# Patient Record
Sex: MALE | Race: BLACK OR AFRICAN AMERICAN | NOT HISPANIC OR LATINO | Employment: OTHER | URBAN - METROPOLITAN AREA
[De-identification: names, ages, dates, MRNs, and addresses within clinical notes are randomized per-mention and may not be internally consistent; named-entity substitution may affect disease eponyms.]

---

## 2017-04-04 ENCOUNTER — OFFICE VISIT (OUTPATIENT)
Dept: PULMONOLOGY | Facility: CLINIC | Age: 67
End: 2017-04-04
Payer: MEDICARE

## 2017-04-04 ENCOUNTER — HOSPITAL ENCOUNTER (OUTPATIENT)
Dept: PULMONOLOGY | Facility: CLINIC | Age: 67
Discharge: HOME | End: 2017-04-04
Payer: MEDICARE

## 2017-04-04 VITALS
BODY MASS INDEX: 28.25 KG/M2 | OXYGEN SATURATION: 96 % | DIASTOLIC BLOOD PRESSURE: 74 MMHG | HEIGHT: 67 IN | HEART RATE: 85 BPM | SYSTOLIC BLOOD PRESSURE: 142 MMHG | WEIGHT: 180 LBS

## 2017-04-04 DIAGNOSIS — J45.40 MODERATE PERSISTENT ASTHMA WITHOUT COMPLICATION: ICD-10-CM

## 2017-04-04 DIAGNOSIS — J45.30 MILD PERSISTENT ASTHMA IN ADULT WITHOUT COMPLICATION: Primary | ICD-10-CM

## 2017-04-04 LAB
PRE FEV1 FVC: 57
PRE FEV1: 1.35
PRE FVC: 2.38
PREDICTED FEV1 FVC: 80
PREDICTED FEV1: 2.52
PREDICTED FVC: 3.14

## 2017-04-04 PROCEDURE — 1157F ADVNC CARE PLAN IN RCRD: CPT | Mod: S$GLB,,, | Performed by: INTERNAL MEDICINE

## 2017-04-04 PROCEDURE — 3078F DIAST BP <80 MM HG: CPT | Mod: S$GLB,,, | Performed by: INTERNAL MEDICINE

## 2017-04-04 PROCEDURE — 1159F MED LIST DOCD IN RCRD: CPT | Mod: S$GLB,,, | Performed by: INTERNAL MEDICINE

## 2017-04-04 PROCEDURE — 1160F RVW MEDS BY RX/DR IN RCRD: CPT | Mod: S$GLB,,, | Performed by: INTERNAL MEDICINE

## 2017-04-04 PROCEDURE — 94010 BREATHING CAPACITY TEST: CPT | Mod: S$GLB,,, | Performed by: INTERNAL MEDICINE

## 2017-04-04 PROCEDURE — 99214 OFFICE O/P EST MOD 30 MIN: CPT | Mod: S$GLB,,, | Performed by: INTERNAL MEDICINE

## 2017-04-04 PROCEDURE — 3077F SYST BP >= 140 MM HG: CPT | Mod: S$GLB,,, | Performed by: INTERNAL MEDICINE

## 2017-04-04 PROCEDURE — 1126F AMNT PAIN NOTED NONE PRSNT: CPT | Mod: S$GLB,,, | Performed by: INTERNAL MEDICINE

## 2017-04-04 PROCEDURE — 99499 UNLISTED E&M SERVICE: CPT | Mod: S$GLB,,, | Performed by: INTERNAL MEDICINE

## 2017-04-04 PROCEDURE — 99999 PR PBB SHADOW E&M-EST. PATIENT-LVL III: CPT | Mod: PBBFAC,,, | Performed by: INTERNAL MEDICINE

## 2017-04-04 NOTE — PROGRESS NOTES
Subjective:       Patient ID: Scooter Morrissey is a 66 y.o. male.    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 He has not seen me since December, and  The use of the Symbicort in doing well. Seldom has  To use his ventolin inhaler.       No flowsheet data found.]  Review of Systems   Constitutional: Negative.    HENT: Negative.    Eyes: Negative.    Respiratory: Positive for cough and dyspnea on extertion.         Cough seems to be triggered by changes in temperature of pollens.     Hx of empyema treated at [facility]   Cardiovascular: Negative.    Genitourinary: Negative.    Musculoskeletal: Negative.    Skin: Negative.    Gastrointestinal: Negative.    Neurological: Negative.    Psychiatric/Behavioral: Negative.        Objective:      Physical Exam   Constitutional: He is oriented to person, place, and time. He appears well-developed and well-nourished.   HENT:   Head: Normocephalic and atraumatic.   Right Ear: External ear normal.   Left Ear: External ear normal.   Eyes: Conjunctivae and EOM are normal. Pupils are equal, round, and reactive to light.   Neck: Normal range of motion. Neck supple.   Cardiovascular: Normal rate, regular rhythm and normal heart sounds.    Pulmonary/Chest: Effort normal and breath sounds normal.   Clear with decreased air entry NO wheezes or rales.    Abdominal: Soft. Bowel sounds are normal.   Musculoskeletal: Normal range of motion.   Neurological: He is alert and oriented to person, place, and time. He has normal reflexes.   Skin: Skin is warm and dry.   Psychiatric: He has a normal mood and affect. His behavior is normal. Judgment and thought content normal.           Assessment:       No diagnosis found.    Outpatient Encounter Prescriptions as of 4/4/2017   Medication Sig Dispense Refill    albuterol 90 mcg/actuation inhaler Inhale 2 puffs into the lungs every 6 (six) hours as needed. 1 Inhaler 12     amlodipine (NORVASC) 10 MG tablet Take 1 tablet (10 mg total) by mouth once daily. 90 tablet 3    atorvastatin (LIPITOR) 20 MG tablet Take 1 tablet (20 mg total) by mouth once daily. 90 tablet 3    budesonide-formoterol 160-4.5 mcg (SYMBICORT) 160-4.5 mcg/actuation HFAA Inhale 2 puffs into the lungs every 12 (twelve) hours. 1 Inhaler 6    losartan (COZAAR) 100 MG tablet Take 1 tablet (100 mg total) by mouth once daily. 90 tablet 3    metoprolol succinate (TOPROL-XL) 100 MG 24 hr tablet Take 1 tablet (100 mg total) by mouth once daily. 90 tablet 3    sildenafil (VIAGRA) 50 MG tablet Take 1 tablet (50 mg total) by mouth daily as needed for Erectile Dysfunction. 9 tablet 3     No facility-administered encounter medications on file as of 4/4/2017.      No orders of the defined types were placed in this encounter.    Plan:        Patient is stable Fev-1:1.35 liters 57% IMP Stable COPD

## 2017-04-07 DIAGNOSIS — J44.9 CHRONIC OBSTRUCTIVE PULMONARY DISEASE, UNSPECIFIED COPD TYPE: Primary | ICD-10-CM

## 2017-04-17 ENCOUNTER — TELEPHONE (OUTPATIENT)
Dept: FAMILY MEDICINE | Facility: CLINIC | Age: 67
End: 2017-04-17

## 2017-04-17 NOTE — TELEPHONE ENCOUNTER
Called patient to reschedule is appointment Wednesday because it was schedules incorrectly. Left a voicemail requesting a callback.

## 2017-04-18 ENCOUNTER — TELEPHONE (OUTPATIENT)
Dept: FAMILY MEDICINE | Facility: CLINIC | Age: 67
End: 2017-04-18

## 2017-04-18 NOTE — TELEPHONE ENCOUNTER
Called patient to reschedule tomorrow's appointment due to incorrect scheduling. Left a voicemail requesting a call back.

## 2017-04-20 ENCOUNTER — TELEPHONE (OUTPATIENT)
Dept: INTERNAL MEDICINE | Facility: CLINIC | Age: 67
End: 2017-04-20

## 2017-04-20 DIAGNOSIS — E78.2 MIXED HYPERLIPIDEMIA: Primary | ICD-10-CM

## 2017-04-20 RX ORDER — ATORVASTATIN CALCIUM 20 MG/1
20 TABLET, FILM COATED ORAL DAILY
Qty: 90 TABLET | Refills: 4 | Status: SHIPPED | OUTPATIENT
Start: 2017-04-20 | End: 2017-05-03 | Stop reason: SDUPTHER

## 2017-04-20 NOTE — TELEPHONE ENCOUNTER
----- Message from Sia Dos Santos sent at 4/20/2017  1:28 PM CDT -----  Contact: Laurence vizcaino [facility]/[phone]  Pharmacy said they have faxed for a refill on patient's atorvastatin (LIPITOR) 20 MG tablet and there has been no reply. Please send to pharmacy on file. Please advise

## 2017-05-03 ENCOUNTER — OFFICE VISIT (OUTPATIENT)
Dept: FAMILY MEDICINE | Facility: CLINIC | Age: 67
End: 2017-05-03
Payer: MEDICARE

## 2017-05-03 VITALS
HEART RATE: 82 BPM | DIASTOLIC BLOOD PRESSURE: 80 MMHG | WEIGHT: 181.88 LBS | BODY MASS INDEX: 30.3 KG/M2 | HEIGHT: 65 IN | OXYGEN SATURATION: 96 % | SYSTOLIC BLOOD PRESSURE: 149 MMHG

## 2017-05-03 DIAGNOSIS — E66.9 OBESITY (BMI 30.0-34.9): ICD-10-CM

## 2017-05-03 DIAGNOSIS — I10 BENIGN ESSENTIAL HYPERTENSION: ICD-10-CM

## 2017-05-03 DIAGNOSIS — Z12.5 ENCOUNTER FOR SCREENING FOR MALIGNANT NEOPLASM OF PROSTATE: ICD-10-CM

## 2017-05-03 DIAGNOSIS — E78.2 MIXED HYPERLIPIDEMIA: ICD-10-CM

## 2017-05-03 DIAGNOSIS — M62.08 RECTUS DIASTASIS: ICD-10-CM

## 2017-05-03 DIAGNOSIS — Z79.899 MEDICATION MANAGEMENT: ICD-10-CM

## 2017-05-03 DIAGNOSIS — J01.00 ACUTE NON-RECURRENT MAXILLARY SINUSITIS: ICD-10-CM

## 2017-05-03 DIAGNOSIS — J44.9 CHRONIC OBSTRUCTIVE PULMONARY DISEASE, UNSPECIFIED COPD TYPE: ICD-10-CM

## 2017-05-03 DIAGNOSIS — Z00.00 ENCOUNTER FOR MEDICARE ANNUAL WELLNESS EXAM: Primary | ICD-10-CM

## 2017-05-03 DIAGNOSIS — H66.001 ACUTE SUPPURATIVE OTITIS MEDIA OF RIGHT EAR WITHOUT SPONTANEOUS RUPTURE OF TYMPANIC MEMBRANE, RECURRENCE NOT SPECIFIED: ICD-10-CM

## 2017-05-03 DIAGNOSIS — Z23 NEED FOR SHINGLES VACCINE: ICD-10-CM

## 2017-05-03 PROCEDURE — 99999 PR PBB SHADOW E&M-EST. PATIENT-LVL III: CPT | Mod: PBBFAC,,, | Performed by: FAMILY MEDICINE

## 2017-05-03 PROCEDURE — 99499 UNLISTED E&M SERVICE: CPT | Mod: S$GLB,,, | Performed by: FAMILY MEDICINE

## 2017-05-03 PROCEDURE — 3079F DIAST BP 80-89 MM HG: CPT | Mod: S$GLB,,, | Performed by: FAMILY MEDICINE

## 2017-05-03 PROCEDURE — 3077F SYST BP >= 140 MM HG: CPT | Mod: S$GLB,,, | Performed by: FAMILY MEDICINE

## 2017-05-03 PROCEDURE — 99397 PER PM REEVAL EST PAT 65+ YR: CPT | Mod: S$GLB,,, | Performed by: FAMILY MEDICINE

## 2017-05-03 RX ORDER — AMLODIPINE BESYLATE 10 MG/1
10 TABLET ORAL DAILY
Qty: 90 TABLET | Refills: 3 | Status: SHIPPED | OUTPATIENT
Start: 2017-05-03 | End: 2018-05-04 | Stop reason: SDUPTHER

## 2017-05-03 RX ORDER — METOPROLOL SUCCINATE 100 MG/1
100 TABLET, EXTENDED RELEASE ORAL DAILY
Qty: 90 TABLET | Refills: 3 | Status: SHIPPED | OUTPATIENT
Start: 2017-05-03 | End: 2018-07-30 | Stop reason: SDUPTHER

## 2017-05-03 RX ORDER — ALBUTEROL SULFATE 90 UG/1
2 AEROSOL, METERED RESPIRATORY (INHALATION) EVERY 6 HOURS PRN
Qty: 1 INHALER | Refills: 12 | Status: SHIPPED | OUTPATIENT
Start: 2017-05-03 | End: 2018-02-02

## 2017-05-03 RX ORDER — AMOXICILLIN AND CLAVULANATE POTASSIUM 875; 125 MG/1; MG/1
1 TABLET, FILM COATED ORAL 2 TIMES DAILY
Qty: 20 TABLET | Refills: 0 | Status: SHIPPED | OUTPATIENT
Start: 2017-05-03 | End: 2017-11-29

## 2017-05-03 RX ORDER — ATORVASTATIN CALCIUM 20 MG/1
20 TABLET, FILM COATED ORAL DAILY
Qty: 90 TABLET | Refills: 4 | Status: SHIPPED | OUTPATIENT
Start: 2017-05-03 | End: 2018-07-08 | Stop reason: SDUPTHER

## 2017-05-03 RX ORDER — LOSARTAN POTASSIUM 100 MG/1
100 TABLET ORAL DAILY
Qty: 90 TABLET | Refills: 3 | Status: SHIPPED | OUTPATIENT
Start: 2017-05-03 | End: 2018-05-04 | Stop reason: SDUPTHER

## 2017-05-03 NOTE — PROGRESS NOTES
" Office Visit    Patient Name: Scooter Morrissey    : 1950  MRN: [id number]    Subjective:  Scooter is a 66 y.o. male who presents today for:    Annual Exam    Scooter presents today for annual wellness exam and for monitoring of chronic HTN, HLD, and chronic lung disease/ COPD for which he also sees pulmonary Dr Guerrier. Home blood pressures are 120-140s/60s.       They have been feeling overall well other than some sinus drainage with thick and "smelly" mucus.      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 Weight is up 10 lbs over the last year.      Immunizations: TDaP 16, Prevnar 13 4/10/2015, states he also had Pneumovax 23 around age 64. Shingles due     Screening Tests: colonoscopy- 2016 and repeat 10 years, PSA today and declines MARISA     Eye/Dental: eye exam up to date-- examined last week by [facility]--> cataracts, dental up to date           Past Medical History  Past Medical History:   Diagnosis Date    Anxiety 2016    Benign essential hypertension 2016    Hyperlipidemia 2016    Interstitial pneumonitis 2016       Past Surgical History  Past Surgical History:   Procedure Laterality Date    COLONOSCOPY N/A 2016    Procedure: COLONOSCOPY;  Surgeon: Meme Mills MD;  Location: [facility];  Service: Endoscopy;  Laterality: N/A;    FINGER SURGERY      15 years ago    TONSILLECTOMY         Family History  Family History   Problem Relation Age of Onset    No Known Problems Mother     No Known Problems Father        Social History  Social History     Social History    Marital status:      Spouse name: N/A    Number of children: N/A    Years of education: N/A     Occupational History    lawn " "service      Social History Main Topics    Smoking status: Former Smoker     Types: Cigars    Smokeless tobacco: Former User     Quit date: 5/12/1996    Alcohol use No    Drug use: No    Sexual activity: Yes     Partners: Female     Other Topics Concern    Not on file     Social History Narrative       Current Medications  Medications reviewed and updated.     Allergies   Review of patient's allergies indicates:  No Known Allergies    Review of Systems (Pertinent positives)  Review of Systems   Constitutional: Positive for unexpected weight change (weight gain).   HENT: Positive for congestion and postnasal drip.    Eyes: Negative for visual disturbance.   Respiratory: Positive for shortness of breath (stable).    Cardiovascular: Negative for chest pain.   Gastrointestinal: Negative for blood in stool, constipation and diarrhea.   Genitourinary: Negative for difficulty urinating.   Musculoskeletal: Positive for arthralgias (right knee pain).   Allergic/Immunologic: Positive for environmental allergies.   Neurological: Negative for dizziness and light-headedness.   Psychiatric/Behavioral: Negative for sleep disturbance.       BP (!) 149/80  Pulse 82  Ht 5' 5" (1.651 m)  Wt 82.5 kg (181 lb 14.1 oz)  SpO2 96%  BMI 30.27 kg/m2    Physical Exam   Constitutional: He is oriented to person, place, and time. He appears well-developed and well-nourished. No distress.   HENT:   Head: Normocephalic and atraumatic.   Right Ear: External ear normal. Tympanic membrane is erythematous and bulging. Tympanic membrane is not perforated.   Left Ear: External ear normal.   Nose: Mucosal edema and rhinorrhea present. Right sinus exhibits maxillary sinus tenderness.   Mouth/Throat: Oropharynx is clear and moist. No oropharyngeal exudate.   Eyes: Conjunctivae are normal. No scleral icterus.   Neck: No tracheal deviation present. No thyromegaly present.   Cardiovascular: Normal rate, regular rhythm, normal heart sounds and intact " distal pulses.    Pulmonary/Chest: Effort normal and breath sounds normal.   Abdominal: Soft. Bowel sounds are normal. He exhibits no distension and no mass. There is no tenderness. No hernia (but has notable midline rectus diastasis).   Musculoskeletal: Normal range of motion.   Lymphadenopathy:     He has no cervical adenopathy.   Neurological: He is alert and oriented to person, place, and time. He has normal reflexes.   Skin: Skin is warm and dry. No rash noted.   Psychiatric: He has a normal mood and affect.   Vitals reviewed.        Assessment/Plan:  Scooter Morrissey is a 66 y.o. male who presents today for :    Scooter was seen today for annual exam.    Diagnoses and all orders for this visit:    Encounter for Medicare annual wellness exam  -     Hemoglobin A1c; Future  -     Comprehensive metabolic panel; Future  -     Lipid panel; Future  -     CBC auto differential; Future  -     TSH; Future  -     PSA, Screening; Future  -     Vitamin D; Future    Obesity (BMI 30.0-34.9)  Comments:  discussed starting aerobic exercise program-- walking 20-30 minutes sustained a few times weekly, cut back on sugar and salt  Orders:  -     Hemoglobin A1c; Future    Benign essential hypertension  Comments:  stable on metoprolol, cozaar and amlodipine and continue home monitoring  Orders:  -     Comprehensive metabolic panel; Future  -     CBC auto differential; Future  -     TSH; Future  -     metoprolol succinate (TOPROL-XL) 100 MG 24 hr tablet; Take 1 tablet (100 mg total) by mouth once daily.  -     losartan (COZAAR) 100 MG tablet; Take 1 tablet (100 mg total) by mouth once daily.  -     amlodipine (NORVASC) 10 MG tablet; Take 1 tablet (10 mg total) by mouth once daily.    Mixed hyperlipidemia  -     Lipid panel; Future  -     atorvastatin (LIPITOR) 20 MG tablet; Take 1 tablet (20 mg total) by mouth once daily.    Chronic obstructive pulmonary disease, unspecified COPD type  Comments:  stable on Symbicort, following with  Pulmonary, Dr Guerrier  Orders:  -     albuterol 90 mcg/actuation inhaler; Inhale 2 puffs into the lungs every 6 (six) hours as needed.    Medication management  -     Hemoglobin A1c; Future  -     Comprehensive metabolic panel; Future  -     Lipid panel; Future  -     CBC auto differential; Future  -     TSH; Future  -     PSA, Screening; Future  -     Vitamin D; Future    Encounter for screening for malignant neoplasm of prostate  -     PSA, Screening; Future    Acute non-recurrent maxillary sinusitis  Comments:  daily Allegra or Zyrtec to help dry up drainage, Saline rinses, and Augmentin Antibiotic  Orders:  -     amoxicillin-clavulanate 875-125mg (AUGMENTIN) 875-125 mg per tablet; Take 1 tablet by mouth 2 (two) times daily.    Need for shingles vaccine  Comments:  advised to obtain shingels vaccine through the [facility]    Benign essential hypertension  Comments:  INCREASE COZAAR  MG NIGHTLY (NEW PRESCRIPTION REFLECTS ADJUSTED DOSE), CHECK AND RECORD PRESSURES AND FOLLOW UP IN 2 WEEKS  Orders:  -     Comprehensive metabolic panel; Future  -     CBC auto differential; Future  -     TSH; Future  -     metoprolol succinate (TOPROL-XL) 100 MG 24 hr tablet; Take 1 tablet (100 mg total) by mouth once daily.  -     losartan (COZAAR) 100 MG tablet; Take 1 tablet (100 mg total) by mouth once daily.  -     amlodipine (NORVASC) 10 MG tablet; Take 1 tablet (10 mg total) by mouth once daily.    Benign essential hypertension  Comments:  continue current metoprolol succinate 100 mg daily, losartan 100 mg daily, and increase amlodipine to 10 mg daily. monitor readings & follow up 3 months  Orders:  -     Comprehensive metabolic panel; Future  -     CBC auto differential; Future  -     TSH; Future  -     metoprolol succinate (TOPROL-XL) 100 MG 24 hr tablet; Take 1 tablet (100 mg total) by mouth once daily.  -     losartan (COZAAR) 100 MG tablet; Take 1 tablet (100 mg total) by mouth once daily.  -     amlodipine (NORVASC) 10  MG tablet; Take 1 tablet (10 mg total) by mouth once daily.    Rectus diastasis    Acute suppurative otitis media of right ear without spontaneous rupture of tympanic membrane, recurrence not specified  Comments:  prescribing augmentin antibiotic            ICD-10-CM ICD-9-CM    1. Encounter for Medicare annual wellness exam Z00.00 V70.0 Hemoglobin A1c      Comprehensive metabolic panel      Lipid panel      CBC auto differential      TSH      PSA, Screening      Vitamin D   2. Obesity (BMI 30.0-34.9) E66.9 278.00 Hemoglobin A1c    discussed starting aerobic exercise program-- walking 20-30 minutes sustained a few times weekly, cut back on sugar and salt   3. Benign essential hypertension I10 401.1 Comprehensive metabolic panel      CBC auto differential      TSH      metoprolol succinate (TOPROL-XL) 100 MG 24 hr tablet      losartan (COZAAR) 100 MG tablet      amlodipine (NORVASC) 10 MG tablet    stable on metoprolol, cozaar and amlodipine and continue home monitoring   4. Mixed hyperlipidemia E78.2 272.2 Lipid panel      atorvastatin (LIPITOR) 20 MG tablet   5. Chronic obstructive pulmonary disease, unspecified COPD type J44.9 496 albuterol 90 mcg/actuation inhaler    stable on Symbicort, following with Pulmonary, Dr Guerrier   6. Medication management Z79.899 V58.69 Hemoglobin A1c      Comprehensive metabolic panel      Lipid panel      CBC auto differential      TSH      PSA, Screening      Vitamin D   7. Encounter for screening for malignant neoplasm of prostate Z12.5 V76.44 PSA, Screening   8. Acute non-recurrent maxillary sinusitis J01.00 461.0 amoxicillin-clavulanate 875-125mg (AUGMENTIN) 875-125 mg per tablet    daily Allegra or Zyrtec to help dry up drainage, Saline rinses, and Augmentin Antibiotic   9. Need for shingles vaccine Z23 V04.89     advised to obtain shingels vaccine through the pharamcy   10. Benign essential hypertension I10 401.1 Comprehensive metabolic panel      CBC auto differential      TSH       metoprolol succinate (TOPROL-XL) 100 MG 24 hr tablet      losartan (COZAAR) 100 MG tablet      amlodipine (NORVASC) 10 MG tablet    INCREASE COZAAR  MG NIGHTLY (NEW PRESCRIPTION REFLECTS ADJUSTED DOSE), CHECK AND RECORD PRESSURES AND FOLLOW UP IN 2 WEEKS   11. Benign essential hypertension I10 401.1 Comprehensive metabolic panel      CBC auto differential      TSH      metoprolol succinate (TOPROL-XL) 100 MG 24 hr tablet      losartan (COZAAR) 100 MG tablet      amlodipine (NORVASC) 10 MG tablet    continue current metoprolol succinate 100 mg daily, losartan 100 mg daily, and increase amlodipine to 10 mg daily. monitor readings & follow up 3 months   12. Rectus diastasis M62.08 728.84    13. Acute suppurative otitis media of right ear without spontaneous rupture of tympanic membrane, recurrence not specified H66.001 382.00     prescribing augmentin antibiotic       Return in about 6 months (around 11/3/2017) for return as needed for new concerns.

## 2017-05-03 NOTE — MR AVS SNAPSHOT
[address]  [place] [address]  Phone: [phone]  Fax: [phone]                  Scooter Morrissey   5/3/2017 10:40 AM   Office Visit    Description:  Male : 1950   Provider:  Katelynn Rojas MD   Department:  [facility]           Reason for Visit     Annual Exam           Diagnoses this Visit        Comments    Encounter for Medicare annual wellness exam    -  Primary     Obesity (BMI 30.0-34.9)     discussed starting aerobic exercise program-- walking 20-30 minutes sustained a few times weekly, cut back on sugar and salt    Benign essential hypertension     stable on metoprolol, cozaar and amlodipine and continue home monitoring    Mixed hyperlipidemia         Chronic obstructive pulmonary disease, unspecified COPD type     stable on Symbicort, following with Pulmonary, Dr Guerrier    Medication management         Encounter for screening for malignant neoplasm of prostate         Acute non-recurrent maxillary sinusitis     daily Allegra or Zyrtec to help dry up drainage, Saline rinses, and Augmentin Antibiotic    Need for shingles vaccine     advised to obtain shingels vaccine throught he pharamcy    Benign essential hypertension     INCREASE COZAAR  MG NIGHTLY (NEW PRESCRIPTION REFLECTS ADJUSTED DOSE), CHECK AND RECORD PRESSURES AND FOLLOW UP IN 2 WEEKS    Benign essential hypertension     continue current metoprolol succinate 100 mg daily, losartan 100 mg daily, and increase amlodipine to 10 mg daily. monitor readings & follow up 3 months    Rectus diastasis         Acute suppurative otitis media of right ear without spontaneous rupture of tympanic membrane, recurrence not specified     prescribing augmentin antibiotic           To Do List           Future Appointments        Provider Department Dept Phone    2017 7:00 AM APPOINTMENT [facility] [phone]      Goals (5 Years of Data)      None      Follow-Up and Disposition     Return in about 6 months (around 11/3/2017) for return as needed for new concerns.       These Medications        Disp Refills Start End    amoxicillin-clavulanate 875-125mg (AUGMENTIN) 875-125 mg per tablet 20 tablet 0 5/3/2017     Take 1 tablet by mouth 2 (two) times daily. - Oral    Pharmacy: [address] Ph #: [phone]       albuterol 90 mcg/actuation inhaler 1 Inhaler 12 5/3/2017     Inhale 2 puffs into the lungs every 6 (six) hours as needed. - Inhalation    Pharmacy: [address] Ph #: [phone]       metoprolol succinate (TOPROL-XL) 100 MG 24 hr tablet 90 tablet 3 5/3/2017 5/3/2018    Take 1 tablet (100 mg total) by mouth once daily. - Oral    Pharmacy: [address] Ph #: [phone]       losartan (COZAAR) 100 MG tablet 90 tablet 3 5/3/2017 5/3/2018    Take 1 tablet (100 mg total) by mouth once daily. - Oral    Pharmacy: [address] Ph #: [phone]       atorvastatin (LIPITOR) 20 MG tablet 90 tablet 4 5/3/2017 5/3/2018    Take 1 tablet (20 mg total) by mouth once daily. - Oral    Pharmacy: [address] Ph #: [phone]       amlodipine (NORVASC) 10 MG tablet 90 tablet 3 5/3/2017 5/3/2018    Take 1 tablet (10 mg total) by mouth once daily. - Oral    Pharmacy: [address] Ph #: [phone]         [facility]     [facility] Nurse Care Line - 24/7 Assistance  Unless otherwise directed by your provider, please  contact [facility], our nurse care line that is available for 24/7 assistance.     Registered nurses in the [facility] provide: appointment scheduling, clinical advisement, health education, and other advisory services.  Call: [phone] (toll free)               Medications           Message regarding Medications     Verify the changes and/or additions to your medication regime listed below are the same as discussed with your clinician today.  If any of these changes or additions are incorrect, please notify your healthcare provider.        START taking these NEW medications        Refills    amoxicillin-clavulanate 875-125mg (AUGMENTIN) 875-125 mg per tablet 0    Sig: Take 1 tablet by mouth 2 (two) times daily.    Class: Normal    Route: Oral      STOP taking these medications     sildenafil (VIAGRA) 50 MG tablet Take 1 tablet (50 mg total) by mouth daily as needed for Erectile Dysfunction.           Verify that the below list of medications is an accurate representation of the medications you are currently taking.  If none reported, the list may be blank. If incorrect, please contact your healthcare provider. Carry this list with you in case of emergency.           Current Medications     albuterol 90 mcg/actuation inhaler Inhale 2 puffs into the lungs every 6 (six) hours as needed.    amlodipine (NORVASC) 10 MG tablet Take 1 tablet (10 mg total) by mouth once daily.    amoxicillin-clavulanate 875-125mg (AUGMENTIN) 875-125 mg per tablet Take 1 tablet by mouth 2 (two) times daily.    atorvastatin (LIPITOR) 20 MG tablet Take 1 tablet (20 mg total) by mouth once daily.    budesonide-formoterol 160-4.5 mcg (SYMBICORT) 160-4.5 mcg/actuation HFAA Inhale 2 puffs into the lungs every 12 (twelve) hours.    losartan (COZAAR) 100 MG tablet Take 1 tablet (100 mg total) by mouth once daily.    metoprolol succinate (TOPROL-XL) 100 MG 24 hr tablet Take 1 tablet (100 mg total) by mouth once daily.          "  Clinical Reference Information           Your Vitals Were     BP Pulse Height Weight SpO2 BMI    149/80 82 5' 5" (1.651 m) 82.5 kg (181 lb 14.1 oz) 96% 30.27 kg/m2      Blood Pressure          Most Recent Value    BP  (!)  149/80      Allergies as of 5/3/2017     No Known Allergies      Immunizations Administered on Date of Encounter - 5/3/2017     None      Orders Placed During Today's Visit     Future Labs/Procedures Expected by Expires    CBC auto differential  5/3/2017 8/1/2017    Comprehensive metabolic panel  5/3/2017 8/1/2017    Hemoglobin A1c  5/3/2017 7/2/2018    Lipid panel  5/3/2017 8/1/2017    PSA, Screening  5/3/2017 8/1/2017    TSH  5/3/2017 8/1/2017    Vitamin D  5/3/2017 7/2/2018      Language Assistance Services     ATTENTION: Language assistance services are available, free of charge. Please call [phone].      ATENCIÓN: Si habla español, tiene a castro disposición servicios gratuitos de asistencia lingüística. Llame al [phone].     CHÚ Ý: N?u b?n nói Ti?ng Vi?t, có các d?ch v? h? tr? ngôn ng? mi?n phí dành cho b?n. G?i s? [phone].         [facility] complies with applicable Federal civil rights laws and does not discriminate on the basis of race, color, national origin, age, disability, or sex.        "

## 2017-05-09 ENCOUNTER — LAB VISIT (OUTPATIENT)
Dept: LAB | Facility: HOSPITAL | Age: 67
End: 2017-05-09
Attending: FAMILY MEDICINE
Payer: MEDICARE

## 2017-05-09 DIAGNOSIS — E78.2 MIXED HYPERLIPIDEMIA: ICD-10-CM

## 2017-05-09 DIAGNOSIS — E66.9 OBESITY (BMI 30.0-34.9): ICD-10-CM

## 2017-05-09 DIAGNOSIS — Z00.00 ENCOUNTER FOR MEDICARE ANNUAL WELLNESS EXAM: ICD-10-CM

## 2017-05-09 DIAGNOSIS — Z79.899 MEDICATION MANAGEMENT: ICD-10-CM

## 2017-05-09 DIAGNOSIS — I10 BENIGN ESSENTIAL HYPERTENSION: ICD-10-CM

## 2017-05-09 DIAGNOSIS — Z12.5 ENCOUNTER FOR SCREENING FOR MALIGNANT NEOPLASM OF PROSTATE: ICD-10-CM

## 2017-05-09 LAB
25(OH)D3+25(OH)D2 SERPL-MCNC: 25 NG/ML
ALBUMIN SERPL BCP-MCNC: 3.8 G/DL
ALP SERPL-CCNC: 69 U/L
ALT SERPL W/O P-5'-P-CCNC: 24 U/L
ANION GAP SERPL CALC-SCNC: 9 MMOL/L
AST SERPL-CCNC: 19 U/L
BASOPHILS # BLD AUTO: 0.02 K/UL
BASOPHILS NFR BLD: 0.3 %
BILIRUB SERPL-MCNC: 0.5 MG/DL
BUN SERPL-MCNC: 15 MG/DL
CALCIUM SERPL-MCNC: 9.5 MG/DL
CHLORIDE SERPL-SCNC: 106 MMOL/L
CHOLEST/HDLC SERPL: 4.7 {RATIO}
CO2 SERPL-SCNC: 25 MMOL/L
COMPLEXED PSA SERPL-MCNC: 0.47 NG/ML
CREAT SERPL-MCNC: 1 MG/DL
DIFFERENTIAL METHOD: ABNORMAL
EOSINOPHIL # BLD AUTO: 0.3 K/UL
EOSINOPHIL NFR BLD: 3.6 %
ERYTHROCYTE [DISTWIDTH] IN BLOOD BY AUTOMATED COUNT: 12.9 %
EST. GFR  (AFRICAN AMERICAN): >60 ML/MIN/1.73 M^2
EST. GFR  (NON AFRICAN AMERICAN): >60 ML/MIN/1.73 M^2
GLUCOSE SERPL-MCNC: 102 MG/DL
HCT VFR BLD AUTO: 41.3 %
HDL/CHOLESTEROL RATIO: 21.4 %
HDLC SERPL-MCNC: 140 MG/DL
HDLC SERPL-MCNC: 30 MG/DL
HGB BLD-MCNC: 14.2 G/DL
LDLC SERPL CALC-MCNC: 92.6 MG/DL
LYMPHOCYTES # BLD AUTO: 2.4 K/UL
LYMPHOCYTES NFR BLD: 31.6 %
MCH RBC QN AUTO: 33.3 PG
MCHC RBC AUTO-ENTMCNC: 34.4 %
MCV RBC AUTO: 97 FL
MONOCYTES # BLD AUTO: 0.6 K/UL
MONOCYTES NFR BLD: 8 %
NEUTROPHILS # BLD AUTO: 4.3 K/UL
NEUTROPHILS NFR BLD: 56.2 %
NONHDLC SERPL-MCNC: 110 MG/DL
PLATELET # BLD AUTO: 223 K/UL
PMV BLD AUTO: 10.6 FL
POTASSIUM SERPL-SCNC: 3.9 MMOL/L
PROT SERPL-MCNC: 9 G/DL
RBC # BLD AUTO: 4.27 M/UL
SODIUM SERPL-SCNC: 140 MMOL/L
TRIGL SERPL-MCNC: 87 MG/DL
TSH SERPL DL<=0.005 MIU/L-ACNC: 1.6 UIU/ML
WBC # BLD AUTO: 7.71 K/UL

## 2017-05-09 PROCEDURE — 84153 ASSAY OF PSA TOTAL: CPT

## 2017-05-09 PROCEDURE — 82306 VITAMIN D 25 HYDROXY: CPT

## 2017-05-09 PROCEDURE — 80053 COMPREHEN METABOLIC PANEL: CPT

## 2017-05-09 PROCEDURE — 84443 ASSAY THYROID STIM HORMONE: CPT

## 2017-05-09 PROCEDURE — 80061 LIPID PANEL: CPT

## 2017-05-09 PROCEDURE — 36415 COLL VENOUS BLD VENIPUNCTURE: CPT

## 2017-05-09 PROCEDURE — 85025 COMPLETE CBC W/AUTO DIFF WBC: CPT

## 2017-05-09 PROCEDURE — 83036 HEMOGLOBIN GLYCOSYLATED A1C: CPT

## 2017-05-10 LAB
ESTIMATED AVG GLUCOSE: 123 MG/DL
HBA1C MFR BLD HPLC: 5.9 %

## 2017-05-12 ENCOUNTER — TELEPHONE (OUTPATIENT)
Dept: FAMILY MEDICINE | Facility: CLINIC | Age: 67
End: 2017-05-12

## 2017-05-12 DIAGNOSIS — E55.9 VITAMIN D DEFICIENCY: ICD-10-CM

## 2017-05-12 DIAGNOSIS — Z79.899 MEDICATION MANAGEMENT: ICD-10-CM

## 2017-05-12 DIAGNOSIS — E78.2 MIXED HYPERLIPIDEMIA: ICD-10-CM

## 2017-05-12 DIAGNOSIS — I10 BENIGN ESSENTIAL HYPERTENSION: Primary | ICD-10-CM

## 2017-05-12 NOTE — TELEPHONE ENCOUNTER
Please notify that labs overall look good but he is on the border for pre-diabetes, so he should definitely cut back on sweet tea and sugars in general.  Also vitamin D level is a little low, so I would like him to take 5,000 IU of vitamin D daily-- it's over the counter to help improve his level. I am ordering repeat labs to be done in 6 months, prior to a 6 month follow up, thanks

## 2017-05-15 ENCOUNTER — TELEPHONE (OUTPATIENT)
Dept: FAMILY MEDICINE | Facility: CLINIC | Age: 67
End: 2017-05-15

## 2017-11-29 ENCOUNTER — OFFICE VISIT (OUTPATIENT)
Dept: FAMILY MEDICINE | Facility: CLINIC | Age: 67
End: 2017-11-29
Payer: MEDICARE

## 2017-11-29 VITALS
OXYGEN SATURATION: 94 % | HEIGHT: 65 IN | BODY MASS INDEX: 30.01 KG/M2 | HEART RATE: 93 BPM | WEIGHT: 180.13 LBS | DIASTOLIC BLOOD PRESSURE: 84 MMHG | SYSTOLIC BLOOD PRESSURE: 152 MMHG

## 2017-11-29 DIAGNOSIS — E66.3 OVERWEIGHT (BMI 25.0-29.9): ICD-10-CM

## 2017-11-29 DIAGNOSIS — I10 BENIGN ESSENTIAL HYPERTENSION: ICD-10-CM

## 2017-11-29 DIAGNOSIS — J44.9 CHRONIC OBSTRUCTIVE PULMONARY DISEASE, UNSPECIFIED COPD TYPE: ICD-10-CM

## 2017-11-29 DIAGNOSIS — Z23 NEED FOR VACCINATION AGAINST STREPTOCOCCUS PNEUMONIAE: ICD-10-CM

## 2017-11-29 DIAGNOSIS — E55.9 VITAMIN D DEFICIENCY: ICD-10-CM

## 2017-11-29 DIAGNOSIS — J84.10 LUNG GRANULOMA: ICD-10-CM

## 2017-11-29 DIAGNOSIS — I70.0 AORTIC ATHEROSCLEROSIS: ICD-10-CM

## 2017-11-29 DIAGNOSIS — Z00.00 ENCOUNTER FOR PREVENTIVE HEALTH EXAMINATION: Primary | ICD-10-CM

## 2017-11-29 DIAGNOSIS — E78.2 MIXED HYPERLIPIDEMIA: ICD-10-CM

## 2017-11-29 PROCEDURE — 90732 PPSV23 VACC 2 YRS+ SUBQ/IM: CPT | Mod: S$GLB,,, | Performed by: FAMILY MEDICINE

## 2017-11-29 PROCEDURE — G0009 ADMIN PNEUMOCOCCAL VACCINE: HCPCS | Mod: S$GLB,,, | Performed by: FAMILY MEDICINE

## 2017-11-29 PROCEDURE — 99999 PR PBB SHADOW E&M-EST. PATIENT-LVL IV: CPT | Mod: PBBFAC,,, | Performed by: NURSE PRACTITIONER

## 2017-11-29 PROCEDURE — 99499 UNLISTED E&M SERVICE: CPT | Mod: S$GLB,,, | Performed by: NURSE PRACTITIONER

## 2017-11-29 PROCEDURE — G0439 PPPS, SUBSEQ VISIT: HCPCS | Mod: S$GLB,,, | Performed by: NURSE PRACTITIONER

## 2017-11-29 NOTE — Clinical Note
Primary Care Providers: Katelynn Rojas MD, MD (General)  Your patient was seen today for a HRA visit. Gap(s) in care (HEDIS gaps) have been identified during this visit that require additional testing and possible follow up.  Orders Placed This Encounter     Pneumococcal polysaccharide vaccine 23-valent greater than or equal to 3yo subcutaneous/IM   These orders were placed using [facility] approved protocol and any results will be forwarded to your office for appropriate follow up. I have included a copy of my visit note; please review the note and feel free to contact me with any questions.   Thank you for allowing me to participate in the care of your patients. Estelita Gomez NP

## 2017-11-29 NOTE — PATIENT INSTRUCTIONS
Counseling and Referral of Other Preventative  (Italic type indicates deductible and co-insurance are waived)    Patient Name: Scooter Morrissey  Today's Date: 11/29/2017      SERVICE LIMITATIONS RECOMMENDATION    Vaccines    · Pneumococcal (once after 65)    · Influenza (annually)    · Hepatitis B (if medium/high risk)    · Prevnar 13      Hepatitis B medium/high risk factors:       - End-stage renal disease       - Hemophiliacs who received Factor VII or         IX concentrates       - Clients of institutions for the mentally             retarded       - Persons who live in the same house as          a HepB carrier       - Homosexual men       - Illicit injectable drug abusers     Pneumococcal: Scheduled - see appointments     Influenza: Recommended to patient, declined     Hepatitis B: N/A     Prevnar 13: Done, no repeat necessary    Prostate cancer screening (annually to age 75)     Prostate specific antigen (PSA) Shared decision making with Provider. Sometimes a co-pay may be required if the patient decides to have this test. The USPSTF no longer recommends prostate cancer screening routinely in medicine: every 1 year    Colorectal cancer screening (to age 75)    · Fecal occult blood test (annual)  · Flexible sigmoidoscopy (5y)  · Screening colonoscopy (10y)  · Barium enema   Last done 06/01/2016, recommend to repeat every 10  years    Diabetes self-management training (no USPSTF recommendations)  Requires referral by treating physician for patient with diabetes or renal disease. 10 hours of initial DSMT sessions of no less than 30 minutes each in a continuous 12-month period. 2 hours of follow-up DSMT in subsequent years.  N/A    Glaucoma screening (no USPSTF recommendation)  Diabetes mellitus, family history   , age 50 or over    American, age 65 or over  Recommend follow up with eye care professional regularly    Medical nutrition therapy for diabetes or renal disease (no recommended  schedule)  Requires referral by treating physician for patient with diabetes or renal disease or kidney transplant within the past 3 years.  Can be provided in same year as diabetes self-management training (DSMT), and CMS recommends medical nutrition therapy take place after DSMT. Up to 3 hours for initial year and 2 hours in subsequent years.  N/A    Cardiovascular screening blood tests (every 5 years)  · Fasting lipid panel  Order as a panel if possible  Done this year, repeat every year    Diabetes screening tests (at least every 3 years, Medicare covers annually or at 6-month intervals for prediabetic patients)  · Fasting blood sugar (FBS) or glucose tolerance test (GTT)  Patient must be diagnosed with one of the following:       - Hypertension       - Dyslipidemia       - Obesity (BMI 30kg/m2)       - Previous elevated impaired FBS or GTT       ... or any two of the following:       - Overweight (BMI 25 but <30)       - Family history of diabetes       - Age 65 or older       - History of gestational diabetes or birth of baby weighing more than 9 pounds  Done this year, repeat every year    Abdominal aortic aneurysm screening (once)  · Sonogram   Limited to patients who meet one of the following criteria:       - Men who are 65-75 years old and have smoked more than 100 cigarette in their lifetime       - Anyone with a family history of abdominal aortic aneurysm       - Anyone recommended for screening by the USPSTF  Done, no repeat necessary    HIV screening (annually for increased risk patients)  · HIV-1 and HIV-2 by EIA, or MARTHA, rapid antibody test or oral mucosa transudate  Patients must be at increased risk for HIV infection per USPSTF guidelines or pregnant. Tests covered annually for patient at increased risk or as requested by the patient. Pregnant patients may receive up to 3 tests during pregnancy.  Risks discussed, screening is not recommended    Smoking cessation counseling (up to 8 sessions per  year)  Patients must be asymptomatic of tobacco-related conditions to receive as a preventative service.  Non-smoker    Subsequent annual wellness visit  At least 12 months since last AWV  Return in one year     The following information is provided to all patients.  This information is to help you find resources for any of the problems found today that may be affecting your health:                Living healthy guide: www.[facility].[place].[facility]      Understanding Diabetes: www.diabetes.org      Eating healthy: www.cdc.gov/healthyweight      CDC home safety checklist: www.cdc.gov/steadi/patient.html      Agency on Aging: www.goea.[place].[facility]      Alcoholics anonymous (AA): www.aa.org      Physical Activity: www.emilie.nih.gov/yq7ctsh      Tobacco use: www.quitwithusla.org

## 2017-11-30 ENCOUNTER — TELEPHONE (OUTPATIENT)
Dept: FAMILY MEDICINE | Facility: CLINIC | Age: 67
End: 2017-11-30

## 2017-11-30 PROBLEM — H66.001 ACUTE SUPPURATIVE OTITIS MEDIA OF RIGHT EAR WITHOUT SPONTANEOUS RUPTURE OF TYMPANIC MEMBRANE: Status: RESOLVED | Noted: 2017-05-03 | Resolved: 2017-11-30

## 2017-11-30 NOTE — PROGRESS NOTES
"Scooter Morrissey presented for a  Medicare AWV and comprehensive Health Risk Assessment today. The following components were reviewed and updated:    · Medical history  · Family History  · Social history  · Allergies and Current Medications  · Health Risk Assessment  · Health Maintenance  · Care Team     ** See Completed Assessments for Annual Wellness Visit within the encounter summary.**       The following assessments were completed:  · Living Situation  · CAGE  · Depression Screening  · Timed Get Up and Go  · Whisper Test  · Cognitive Function Screening  · Nutrition Screening  · ADL Screening  · PAQ Screening    Vitals:    11/29/17 1453   BP: (!) 152/84   Pulse: 93   SpO2: (!) 94%   Weight: 81.7 kg (180 lb 1.9 oz)   Height: 5' 5" (1.651 m)     Body mass index is 29.97 kg/m².     Physical Exam   Constitutional: He is oriented to person, place, and time. He appears well-developed and well-nourished. No distress.   HENT:   Head: Normocephalic and atraumatic.   Eyes: EOM are normal. Pupils are equal, round, and reactive to light.   Neck: Neck supple. No JVD present. No tracheal deviation present.   Cardiovascular: Normal rate, regular rhythm, normal heart sounds and intact distal pulses.    No murmur heard.  Pulmonary/Chest: Effort normal and breath sounds normal. No respiratory distress. He has no wheezes. He has no rales.   Abdominal: Soft. Bowel sounds are normal. He exhibits no distension and no mass. There is no tenderness.   Musculoskeletal: Normal range of motion. He exhibits no edema or tenderness.   Neurological: He is alert and oriented to person, place, and time. Coordination normal.   Skin: Skin is warm and dry. No erythema. No pallor.   Psychiatric: He has a normal mood and affect. His behavior is normal. Judgment and thought content normal. Cognition and memory are normal. He expresses no homicidal and no suicidal ideation.   Nursing note and vitals reviewed.        Diagnoses and health risks identified " today and associated recommendations/orders:    1. Encounter for preventive health examination    2. Benign essential hypertension  Chronic; stable on medication.  Followed by PCP.    3. Mixed hyperlipidemia  Chronic; stable on medication.  Followed by PCP.    4. Aortic atherosclerosis  Chronic; stable.  As seen on imaging dated 04/13/16.  Followed by PCP.    5. Chronic obstructive pulmonary disease, unspecified COPD type  Chronic; stable on medication.  Followed by Pulmonology.    6. Lung granuloma  Chronic; stable.  As seen on imaging dated 04/13/16.  Followed by Pulmonology.    7. Vitamin D deficiency  Chronic; stable.  Followed by PCP.    8. Overweight (BMI 25.0-29.9)    9. Need for vaccination against Streptococcus pneumoniae  Pneumovax vaccination administered today in clinic.  - Pneumococcal polysaccharide vaccine 23-valent greater than or equal to 3yo subcutaneous/IM      Provided Scooter with a 5-10 year written screening schedule and personal prevention plan. Recommendations were developed using the USPSTF age appropriate recommendations. Education, counseling, and referrals were provided as needed. After Visit Summary printed and given to patient which includes a list of additional screenings\tests needed.    Return in about 3 months (around 2/28/2018) for follow-up with PCP, HRA visit in 1 year.    Estelita Gomez NP

## 2017-11-30 NOTE — TELEPHONE ENCOUNTER
Called patient to schedule 40 minute appointment and labs. Left a voicemail requesting a call back.

## 2017-11-30 NOTE — TELEPHONE ENCOUNTER
Patient is overdue for labs (they are already ordered) and a 6 month follow up.  Please schedule him for a 40 minute visit the week between Christmas and new year's if possible thanks

## 2017-12-01 ENCOUNTER — TELEPHONE (OUTPATIENT)
Dept: FAMILY MEDICINE | Facility: CLINIC | Age: 67
End: 2017-12-01

## 2017-12-01 NOTE — TELEPHONE ENCOUNTER
----- Message from Everette Ibarra sent at 12/1/2017 10:56 AM CST -----  Contact: [phone] self  Patient called in returning your call. Please advise.

## 2017-12-12 ENCOUNTER — HOSPITAL ENCOUNTER (OUTPATIENT)
Dept: PULMONOLOGY | Facility: CLINIC | Age: 67
Discharge: HOME | End: 2017-12-12
Payer: MEDICARE

## 2017-12-12 ENCOUNTER — OFFICE VISIT (OUTPATIENT)
Dept: PULMONOLOGY | Facility: CLINIC | Age: 67
End: 2017-12-12
Payer: MEDICARE

## 2017-12-12 VITALS
SYSTOLIC BLOOD PRESSURE: 134 MMHG | BODY MASS INDEX: 30.32 KG/M2 | HEART RATE: 82 BPM | OXYGEN SATURATION: 96 % | DIASTOLIC BLOOD PRESSURE: 64 MMHG | HEIGHT: 65 IN | WEIGHT: 182 LBS

## 2017-12-12 DIAGNOSIS — J44.9 CHRONIC OBSTRUCTIVE PULMONARY DISEASE, UNSPECIFIED COPD TYPE: ICD-10-CM

## 2017-12-12 DIAGNOSIS — J45.30 WELL CONTROLLED MILD PERSISTENT ASTHMA: Primary | ICD-10-CM

## 2017-12-12 LAB
PRE FEV1 FVC: 55
PRE FEV1: 1.15
PRE FVC: 2.11
PREDICTED FEV1 FVC: 80
PREDICTED FEV1: 2.49
PREDICTED FVC: 3.11

## 2017-12-12 PROCEDURE — 99214 OFFICE O/P EST MOD 30 MIN: CPT | Mod: 25,S$GLB,, | Performed by: INTERNAL MEDICINE

## 2017-12-12 PROCEDURE — 99499 UNLISTED E&M SERVICE: CPT | Mod: S$GLB,,, | Performed by: INTERNAL MEDICINE

## 2017-12-12 PROCEDURE — 99999 PR PBB SHADOW E&M-EST. PATIENT-LVL III: CPT | Mod: PBBFAC,,, | Performed by: INTERNAL MEDICINE

## 2017-12-12 PROCEDURE — 94010 BREATHING CAPACITY TEST: CPT | Mod: S$GLB,,, | Performed by: INTERNAL MEDICINE

## 2017-12-12 RX ORDER — PREDNISONE 10 MG/1
TABLET ORAL
Qty: 36 TABLET | Refills: 1 | Status: SHIPPED | OUTPATIENT
Start: 2017-12-12 | End: 2019-03-06

## 2017-12-14 NOTE — PROGRESS NOTES
Subjective:       Patient ID: Scooter Morrissey is a 67 y.o. male.    Chief Complaint: Asthma and COPD    HPI  Patient with moderte COPD with a bronchospastic component comes for a periodic follow up. He has done OK but is not compliant with taking his symbicort.   It is too expensive to use except when he is symptomatic.Fev-1: 1.15 liters 55% and FVC 68%. He is no distress today. Have asked him to use his symbicort bid and gave him a BREO inhaler sample to use when this symbicort run out. Then in he new year will see if he can get financial assistance for his inhaler.       No flowsheet data found.]  Review of Systems   Constitutional: Negative.    HENT: Negative.    Eyes: Negative.    Respiratory: Positive for cough and wheezing.         COPD with a bronchospastic component    Hx of empyema   Cardiovascular: Negative.    Genitourinary: Negative.    Musculoskeletal: Negative.    Skin: Negative.    Gastrointestinal: Negative.    Neurological: Negative.    Psychiatric/Behavioral: Negative.        Objective:      Physical Exam   Constitutional: He is oriented to person, place, and time. He appears well-developed and well-nourished.   HENT:   Head: Normocephalic and atraumatic.   Right Ear: External ear normal.   Left Ear: External ear normal.   Eyes: Conjunctivae and EOM are normal. Pupils are equal, round, and reactive to light.   Neck: Normal range of motion. Neck supple.   Cardiovascular: Normal rate, regular rhythm and normal heart sounds.    Pulmonary/Chest: Effort normal and breath sounds normal.   Decreased breath sounds with a faint end expiratory wheeze   Abdominal: Soft. Bowel sounds are normal.   Musculoskeletal: Normal range of motion.   Neurological: He is alert and oriented to person, place, and time. He has normal reflexes.   Skin: Skin is warm and dry.   Psychiatric: He has a normal mood and affect. His behavior is normal. Judgment and thought content normal.           Assessment:       1. Well controlled  mild persistent asthma        Outpatient Encounter Prescriptions as of 12/12/2017   Medication Sig Dispense Refill    albuterol 90 mcg/actuation inhaler Inhale 2 puffs into the lungs every 6 (six) hours as needed. 1 Inhaler 12    amlodipine (NORVASC) 10 MG tablet Take 1 tablet (10 mg total) by mouth once daily. 90 tablet 3    atorvastatin (LIPITOR) 20 MG tablet Take 1 tablet (20 mg total) by mouth once daily. 90 tablet 4    budesonide-formoterol 160-4.5 mcg (SYMBICORT) 160-4.5 mcg/actuation HFAA Inhale 2 puffs into the lungs every 12 (twelve) hours. 1 Inhaler 6    losartan (COZAAR) 100 MG tablet Take 1 tablet (100 mg total) by mouth once daily. 90 tablet 3    metoprolol succinate (TOPROL-XL) 100 MG 24 hr tablet Take 1 tablet (100 mg total) by mouth once daily. 90 tablet 3    predniSONE (DELTASONE) 10 MG tablet Three tablets x 7 days then 2 tablets x 7days 36 tablet 1     No facility-administered encounter medications on file as of 12/12/2017.      No orders of the defined types were placed in this encounter.    Plan:       IMP Moderate COPD Has asked him to use his symbicort bid and gave a pulse of prednisone 30 mg x 7 days then 20 mg x 7 days. Use a BREO inhaler when symbicort run out.

## 2017-12-29 DIAGNOSIS — J44.9 CHRONIC OBSTRUCTIVE PULMONARY DISEASE, UNSPECIFIED COPD TYPE: Primary | ICD-10-CM

## 2018-01-23 ENCOUNTER — TELEPHONE (OUTPATIENT)
Dept: FAMILY MEDICINE | Facility: CLINIC | Age: 68
End: 2018-01-23

## 2018-01-23 NOTE — TELEPHONE ENCOUNTER
----- Message from Vandana Doan sent at 1/23/2018  2:57 PM CST -----  Contact: Self [phone]  Patient Returning Your Phone Call

## 2018-02-02 ENCOUNTER — OFFICE VISIT (OUTPATIENT)
Dept: FAMILY MEDICINE | Facility: CLINIC | Age: 68
End: 2018-02-02
Payer: MEDICARE

## 2018-02-02 VITALS
HEART RATE: 95 BPM | WEIGHT: 178.13 LBS | TEMPERATURE: 99 F | HEIGHT: 65 IN | OXYGEN SATURATION: 95 % | SYSTOLIC BLOOD PRESSURE: 146 MMHG | DIASTOLIC BLOOD PRESSURE: 78 MMHG | BODY MASS INDEX: 29.68 KG/M2

## 2018-02-02 DIAGNOSIS — J44.9 CHRONIC OBSTRUCTIVE PULMONARY DISEASE, UNSPECIFIED COPD TYPE: ICD-10-CM

## 2018-02-02 DIAGNOSIS — E55.9 VITAMIN D DEFICIENCY: ICD-10-CM

## 2018-02-02 DIAGNOSIS — J30.89 CHRONIC NONSEASONAL ALLERGIC RHINITIS DUE TO OTHER ALLERGEN: ICD-10-CM

## 2018-02-02 DIAGNOSIS — E66.3 OVERWEIGHT (BMI 25.0-29.9): ICD-10-CM

## 2018-02-02 DIAGNOSIS — I70.0 AORTIC ATHEROSCLEROSIS: ICD-10-CM

## 2018-02-02 DIAGNOSIS — Z79.899 MEDICATION MANAGEMENT: ICD-10-CM

## 2018-02-02 DIAGNOSIS — I10 BENIGN ESSENTIAL HYPERTENSION: Primary | ICD-10-CM

## 2018-02-02 DIAGNOSIS — J84.89 INTERSTITIAL PNEUMONITIS: ICD-10-CM

## 2018-02-02 DIAGNOSIS — E78.2 MIXED HYPERLIPIDEMIA: ICD-10-CM

## 2018-02-02 DIAGNOSIS — J84.10 LUNG GRANULOMA: ICD-10-CM

## 2018-02-02 PROCEDURE — 99214 OFFICE O/P EST MOD 30 MIN: CPT | Mod: S$GLB,,, | Performed by: FAMILY MEDICINE

## 2018-02-02 PROCEDURE — 1159F MED LIST DOCD IN RCRD: CPT | Mod: S$GLB,,, | Performed by: FAMILY MEDICINE

## 2018-02-02 PROCEDURE — 99499 UNLISTED E&M SERVICE: CPT | Mod: S$GLB,,, | Performed by: FAMILY MEDICINE

## 2018-02-02 PROCEDURE — 1126F AMNT PAIN NOTED NONE PRSNT: CPT | Mod: S$GLB,,, | Performed by: FAMILY MEDICINE

## 2018-02-02 PROCEDURE — 99999 PR PBB SHADOW E&M-EST. PATIENT-LVL IV: CPT | Mod: PBBFAC,,, | Performed by: FAMILY MEDICINE

## 2018-02-02 PROCEDURE — 3008F BODY MASS INDEX DOCD: CPT | Mod: S$GLB,,, | Performed by: FAMILY MEDICINE

## 2018-02-02 RX ORDER — MINERAL OIL
180 ENEMA (ML) RECTAL DAILY
Qty: 30 TABLET | Refills: 11 | Status: SHIPPED | OUTPATIENT
Start: 2018-02-02 | End: 2019-03-06

## 2018-02-02 RX ORDER — FLUTICASONE PROPIONATE 50 MCG
2 SPRAY, SUSPENSION (ML) NASAL DAILY
Qty: 1 BOTTLE | Refills: 11 | Status: SHIPPED | OUTPATIENT
Start: 2018-02-02 | End: 2019-03-06

## 2018-02-02 NOTE — PATIENT INSTRUCTIONS
Continue current medications as prescribed except    #1.  STOP Over-the-counter [employer] ALLERGY pills that contain decongestants.  Instead start daily Allegra antihistamine and daily steroid nasal spray (Flonase)    #2.  Increase Symbicort to 2 puffs twice daily    #3 have labs done as ordered and will call to advise further based on results.  If stable will advise follow up for annual physical in 6 months.  Will advise follow up sooner if concerns.    #4 Start over the counter 2,000 IU VITAMIN D Daily supplement with breakfast

## 2018-02-02 NOTE — PROGRESS NOTES
Office Visit    Patient Name: Scooter Morrissey    : 1950  MRN: [id number]    Subjective:  Scooter is a 67 y.o. male who presents today for:    Follow-up (6 month)    66 yo male for 6 month follow up of HTN, HLD, obesity here for 6 month follow up.  Had labs prior to this appointment.  His blood pressure is looking pretty good on a combination of metoprolol extended release 100 mg daily, Cozaar 100 mg daily, amlodipine 10 mg daily.  He has been compliant with his Lipitor for high cholesterol.  He cuts lawns for a living, but lately with the winter weather his work schedule has been not as busy.  He has still managed to lose some weight despite not being as active, and he has done this primarily by watching his portions.  She does report an increase in ALLERGY symptoms with his lawn care/allergen exposure, and he started taking some over-the-counter sinus pills from [employer] that contain Benadryl and phenylephrine.  He has been taking 2 of these every night for the past several weeks.  His breathing is overall recently been stable.  Started on Symbicort per pulmonology but he is not taking it twice daily as prescribed.  He has not recently had fevers, chills, cough or chest pain.  He was not aware of the fact that decongestants can raise his blood pressure and should not be taken on a long-term basis due to this and possibility of rebound congestion.        Past Medical History  Past Medical History:   Diagnosis Date    Anxiety 2016    Benign essential hypertension 2016    Hyperlipidemia 2016    Interstitial pneumonitis 2016       Past Surgical History  Past Surgical History:   Procedure Laterality Date    ADENOIDECTOMY      COLONOSCOPY N/A 2016    Procedure: COLONOSCOPY;  Surgeon: Meme Mills MD;  Location: [facility];  Service: Endoscopy;  Laterality: N/A;    FINGER SURGERY      15 years ago    TONSILLECTOMY         Family History  Family History   Problem Relation Age of  "Onset    No Known Problems Mother     No Known Problems Father     No Known Problems Daughter        Social History  Social History     Social History    Marital status:      Spouse name: N/A    Number of children: N/A    Years of education: N/A     Occupational History    lawn service      Social History Main Topics    Smoking status: Former Smoker     Types: Cigars    Smokeless tobacco: Former User     Quit date: 5/12/1996    Alcohol use No    Drug use: No    Sexual activity: Yes     Partners: Female     Other Topics Concern    Not on file     Social History Narrative    No narrative on file       Current Medications  Medications reviewed and updated.     Allergies   Review of patient's allergies indicates:  No Known Allergies    Review of Systems (Pertinent positives)  Review of Systems   Constitutional: Negative for chills and fever.   HENT: Positive for congestion. Negative for ear pain.    Respiratory: Positive for shortness of breath (at baseline). Negative for cough.    Cardiovascular: Negative for chest pain and leg swelling.   Neurological: Negative for dizziness.       BP (!) 146/78   Pulse 95   Temp 98.5 °F (36.9 °C) (Oral)   Ht 5' 5" (1.651 m)   Wt 80.8 kg (178 lb 2.1 oz)   SpO2 95%   BMI 29.64 kg/m²     Physical Exam   Constitutional: He is oriented to person, place, and time. He appears well-developed and well-nourished. No distress.   HENT:   Right Ear: Tympanic membrane is injected and retracted. Tympanic membrane is not erythematous and not bulging.   Left Ear: Tympanic membrane is injected and retracted. Tympanic membrane is not erythematous and not bulging.   Nose: Mucosal edema (mild) and rhinorrhea present.   Mouth/Throat: No oropharyngeal exudate, posterior oropharyngeal edema or posterior oropharyngeal erythema.   Cardiovascular: Normal rate, regular rhythm and normal heart sounds.    Pulmonary/Chest: Effort normal. He has decreased breath sounds. He has rhonchi " (scattered). He has rales (of bilateral bases).   Musculoskeletal: He exhibits no edema.   Neurological: He is alert and oriented to person, place, and time.   Psychiatric: He has a normal mood and affect.   Vitals reviewed.        Assessment/Plan:  Scooter Morrissey is a 67 y.o. male who presents today for :    Scooter was seen today for follow-up.    Diagnoses and all orders for this visit:    Benign essential hypertension    Mixed hyperlipidemia    Overweight (BMI 25.0-29.9)    Interstitial pneumonitis    Lung granuloma    Chronic obstructive pulmonary disease, unspecified COPD type    Chronic nonseasonal allergic rhinitis due to other allergen  -     fexofenadine (ALLEGRA) 180 MG tablet; Take 1 tablet (180 mg total) by mouth once daily.  -     fluticasone (FLONASE) 50 mcg/actuation nasal spray; 2 sprays (100 mcg total) by Each Nare route once daily.    Aortic atherosclerosis    Medication management    Vitamin D deficiency            ICD-10-CM ICD-9-CM    1. Benign essential hypertension I10 401.1    2. Mixed hyperlipidemia E78.2 272.2    3. Overweight (BMI 25.0-29.9) E66.3 278.02    4. Interstitial pneumonitis J84.89 515    5. Lung granuloma J84.10 515    6. Chronic obstructive pulmonary disease, unspecified COPD type J44.9 496    7. Chronic nonseasonal allergic rhinitis due to other allergen J30.89 477.8 fexofenadine (ALLEGRA) 180 MG tablet      fluticasone (FLONASE) 50 mcg/actuation nasal spray   8. Aortic atherosclerosis I70.0 440.0    9. Medication management Z79.899 V58.69    10. Vitamin D deficiency E55.9 268.9        Patient Instructions   Continue current medications as prescribed except    #1.  STOP Over-the-counter [employer] ALLERGY pills that contain decongestants.  Instead start daily Allegra antihistamine and daily steroid nasal spray (Flonase)    #2.  Increase Symbicort to 2 puffs twice daily    #3 have labs done as ordered and will call to advise further based on results.  If stable will advise follow up  for annual physical in 6 months.  Will advise follow up sooner if concerns.    #4 Start over the counter 2,000 IU VITAMIN D Daily supplement with breakfast        Follow-up for will advise follow up based on results.

## 2018-02-20 ENCOUNTER — LAB VISIT (OUTPATIENT)
Dept: LAB | Facility: HOSPITAL | Age: 68
End: 2018-02-20
Attending: FAMILY MEDICINE
Payer: MEDICARE

## 2018-02-20 DIAGNOSIS — E55.9 VITAMIN D DEFICIENCY: ICD-10-CM

## 2018-02-20 DIAGNOSIS — Z79.899 MEDICATION MANAGEMENT: ICD-10-CM

## 2018-02-20 DIAGNOSIS — E78.2 MIXED HYPERLIPIDEMIA: ICD-10-CM

## 2018-02-20 DIAGNOSIS — I10 BENIGN ESSENTIAL HYPERTENSION: ICD-10-CM

## 2018-02-20 LAB
25(OH)D3+25(OH)D2 SERPL-MCNC: 21 NG/ML
ALBUMIN SERPL BCP-MCNC: 3.7 G/DL
ALP SERPL-CCNC: 75 U/L
ALT SERPL W/O P-5'-P-CCNC: 20 U/L
ANION GAP SERPL CALC-SCNC: 7 MMOL/L
AST SERPL-CCNC: 17 U/L
BILIRUB SERPL-MCNC: 0.4 MG/DL
BUN SERPL-MCNC: 11 MG/DL
CALCIUM SERPL-MCNC: 9.4 MG/DL
CHLORIDE SERPL-SCNC: 106 MMOL/L
CHOLEST SERPL-MCNC: 135 MG/DL
CHOLEST/HDLC SERPL: 4.2 {RATIO}
CO2 SERPL-SCNC: 26 MMOL/L
CREAT SERPL-MCNC: 0.9 MG/DL
EST. GFR  (AFRICAN AMERICAN): >60 ML/MIN/1.73 M^2
EST. GFR  (NON AFRICAN AMERICAN): >60 ML/MIN/1.73 M^2
ESTIMATED AVG GLUCOSE: 108 MG/DL
GLUCOSE SERPL-MCNC: 105 MG/DL
HBA1C MFR BLD HPLC: 5.4 %
HDLC SERPL-MCNC: 32 MG/DL
HDLC SERPL: 23.7 %
LDLC SERPL CALC-MCNC: 86.4 MG/DL
NONHDLC SERPL-MCNC: 103 MG/DL
POTASSIUM SERPL-SCNC: 4.2 MMOL/L
PROT SERPL-MCNC: 8.4 G/DL
SODIUM SERPL-SCNC: 139 MMOL/L
TRIGL SERPL-MCNC: 83 MG/DL

## 2018-02-20 PROCEDURE — 80053 COMPREHEN METABOLIC PANEL: CPT

## 2018-02-20 PROCEDURE — 36415 COLL VENOUS BLD VENIPUNCTURE: CPT

## 2018-02-20 PROCEDURE — 82306 VITAMIN D 25 HYDROXY: CPT

## 2018-02-20 PROCEDURE — 83036 HEMOGLOBIN GLYCOSYLATED A1C: CPT

## 2018-02-20 PROCEDURE — 80061 LIPID PANEL: CPT

## 2018-02-22 ENCOUNTER — TELEPHONE (OUTPATIENT)
Dept: FAMILY MEDICINE | Facility: CLINIC | Age: 68
End: 2018-02-22

## 2018-02-22 DIAGNOSIS — E78.2 MIXED HYPERLIPIDEMIA: ICD-10-CM

## 2018-02-22 DIAGNOSIS — Z12.5 SCREENING FOR MALIGNANT NEOPLASM OF PROSTATE: ICD-10-CM

## 2018-02-22 DIAGNOSIS — I10 BENIGN ESSENTIAL HYPERTENSION: ICD-10-CM

## 2018-02-22 DIAGNOSIS — E55.9 VITAMIN D DEFICIENCY: Primary | ICD-10-CM

## 2018-02-22 DIAGNOSIS — Z79.899 MEDICATION MANAGEMENT: ICD-10-CM

## 2018-02-23 ENCOUNTER — TELEPHONE (OUTPATIENT)
Dept: FAMILY MEDICINE | Facility: CLINIC | Age: 68
End: 2018-02-23

## 2018-02-23 NOTE — TELEPHONE ENCOUNTER
Called patient to review lab results: continue to look good except for ongoing mild-moderate vitamin D deficiency. Please advise 5,000 IU of otc vitamin D every day with breakfast. Please schedule him for annual physical in 6 months, and I am putting in orders for repeat labs to be done in advance of this visit .-- Left a message requesting a call back.

## 2018-05-04 DIAGNOSIS — I10 BENIGN ESSENTIAL HYPERTENSION: ICD-10-CM

## 2018-05-06 RX ORDER — LOSARTAN POTASSIUM 100 MG/1
TABLET ORAL
Qty: 90 TABLET | Refills: 4 | Status: SHIPPED | OUTPATIENT
Start: 2018-05-06 | End: 2019-07-16 | Stop reason: SDUPTHER

## 2018-05-06 RX ORDER — AMLODIPINE BESYLATE 10 MG/1
TABLET ORAL
Qty: 90 TABLET | Refills: 4 | Status: SHIPPED | OUTPATIENT
Start: 2018-05-06 | End: 2019-07-16 | Stop reason: SDUPTHER

## 2018-06-28 ENCOUNTER — PES CALL (OUTPATIENT)
Dept: ADMINISTRATIVE | Facility: CLINIC | Age: 68
End: 2018-06-28

## 2018-07-08 DIAGNOSIS — E78.2 MIXED HYPERLIPIDEMIA: ICD-10-CM

## 2018-07-09 RX ORDER — ATORVASTATIN CALCIUM 20 MG/1
TABLET, FILM COATED ORAL
Qty: 90 TABLET | Refills: 0 | Status: SHIPPED | OUTPATIENT
Start: 2018-07-09 | End: 2018-10-05 | Stop reason: SDUPTHER

## 2018-07-30 DIAGNOSIS — I10 BENIGN ESSENTIAL HYPERTENSION: ICD-10-CM

## 2018-07-30 RX ORDER — METOPROLOL SUCCINATE 100 MG/1
TABLET, EXTENDED RELEASE ORAL
Qty: 90 TABLET | Refills: 4 | Status: SHIPPED | OUTPATIENT
Start: 2018-07-30 | End: 2019-10-05 | Stop reason: SDUPTHER

## 2018-08-09 ENCOUNTER — PES CALL (OUTPATIENT)
Dept: ADMINISTRATIVE | Facility: CLINIC | Age: 68
End: 2018-08-09

## 2018-10-05 DIAGNOSIS — E78.2 MIXED HYPERLIPIDEMIA: ICD-10-CM

## 2018-10-07 RX ORDER — ATORVASTATIN CALCIUM 20 MG/1
TABLET, FILM COATED ORAL
Qty: 90 TABLET | Refills: 3 | Status: SHIPPED | OUTPATIENT
Start: 2018-10-07 | End: 2019-10-21 | Stop reason: SDUPTHER

## 2018-10-07 RX ORDER — ATORVASTATIN CALCIUM 20 MG/1
TABLET, FILM COATED ORAL
Qty: 90 TABLET | Refills: 3 | Status: SHIPPED | OUTPATIENT
Start: 2018-10-07 | End: 2019-03-06

## 2019-01-29 ENCOUNTER — PES CALL (OUTPATIENT)
Dept: ADMINISTRATIVE | Facility: CLINIC | Age: 69
End: 2019-01-29

## 2019-03-04 ENCOUNTER — TELEPHONE (OUTPATIENT)
Dept: FAMILY MEDICINE | Facility: CLINIC | Age: 69
End: 2019-03-04

## 2019-03-04 NOTE — TELEPHONE ENCOUNTER
----- Message from Tabatha Darnell sent at 3/4/2019  9:43 AM CST -----  Contact: Ly (wife)/ [phone]  Wife asked if patient can be seen today. Patient fainted twice on Thursday Feb 28.    Please call.

## 2019-03-04 NOTE — TELEPHONE ENCOUNTER
Returned patient's call. Patient stated he had a bad cough since alt week. He had a coughing fit Thursday and his wife said he fainted. His wife said he fainted 2 hours later. He still has a severe cough. I scheduled him for Wednesday. I offered him an urgent visit with another provider, he declined. He also declined urgent care.

## 2019-03-04 NOTE — TELEPHONE ENCOUNTER
----- Message from Vida Valverde sent at 3/4/2019 12:28 PM CST -----  Contact: [phone]/ self  Patient called in returning your call. Please advise.

## 2019-03-06 ENCOUNTER — OFFICE VISIT (OUTPATIENT)
Dept: FAMILY MEDICINE | Facility: CLINIC | Age: 69
End: 2019-03-06
Payer: MEDICARE

## 2019-03-06 VITALS
SYSTOLIC BLOOD PRESSURE: 149 MMHG | HEART RATE: 90 BPM | DIASTOLIC BLOOD PRESSURE: 79 MMHG | HEIGHT: 66 IN | BODY MASS INDEX: 27.74 KG/M2 | WEIGHT: 172.63 LBS | OXYGEN SATURATION: 95 % | TEMPERATURE: 98 F

## 2019-03-06 DIAGNOSIS — R00.2 PALPITATIONS: ICD-10-CM

## 2019-03-06 DIAGNOSIS — I10 BENIGN ESSENTIAL HYPERTENSION: ICD-10-CM

## 2019-03-06 DIAGNOSIS — J30.89 NON-SEASONAL ALLERGIC RHINITIS, UNSPECIFIED TRIGGER: ICD-10-CM

## 2019-03-06 DIAGNOSIS — J45.31 MILD PERSISTENT ASTHMA WITH ACUTE EXACERBATION: Primary | ICD-10-CM

## 2019-03-06 DIAGNOSIS — I70.0 AORTIC ATHEROSCLEROSIS: ICD-10-CM

## 2019-03-06 DIAGNOSIS — J44.9 CHRONIC OBSTRUCTIVE PULMONARY DISEASE, UNSPECIFIED COPD TYPE: ICD-10-CM

## 2019-03-06 DIAGNOSIS — R55 SYNCOPE, UNSPECIFIED SYNCOPE TYPE: ICD-10-CM

## 2019-03-06 DIAGNOSIS — Z87.09 H/O PLEURAL EMPYEMA: ICD-10-CM

## 2019-03-06 DIAGNOSIS — J84.89 INTERSTITIAL PNEUMONITIS: ICD-10-CM

## 2019-03-06 DIAGNOSIS — Z79.899 MEDICATION MANAGEMENT: ICD-10-CM

## 2019-03-06 DIAGNOSIS — E66.3 OVERWEIGHT (BMI 25.0-29.9): ICD-10-CM

## 2019-03-06 PROCEDURE — 99214 OFFICE O/P EST MOD 30 MIN: CPT | Mod: HCNC,S$GLB,, | Performed by: FAMILY MEDICINE

## 2019-03-06 PROCEDURE — 3077F PR MOST RECENT SYSTOLIC BLOOD PRESSURE >= 140 MM HG: ICD-10-PCS | Mod: HCNC,CPTII,S$GLB, | Performed by: FAMILY MEDICINE

## 2019-03-06 PROCEDURE — 1101F PR PT FALLS ASSESS DOC 0-1 FALLS W/OUT INJ PAST YR: ICD-10-PCS | Mod: HCNC,CPTII,S$GLB, | Performed by: FAMILY MEDICINE

## 2019-03-06 PROCEDURE — 99214 PR OFFICE/OUTPT VISIT, EST, LEVL IV, 30-39 MIN: ICD-10-PCS | Mod: HCNC,S$GLB,, | Performed by: FAMILY MEDICINE

## 2019-03-06 PROCEDURE — 99499 RISK ADDL DX/OHS AUDIT: ICD-10-PCS | Mod: HCNC,S$GLB,, | Performed by: FAMILY MEDICINE

## 2019-03-06 PROCEDURE — 3077F SYST BP >= 140 MM HG: CPT | Mod: HCNC,CPTII,S$GLB, | Performed by: FAMILY MEDICINE

## 2019-03-06 PROCEDURE — 99999 PR PBB SHADOW E&M-EST. PATIENT-LVL III: ICD-10-PCS | Mod: PBBFAC,HCNC,, | Performed by: FAMILY MEDICINE

## 2019-03-06 PROCEDURE — 3078F PR MOST RECENT DIASTOLIC BLOOD PRESSURE < 80 MM HG: ICD-10-PCS | Mod: HCNC,CPTII,S$GLB, | Performed by: FAMILY MEDICINE

## 2019-03-06 PROCEDURE — 99999 PR PBB SHADOW E&M-EST. PATIENT-LVL III: CPT | Mod: PBBFAC,HCNC,, | Performed by: FAMILY MEDICINE

## 2019-03-06 PROCEDURE — 99499 UNLISTED E&M SERVICE: CPT | Mod: HCNC,S$GLB,, | Performed by: FAMILY MEDICINE

## 2019-03-06 PROCEDURE — 3078F DIAST BP <80 MM HG: CPT | Mod: HCNC,CPTII,S$GLB, | Performed by: FAMILY MEDICINE

## 2019-03-06 PROCEDURE — 1101F PT FALLS ASSESS-DOCD LE1/YR: CPT | Mod: HCNC,CPTII,S$GLB, | Performed by: FAMILY MEDICINE

## 2019-03-06 RX ORDER — PREDNISONE 20 MG/1
40 TABLET ORAL DAILY
Qty: 10 TABLET | Refills: 0 | Status: SHIPPED | OUTPATIENT
Start: 2019-03-06 | End: 2019-03-11

## 2019-03-06 RX ORDER — MINERAL OIL
180 ENEMA (ML) RECTAL DAILY
Qty: 30 TABLET | Refills: 11 | Status: SHIPPED | OUTPATIENT
Start: 2019-03-06 | End: 2019-04-08 | Stop reason: SDUPTHER

## 2019-03-06 RX ORDER — BUDESONIDE AND FORMOTEROL FUMARATE DIHYDRATE 160; 4.5 UG/1; UG/1
2 AEROSOL RESPIRATORY (INHALATION) EVERY 12 HOURS
Qty: 10.2 G | Refills: 11 | Status: SHIPPED | OUTPATIENT
Start: 2019-03-06 | End: 2019-07-11 | Stop reason: SDUPTHER

## 2019-03-06 NOTE — PROGRESS NOTES
Office Visit    Patient Name: Scooter Morrissey    : 1950  MRN: [id number]    Subjective:  Scooter is a 68 y.o. male who presents today for:    Cough (ongoing for over a week, pt stated he passed out twice Thursday during coughing fits) and Nasal Congestion    69 yo patient of mine with HTN, HLD, obesity, mild persistent asthma/history of pulmonary empyema/interstitial pneumonitis (s/p pulmonary eval with Dr Guerrier) who is very overdue for routine followup presents today at the urging of his family members due to 2 episodes of passing out during coughing fits.    He does have a diagnosis of asthma as per above and unfortunately has gotten off of all of his asthma and allergy medications due to affordability concerns.  He has been off of his Symbicort for several months and the last couple of months he has definitely noticed his breathing is more labored in his exercise tolerance is decreased.  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  He has had increased nasal congestion and postnasal drip.  He has not had fevers or chills.  He has not had severe sinus pain or pressure.    Per the report of family members he had 2 episodes of severe coughing where he actually passed out and fell to the floor seemingly losing consciousness for a brief period.  He did not fall forcefully to the for and there is no concern for underlying head or other bodily injury associated with the episode of passing out.  The patient denies that he fully lost consciousness and also declined going to the emergency room on those occasions.  He does report that when he regained a full sense of awareness he felt that his heart was racing and he felt anxious.  He did not have pre syncopal symptoms such as lightheadedness or nausea leading up to  the episodes.  He has underlying hypertension that has overall been decently controlled, though he is way overdue for follow-up and has not been checking his levels regularly at home.  He does not, however, have any history of heart conditions.    Past Medical History  Past Medical History:   Diagnosis Date    Anxiety 6/29/2016    Benign essential hypertension 4/5/2016    Hyperlipidemia 4/13/2016    Interstitial pneumonitis 4/13/2016       Past Surgical History  Past Surgical History:   Procedure Laterality Date    ADENOIDECTOMY      COLONOSCOPY N/A 6/1/2016    Performed by [facility]    FINGER SURGERY      15 years ago    TONSILLECTOMY         Family History  Family History   Problem Relation Age of Onset    No Known Problems Mother     No Known Problems Father     No Known Problems Daughter        Social History  Social History     Socioeconomic History    Marital status:      Spouse name: Not on file    Number of children: Not on file    Years of education: Not on file    Highest education level: Not on file   Social Needs    Financial resource strain: Not on file    Food insecurity - worry: Not on file    Food insecurity - inability: Not on file    Transportation needs - medical: Not on file    Transportation needs - non-medical: Not on file   Occupational History    Occupation: lawn service   Tobacco Use    Smoking status: Former Smoker     Types: Cigars    Smokeless tobacco: Former User     Quit date: 5/12/1996   Substance and Sexual Activity    Alcohol use: No    Drug use: No    Sexual activity: Yes     Partners: Female   Other Topics Concern    Not on file   Social History Narrative    Not on file       Current Medications  Medications reviewed and updated.     Allergies   Review of patient's allergies indicates:  No Known Allergies    Review of Systems (Pertinent positives)  Review of Systems   Constitutional: Negative for fever.   HENT:  "Positive for congestion and postnasal drip. Negative for sinus pressure and sinus pain.    Respiratory: Positive for cough, chest tightness and shortness of breath.    Cardiovascular: Positive for palpitations. Negative for leg swelling.   Neurological: Positive for syncope. Negative for dizziness and light-headedness.       BP (!) 149/79   Pulse 90   Temp 97.9 °F (36.6 °C)   Ht 5' 6" (1.676 m)   Wt 78.3 kg (172 lb 9.9 oz)   SpO2 95%   BMI 27.86 kg/m²     Physical Exam   Constitutional: He is oriented to person, place, and time. He appears well-developed and well-nourished. No distress.   HENT:   Right Ear: Tympanic membrane is not erythematous and not bulging.   Left Ear: Tympanic membrane is not erythematous and not bulging.   Nose: Rhinorrhea present. No mucosal edema. Right sinus exhibits no maxillary sinus tenderness and no frontal sinus tenderness. Left sinus exhibits no maxillary sinus tenderness and no frontal sinus tenderness.   Mouth/Throat: Posterior oropharyngeal erythema (mild with PND) present. No oropharyngeal exudate or posterior oropharyngeal edema.   Cardiovascular: Normal rate, regular rhythm and normal heart sounds.   Pulmonary/Chest: Effort normal. He has decreased breath sounds (diffusely decreased). He has wheezes in the right middle field, the right lower field, the left middle field and the left lower field. He has rales in the left lower field.   Musculoskeletal: He exhibits no edema.   Neurological: He is alert and oriented to person, place, and time.   Psychiatric: He has a normal mood and affect.   Vitals reviewed.        Assessment/Plan:  Scooter Morrissey is a 68 y.o. male who presents today for :    Scooter was seen today for cough and nasal congestion.    Diagnoses and all orders for this visit:    Mild persistent asthma with acute exacerbation  -     budesonide-formoterol 160-4.5 mcg (SYMBICORT) 160-4.5 mcg/actuation HFAA; Inhale 2 puffs into the lungs every 12 (twelve) hours.  -     " predniSONE (DELTASONE) 20 MG tablet; Take 2 tablets (40 mg total) by mouth once daily. for 5 days  -     Magnesium; Future    Chronic obstructive pulmonary disease, unspecified COPD type  -     budesonide-formoterol 160-4.5 mcg (SYMBICORT) 160-4.5 mcg/actuation HFAA; Inhale 2 puffs into the lungs every 12 (twelve) hours.  -     predniSONE (DELTASONE) 20 MG tablet; Take 2 tablets (40 mg total) by mouth once daily. for 5 days    Interstitial pneumonitis  -     predniSONE (DELTASONE) 20 MG tablet; Take 2 tablets (40 mg total) by mouth once daily. for 5 days    H/O pleural empyema    Aortic atherosclerosis  -     Comprehensive metabolic panel; Future  -     Lipid panel; Future    Benign essential hypertension  -     Exercise stress echo; Future  -     Hemoglobin A1c; Future  -     Comprehensive metabolic panel; Future  -     Lipid panel; Future  -     TSH; Future    Overweight (BMI 25.0-29.9)    Non-seasonal allergic rhinitis, unspecified trigger  -     fexofenadine (ALLEGRA) 180 MG tablet; Take 1 tablet (180 mg total) by mouth once daily.    Syncope, unspecified syncope type  -     Exercise stress echo; Future  -     Holter monitor - 48 hour; Future    Medication management  -     Hemoglobin A1c; Future  -     Comprehensive metabolic panel; Future  -     Lipid panel; Future  -     CBC auto differential; Future  -     TSH; Future  -     Vitamin D; Future  -     Magnesium; Future    Palpitations  -     Comprehensive metabolic panel; Future  -     CBC auto differential; Future  -     TSH; Future  -     Magnesium; Future  -     Holter monitor - 48 hour; Future            ICD-10-CM ICD-9-CM    1. Mild persistent asthma with acute exacerbation J45.31 493.92 budesonide-formoterol 160-4.5 mcg (SYMBICORT) 160-4.5 mcg/actuation HFAA      predniSONE (DELTASONE) 20 MG tablet      Magnesium   2. Chronic obstructive pulmonary disease, unspecified COPD type J44.9 496 budesonide-formoterol 160-4.5 mcg (SYMBICORT) 160-4.5 mcg/actuation  HFAA      predniSONE (DELTASONE) 20 MG tablet   3. Interstitial pneumonitis J84.89 515 predniSONE (DELTASONE) 20 MG tablet   4. H/O pleural empyema Z87.09 V12.69    5. Aortic atherosclerosis I70.0 440.0 Comprehensive metabolic panel      Lipid panel   6. Benign essential hypertension I10 401.1 Exercise stress echo      Hemoglobin A1c      Comprehensive metabolic panel      Lipid panel      TSH   7. Overweight (BMI 25.0-29.9) E66.3 278.02    8. Non-seasonal allergic rhinitis, unspecified trigger J30.89 477.8 fexofenadine (ALLEGRA) 180 MG tablet   9. Syncope, unspecified syncope type R55 780.2 Exercise stress echo      Holter monitor - 48 hour   10. Medication management Z79.899 V58.69 Hemoglobin A1c      Comprehensive metabolic panel      Lipid panel      CBC auto differential      TSH      Vitamin D      Magnesium   11. Palpitations R00.2 785.1 Comprehensive metabolic panel      CBC auto differential      TSH      Magnesium      Holter monitor - 48 hour       Needs to resume asthma and allergy medications.  Steroid burst due to acute exacerbation of symptoms.  Unclear whether patient with episode of complete syncope, but will order stress echo and Holter monitor given his decreased exercise tolerance and report of intermittent palpitations.  Will also order blood work secondary to this and his underlying chronic conditions such as high cholesterol and high blood pressure managed with chronic prescription medications.    There are no Patient Instructions on file for this visit.      Follow-up in about 1 month (around 4/6/2019) for to follow up on lab results, to review results of diagnostic procedure, return as needed for new con.

## 2019-03-08 ENCOUNTER — TELEPHONE (OUTPATIENT)
Dept: FAMILY MEDICINE | Facility: CLINIC | Age: 69
End: 2019-03-08

## 2019-03-08 NOTE — TELEPHONE ENCOUNTER
----- Message from Keiko Medrano sent at 3/8/2019 12:06 PM CST -----  Contact: Self [phone]  Patient would like to speak with you about a personal matter. Please advise

## 2019-03-12 ENCOUNTER — HOSPITAL ENCOUNTER (OUTPATIENT)
Dept: CARDIOLOGY | Facility: HOSPITAL | Age: 69
Discharge: HOME | End: 2019-03-12
Attending: FAMILY MEDICINE
Payer: MEDICARE

## 2019-03-12 DIAGNOSIS — R00.2 PALPITATIONS: ICD-10-CM

## 2019-03-12 DIAGNOSIS — R55 SYNCOPE, UNSPECIFIED SYNCOPE TYPE: ICD-10-CM

## 2019-03-12 DIAGNOSIS — I10 BENIGN ESSENTIAL HYPERTENSION: ICD-10-CM

## 2019-03-12 PROCEDURE — 93227 HOLTER MONITOR - 48 HOUR (CUPID ONLY): ICD-10-PCS | Mod: HCNC,,, | Performed by: STUDENT IN AN ORGANIZED HEALTH CARE EDUCATION/TRAINING PROGRAM

## 2019-03-12 PROCEDURE — 93227 XTRNL ECG REC<48 HR R&I: CPT | Mod: HCNC,,, | Performed by: STUDENT IN AN ORGANIZED HEALTH CARE EDUCATION/TRAINING PROGRAM

## 2019-03-12 PROCEDURE — 93226 XTRNL ECG REC<48 HR SCAN A/R: CPT | Mod: HCNC

## 2019-03-13 ENCOUNTER — PATIENT MESSAGE (OUTPATIENT)
Dept: FAMILY MEDICINE | Facility: CLINIC | Age: 69
End: 2019-03-13

## 2019-03-13 LAB
CV ECHO LV RWT: 0.47 CM
CV STRESS BASE HR: 85 BPM
DIASTOLIC BLOOD PRESSURE: 76 MMHG
ECHO LV POSTERIOR WALL: 1.1 CM (ref 0.6–1.1)
FRACTIONAL SHORTENING: 30 % (ref 28–44)
INTERVENTRICULAR SEPTUM: 1 CM (ref 0.6–1.1)
LA MAJOR: 3.9 CM
LA MINOR: 3.5 CM
LA WIDTH: 3.5 CM
LEFT ATRIUM SIZE: 3.5 CM
LEFT ATRIUM VOLUME: 38.41 CM3
LEFT INTERNAL DIMENSION IN SYSTOLE: 3.3 CM (ref 2.1–4)
LEFT VENTRICULAR INTERNAL DIMENSION IN DIASTOLE: 4.7 CM (ref 3.5–6)
LEFT VENTRICULAR MASS: 175.83 G
OHS CV CPX 85 PERCENT MAX PREDICTED HEART RATE MALE: 128
OHS CV CPX ESTIMATED METS: 4
OHS CV CPX MAX PREDICTED HEART RATE: 151
OHS CV CPX PATIENT IS FEMALE: 0
OHS CV CPX PATIENT IS MALE: 1
OHS CV CPX PEAK DIASTOLIC BLOOD PRESSURE: 79 MMHG
OHS CV CPX PEAK HEAR RATE: 131 BPM
OHS CV CPX PEAK RATE PRESSURE PRODUCT: NORMAL
OHS CV CPX PEAK SYSTOLIC BLOOD PRESSURE: 169 MMHG
OHS CV CPX PERCENT MAX PREDICTED HEART RATE ACHIEVED: 87
OHS CV CPX RATE PRESSURE PRODUCT PRESENTING: NORMAL
RIGHT VENTRICULAR END-DIASTOLIC DIMENSION: 2.8 CM
STRESS ECHO POST EXERCISE DUR MIN: 3 MIN
STRESS ECHO POST EXERCISE DUR SEC: 5
SYSTOLIC BLOOD PRESSURE: 146 MMHG

## 2019-03-20 LAB
OHS CV EVENT MONITOR DAY: 0
OHS CV HOLTER LENGTH DECIMAL HOURS: 47.98
OHS CV HOLTER LENGTH HOURS: 47
OHS CV HOLTER LENGTH MINUTES: 59

## 2019-04-08 ENCOUNTER — OFFICE VISIT (OUTPATIENT)
Dept: FAMILY MEDICINE | Facility: CLINIC | Age: 69
End: 2019-04-08
Payer: MEDICARE

## 2019-04-08 VITALS
HEIGHT: 66 IN | OXYGEN SATURATION: 95 % | BODY MASS INDEX: 28.09 KG/M2 | WEIGHT: 174.81 LBS | DIASTOLIC BLOOD PRESSURE: 76 MMHG | SYSTOLIC BLOOD PRESSURE: 138 MMHG | HEART RATE: 80 BPM

## 2019-04-08 DIAGNOSIS — Z12.5 SCREENING FOR MALIGNANT NEOPLASM OF PROSTATE: ICD-10-CM

## 2019-04-08 DIAGNOSIS — Z23 NEED FOR SHINGLES VACCINE: ICD-10-CM

## 2019-04-08 DIAGNOSIS — R55 SYNCOPE, UNSPECIFIED SYNCOPE TYPE: ICD-10-CM

## 2019-04-08 DIAGNOSIS — J34.89 PURULENT NASAL DISCHARGE: ICD-10-CM

## 2019-04-08 DIAGNOSIS — J30.89 NON-SEASONAL ALLERGIC RHINITIS, UNSPECIFIED TRIGGER: ICD-10-CM

## 2019-04-08 DIAGNOSIS — R73.09 ELEVATED HEMOGLOBIN A1C: ICD-10-CM

## 2019-04-08 DIAGNOSIS — E55.9 VITAMIN D DEFICIENCY: ICD-10-CM

## 2019-04-08 DIAGNOSIS — J84.10 LUNG GRANULOMA: ICD-10-CM

## 2019-04-08 DIAGNOSIS — J32.4 CHRONIC PANSINUSITIS: ICD-10-CM

## 2019-04-08 DIAGNOSIS — E78.2 MIXED HYPERLIPIDEMIA: ICD-10-CM

## 2019-04-08 DIAGNOSIS — J84.89 INTERSTITIAL PNEUMONITIS: ICD-10-CM

## 2019-04-08 DIAGNOSIS — Z00.00 ROUTINE GENERAL MEDICAL EXAMINATION AT A HEALTH CARE FACILITY: Primary | ICD-10-CM

## 2019-04-08 DIAGNOSIS — I70.0 AORTIC ATHEROSCLEROSIS: ICD-10-CM

## 2019-04-08 DIAGNOSIS — I10 BENIGN ESSENTIAL HYPERTENSION: ICD-10-CM

## 2019-04-08 DIAGNOSIS — J44.9 CHRONIC OBSTRUCTIVE PULMONARY DISEASE, UNSPECIFIED COPD TYPE: ICD-10-CM

## 2019-04-08 DIAGNOSIS — E66.3 OVERWEIGHT (BMI 25.0-29.9): ICD-10-CM

## 2019-04-08 DIAGNOSIS — H91.93 DECREASED HEARING OF BOTH EARS: ICD-10-CM

## 2019-04-08 PROCEDURE — 3075F PR MOST RECENT SYSTOLIC BLOOD PRESS GE 130-139MM HG: ICD-10-PCS | Mod: HCNC,CPTII,S$GLB, | Performed by: FAMILY MEDICINE

## 2019-04-08 PROCEDURE — 3075F SYST BP GE 130 - 139MM HG: CPT | Mod: HCNC,CPTII,S$GLB, | Performed by: FAMILY MEDICINE

## 2019-04-08 PROCEDURE — 99397 PER PM REEVAL EST PAT 65+ YR: CPT | Mod: HCNC,S$GLB,, | Performed by: FAMILY MEDICINE

## 2019-04-08 PROCEDURE — 3078F DIAST BP <80 MM HG: CPT | Mod: HCNC,CPTII,S$GLB, | Performed by: FAMILY MEDICINE

## 2019-04-08 PROCEDURE — 99999 PR PBB SHADOW E&M-EST. PATIENT-LVL IV: CPT | Mod: PBBFAC,HCNC,, | Performed by: FAMILY MEDICINE

## 2019-04-08 PROCEDURE — 99397 PR PREVENTIVE VISIT,EST,65 & OVER: ICD-10-PCS | Mod: HCNC,S$GLB,, | Performed by: FAMILY MEDICINE

## 2019-04-08 PROCEDURE — 3078F PR MOST RECENT DIASTOLIC BLOOD PRESSURE < 80 MM HG: ICD-10-PCS | Mod: HCNC,CPTII,S$GLB, | Performed by: FAMILY MEDICINE

## 2019-04-08 PROCEDURE — 99999 PR PBB SHADOW E&M-EST. PATIENT-LVL IV: ICD-10-PCS | Mod: PBBFAC,HCNC,, | Performed by: FAMILY MEDICINE

## 2019-04-08 RX ORDER — MINERAL OIL
180 ENEMA (ML) RECTAL DAILY
Qty: 30 TABLET | Refills: 11 | Status: SHIPPED | OUTPATIENT
Start: 2019-04-08 | End: 2019-06-24

## 2019-04-08 RX ORDER — VIT C/E/ZN/COPPR/LUTEIN/ZEAXAN 250MG-90MG
2000 CAPSULE ORAL DAILY
Qty: 180 CAPSULE | Refills: 3 | COMMUNITY
Start: 2019-04-08 | End: 2019-10-21

## 2019-04-08 NOTE — PROGRESS NOTES
Office Visit    Patient Name: Scooter Morrissey    : 1950  MRN: [id number]    Subjective:  Scooter is a 68 y.o. male who presents today for:    Annual Exam    Scooter presents today for annual wellness exam and for monitoring of chronic HTN, HLD, and chronic lung disease/ COPD for which he also sees pulmonary Dr Guerrier. Home blood pressures are 120-140s/60s.      At our most recent visit he reported  2 syncopal episodes that occurred during coughing fits.  He was coughing excessively likely in part due to being off of his chronic Symbicort that he takes for control of asthma/COPD.  I ordered further cardiac evaluation including a Holter monitor and a stress echo that were fortunately unremarkable/reassuring.  I also emphasized the need to be on regular Symbicort which she has resumed.  Since our last appointment he has been feeling overall well.  His breathing is much improved as is his exercise tolerance.  He has not had any further excessive coughing fits, however, he is continuing to have some thick sinus drainage that smells bad and is always thick and green.  He has had multiple rounds of antibiotics and steroids for this in the past but he feels that it never totally goes away.  He has been using Flonase nasal spray which helped some, but he has not started daily Allegra.  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      They have been feeling overall well other than the ongoing sinus drainage and also decreased hearing of both ears.     General lifestyle habits are as follows: Diet is described as fair- eats lots of fruits and vegetables but diet is very high in salt and sugars and his portion sizes are admittedly too large, exercise is described as fair-- cuts grass for work but otherwise no exercise, sleep is described as good-- no issues. Weight is overall stable at about 175.      Immunizations: TDaP 16, Prevnar 13 4/10/2015, states he also had Pneumovax 23 2017.  Shingles due but declined at this time. FLU shot declined     Screening Tests: colonoscopy- 6/1/2016 and repeat 10 years, PSA has been stable and repeat ordered     Eye/Dental: eye exam up to date-- examined last week by [facility]--> cataracts, dental up to date          Past Medical History  Past Medical History:   Diagnosis Date    Anxiety 6/29/2016    Benign essential hypertension 4/5/2016    Hyperlipidemia 4/13/2016    Interstitial pneumonitis 4/13/2016       Past Surgical History  Past Surgical History:   Procedure Laterality Date    ADENOIDECTOMY      COLONOSCOPY N/A 6/1/2016    [facility]    FINGER SURGERY      15 years ago    TONSILLECTOMY         Family History  Family History   Problem Relation Age of Onset    No Known Problems Mother     No Known Problems Father     No Known Problems Daughter        Social History  Social History     Socioeconomic History    Marital status:      Spouse name: Not on file    Number of children: Not on file    Years of education: Not on file    Highest education level: Not on file   Occupational History    Occupation: lawn service   Social Needs    Financial resource strain: Not on file    Food insecurity:     Worry: Not on file     Inability: Not on file    Transportation needs:     Medical: Not on file     Non-medical: Not on file   Tobacco Use    Smoking status: Former Smoker     Types: Cigars    Smokeless tobacco: Former User     Quit date: 5/12/1996   Substance and Sexual Activity    Alcohol use: No    Drug use: No    Sexual activity: Yes     Partners: Female   Lifestyle    Physical activity:     Days per week: Not on file     Minutes per session: Not on file    Stress: Not on file   Relationships    Social connections:     Talks on phone: Not on file     Gets together: Not on file     Attends Hoah[employer] service: Not on file     Active member of club or organization: Not on file     Attends  "meetings of clubs or organizations: Not on file     Relationship status: Not on file   Other Topics Concern    Not on file   Social History Narrative    Not on file       Current Medications  Medications reviewed and updated.     Allergies   Review of patient's allergies indicates:  No Known Allergies    Review of Systems (Pertinent positives)  Review of Systems   Constitutional: Negative for chills, fever and unexpected weight change.   HENT: Positive for congestion, hearing loss, postnasal drip and sinus pressure.    Respiratory: Positive for shortness of breath (overall stable and improved).    Cardiovascular: Negative for chest pain and leg swelling.   Gastrointestinal: Negative for constipation and diarrhea.   Genitourinary: Negative for dysuria and hematuria.   Allergic/Immunologic: Positive for environmental allergies.   Neurological: Negative for dizziness, syncope (no further episodes) and light-headedness.   Psychiatric/Behavioral: Negative for sleep disturbance.       /76   Pulse 80   Ht 5' 6" (1.676 m)   Wt 79.3 kg (174 lb 13.2 oz)   SpO2 95%   BMI 28.22 kg/m²     Physical Exam   Constitutional: He is oriented to person, place, and time. He appears well-developed and well-nourished. No distress.   HENT:   Head: Normocephalic and atraumatic.   Right Ear: External ear normal.   Left Ear: External ear normal.   Nose: Rhinorrhea present. No mucosal edema. Right sinus exhibits maxillary sinus tenderness and frontal sinus tenderness. Left sinus exhibits maxillary sinus tenderness and frontal sinus tenderness.   Mouth/Throat: Oropharynx is clear and moist. No oropharyngeal exudate.   Eyes: Conjunctivae are normal. No scleral icterus.   Neck: No tracheal deviation present. No thyromegaly present.   Cardiovascular: Normal rate, regular rhythm, normal heart sounds and intact distal pulses.   Pulmonary/Chest: Effort normal. He has decreased breath sounds. He has wheezes (some scattered). He has rhonchi " in the left lower field.   Abdominal: Soft. Bowel sounds are normal. He exhibits no distension and no mass. There is no tenderness. No hernia.   Musculoskeletal: Normal range of motion.   Lymphadenopathy:     He has no cervical adenopathy.   Neurological: He is alert and oriented to person, place, and time. He has normal reflexes.   Skin: Skin is warm and dry. No rash noted.   Psychiatric: He has a normal mood and affect.   Vitals reviewed.        Assessment/Plan:  Scooter Morrissey is a 68 y.o. male who presents today for :    Scooter was seen today for annual exam.    Diagnoses and all orders for this visit:    Routine general medical examination at a health care facility  Comments:  Health maintenance reviewed:  Declines shingles and flu vaccines, otherwise up-to-date.  Advised on diet/exercise, eye/dental exams  Orders:  -     PSA, Screening; Future    Overweight (BMI 25.0-29.9)    Benign essential hypertension  Comments:  Blood pressures with overall good control on Norvasc 10, Cozaar 100 mg daily along with metoprolol succinate 100 mg daily  Orders:  -     Comprehensive metabolic panel; Future  -     Lipid panel; Future  -     CBC auto differential; Future  -     TSH; Future    Chronic obstructive pulmonary disease, unspecified COPD type  Comments:  Has been followed by pulmonology Dr. Guerrier for diagnosis of chronic lung disease, COPD versus asthma.  Now much improved with Symbicort compliance    Interstitial pneumonitis    Lung granuloma    Aortic atherosclerosis  Comments:  Continue daily statin    Vitamin D deficiency  Comments:  advised on gummy vitamin D 2,000 IU daily since has had trouble tolerating other formulations  Orders:  -     Vitamin D; Future  -     cholecalciferol, vitamin D3, (VITAMIN D3) 1,000 unit capsule; Take 2 capsules (2,000 Units total) by mouth once daily.    Mixed hyperlipidemia  -     Hemoglobin A1c; Future  -     Comprehensive metabolic panel; Future  -     Lipid panel; Future    Elevated  hemoglobin A1c  -     Hemoglobin A1c; Future  -     Comprehensive metabolic panel; Future  -     Lipid panel; Future    Syncope, unspecified syncope type  Comments:  Labs, stress echo, Holter all reassuring.  Syncopal event was associated with severe coughing fit.  Cough is now much improved with daily use of Symbicort    Chronic pansinusitis  -     CT Maxillofacial Without Contrast; Future  -     Ambulatory referral to ENT    Purulent nasal discharge  -     CT Maxillofacial Without Contrast; Future  -     Ambulatory referral to ENT    Decreased hearing of both ears  -     Ambulatory referral to ENT    Non-seasonal allergic rhinitis, unspecified trigger  -     fexofenadine (ALLEGRA) 180 MG tablet; Take 1 tablet (180 mg total) by mouth once daily.    Screening for malignant neoplasm of prostate  -     PSA, Screening; Future    Need for shingles vaccine            ICD-10-CM ICD-9-CM    1. Routine general medical examination at a health care facility Z00.00 V70.0 PSA, Screening    Health maintenance reviewed:  Declines shingles and flu vaccines, otherwise up-to-date.  Advised on diet/exercise, eye/dental exams   2. Overweight (BMI 25.0-29.9) E66.3 278.02    3. Benign essential hypertension I10 401.1 Comprehensive metabolic panel      Lipid panel      CBC auto differential      TSH    Blood pressures with overall good control on Norvasc 10, Cozaar 100 mg daily along with metoprolol succinate 100 mg daily   4. Chronic obstructive pulmonary disease, unspecified COPD type J44.9 496     Has been followed by pulmonology Dr. Guerrier for diagnosis of chronic lung disease, COPD versus asthma.  Now much improved with Symbicort compliance   5. Interstitial pneumonitis J84.89 515    6. Lung granuloma J84.10 515    7. Aortic atherosclerosis I70.0 440.0     Continue daily statin   8. Vitamin D deficiency E55.9 268.9 Vitamin D      cholecalciferol, vitamin D3, (VITAMIN D3) 1,000 unit capsule    advised on gummy vitamin D 2,000 IU daily  since has had trouble tolerating other formulations   9. Mixed hyperlipidemia E78.2 272.2 Hemoglobin A1c      Comprehensive metabolic panel      Lipid panel   10. Elevated hemoglobin A1c R73.09 790.29 Hemoglobin A1c      Comprehensive metabolic panel      Lipid panel   11. Syncope, unspecified syncope type R55 780.2     Labs, stress echo, Holter all reassuring.  Syncopal event was associated with severe coughing fit.  Cough is now much improved with daily use of Symbicort   12. Chronic pansinusitis J32.4 473.8 CT Maxillofacial Without Contrast      Ambulatory referral to ENT   13. Purulent nasal discharge J34.89 478.19 CT Maxillofacial Without Contrast      Ambulatory referral to ENT   14. Decreased hearing of both ears H91.93 389.9 Ambulatory referral to ENT   15. Non-seasonal allergic rhinitis, unspecified trigger J30.89 477.8 fexofenadine (ALLEGRA) 180 MG tablet   16. Screening for malignant neoplasm of prostate Z12.5 V76.44 PSA, Screening   17. Need for shingles vaccine Z23 V04.89        There are no Patient Instructions on file for this visit.      Follow up in about 6 months (around 10/8/2019) for return as needed for new concerns, to follow up on lab results.

## 2019-04-10 ENCOUNTER — HOSPITAL ENCOUNTER (OUTPATIENT)
Dept: RADIOLOGY | Facility: HOSPITAL | Age: 69
Discharge: HOME | End: 2019-04-10
Attending: FAMILY MEDICINE
Payer: MEDICARE

## 2019-04-10 DIAGNOSIS — J34.89 PURULENT NASAL DISCHARGE: ICD-10-CM

## 2019-04-10 DIAGNOSIS — J32.4 CHRONIC PANSINUSITIS: ICD-10-CM

## 2019-04-10 PROCEDURE — 70486 CT MAXILLOFACIAL W/O DYE: CPT | Mod: 26,HCNC,, | Performed by: RADIOLOGY

## 2019-04-10 PROCEDURE — 70486 CT MAXILLOFACIAL W/O DYE: CPT | Mod: TC,HCNC

## 2019-04-10 PROCEDURE — 70486 CT MAXILLOFACIAL WITHOUT CONTRAST: ICD-10-PCS | Mod: 26,HCNC,, | Performed by: RADIOLOGY

## 2019-04-12 ENCOUNTER — TELEPHONE (OUTPATIENT)
Dept: FAMILY MEDICINE | Facility: CLINIC | Age: 69
End: 2019-04-12

## 2019-04-12 PROBLEM — J32.4 CHRONIC PANSINUSITIS: Status: ACTIVE | Noted: 2019-04-12

## 2019-04-12 NOTE — TELEPHONE ENCOUNTER
----- Message from Lorena Toribio sent at 4/12/2019  2:12 PM CDT -----  Contact: [phone]/self  Patient is returning your call.  Please call and advise

## 2019-04-12 NOTE — TELEPHONE ENCOUNTER
Pt stated that they done the test and he wanted to see the ENT at [facility], he is requesting an external referral. Please Advise.

## 2019-04-12 NOTE — TELEPHONE ENCOUNTER
That is fine, I will place an external referral, but please tell me the name of the ENT physician so that I can include that, thank you

## 2019-04-12 NOTE — TELEPHONE ENCOUNTER
Called pt to find out the name The name of the ENT at [facility]. Left message requesting a call back.

## 2019-04-15 ENCOUNTER — PES CALL (OUTPATIENT)
Dept: ADMINISTRATIVE | Facility: CLINIC | Age: 69
End: 2019-04-15

## 2019-05-16 ENCOUNTER — OFFICE VISIT (OUTPATIENT)
Dept: FAMILY MEDICINE | Facility: CLINIC | Age: 69
End: 2019-05-16
Payer: MEDICARE

## 2019-05-16 VITALS
HEART RATE: 93 BPM | DIASTOLIC BLOOD PRESSURE: 76 MMHG | WEIGHT: 174.38 LBS | BODY MASS INDEX: 28.03 KG/M2 | SYSTOLIC BLOOD PRESSURE: 150 MMHG | OXYGEN SATURATION: 95 % | HEIGHT: 66 IN

## 2019-05-16 DIAGNOSIS — J84.10 LUNG GRANULOMA: ICD-10-CM

## 2019-05-16 DIAGNOSIS — E78.2 MIXED HYPERLIPIDEMIA: ICD-10-CM

## 2019-05-16 DIAGNOSIS — E55.9 VITAMIN D DEFICIENCY: ICD-10-CM

## 2019-05-16 DIAGNOSIS — I70.0 AORTIC ATHEROSCLEROSIS: ICD-10-CM

## 2019-05-16 DIAGNOSIS — J44.9 CHRONIC OBSTRUCTIVE PULMONARY DISEASE, UNSPECIFIED COPD TYPE: ICD-10-CM

## 2019-05-16 DIAGNOSIS — I10 BENIGN ESSENTIAL HYPERTENSION: ICD-10-CM

## 2019-05-16 DIAGNOSIS — E66.3 OVERWEIGHT (BMI 25.0-29.9): ICD-10-CM

## 2019-05-16 DIAGNOSIS — Z00.00 ENCOUNTER FOR PREVENTIVE HEALTH EXAMINATION: Primary | ICD-10-CM

## 2019-05-16 PROCEDURE — 3077F SYST BP >= 140 MM HG: CPT | Mod: HCNC,CPTII,S$GLB, | Performed by: NURSE PRACTITIONER

## 2019-05-16 PROCEDURE — G0439 PR MEDICARE ANNUAL WELLNESS SUBSEQUENT VISIT: ICD-10-PCS | Mod: HCNC,S$GLB,, | Performed by: NURSE PRACTITIONER

## 2019-05-16 PROCEDURE — 99999 PR PBB SHADOW E&M-EST. PATIENT-LVL IV: ICD-10-PCS | Mod: PBBFAC,HCNC,, | Performed by: NURSE PRACTITIONER

## 2019-05-16 PROCEDURE — G0439 PPPS, SUBSEQ VISIT: HCPCS | Mod: HCNC,S$GLB,, | Performed by: NURSE PRACTITIONER

## 2019-05-16 PROCEDURE — 99999 PR PBB SHADOW E&M-EST. PATIENT-LVL IV: CPT | Mod: PBBFAC,HCNC,, | Performed by: NURSE PRACTITIONER

## 2019-05-16 PROCEDURE — 3077F PR MOST RECENT SYSTOLIC BLOOD PRESSURE >= 140 MM HG: ICD-10-PCS | Mod: HCNC,CPTII,S$GLB, | Performed by: NURSE PRACTITIONER

## 2019-05-16 PROCEDURE — 3078F PR MOST RECENT DIASTOLIC BLOOD PRESSURE < 80 MM HG: ICD-10-PCS | Mod: HCNC,CPTII,S$GLB, | Performed by: NURSE PRACTITIONER

## 2019-05-16 PROCEDURE — 3078F DIAST BP <80 MM HG: CPT | Mod: HCNC,CPTII,S$GLB, | Performed by: NURSE PRACTITIONER

## 2019-05-16 RX ORDER — FLUTICASONE PROPIONATE 50 MCG
2 SPRAY, SUSPENSION (ML) NASAL DAILY
Refills: 11 | COMMUNITY
Start: 2019-04-15 | End: 2020-01-28 | Stop reason: SDUPTHER

## 2019-05-16 NOTE — PATIENT INSTRUCTIONS
How to Quit Smoking  Smoking is one of the hardest habits to break. About half of all people who have ever smoked have been able to quit. Most people who still smoke want to quit. Here are some of the best ways to stop smoking.    Keep trying  Most smokers make many attempts at quitting before they are successful. Its important not to give up.  Go cold turkey  Most former smokers quit cold turkey (all at once). Trying to cut back gradually doesn't seem to work as well, perhaps because it continues the smoking habit. Also, it is possible to inhale more while smoking fewer cigarettes. This results in the same amount of nicotine in your body.  Get support  Support programs can be a big help, especially for heavy smokers. These groups offer lectures, ways to change behavior, and peer support. Here are some ways to find a support program:  · Free national quitline: 800-QUIT-NOW (889-748-6966).  · Hospital quit-smoking programs.  · American Lung Association: (986.440.6711).  · American Cancer Society (288-132-8956).  Support at home is important too. Nonsmokers can offer praise and encouragement. If the smoker in your life finds it hard to quit, encourage them to keep trying.  Over-the-counter medicines  Nicotine replacement therapy may make quitting easier. Certain aids, such as the nicotine patch, gum, and lozenges, are available without a prescription. It is best to use these under a doctors care, though. The skin patch provides a steady supply of nicotine. Nicotine gum and lozenges give temporary bursts of low levels of nicotine. Both methods reduce the craving for cigarettes. Warning: If you have nausea, vomiting, dizziness, weakness, or a fast heartbeat, stop using these products and see your doctor.  Prescription medicines  After reviewing your smoking patterns and past attempts to quit, your doctor may offer a prescription medicine such as bupropion, varenicline, a nicotine inhaler, or nasal spray. Each has  "advantages and side effects. Your doctor can review these with you.  Health benefits of quitting  The benefits of quitting start right away and keep improving the longer you go without smoking. These benefits occur at any age.  So whether you are 17 or 70, quitting is a good decision. Some of the benefits include:  · 20 minutes: Blood pressure and pulse return to normal.  · 8 hours: Oxygen levels return to normal.  · 2 days: Ability to smell and taste begin to improve as damaged nerves regrow.  · 2 to 3 weeks: Circulation and lung function improve.  · 1 to 9 months: Coughing, congestion, and shortness of breath decrease; tiredness decreases.  · 1 year: Risk of heart attack decreases by half.  · 5 years: Risk of lung cancer decreases by half; risk of stroke becomes the same as a nonsmokers.  For more on how to quit smoking, try these online resources:   · [employer]  · "Clearing the Air" booklet from the National Cancer [place]: [employer].gov/sites/default/files/pdf/clearing-the-air-accessible.pdf  Date Last Reviewed: 3/1/2017  © 5877-9770 [employer]. [address] [place] [address]. All rights reserved. This information is not intended as a substitute for professional medical care. Always follow your healthcare professional's instructions.          Controlling High Blood Pressure  High blood pressure (hypertension) is often called the silent killer. This is because many people who have it dont know it. High blood pressure is defined as 140/90 mm Hg or higher. Know your blood pressure and remember to check it regularly. Doing so can save your life. Here are some things you can do to help control your blood pressure.    Choose heart-healthy foods  · Select low-salt, low-fat foods. Limit sodium intake to 2,400 mg per day or the amount suggested by your healthcare provider.  · Limit canned, dried, cured, packaged, and fast foods. These can contain a lot of salt.  · Eat 8 to 10 servings of " fruits and vegetables every day.  · Choose lean meats, fish, or chicken.  · Eat whole-grain pasta, brown rice, and beans.  · Eat 2 to 3 servings of low-fat or fat-free dairy products.  · Ask your doctor about the DASH eating plan. This plan helps reduce blood pressure.  · When you go to a restaurant, ask that your meal be prepared with no added salt.  Maintain a healthy weight  · Ask your healthcare provider how many calories to eat a day. Then stick to that number.  · Ask your healthcare provider what weight range is healthiest for you. If you are overweight, a weight loss of only 3% to 5% of your body weight can help lower blood pressure. Generally, a good weight loss goal is to lose 10% of your body weight in a year.  · Limit snacks and sweets.  · Get regular exercise.  Get up and get active  · Choose activities you enjoy. Find ones you can do with friends or family. This includes bicycling, dancing, walking, and jogging.  · Park farther away from building entrances.  · Use stairs instead of the elevator.  · When you can, walk or bike instead of driving.  · Ovid leaves, garden, or do household repairs.  · Be active at a moderate to vigorous level of physical activity for at least 40 minutes for a minimum of 3 to 4 days a week.   Manage stress  · Make time to relax and enjoy life. Find time to laugh.  · Communicate your concerns with your loved ones and your healthcare provider.  · Visit with family and friends, and keep up with hobbies.  Limit alcohol and quit smoking  · Men should have no more than 2 drinks per day.  · Women should have no more than 1 drink per day.  · Talk with your healthcare provider about quitting smoking. Smoking significantly increases your risk for heart disease and stroke. Ask your healthcare provider about community smoking cessation programs and other options.  Medicines  If lifestyle changes arent enough, your healthcare provider may prescribe high blood pressure medicine. Take all  medicines as prescribed. If you have any questions about your medicines, ask your healthcare provider before stopping or changing them.   Date Last Reviewed: 4/27/2016  © 1952-7004 [employer]. [address] [place] [address], [place] [address]. All rights reserved. This information is not intended as a substitute for professional medical care. Always follow your healthcare professional's instructions.        Counseling and Referral of Other Preventative  (Italic type indicates deductible and co-insurance are waived)    Patient Name: Scooter Morrissey  Today's Date: 5/16/2019    Health Maintenance       Date Due Completion Date    Shingles Vaccine (1 of 2) 10/02/1999 ---    High Dose Statin 04/08/2020 4/8/2019    Lipid Panel 03/12/2024 3/12/2019    TETANUS VACCINE 04/05/2026 4/5/2016    Colonoscopy 06/01/2026 6/1/2016        No orders of the defined types were placed in this encounter.    The following information is provided to all patients.  This information is to help you find resources for any of the problems found today that may be affecting your health:                Living healthy guide: www.[facility].[place].gov      Understanding Diabetes: www.diabetes.org      Eating healthy: www.cdc.gov/healthyweight      CDC home safety checklist: www.cdc.gov/steadi/patient.html      Agency on Aging: www.goea.[place].gov      Alcoholics anonymous (AA): www.aa.org      Physical Activity: www.emilie.nih.gov/az3tscw      Tobacco use: www.quitwithusla.org

## 2019-05-17 PROBLEM — I10 HYPERTENSION: Status: ACTIVE | Noted: 2019-05-17

## 2019-05-18 NOTE — PROGRESS NOTES
"Scooter Morrissey presented for a  Medicare AWV and comprehensive Health Risk Assessment today. The following components were reviewed and updated:    · Medical history  · Family History  · Social history  · Allergies and Current Medications  · Health Risk Assessment  · Health Maintenance  · Care Team     ** See Completed Assessments for Annual Wellness Visit within the encounter summary.**       The following assessments were completed:  · Living Situation  · CAGE  · Depression Screening  · Timed Get Up and Go  · Whisper Test  · Cognitive Function Screening  · Nutrition Screening  · ADL Screening  · PAQ Screening    Vitals:    05/16/19 1617   BP: (!) 150/76   Pulse: 93   SpO2: 95%   Weight: 79.1 kg (174 lb 6.1 oz)   Height: 5' 6" (1.676 m)     Body mass index is 28.15 kg/m².  Physical Exam   Constitutional: He is oriented to person, place, and time. He appears well-developed and well-nourished. No distress.   HENT:   Head: Normocephalic.   Right Ear: Tympanic membrane and ear canal normal.   Left Ear: Tympanic membrane and ear canal normal.   Nose: Nose normal.   Mouth/Throat: Uvula is midline and oropharynx is clear and moist.   Eyes: Pupils are equal, round, and reactive to light.   Neck: Normal range of motion. Neck supple. No JVD present. No thyromegaly present.   Cardiovascular: Normal rate, regular rhythm and intact distal pulses.   No murmur heard.  No peripheral edema noted.   Pulmonary/Chest: Effort normal. He has no wheezes.   Diminished breath sounds throughout both lungs   Abdominal: Soft. Bowel sounds are normal.   Musculoskeletal: Normal range of motion.   Neurological: He is alert and oriented to person, place, and time. No cranial nerve deficit. Coordination normal.   Skin: Skin is warm and dry.   Psychiatric: He has a normal mood and affect. His behavior is normal. Thought content normal.             Diagnoses and health risks identified today and associated recommendations/orders:    1. Encounter for " preventive health examination      2. Benign essential hypertension  - Uncontrolled hypertension  - Keep log of blood pressure measurements twice daily, bring log to next appt; follow up for nurse visit in 2 weeks for blood pressure check  - Continue current medication    3. Aortic atherosclerosis  - Chronic, stable, continue current medication therapy  - Followed by PCP    4. Chronic obstructive pulmonary disease, unspecified COPD type  - Chronic, stable, continue current medication therapy  - Followed by PCP    5. Lung granuloma  - Chronic, stable. Seen on imaging 4/2016  - Followed by PCP    6. Mixed hyperlipidemia  - Chronic, stable, continue current medication therapy  - Followed by PCP    7. Overweight (BMI 25.0-29.9)  - Weight loss advised. Dietary and exercise counseling done.  - Followed by PCP    8. Vitamin D deficiency  - Vitamin D 26 3/2019 labs, repeat vitamin D level 10/2019  - Continue vitamin D supplementation  - Followed by PCP      Provided Scooter with a 5-10 year written screening schedule and personal prevention plan. Recommendations were developed using the USPSTF age appropriate recommendations. Education, counseling, and referrals were provided as needed. After Visit Summary printed and given to patient which includes a list of additional screenings\tests needed.    Follow up for PCP as scheduled and Annual Medicare Wellness in 1 year.    CARLOTA Dick

## 2019-05-30 ENCOUNTER — PATIENT MESSAGE (OUTPATIENT)
Dept: FAMILY MEDICINE | Facility: CLINIC | Age: 69
End: 2019-05-30

## 2019-05-31 ENCOUNTER — PATIENT MESSAGE (OUTPATIENT)
Dept: FAMILY MEDICINE | Facility: CLINIC | Age: 69
End: 2019-05-31

## 2019-06-03 NOTE — TELEPHONE ENCOUNTER
Please schedule Scooter non-urgently for a 40 min test to do some tests regarding his memory. I had discussed this with him previously but family members are noting some concerns and I think we should encourage him to come in within the next 6 weeks or so for some testing, thanks.    PS I'm not sure if he is aware that his family wrote in on my chart on his behalf with the concerns, so please be advised of this when calling him to schedule, thanks

## 2019-06-05 ENCOUNTER — TELEPHONE (OUTPATIENT)
Dept: FAMILY MEDICINE | Facility: CLINIC | Age: 69
End: 2019-06-05

## 2019-06-05 NOTE — TELEPHONE ENCOUNTER
Returned pt's wife phone call, she stated that he had an appt on may 16th, she said  That the pt called after the appt and that he was in the parking lot lost. She stated that he called her and said that he was in a parking lot and didnt know where he was. So she stayed on the phone with him until he got into his appt and when he went back home he managed to be able to tell ms cano what they said in the visit. She also stated that Yesterday they are both in the truck came to a stop sign and stopped and he stayed there for like 5 mins. She said that she told him that he can go and he turned and said what are you doing here. And then a little ways down at a red light he looked at her and said it again, he then asked where it was. So she explained. He then a few mins later asked again where they were going. She did state that he did not know anything about her calling and expressing her concern and that she would like for me to call him myslef to get the appt scheduled and state that it is a follow up from appt with Ms Kebede.     Called pt himself and left a message.

## 2019-06-05 NOTE — TELEPHONE ENCOUNTER
----- Message from Everette Ibarra sent at 6/5/2019  8:55 AM CDT -----  Contact: [phone] rachael  Patient wife requested to speak with the nurse because the patient is having episodes and has the first signs of dementia. Please call.

## 2019-06-05 NOTE — TELEPHONE ENCOUNTER
----- Message from Natali Mercado sent at 6/5/2019  1:59 PM CDT -----  Contact: [phone]/self  Pt states he is returning your call

## 2019-06-05 NOTE — TELEPHONE ENCOUNTER
Pt returned phone call, got pt scheled for his appt for memory loss for 6/24 at 11AM. Pt verbalized understanding.

## 2019-06-14 ENCOUNTER — TELEPHONE (OUTPATIENT)
Dept: FAMILY MEDICINE | Facility: CLINIC | Age: 69
End: 2019-06-14

## 2019-06-17 ENCOUNTER — TELEPHONE (OUTPATIENT)
Dept: FAMILY MEDICINE | Facility: CLINIC | Age: 69
End: 2019-06-17

## 2019-06-17 NOTE — TELEPHONE ENCOUNTER
----- Message from Kaycee Guerin sent at 6/17/2019  2:52 PM CDT -----  Contact: self, [phone] (M)  Patient called in returning your call. States he needs a late afternoon appt on 6/24.Please advise.

## 2019-06-17 NOTE — TELEPHONE ENCOUNTER
Pt returned my phone call, got his October appt rescheduled for October 21st and his June 24th appt rescheduled for 320. Pt verbalized understanding.

## 2019-06-24 ENCOUNTER — OFFICE VISIT (OUTPATIENT)
Dept: FAMILY MEDICINE | Facility: CLINIC | Age: 69
End: 2019-06-24
Payer: MEDICARE

## 2019-06-24 VITALS
HEIGHT: 66 IN | BODY MASS INDEX: 28.6 KG/M2 | TEMPERATURE: 98 F | SYSTOLIC BLOOD PRESSURE: 130 MMHG | HEART RATE: 86 BPM | WEIGHT: 177.94 LBS | DIASTOLIC BLOOD PRESSURE: 70 MMHG | OXYGEN SATURATION: 95 %

## 2019-06-24 DIAGNOSIS — J84.10 LUNG GRANULOMA: ICD-10-CM

## 2019-06-24 DIAGNOSIS — Z87.09 H/O PLEURAL EMPYEMA: ICD-10-CM

## 2019-06-24 DIAGNOSIS — R45.86 MOOD SWINGS: ICD-10-CM

## 2019-06-24 DIAGNOSIS — J32.4 CHRONIC PANSINUSITIS: ICD-10-CM

## 2019-06-24 DIAGNOSIS — I70.0 AORTIC ATHEROSCLEROSIS: ICD-10-CM

## 2019-06-24 DIAGNOSIS — E66.3 OVERWEIGHT (BMI 25.0-29.9): ICD-10-CM

## 2019-06-24 DIAGNOSIS — H91.93 DECREASED HEARING OF BOTH EARS: ICD-10-CM

## 2019-06-24 DIAGNOSIS — I10 BENIGN ESSENTIAL HYPERTENSION: Primary | ICD-10-CM

## 2019-06-24 DIAGNOSIS — J44.89 COPD WITH ASTHMA: ICD-10-CM

## 2019-06-24 DIAGNOSIS — R41.3 MEMORY IMPAIRMENT: ICD-10-CM

## 2019-06-24 DIAGNOSIS — E78.2 MIXED HYPERLIPIDEMIA: ICD-10-CM

## 2019-06-24 PROCEDURE — 1101F PR PT FALLS ASSESS DOC 0-1 FALLS W/OUT INJ PAST YR: ICD-10-PCS | Mod: HCNC,CPTII,S$GLB, | Performed by: FAMILY MEDICINE

## 2019-06-24 PROCEDURE — 99499 UNLISTED E&M SERVICE: CPT | Mod: HCNC,S$GLB,, | Performed by: FAMILY MEDICINE

## 2019-06-24 PROCEDURE — 3075F SYST BP GE 130 - 139MM HG: CPT | Mod: HCNC,CPTII,S$GLB, | Performed by: FAMILY MEDICINE

## 2019-06-24 PROCEDURE — 99999 PR PBB SHADOW E&M-EST. PATIENT-LVL IV: CPT | Mod: PBBFAC,HCNC,, | Performed by: FAMILY MEDICINE

## 2019-06-24 PROCEDURE — 1101F PT FALLS ASSESS-DOCD LE1/YR: CPT | Mod: HCNC,CPTII,S$GLB, | Performed by: FAMILY MEDICINE

## 2019-06-24 PROCEDURE — 3075F PR MOST RECENT SYSTOLIC BLOOD PRESS GE 130-139MM HG: ICD-10-PCS | Mod: HCNC,CPTII,S$GLB, | Performed by: FAMILY MEDICINE

## 2019-06-24 PROCEDURE — 99999 PR PBB SHADOW E&M-EST. PATIENT-LVL IV: ICD-10-PCS | Mod: PBBFAC,HCNC,, | Performed by: FAMILY MEDICINE

## 2019-06-24 PROCEDURE — 99499 RISK ADDL DX/OHS AUDIT: ICD-10-PCS | Mod: HCNC,S$GLB,, | Performed by: FAMILY MEDICINE

## 2019-06-24 PROCEDURE — 3078F PR MOST RECENT DIASTOLIC BLOOD PRESSURE < 80 MM HG: ICD-10-PCS | Mod: HCNC,CPTII,S$GLB, | Performed by: FAMILY MEDICINE

## 2019-06-24 PROCEDURE — 99214 OFFICE O/P EST MOD 30 MIN: CPT | Mod: HCNC,S$GLB,, | Performed by: FAMILY MEDICINE

## 2019-06-24 PROCEDURE — 99214 PR OFFICE/OUTPT VISIT, EST, LEVL IV, 30-39 MIN: ICD-10-PCS | Mod: HCNC,S$GLB,, | Performed by: FAMILY MEDICINE

## 2019-06-24 PROCEDURE — 3078F DIAST BP <80 MM HG: CPT | Mod: HCNC,CPTII,S$GLB, | Performed by: FAMILY MEDICINE

## 2019-06-24 NOTE — PROGRESS NOTES
" Office Visit    Patient Name: Scooter Morrissey    : 1950  MRN: [id number]    Subjective:  Scooter is a 68 y.o. male who presents today for:    Memory Loss (shingles)    69 yo patient of mine with history of HTN, HLD, and chronic lung disease/ COPD/asthma, chronic pansinusitis who presents today at that request for memory evaluation. They are concerned about possible dementia given several outburst episodes he has had and also some concerns about him getting lost and not remembering conversations.    Patient himself has no concerns about his memory. He feels that the issues at hand are   1) decreased hearing-- admits to not hearing what people say and just inferring/ assuming instead of asking for clarification  2) some anger management issues- he is really struggling with getting older and having to cut back on his work due to the physical nature and he is having a hard time letting go of some jobs he would have done "back in the day" and this can lead to outbursts/ takking out frustration against his family even though they are not aware of what is really bothering him.   3) He perseverates on small things at time like if someone else makes a "bad move" driving he gets really annoyed to the point of getting distracted himself and just needs to let things go more    He works doing pressure washing, lawn care and, painting. Work is going well. Doesn't forget appointments and shows up on time. Uses a calendar to stay organized and reports no job related deficiencies.     Needs to walk a little more for cardio though he is very physically active in general with his work.    Denies any recent at risk situations due to confusion/ memory lapse.     He is overall feeling well-- good exercise tolerance and breathing is improved with daily use of symbicort. Still struggling with ongoing sinus pressure and foul smelling sinus drainage.  Went to an outside ENT but has had no lasting relief from recent antibiotic courses. "       Past Medical History  Past Medical History:   Diagnosis Date    Anxiety 6/29/2016    Benign essential hypertension 4/5/2016    Hyperlipidemia 4/13/2016    Interstitial pneumonitis 4/13/2016       Past Surgical History  Past Surgical History:   Procedure Laterality Date    ADENOIDECTOMY      COLONOSCOPY N/A 6/1/2016    Performed by Meme Mills MD at [facility]    FINGER SURGERY      15 years ago    TONSILLECTOMY         Family History  Family History   Problem Relation Age of Onset    No Known Problems Mother     No Known Problems Father     No Known Problems Daughter        Social History  Social History     Socioeconomic History    Marital status:      Spouse name: Not on file    Number of children: Not on file    Years of education: Not on file    Highest education level: Not on file   Occupational History    Occupation: lawn service   Social Needs    Financial resource strain: Not on file    Food insecurity:     Worry: Not on file     Inability: Not on file    Transportation needs:     Medical: Not on file     Non-medical: Not on file   Tobacco Use    Smoking status: Former Smoker     Types: Cigars    Smokeless tobacco: Former User     Quit date: 5/12/1996   Substance and Sexual Activity    Alcohol use: No    Drug use: No    Sexual activity: Yes     Partners: Female   Lifestyle    Physical activity:     Days per week: Not on file     Minutes per session: Not on file    Stress: Not on file   Relationships    Social connections:     Talks on phone: Not on file     Gets together: Not on file     Attends Evangelical service: Not on file     Active member of club or organization: Not on file     Attends meetings of clubs or organizations: Not on file     Relationship status: Not on file   Other Topics Concern    Not on file   Social History Narrative    Not on file       Current Medications  Medications reviewed and updated.     Allergies   Review of patient's  "allergies indicates:  No Known Allergies    Review of Systems (Pertinent positives)  Review of Systems   HENT: Positive for hearing loss, postnasal drip, rhinorrhea and sinus pressure.    Respiratory: Negative for shortness of breath and wheezing.    Cardiovascular: Negative for chest pain and leg swelling.   Allergic/Immunologic: Positive for environmental allergies.   Neurological: Negative for dizziness.   Psychiatric/Behavioral: Positive for agitation (reports some mood swings, anger mgmt issues). Negative for decreased concentration.       /70   Pulse 86   Temp 98.1 °F (36.7 °C)   Ht 5' 6" (1.676 m)   Wt 80.7 kg (177 lb 14.6 oz)   SpO2 95%   BMI 28.72 kg/m²     Physical Exam   Constitutional: He is oriented to person, place, and time. He appears well-developed and well-nourished. No distress.   HENT:   Right Ear: External ear normal. No drainage. Decreased hearing is noted.   Left Ear: External ear normal. No drainage. Decreased hearing is noted.   Nose: Mucosal edema and rhinorrhea present. Right sinus exhibits maxillary sinus tenderness and frontal sinus tenderness. Left sinus exhibits maxillary sinus tenderness and frontal sinus tenderness.   Cardiovascular: Normal rate, regular rhythm and normal heart sounds.   Pulmonary/Chest: Effort normal. He has decreased breath sounds. He has no wheezes. He has no rhonchi.   Musculoskeletal: He exhibits no edema.   Neurological: He is alert and oriented to person, place, and time.   See MMSE scanned to chart- score of 29/30-- got 2/3 object recall.    Poor hearing noted when asked to repeat phrase "no ifs ands or buts" when phrase stated very loudly patient able to repeat correctly   Psychiatric: He has a normal mood and affect.         Assessment/Plan:  Scooter Morrissey is a 68 y.o. male who presents today for :    Scooter was seen today for memory loss.    Diagnoses and all orders for this visit:    Benign essential hypertension  Comments:  Controlled on Toprol "  mg daily, Cozaar 100 mg daily, amlodipine 10 mg daily    Aortic atherosclerosis  Comments:  continue lipitor 20    Mixed hyperlipidemia    Memory impairment  Comments:  MMSE score of 29/30 (quite good given he did not graduate senior yr of high school)- issues likely related to poor hearing, mood swings. ongoing monitoring    Mood swings  Comments:  Patient acknowledges anger mgmt issues at times. Never violent & he feels he lets his issues go rather quickly. Family concerned this could represent dementia    Overweight (BMI 25.0-29.9)    COPD with asthma  Comments:  Stable with use of daily Symbicort, compliance much improved and this is making a difference.  Using Flonase and following up with ENT    H/O pleural empyema    Lung granuloma    Chronic pansinusitis  Comments:  CT of sinuses did demonstrate chronic severe sinus disease, have placed a referral to Dr. Simmons- ENT  Orders:  -     Cancel: Ambulatory referral to ENT  -     Ambulatory referral to ENT    Decreased hearing of both ears  -     Ambulatory referral to ENT            ICD-10-CM ICD-9-CM    1. Benign essential hypertension I10 401.1     Controlled on Toprol  mg daily, Cozaar 100 mg daily, amlodipine 10 mg daily   2. Aortic atherosclerosis I70.0 440.0     continue lipitor 20   3. Mixed hyperlipidemia E78.2 272.2    4. Memory impairment R41.3 780.93     MMSE score of 29/30 (quite good given he did not graduate senior yr of high school)- issues likely related to poor hearing, mood swings. ongoing monitoring   5. Mood swings R45.86 296.99     Patient acknowledges anger mgmt issues at times. Never violent & he feels he lets his issues go rather quickly. Family concerned this could represent dementia   6. Overweight (BMI 25.0-29.9) E66.3 278.02    7. COPD with asthma J44.9 493.20     Stable with use of daily Symbicort, compliance much improved and this is making a difference.  Using Flonase and following up with ENT   8. H/O pleural empyema  Z87.09 V12.69    9. Lung granuloma J84.10 515    10. Chronic pansinusitis J32.4 473.8 Ambulatory referral to ENT      CANCELED: Ambulatory referral to ENT    CT of sinuses did demonstrate chronic severe sinus disease, have placed a referral to Dr. Simmons- ENT   11. Decreased hearing of both ears H91.93 389.9 Ambulatory referral to ENT       There are no Patient Instructions on file for this visit.      Follow up in about 3 months (around 9/24/2019) for to follow up on lab results, return as needed for new concerns.

## 2019-06-25 ENCOUNTER — TELEPHONE (OUTPATIENT)
Dept: FAMILY MEDICINE | Facility: CLINIC | Age: 69
End: 2019-06-25

## 2019-07-03 ENCOUNTER — TELEPHONE (OUTPATIENT)
Dept: FAMILY MEDICINE | Facility: CLINIC | Age: 69
End: 2019-07-03

## 2019-07-03 NOTE — TELEPHONE ENCOUNTER
Pt called wanting to get a sooner appt with ENT. I let him know that he would have to call them and see if they have anything sooner. Pt verbalized understanding.

## 2019-07-11 DIAGNOSIS — J44.9 CHRONIC OBSTRUCTIVE PULMONARY DISEASE, UNSPECIFIED COPD TYPE: ICD-10-CM

## 2019-07-11 DIAGNOSIS — J45.31 MILD PERSISTENT ASTHMA WITH ACUTE EXACERBATION: ICD-10-CM

## 2019-07-11 RX ORDER — BUDESONIDE AND FORMOTEROL FUMARATE DIHYDRATE 160; 4.5 UG/1; UG/1
AEROSOL RESPIRATORY (INHALATION)
Qty: 10.2 G | Refills: 11 | Status: SHIPPED | OUTPATIENT
Start: 2019-07-11 | End: 2021-04-30 | Stop reason: SDUPTHER

## 2019-07-16 DIAGNOSIS — I10 BENIGN ESSENTIAL HYPERTENSION: ICD-10-CM

## 2019-07-16 RX ORDER — LOSARTAN POTASSIUM 100 MG/1
TABLET ORAL
Qty: 90 TABLET | Refills: 4 | Status: SHIPPED | OUTPATIENT
Start: 2019-07-16 | End: 2020-08-17

## 2019-07-16 RX ORDER — AMLODIPINE BESYLATE 10 MG/1
TABLET ORAL
Qty: 90 TABLET | Refills: 4 | Status: SHIPPED | OUTPATIENT
Start: 2019-07-16 | End: 2020-08-17

## 2019-08-01 ENCOUNTER — CLINICAL SUPPORT (OUTPATIENT)
Dept: OTOLARYNGOLOGY | Facility: CLINIC | Age: 69
End: 2019-08-01
Payer: MEDICARE

## 2019-08-01 ENCOUNTER — OFFICE VISIT (OUTPATIENT)
Dept: OTOLARYNGOLOGY | Facility: CLINIC | Age: 69
End: 2019-08-01
Payer: MEDICARE

## 2019-08-01 VITALS
HEART RATE: 70 BPM | DIASTOLIC BLOOD PRESSURE: 80 MMHG | BODY MASS INDEX: 28.44 KG/M2 | TEMPERATURE: 98 F | SYSTOLIC BLOOD PRESSURE: 138 MMHG | WEIGHT: 176.94 LBS | HEIGHT: 66 IN

## 2019-08-01 DIAGNOSIS — J34.89 NASAL OBSTRUCTION: ICD-10-CM

## 2019-08-01 DIAGNOSIS — H90.6 MIXED HEARING LOSS, BILATERAL: ICD-10-CM

## 2019-08-01 DIAGNOSIS — H69.93 ETD (EUSTACHIAN TUBE DYSFUNCTION), BILATERAL: Primary | ICD-10-CM

## 2019-08-01 DIAGNOSIS — J34.2 NASAL SEPTAL DEVIATION: ICD-10-CM

## 2019-08-01 DIAGNOSIS — J32.8 OTHER CHRONIC SINUSITIS: Primary | ICD-10-CM

## 2019-08-01 DIAGNOSIS — J34.3 HYPERTROPHY OF INFERIOR NASAL TURBINATE: ICD-10-CM

## 2019-08-01 PROCEDURE — 99999 PR PBB SHADOW E&M-EST. PATIENT-LVL I: ICD-10-PCS | Mod: PBBFAC,HCNC,, | Performed by: AUDIOLOGIST-HEARING AID FITTER

## 2019-08-01 PROCEDURE — 3075F SYST BP GE 130 - 139MM HG: CPT | Mod: HCNC,CPTII,S$GLB, | Performed by: OTOLARYNGOLOGY

## 2019-08-01 PROCEDURE — 92557 COMPREHENSIVE HEARING TEST: CPT | Mod: HCNC,S$GLB,, | Performed by: AUDIOLOGIST-HEARING AID FITTER

## 2019-08-01 PROCEDURE — 31231 NASAL ENDOSCOPY DX: CPT | Mod: HCNC,S$GLB,, | Performed by: OTOLARYNGOLOGY

## 2019-08-01 PROCEDURE — 99999 PR PBB SHADOW E&M-EST. PATIENT-LVL I: CPT | Mod: PBBFAC,HCNC,, | Performed by: AUDIOLOGIST-HEARING AID FITTER

## 2019-08-01 PROCEDURE — 99999 PR PBB SHADOW E&M-EST. PATIENT-LVL III: ICD-10-PCS | Mod: PBBFAC,HCNC,, | Performed by: OTOLARYNGOLOGY

## 2019-08-01 PROCEDURE — 92567 PR TYMPA2METRY: ICD-10-PCS | Mod: HCNC,S$GLB,, | Performed by: AUDIOLOGIST-HEARING AID FITTER

## 2019-08-01 PROCEDURE — 3075F PR MOST RECENT SYSTOLIC BLOOD PRESS GE 130-139MM HG: ICD-10-PCS | Mod: HCNC,CPTII,S$GLB, | Performed by: OTOLARYNGOLOGY

## 2019-08-01 PROCEDURE — 31231 PR NASAL ENDOSCOPY, DX: ICD-10-PCS | Mod: HCNC,S$GLB,, | Performed by: OTOLARYNGOLOGY

## 2019-08-01 PROCEDURE — 3079F DIAST BP 80-89 MM HG: CPT | Mod: HCNC,CPTII,S$GLB, | Performed by: OTOLARYNGOLOGY

## 2019-08-01 PROCEDURE — 99204 OFFICE O/P NEW MOD 45 MIN: CPT | Mod: 25,HCNC,S$GLB, | Performed by: OTOLARYNGOLOGY

## 2019-08-01 PROCEDURE — 1101F PR PT FALLS ASSESS DOC 0-1 FALLS W/OUT INJ PAST YR: ICD-10-PCS | Mod: HCNC,CPTII,S$GLB, | Performed by: OTOLARYNGOLOGY

## 2019-08-01 PROCEDURE — 1101F PT FALLS ASSESS-DOCD LE1/YR: CPT | Mod: HCNC,CPTII,S$GLB, | Performed by: OTOLARYNGOLOGY

## 2019-08-01 PROCEDURE — 3079F PR MOST RECENT DIASTOLIC BLOOD PRESSURE 80-89 MM HG: ICD-10-PCS | Mod: HCNC,CPTII,S$GLB, | Performed by: OTOLARYNGOLOGY

## 2019-08-01 PROCEDURE — 92557 PR COMPREHENSIVE HEARING TEST: ICD-10-PCS | Mod: HCNC,S$GLB,, | Performed by: AUDIOLOGIST-HEARING AID FITTER

## 2019-08-01 PROCEDURE — 99999 PR PBB SHADOW E&M-EST. PATIENT-LVL III: CPT | Mod: PBBFAC,HCNC,, | Performed by: OTOLARYNGOLOGY

## 2019-08-01 PROCEDURE — 99204 PR OFFICE/OUTPT VISIT, NEW, LEVL IV, 45-59 MIN: ICD-10-PCS | Mod: 25,HCNC,S$GLB, | Performed by: OTOLARYNGOLOGY

## 2019-08-01 PROCEDURE — 92567 TYMPANOMETRY: CPT | Mod: HCNC,S$GLB,, | Performed by: AUDIOLOGIST-HEARING AID FITTER

## 2019-08-01 RX ORDER — AMOXICILLIN AND CLAVULANATE POTASSIUM 875; 125 MG/1; MG/1
1 TABLET, FILM COATED ORAL 2 TIMES DAILY
Qty: 42 TABLET | Refills: 0 | Status: SHIPPED | OUTPATIENT
Start: 2019-08-01 | End: 2019-08-22

## 2019-08-01 RX ORDER — PREDNISONE 20 MG/1
TABLET ORAL
Qty: 21 TABLET | Refills: 0 | Status: SHIPPED | OUTPATIENT
Start: 2019-08-01 | End: 2019-09-03

## 2019-08-01 RX ORDER — LEVOCETIRIZINE DIHYDROCHLORIDE 5 MG/1
5 TABLET, FILM COATED ORAL NIGHTLY
Qty: 30 TABLET | Refills: 11 | Status: SHIPPED | OUTPATIENT
Start: 2019-08-01 | End: 2020-01-28 | Stop reason: SDUPTHER

## 2019-08-01 NOTE — PROCEDURES
Procedures     PROCEDURE NOTE:  Nasal endoscopy   Preprocedure diagnosis:  Chronic sinusitis  Postprocedure diangosis:  Same  Complications:  None  Blood Loss:  None    Procedure in detail:  After verbal consent was obtained, the patient's nasal cavity was anesthesized using topical 1%lidocaine and Neosynepherine.  A rigid 0 degree endoscope was placed in first the right, then the left nasal cavity.  The inferior and middle turbinates were examined, and found to be edematous and erythematous  bilaterally.  The middle meatus and maxillary antrum was also examined, and found to be narrowed bilaterally.  Purulent drainage present. No masses seen.  The patient tolerated the procedure well and there were no complications.

## 2019-08-01 NOTE — LETTER
August 1, 2019      Katelynn Rojas MD  [address]  [address]  [place] [address]           [facility]  [address]  [place] [address]  Phone: [phone]  Fax: [phone]          Patient: Scooter Morrissey   MR Number: [id number]   YOB: 1950   Date of Visit: 8/1/2019       Dear Dr. Katelynn Rojas:    Thank you for referring Scooter Morrissey to me for evaluation. Attached you will find relevant portions of my assessment and plan of care.    If you have questions, please do not hesitate to call me. I look forward to following Scooter Morrissey along with you.    Sincerely,    Clara Jennings MD    Enclosure  CC:  No Recipients    If you would like to receive this communication electronically, please contact [email or web address] or [phone] to request more information on [facility] access.    For providers and/or their staff who would like to refer a patient to [facility], please contact us through our one-stop-shop provider referral line, [facility], at [phone].    If you feel you have received this communication in error or would no longer like to receive these types of communications, please e-mail [email or web address]

## 2019-08-09 ENCOUNTER — TELEPHONE (OUTPATIENT)
Dept: OTOLARYNGOLOGY | Facility: CLINIC | Age: 69
End: 2019-08-09

## 2019-08-12 ENCOUNTER — HOSPITAL ENCOUNTER (OUTPATIENT)
Dept: RADIOLOGY | Facility: HOSPITAL | Age: 69
Discharge: HOME | End: 2019-08-12
Attending: OTOLARYNGOLOGY
Payer: MEDICARE

## 2019-08-12 DIAGNOSIS — J32.8 OTHER CHRONIC SINUSITIS: ICD-10-CM

## 2019-08-12 PROCEDURE — 70486 CT MAXILLOFACIAL W/O DYE: CPT | Mod: TC,HCNC

## 2019-08-12 PROCEDURE — 70486 CT MAXILLOFACIAL W/O DYE: CPT | Mod: 26,HCNC,, | Performed by: RADIOLOGY

## 2019-08-12 PROCEDURE — 70486 CT MEDTRONIC SINUSES WITHOUT: ICD-10-PCS | Mod: 26,HCNC,, | Performed by: RADIOLOGY

## 2019-09-03 ENCOUNTER — OFFICE VISIT (OUTPATIENT)
Dept: OTOLARYNGOLOGY | Facility: CLINIC | Age: 69
End: 2019-09-03
Payer: MEDICARE

## 2019-09-03 VITALS
HEART RATE: 88 BPM | BODY MASS INDEX: 29.09 KG/M2 | DIASTOLIC BLOOD PRESSURE: 74 MMHG | WEIGHT: 181 LBS | SYSTOLIC BLOOD PRESSURE: 144 MMHG | HEIGHT: 66 IN

## 2019-09-03 DIAGNOSIS — H69.93 ETD (EUSTACHIAN TUBE DYSFUNCTION), BILATERAL: ICD-10-CM

## 2019-09-03 DIAGNOSIS — J34.3 HYPERTROPHY OF INFERIOR NASAL TURBINATE: ICD-10-CM

## 2019-09-03 DIAGNOSIS — J34.89 NASAL OBSTRUCTION: ICD-10-CM

## 2019-09-03 DIAGNOSIS — J32.8 OTHER CHRONIC SINUSITIS: Primary | ICD-10-CM

## 2019-09-03 DIAGNOSIS — J34.2 NASAL SEPTAL DEVIATION: ICD-10-CM

## 2019-09-03 PROCEDURE — 3077F SYST BP >= 140 MM HG: CPT | Mod: HCNC,CPTII,S$GLB, | Performed by: OTOLARYNGOLOGY

## 2019-09-03 PROCEDURE — 31231 NASAL ENDOSCOPY DX: CPT | Mod: HCNC,S$GLB,, | Performed by: OTOLARYNGOLOGY

## 2019-09-03 PROCEDURE — 31231 PR NASAL ENDOSCOPY, DX: ICD-10-PCS | Mod: HCNC,S$GLB,, | Performed by: OTOLARYNGOLOGY

## 2019-09-03 PROCEDURE — 99214 PR OFFICE/OUTPT VISIT, EST, LEVL IV, 30-39 MIN: ICD-10-PCS | Mod: 25,HCNC,S$GLB, | Performed by: OTOLARYNGOLOGY

## 2019-09-03 PROCEDURE — 99999 PR PBB SHADOW E&M-EST. PATIENT-LVL III: CPT | Mod: PBBFAC,HCNC,, | Performed by: OTOLARYNGOLOGY

## 2019-09-03 PROCEDURE — 3078F DIAST BP <80 MM HG: CPT | Mod: HCNC,CPTII,S$GLB, | Performed by: OTOLARYNGOLOGY

## 2019-09-03 PROCEDURE — 3078F PR MOST RECENT DIASTOLIC BLOOD PRESSURE < 80 MM HG: ICD-10-PCS | Mod: HCNC,CPTII,S$GLB, | Performed by: OTOLARYNGOLOGY

## 2019-09-03 PROCEDURE — 1101F PR PT FALLS ASSESS DOC 0-1 FALLS W/OUT INJ PAST YR: ICD-10-PCS | Mod: HCNC,CPTII,S$GLB, | Performed by: OTOLARYNGOLOGY

## 2019-09-03 PROCEDURE — 99214 OFFICE O/P EST MOD 30 MIN: CPT | Mod: 25,HCNC,S$GLB, | Performed by: OTOLARYNGOLOGY

## 2019-09-03 PROCEDURE — 3077F PR MOST RECENT SYSTOLIC BLOOD PRESSURE >= 140 MM HG: ICD-10-PCS | Mod: HCNC,CPTII,S$GLB, | Performed by: OTOLARYNGOLOGY

## 2019-09-03 PROCEDURE — 99999 PR PBB SHADOW E&M-EST. PATIENT-LVL III: ICD-10-PCS | Mod: PBBFAC,HCNC,, | Performed by: OTOLARYNGOLOGY

## 2019-09-03 PROCEDURE — 1101F PT FALLS ASSESS-DOCD LE1/YR: CPT | Mod: HCNC,CPTII,S$GLB, | Performed by: OTOLARYNGOLOGY

## 2019-09-03 RX ORDER — LEVOFLOXACIN 500 MG/1
500 TABLET, FILM COATED ORAL DAILY
Qty: 14 TABLET | Refills: 0 | Status: SHIPPED | OUTPATIENT
Start: 2019-09-03 | End: 2019-09-17

## 2019-09-03 NOTE — PROGRESS NOTES
Chief Complaint   Patient presents with    Follow-up     patient reports the bad odor smell is gone, breathing better   .    HPI:     Scooter Morrissey is a 68 y.o. male who is referred by Dr. Katelynn Rojas for evaluation of a several month history of foul smelling discharge in his nose.  He reports that he has nasal congestion and postnasal drip.  He describes difficulty breathing at night. There is bilateral nasal obstruction. He does use sinus rinses or nasal sprays. He has been using nasal saline rinses. He is on Flonase. He has seen an outside ENT who prescribed a Zpak and gave him an IM steroid injection without relief. He has had an abnormal CT maxillofacial ordered by Dr. Rojas in April 2019.   He denies midface pain and pressure.  He admits to rhinorrhea and postnasal drip. There is not maxillary tooth pain. He  admits to headaches.  He has not had sinus or nasal surgery. There is no history of sinonasal trauma.      Interval HPI 9/3/2019:  Mr. Morrissey follows up today for CRS.  He states since last visit approximately 1 month ago he has completed his Augmentin a 3 week course as well as his prednisone taper.  He has been utilizing Xyzal and Flonase as recommended.  He has been using nasal saline rinses which he does feel are useful.  He does feel that he has had improvement in the foul smelling discharge that was present prior to his last appointment.  He does note continued nasal congestion, rhinorrhea, postnasal drip and facial pain or pressure.  He does work as a  and is frequently outside.  He does feel that grass does tend to worsen his symptoms.      Past Medical History:   Diagnosis Date    Anxiety 6/29/2016    Benign essential hypertension 4/5/2016    Hyperlipidemia 4/13/2016    Interstitial pneumonitis 4/13/2016     Social History     Socioeconomic History    Marital status:      Spouse name: Not on file    Number of children: Not on file    Years of education: Not on  file    Highest education level: Not on file   Occupational History    Occupation: lawn service   Social Needs    Financial resource strain: Not on file    Food insecurity:     Worry: Not on file     Inability: Not on file    Transportation needs:     Medical: Not on file     Non-medical: Not on file   Tobacco Use    Smoking status: Former Smoker     Types: Cigars    Smokeless tobacco: Former User     Quit date: 5/12/1996   Substance and Sexual Activity    Alcohol use: No    Drug use: No    Sexual activity: Yes     Partners: Female   Lifestyle    Physical activity:     Days per week: Not on file     Minutes per session: Not on file    Stress: Not on file   Relationships    Social connections:     Talks on phone: Not on file     Gets together: Not on file     Attends Restorationist service: Not on file     Active member of club or organization: Not on file     Attends meetings of clubs or organizations: Not on file     Relationship status: Not on file   Other Topics Concern    Not on file   Social History Narrative    Not on file     Past Surgical History:   Procedure Laterality Date    ADENOIDECTOMY      COLONOSCOPY N/A 6/1/2016    Performed by [facility]    FINGER SURGERY      15 years ago    TONSILLECTOMY       Family History   Problem Relation Age of Onset    No Known Problems Mother     No Known Problems Father     No Known Problems Daughter            Review of Systems  General: negative for chills, fever or weight loss  Psychological: negative for mood changes or depression  Ophthalmic: negative for blurry vision, photophobia or eye pain  ENT: see HPI  Respiratory: no cough, shortness of breath, or wheezing  Cardiovascular: no chest pain or dyspnea on exertion  Gastrointestinal: no abdominal pain, change in bowel habits, or black/ bloody stools  Musculoskeletal: negative for gait disturbance or muscular weakness  Neurological: no syncope or seizures; no  ataxia  Dermatological: negative for puritis,  rash and jaundice  Hematologic/lymphatic: no easy bruising, no new lumps or bumps      Physical Exam:    Vitals:    09/03/19 1522   BP: (!) 144/74   Pulse: 88       Constitutional: Well appearing / communicating without difficutly.  NAD.  Eyes: EOM I Bilaterally  Head/Face: Normocephalic.  Negative paranasal sinus pressure/tenderness.  Salivary glands WNL.  House Brackmann I Bilaterally.    Right Ear: Auricle normal appearance. External Auditory Canal within normal limits,TM w/o masses/lesions/perforations. TM mobility noted.   Left Ear: Auricle normal appearance. External Auditory Canal WNL,TM w/o masses/lesions/perforations. TM mobility noted.  Nose: Nasal septal deviation to the right. Inferior Turbinates 3+ bilaterally. No septal perforation. No masses/lesions. External nasal skin appears normal without masses/lesions.  Oral Cavity: Gingiva/lips within normal limits.  Dentition/gingiva healthy appearing. Mucus membranes moist. Floor of mouth soft, no masses palpated. Oral Tongue mobile. Hard Palate appears normal.    Oropharynx: Base of tongue appears normal. No masses/lesions noted. Tonsillar fossa/pharyngeal wall without lesions. Posterior oropharynx WNL.  Soft palate without masses. Midline uvula.   Neck/Lymphatic: No LAD I-VI bilaterally.  No thyromegaly.  No masses noted on exam.    Mirror laryngoscopy/nasopharyngoscopy: Active gag reflex.  Unable to perform.    Neuro/Psychiatric: AOx3.  Normal mood and affect.   Cardiovascular: Normal carotid pulses bilaterally, no increasing jugular venous distention noted at cervical region bilaterally.    Respiratory: Normal respiratory effort, no stridor, no retractions noted.      See separate procedure note for nasal endoscopy.    Diagnostic studies reviewed:    Ct sinus 8/13/2019: Mild improvement in the aeration of the paranasal sinuses compared to the prior exam.  The frontal sinuses and ethmoid air cells are better  aerated.  Remains inflammatory changes within the maxillary sinuses and sphenoid sinus.    There is fluid opacification of all right mastoid air cells and right middle air cavity, and few inferior left mastoid air cells, unchanged.    CT maxillofacial 4/10/2019:   Paranasal sinuses: There are postsurgical findings of bilateral maxillary uncinectomy/antrostomy. Moderate mucosal thickening is present throughout the right maxillary sinus with near complete opacification of the left maxillary sinus. There near complete opacification of the ethmoid air cells and left frontal sinus with complete opacification of the right frontal sinus. Mild mucosal thickening is present in the sphenoid sinuses. Chronic osteitis changes are present involving all of the paranasal sinuses with rarefaction of the ethmoid air cells in keeping with chronic sinus disease.    Nasal cavity: Clear.    Aerodigestive tract: Rightward septal deviation.      Assessment:    ICD-10-CM ICD-9-CM    1. Other chronic sinusitis J32.8 473.8    2. Nasal septal deviation J34.2 470    3. Hypertrophy of inferior nasal turbinate J34.3 478.0    4. Nasal obstruction J34.89 478.19    5. ETD (Eustachian tube dysfunction), bilateral H69.83 381.81      The primary encounter diagnosis was Other chronic sinusitis. Diagnoses of Nasal septal deviation, Hypertrophy of inferior nasal turbinate, Nasal obstruction, and ETD (Eustachian tube dysfunction), bilateral were also pertinent to this visit.      Plan:  No orders of the defined types were placed in this encounter.    Levaquin 500mg PO daily for 14 days.   Continue Flonase  Continue  Xyzal  Nasal saline rinses BID.   Post treatment CT scan of the sinuses shows continued inflammatory changes within the bilateral maxillary sinuses and sphenoid sinuses.  We discussed that he would benefit from endoscopic sinus surgery, septoplasty, and turbinate reduction for the treatment of this condition.  He is reluctant to undergo  surgery at this time as he feels that he cannot afford to take off work for the procedure. He wishes to try to continue to maximize medical therapy at this time.    This visit was 25 minutes in duration, with over 50% of the time spent in direct face-to-face counseling and coordination of care regarding the issues outlined above.    Thank you kindly for allowing me to participate in the patient's care.       Clara Jennings MD

## 2019-09-03 NOTE — PROCEDURES
Procedures       PROCEDURE NOTE:  Nasal endoscopy   Preprocedure diagnosis:  Chronic sinusitis  Postprocedure diangosis:  Same  Complications:  None  Blood Loss:  None    Procedure in detail:  After verbal consent was obtained, the patient's nasal cavity was anesthesized using topical 1%lidocaine and Neosynepherine.  A rigid 0 degree endoscope was placed in first the right, then the left nasal cavity.  The inferior and middle turbinates were examined, and found to be edematous and erythematous  bilaterally.  The middle meatus and maxillary antrum was also examined, and found to be narrowed bilaterally.  Purulent drainage present in bilateral middle meatus. No masses seen.  The patient tolerated the procedure well and there were no complications.

## 2019-10-04 ENCOUNTER — LAB VISIT (OUTPATIENT)
Dept: LAB | Facility: HOSPITAL | Age: 69
End: 2019-10-04
Attending: FAMILY MEDICINE
Payer: MEDICARE

## 2019-10-04 DIAGNOSIS — Z12.5 SCREENING FOR MALIGNANT NEOPLASM OF PROSTATE: ICD-10-CM

## 2019-10-04 DIAGNOSIS — E55.9 VITAMIN D DEFICIENCY: ICD-10-CM

## 2019-10-04 DIAGNOSIS — I10 BENIGN ESSENTIAL HYPERTENSION: ICD-10-CM

## 2019-10-04 DIAGNOSIS — Z00.00 ROUTINE GENERAL MEDICAL EXAMINATION AT A HEALTH CARE FACILITY: ICD-10-CM

## 2019-10-04 DIAGNOSIS — E78.2 MIXED HYPERLIPIDEMIA: ICD-10-CM

## 2019-10-04 DIAGNOSIS — R73.09 ELEVATED HEMOGLOBIN A1C: ICD-10-CM

## 2019-10-04 LAB
25(OH)D3+25(OH)D2 SERPL-MCNC: 33 NG/ML (ref 30–96)
ALBUMIN SERPL BCP-MCNC: 3.9 G/DL (ref 3.5–5.2)
ALP SERPL-CCNC: 83 U/L (ref 55–135)
ALT SERPL W/O P-5'-P-CCNC: 21 U/L (ref 10–44)
ANION GAP SERPL CALC-SCNC: 8 MMOL/L (ref 8–16)
AST SERPL-CCNC: 22 U/L (ref 10–40)
BASOPHILS # BLD AUTO: 0.01 K/UL (ref 0–0.2)
BASOPHILS NFR BLD: 0.1 % (ref 0–1.9)
BILIRUB SERPL-MCNC: 0.4 MG/DL (ref 0.1–1)
BUN SERPL-MCNC: 15 MG/DL (ref 8–23)
CALCIUM SERPL-MCNC: 9.7 MG/DL (ref 8.7–10.5)
CHLORIDE SERPL-SCNC: 106 MMOL/L (ref 95–110)
CHOLEST SERPL-MCNC: 133 MG/DL (ref 120–199)
CHOLEST/HDLC SERPL: 4.6 {RATIO} (ref 2–5)
CO2 SERPL-SCNC: 25 MMOL/L (ref 23–29)
COMPLEXED PSA SERPL-MCNC: 0.43 NG/ML (ref 0–4)
CREAT SERPL-MCNC: 1.1 MG/DL (ref 0.5–1.4)
DIFFERENTIAL METHOD: ABNORMAL
EOSINOPHIL # BLD AUTO: 0.2 K/UL (ref 0–0.5)
EOSINOPHIL NFR BLD: 2.8 % (ref 0–8)
ERYTHROCYTE [DISTWIDTH] IN BLOOD BY AUTOMATED COUNT: 13.2 % (ref 11.5–14.5)
EST. GFR  (AFRICAN AMERICAN): >60 ML/MIN/1.73 M^2
EST. GFR  (NON AFRICAN AMERICAN): >60 ML/MIN/1.73 M^2
ESTIMATED AVG GLUCOSE: 117 MG/DL (ref 68–131)
GLUCOSE SERPL-MCNC: 107 MG/DL (ref 70–110)
HBA1C MFR BLD HPLC: 5.7 % (ref 4–5.6)
HCT VFR BLD AUTO: 39.5 % (ref 40–54)
HDLC SERPL-MCNC: 29 MG/DL (ref 40–75)
HDLC SERPL: 21.8 % (ref 20–50)
HGB BLD-MCNC: 13 G/DL (ref 14–18)
LDLC SERPL CALC-MCNC: 82.6 MG/DL (ref 63–159)
LYMPHOCYTES # BLD AUTO: 2.9 K/UL (ref 1–4.8)
LYMPHOCYTES NFR BLD: 33.7 % (ref 18–48)
MCH RBC QN AUTO: 32.9 PG (ref 27–31)
MCHC RBC AUTO-ENTMCNC: 32.9 G/DL (ref 32–36)
MCV RBC AUTO: 100 FL (ref 82–98)
MONOCYTES # BLD AUTO: 0.6 K/UL (ref 0.3–1)
MONOCYTES NFR BLD: 6.6 % (ref 4–15)
NEUTROPHILS # BLD AUTO: 4.8 K/UL (ref 1.8–7.7)
NEUTROPHILS NFR BLD: 56.8 % (ref 38–73)
NONHDLC SERPL-MCNC: 104 MG/DL
PLATELET # BLD AUTO: 234 K/UL (ref 150–350)
PMV BLD AUTO: 10.1 FL (ref 9.2–12.9)
POTASSIUM SERPL-SCNC: 4.5 MMOL/L (ref 3.5–5.1)
PROT SERPL-MCNC: 8.9 G/DL (ref 6–8.4)
RBC # BLD AUTO: 3.95 M/UL (ref 4.6–6.2)
SODIUM SERPL-SCNC: 139 MMOL/L (ref 136–145)
TRIGL SERPL-MCNC: 107 MG/DL (ref 30–150)
TSH SERPL DL<=0.005 MIU/L-ACNC: 1.18 UIU/ML (ref 0.4–4)
WBC # BLD AUTO: 8.52 K/UL (ref 3.9–12.7)

## 2019-10-04 PROCEDURE — 82306 VITAMIN D 25 HYDROXY: CPT | Mod: HCNC

## 2019-10-04 PROCEDURE — 83036 HEMOGLOBIN GLYCOSYLATED A1C: CPT | Mod: HCNC

## 2019-10-04 PROCEDURE — 36415 COLL VENOUS BLD VENIPUNCTURE: CPT | Mod: HCNC

## 2019-10-04 PROCEDURE — 80061 LIPID PANEL: CPT | Mod: HCNC

## 2019-10-04 PROCEDURE — 84153 ASSAY OF PSA TOTAL: CPT | Mod: HCNC

## 2019-10-04 PROCEDURE — 85025 COMPLETE CBC W/AUTO DIFF WBC: CPT | Mod: HCNC

## 2019-10-04 PROCEDURE — 80053 COMPREHEN METABOLIC PANEL: CPT | Mod: HCNC

## 2019-10-04 PROCEDURE — 84443 ASSAY THYROID STIM HORMONE: CPT | Mod: HCNC

## 2019-10-05 DIAGNOSIS — I10 BENIGN ESSENTIAL HYPERTENSION: ICD-10-CM

## 2019-10-05 RX ORDER — METOPROLOL SUCCINATE 100 MG/1
TABLET, EXTENDED RELEASE ORAL
Qty: 90 TABLET | Refills: 4 | Status: SHIPPED | OUTPATIENT
Start: 2019-10-05 | End: 2020-10-27

## 2019-10-06 DIAGNOSIS — E78.2 MIXED HYPERLIPIDEMIA: ICD-10-CM

## 2019-10-06 RX ORDER — ATORVASTATIN CALCIUM 20 MG/1
TABLET, FILM COATED ORAL
Qty: 90 TABLET | Refills: 4 | Status: SHIPPED | OUTPATIENT
Start: 2019-10-06 | End: 2021-01-11

## 2019-10-07 ENCOUNTER — PATIENT OUTREACH (OUTPATIENT)
Dept: ADMINISTRATIVE | Facility: HOSPITAL | Age: 69
End: 2019-10-07

## 2019-10-11 ENCOUNTER — PATIENT OUTREACH (OUTPATIENT)
Dept: ADMINISTRATIVE | Facility: OTHER | Age: 69
End: 2019-10-11

## 2019-10-14 ENCOUNTER — OFFICE VISIT (OUTPATIENT)
Dept: OTOLARYNGOLOGY | Facility: CLINIC | Age: 69
End: 2019-10-14
Payer: MEDICARE

## 2019-10-14 VITALS
BODY MASS INDEX: 29.37 KG/M2 | TEMPERATURE: 98 F | DIASTOLIC BLOOD PRESSURE: 73 MMHG | HEART RATE: 78 BPM | HEIGHT: 66 IN | WEIGHT: 182.75 LBS | SYSTOLIC BLOOD PRESSURE: 133 MMHG

## 2019-10-14 DIAGNOSIS — J34.2 NASAL SEPTAL DEVIATION: ICD-10-CM

## 2019-10-14 DIAGNOSIS — J34.89 NASAL OBSTRUCTION: ICD-10-CM

## 2019-10-14 DIAGNOSIS — J32.8 OTHER CHRONIC SINUSITIS: Primary | ICD-10-CM

## 2019-10-14 DIAGNOSIS — J34.3 HYPERTROPHY OF INFERIOR NASAL TURBINATE: ICD-10-CM

## 2019-10-14 PROCEDURE — 1101F PT FALLS ASSESS-DOCD LE1/YR: CPT | Mod: HCNC,CPTII,S$GLB, | Performed by: OTOLARYNGOLOGY

## 2019-10-14 PROCEDURE — 99999 PR PBB SHADOW E&M-EST. PATIENT-LVL III: ICD-10-PCS | Mod: PBBFAC,HCNC,, | Performed by: OTOLARYNGOLOGY

## 2019-10-14 PROCEDURE — 3075F PR MOST RECENT SYSTOLIC BLOOD PRESS GE 130-139MM HG: ICD-10-PCS | Mod: HCNC,CPTII,S$GLB, | Performed by: OTOLARYNGOLOGY

## 2019-10-14 PROCEDURE — 99999 PR PBB SHADOW E&M-EST. PATIENT-LVL III: CPT | Mod: PBBFAC,HCNC,, | Performed by: OTOLARYNGOLOGY

## 2019-10-14 PROCEDURE — 3075F SYST BP GE 130 - 139MM HG: CPT | Mod: HCNC,CPTII,S$GLB, | Performed by: OTOLARYNGOLOGY

## 2019-10-14 PROCEDURE — 3078F DIAST BP <80 MM HG: CPT | Mod: HCNC,CPTII,S$GLB, | Performed by: OTOLARYNGOLOGY

## 2019-10-14 PROCEDURE — 1101F PR PT FALLS ASSESS DOC 0-1 FALLS W/OUT INJ PAST YR: ICD-10-PCS | Mod: HCNC,CPTII,S$GLB, | Performed by: OTOLARYNGOLOGY

## 2019-10-14 PROCEDURE — 99213 OFFICE O/P EST LOW 20 MIN: CPT | Mod: 25,HCNC,S$GLB, | Performed by: OTOLARYNGOLOGY

## 2019-10-14 PROCEDURE — 31231 NASAL ENDOSCOPY DX: CPT | Mod: HCNC,S$GLB,, | Performed by: OTOLARYNGOLOGY

## 2019-10-14 PROCEDURE — 3078F PR MOST RECENT DIASTOLIC BLOOD PRESSURE < 80 MM HG: ICD-10-PCS | Mod: HCNC,CPTII,S$GLB, | Performed by: OTOLARYNGOLOGY

## 2019-10-14 PROCEDURE — 31231 PR NASAL ENDOSCOPY, DX: ICD-10-PCS | Mod: HCNC,S$GLB,, | Performed by: OTOLARYNGOLOGY

## 2019-10-14 PROCEDURE — 99213 PR OFFICE/OUTPT VISIT, EST, LEVL III, 20-29 MIN: ICD-10-PCS | Mod: 25,HCNC,S$GLB, | Performed by: OTOLARYNGOLOGY

## 2019-10-14 NOTE — PROCEDURES
Procedures       PROCEDURE NOTE:  Nasal endoscopy   Preprocedure diagnosis:  Chronic sinusitis  Postprocedure diangosis:  Same  Complications:  None  Blood Loss:  None    Procedure in detail:  After verbal consent was obtained, the patient's nasal cavity was anesthesized using topical 1%lidocaine and Neosynepherine.  A rigid 0 degree endoscope was placed in first the right, then the left nasal cavity.  The inferior and middle turbinates were examined, and found to be edematous and erythematous  bilaterally.  The middle meatus and maxillary antrum was also examined, and found to be normal bilaterally.  Purulent drainage present in bilateral middle meatus previously is resolved. No masses seen.  The patient tolerated the procedure well and there were no complications.

## 2019-10-14 NOTE — PROGRESS NOTES
Chief Complaint   Patient presents with    Follow-up     Patient states improvment since last visit.   .    HPI:     Scooter Morrissey is a 69 y.o. male who is referred by Dr. Katelynn Rojas for evaluation of a several month history of foul smelling discharge in his nose.  He reports that he has nasal congestion and postnasal drip.  He describes difficulty breathing at night. There is bilateral nasal obstruction. He does use sinus rinses or nasal sprays. He has been using nasal saline rinses. He is on Flonase. He has seen an outside ENT who prescribed a Zpak and gave him an IM steroid injection without relief. He has had an abnormal CT maxillofacial ordered by Dr. Rojas in April 2019.   He denies midface pain and pressure.  He admits to rhinorrhea and postnasal drip. There is not maxillary tooth pain. He  admits to headaches.  He has not had sinus or nasal surgery. There is no history of sinonasal trauma.      Interval HPI 10/14/2019:  Mr. Morrissey follows up today for CRS.  He states since last visit approximately 1 month ago he feels he is doing much better. He has completed levaquin.  He has been utilizing Xyzal as recommended but stopped Flonase.   He has been using nasal saline rinses which he does feel are tremendously helpful.  He does note continued mild nasal congestion but denies  rhinorrhea, postnasal drip and facial pain or pressure.  Overall he is improved.     Past Medical History:   Diagnosis Date    Anxiety 6/29/2016    Benign essential hypertension 4/5/2016    Hyperlipidemia 4/13/2016    Interstitial pneumonitis 4/13/2016     Social History     Socioeconomic History    Marital status:      Spouse name: Not on file    Number of children: Not on file    Years of education: Not on file    Highest education level: Not on file   Occupational History    Occupation: lawn service   Social Needs    Financial resource strain: Not on file    Food insecurity:     Worry: Not on file      Inability: Not on file    Transportation needs:     Medical: Not on file     Non-medical: Not on file   Tobacco Use    Smoking status: Former Smoker     Types: Cigars    Smokeless tobacco: Former User     Quit date: 5/12/1996   Substance and Sexual Activity    Alcohol use: No    Drug use: No    Sexual activity: Yes     Partners: Female   Lifestyle    Physical activity:     Days per week: Not on file     Minutes per session: Not on file    Stress: Not on file   Relationships    Social connections:     Talks on phone: Not on file     Gets together: Not on file     Attends Yazdanism service: Not on file     Active member of club or organization: Not on file     Attends meetings of clubs or organizations: Not on file     Relationship status: Not on file   Other Topics Concern    Not on file   Social History Narrative    Not on file     Past Surgical History:   Procedure Laterality Date    ADENOIDECTOMY      COLONOSCOPY N/A 6/1/2016    Procedure: COLONOSCOPY;  Surgeon: Meme Mills MD;  Location: [facility];  Service: Endoscopy;  Laterality: N/A;    FINGER SURGERY      15 years ago    TONSILLECTOMY       Family History   Problem Relation Age of Onset    No Known Problems Mother     No Known Problems Father     No Known Problems Daughter            Review of Systems  General: negative for chills, fever or weight loss  Psychological: negative for mood changes or depression  Ophthalmic: negative for blurry vision, photophobia or eye pain  ENT: see HPI  Respiratory: no cough, shortness of breath, or wheezing  Cardiovascular: no chest pain or dyspnea on exertion  Gastrointestinal: no abdominal pain, change in bowel habits, or black/ bloody stools  Musculoskeletal: negative for gait disturbance or muscular weakness  Neurological: no syncope or seizures; no ataxia  Dermatological: negative for puritis,  rash and jaundice  Hematologic/lymphatic: no easy bruising, no new lumps or bumps      Physical  Exam:    Vitals:    10/14/19 1525   BP: 133/73   Pulse: 78   Temp: 98.2 °F (36.8 °C)       Constitutional: Well appearing / communicating without difficutly.  NAD.  Eyes: EOM I Bilaterally  Head/Face: Normocephalic.  Negative paranasal sinus pressure/tenderness.  Salivary glands WNL.  House Brackmann I Bilaterally.    Right Ear: Auricle normal appearance. External Auditory Canal within normal limits,TM w/o masses/lesions/perforations. TM mobility noted.   Left Ear: Auricle normal appearance. External Auditory Canal WNL,TM w/o masses/lesions/perforations. TM mobility noted.  Nose: Nasal septal deviation to the right. Inferior Turbinates 3+ bilaterally. No septal perforation. No masses/lesions. External nasal skin appears normal without masses/lesions.  Oral Cavity: Gingiva/lips within normal limits.  Dentition/gingiva healthy appearing. Mucus membranes moist. Floor of mouth soft, no masses palpated. Oral Tongue mobile. Hard Palate appears normal.    Oropharynx: Base of tongue appears normal. No masses/lesions noted. Tonsillar fossa/pharyngeal wall without lesions. Posterior oropharynx WNL.  Soft palate without masses. Midline uvula.   Neck/Lymphatic: No LAD I-VI bilaterally.  No thyromegaly.  No masses noted on exam.    Mirror laryngoscopy/nasopharyngoscopy: Active gag reflex.  Unable to perform.    Neuro/Psychiatric: AOx3.  Normal mood and affect.   Cardiovascular: Normal carotid pulses bilaterally, no increasing jugular venous distention noted at cervical region bilaterally.    Respiratory: Normal respiratory effort, no stridor, no retractions noted.      See separate procedure note for nasal endoscopy.        Assessment:    ICD-10-CM ICD-9-CM    1. Other chronic sinusitis J32.8 473.8    2. Nasal septal deviation J34.2 470    3. Hypertrophy of inferior nasal turbinate J34.3 478.0    4. Nasal obstruction J34.89 478.19      The primary encounter diagnosis was Other chronic sinusitis. Diagnoses of Nasal septal  deviation, Hypertrophy of inferior nasal turbinate, and Nasal obstruction were also pertinent to this visit.      Plan:  No orders of the defined types were placed in this encounter.    Continue Flonase  Continue  Xyzal  Nasal saline rinses BID.   Symptoms are overall improved with medical management. Will defer further imaging or procedures unless symptoms return or persist.     Thank you kindly for allowing me to participate in the patient's care.       Clara Jennings MD

## 2019-10-21 ENCOUNTER — OFFICE VISIT (OUTPATIENT)
Dept: FAMILY MEDICINE | Facility: CLINIC | Age: 69
End: 2019-10-21
Payer: MEDICARE

## 2019-10-21 VITALS
WEIGHT: 180 LBS | TEMPERATURE: 98 F | HEART RATE: 87 BPM | BODY MASS INDEX: 28.93 KG/M2 | SYSTOLIC BLOOD PRESSURE: 134 MMHG | HEIGHT: 66 IN | DIASTOLIC BLOOD PRESSURE: 66 MMHG | OXYGEN SATURATION: 97 %

## 2019-10-21 DIAGNOSIS — Z87.09 H/O PLEURAL EMPYEMA: ICD-10-CM

## 2019-10-21 DIAGNOSIS — Z79.899 MEDICATION MANAGEMENT: ICD-10-CM

## 2019-10-21 DIAGNOSIS — I70.0 AORTIC ATHEROSCLEROSIS: ICD-10-CM

## 2019-10-21 DIAGNOSIS — Z23 NEED FOR SHINGLES VACCINE: ICD-10-CM

## 2019-10-21 DIAGNOSIS — E55.9 VITAMIN D DEFICIENCY: ICD-10-CM

## 2019-10-21 DIAGNOSIS — E78.2 MIXED HYPERLIPIDEMIA: ICD-10-CM

## 2019-10-21 DIAGNOSIS — E66.3 OVERWEIGHT (BMI 25.0-29.9): ICD-10-CM

## 2019-10-21 DIAGNOSIS — J44.9 CHRONIC OBSTRUCTIVE PULMONARY DISEASE, UNSPECIFIED COPD TYPE: ICD-10-CM

## 2019-10-21 DIAGNOSIS — I10 BENIGN ESSENTIAL HYPERTENSION: Primary | ICD-10-CM

## 2019-10-21 DIAGNOSIS — J84.10 LUNG GRANULOMA: ICD-10-CM

## 2019-10-21 DIAGNOSIS — D53.9 MACROCYTIC ANEMIA: ICD-10-CM

## 2019-10-21 DIAGNOSIS — J32.4 CHRONIC PANSINUSITIS: ICD-10-CM

## 2019-10-21 DIAGNOSIS — Z28.21 INFLUENZA VACCINATION DECLINED BY PATIENT: ICD-10-CM

## 2019-10-21 PROCEDURE — 99214 PR OFFICE/OUTPT VISIT, EST, LEVL IV, 30-39 MIN: ICD-10-PCS | Mod: HCNC,S$GLB,, | Performed by: FAMILY MEDICINE

## 2019-10-21 PROCEDURE — 3078F DIAST BP <80 MM HG: CPT | Mod: HCNC,CPTII,S$GLB, | Performed by: FAMILY MEDICINE

## 2019-10-21 PROCEDURE — 99214 OFFICE O/P EST MOD 30 MIN: CPT | Mod: HCNC,S$GLB,, | Performed by: FAMILY MEDICINE

## 2019-10-21 PROCEDURE — 3078F PR MOST RECENT DIASTOLIC BLOOD PRESSURE < 80 MM HG: ICD-10-PCS | Mod: HCNC,CPTII,S$GLB, | Performed by: FAMILY MEDICINE

## 2019-10-21 PROCEDURE — 1101F PR PT FALLS ASSESS DOC 0-1 FALLS W/OUT INJ PAST YR: ICD-10-PCS | Mod: HCNC,CPTII,S$GLB, | Performed by: FAMILY MEDICINE

## 2019-10-21 PROCEDURE — 99999 PR PBB SHADOW E&M-EST. PATIENT-LVL III: CPT | Mod: PBBFAC,HCNC,, | Performed by: FAMILY MEDICINE

## 2019-10-21 PROCEDURE — 99499 RISK ADDL DX/OHS AUDIT: ICD-10-PCS | Mod: HCNC,S$GLB,, | Performed by: FAMILY MEDICINE

## 2019-10-21 PROCEDURE — 3075F PR MOST RECENT SYSTOLIC BLOOD PRESS GE 130-139MM HG: ICD-10-PCS | Mod: HCNC,CPTII,S$GLB, | Performed by: FAMILY MEDICINE

## 2019-10-21 PROCEDURE — 99999 PR PBB SHADOW E&M-EST. PATIENT-LVL III: ICD-10-PCS | Mod: PBBFAC,HCNC,, | Performed by: FAMILY MEDICINE

## 2019-10-21 PROCEDURE — 3075F SYST BP GE 130 - 139MM HG: CPT | Mod: HCNC,CPTII,S$GLB, | Performed by: FAMILY MEDICINE

## 2019-10-21 PROCEDURE — 99499 UNLISTED E&M SERVICE: CPT | Mod: HCNC,S$GLB,, | Performed by: FAMILY MEDICINE

## 2019-10-21 PROCEDURE — 1101F PT FALLS ASSESS-DOCD LE1/YR: CPT | Mod: HCNC,CPTII,S$GLB, | Performed by: FAMILY MEDICINE

## 2019-10-21 NOTE — PROGRESS NOTES
" Office Visit    Patient Name: Scooter Morrissey    : 1950  MRN: [id number]    Subjective:  Scooter is a 69 y.o. male who presents today for:    Follow-up    Scooter presents today for 6 month follow-up of chronic conditions (seen for annual 2019 and for memory concerns 2019) that include chronic HTN, HLD, and chronic lung disease/ COPD/asthma for which he also sees pulmonary Dr Guerrier- last seen .  He has recently been struggling with chronic sinusitis that is being managed by ENT Dr. Simmons, last seen 10/14/19.     He was most recently treated with a course of Levaquin following Augmentin with notable improvement.  Advised:  "Continue Flonase, Continue Xyzal. Nasal saline rinses BID.   Symptoms are overall improved with medical management. Will defer further imaging or procedures unless symptoms return or persist. "    Home blood pressures have been at goal and he has been overall feeling well especially with the improvement in his sinus symptoms.  He has been very good about taking his Symbicort morning and night and this is keeping his breathing much improved-no recent exacerbations of COPD/asthma.    Labs drawn prior to office visit 10/4/2019:  Show low stable PSA of 0.43, normal CMP/TSH/lipids at goal with LDL 83/ A1c 5.7.  CBC with overall stable mild macrocytic anemia.  Hematocrit is 39.5.  Colonoscopy is up-to-date as 2016 and 10 year recall advised.    Past Medical History  Past Medical History:   Diagnosis Date    Anxiety 2016    Benign essential hypertension 2016    Hyperlipidemia 2016    Interstitial pneumonitis 2016       Past Surgical History  Past Surgical History:   Procedure Laterality Date    ADENOIDECTOMY      COLONOSCOPY N/A 2016    Procedure: COLONOSCOPY;  Surgeon: Meme Mills MD;  Location: [facility];  Service: Endoscopy;  Laterality: N/A;    FINGER SURGERY      15 years ago    TONSILLECTOMY         Family History  Family History "   Problem Relation Age of Onset    No Known Problems Mother     No Known Problems Father     No Known Problems Daughter        Social History  Social History     Socioeconomic History    Marital status:      Spouse name: Not on file    Number of children: Not on file    Years of education: Not on file    Highest education level: Not on file   Occupational History    Occupation: lawn service   Social Needs    Financial resource strain: Not on file    Food insecurity:     Worry: Not on file     Inability: Not on file    Transportation needs:     Medical: Not on file     Non-medical: Not on file   Tobacco Use    Smoking status: Former Smoker     Types: Cigars    Smokeless tobacco: Former User     Quit date: 5/12/1996   Substance and Sexual Activity    Alcohol use: No    Drug use: No    Sexual activity: Yes     Partners: Female   Lifestyle    Physical activity:     Days per week: Not on file     Minutes per session: Not on file    Stress: Not on file   Relationships    Social connections:     Talks on phone: Not on file     Gets together: Not on file     Attends Quaker service: Not on file     Active member of club or organization: Not on file     Attends meetings of clubs or organizations: Not on file     Relationship status: Not on file   Other Topics Concern    Not on file   Social History Narrative    Not on file       Current Medications  Medications reviewed and updated.     Allergies   Review of patient's allergies indicates:  No Known Allergies    Review of Systems (Pertinent positives)  Review of Systems   Constitutional: Negative for chills, fever and unexpected weight change.   HENT: Positive for rhinorrhea (Not purulent as it was previously prior to antibiotic therapy). Negative for congestion, sinus pressure and sinus pain.    Respiratory: Negative for shortness of breath.    Cardiovascular: Positive for leg swelling (mild, stable). Negative for chest pain.   Neurological:  "Negative for dizziness and light-headedness.       /66   Pulse 87   Temp 98.2 °F (36.8 °C)   Ht 5' 6" (1.676 m)   Wt 81.6 kg (180 lb)   SpO2 97%   BMI 29.05 kg/m²     Physical Exam   Constitutional: He is oriented to person, place, and time. He appears well-developed and well-nourished. No distress.   Cardiovascular: Normal rate, regular rhythm and normal heart sounds.   Pulmonary/Chest: Effort normal. No stridor. No respiratory distress. He has no wheezes. He has rales (bibasilar L>R).   Musculoskeletal: He exhibits no edema.   Neurological: He is alert and oriented to person, place, and time.   Psychiatric: He has a normal mood and affect.   Vitals reviewed.        Assessment/Plan:  Scooter Morrissey is a 69 y.o. male who presents today for :    Scooter was seen today for follow-up.    Diagnoses and all orders for this visit:    Benign essential hypertension  Comments:  Controlled on amlodipine 10 mg daily with Cozaar 100 mg daily and Toprol  mg daily.  Orders:  -     Comprehensive metabolic panel; Future  -     Lipid panel; Future    Overweight (BMI 25.0-29.9)    Mixed hyperlipidemia  Comments:  LDL at goal on Lipitor 20, advised increased physical activity to improve HDL    Aortic atherosclerosis  Comments:  Continue daily Lipitor.    Vitamin D deficiency  Comments:  level is low normal on multivitamin with vitamin D daily    Chronic pansinusitis  Comments:  Significant improvement following course of both Augmentin and Levaquin.  Re-evaluated by ENT.  Continue maintenance regimen with nasal washes    Chronic obstructive pulmonary disease, unspecified COPD type  Comments:  Stable on Symbicort, as long as he takes this regularly he has not had difficulties with breathing.  Taking Xyzal for allergy management.    H/O pleural empyema    Lung granuloma  Comments:  Noted on prior imaging, respiratory status stable.    Medication management  -     Vitamin B12; Future  -     Comprehensive metabolic panel; " Future  -     Lipid panel; Future  -     CBC auto differential; Future  -     Folate; Future    Influenza vaccination declined by patient    Need for shingles vaccine    Macrocytic anemia  Comments:  Overall mild, stable, chronic.  Check B12/folate with next lab draw  Orders:  -     Vitamin B12; Future  -     CBC auto differential; Future  -     Folate; Future            ICD-10-CM ICD-9-CM    1. Benign essential hypertension I10 401.1 Comprehensive metabolic panel      Lipid panel    Controlled on amlodipine 10 mg daily with Cozaar 100 mg daily and Toprol  mg daily.   2. Overweight (BMI 25.0-29.9) E66.3 278.02    3. Mixed hyperlipidemia E78.2 272.2     LDL at goal on Lipitor 20, advised increased physical activity to improve HDL   4. Aortic atherosclerosis I70.0 440.0     Continue daily Lipitor.   5. Vitamin D deficiency E55.9 268.9     level is low normal on multivitamin with vitamin D daily   6. Chronic pansinusitis J32.4 473.8     Significant improvement following course of both Augmentin and Levaquin.  Re-evaluated by ENT.  Continue maintenance regimen with nasal washes   7. Chronic obstructive pulmonary disease, unspecified COPD type J44.9 496     Stable on Symbicort, as long as he takes this regularly he has not had difficulties with breathing.  Taking Xyzal for allergy management.   8. H/O pleural empyema Z87.09 V12.69    9. Lung granuloma J84.10 515     Noted on prior imaging, respiratory status stable.   10. Medication management Z79.899 V58.69 Vitamin B12      Comprehensive metabolic panel      Lipid panel      CBC auto differential      Folate   11. Influenza vaccination declined by patient Z28.21 V64.06    12. Need for shingles vaccine Z23 V04.89    13. Macrocytic anemia D53.9 281.9 Vitamin B12      CBC auto differential      Folate    Overall mild, stable, chronic.  Check B12/folate with next lab draw       There are no Patient Instructions on file for this visit.      Follow up in about 6 months  (around 4/21/2020) for to follow up on lab results, return as needed for new concerns.

## 2020-01-15 ENCOUNTER — PATIENT OUTREACH (OUTPATIENT)
Dept: ADMINISTRATIVE | Facility: OTHER | Age: 70
End: 2020-01-15

## 2020-01-23 ENCOUNTER — PATIENT OUTREACH (OUTPATIENT)
Dept: ADMINISTRATIVE | Facility: OTHER | Age: 70
End: 2020-01-23

## 2020-01-28 ENCOUNTER — OFFICE VISIT (OUTPATIENT)
Dept: OTOLARYNGOLOGY | Facility: CLINIC | Age: 70
End: 2020-01-28
Payer: MEDICARE

## 2020-01-28 VITALS
SYSTOLIC BLOOD PRESSURE: 148 MMHG | WEIGHT: 181.88 LBS | HEART RATE: 84 BPM | TEMPERATURE: 98 F | HEIGHT: 66 IN | DIASTOLIC BLOOD PRESSURE: 80 MMHG | BODY MASS INDEX: 29.23 KG/M2

## 2020-01-28 DIAGNOSIS — J34.3 HYPERTROPHY OF INFERIOR NASAL TURBINATE: ICD-10-CM

## 2020-01-28 DIAGNOSIS — J34.89 NASAL OBSTRUCTION: ICD-10-CM

## 2020-01-28 DIAGNOSIS — J32.8 OTHER CHRONIC SINUSITIS: Primary | ICD-10-CM

## 2020-01-28 DIAGNOSIS — J34.2 NASAL SEPTAL DEVIATION: ICD-10-CM

## 2020-01-28 PROCEDURE — 1126F PR PAIN SEVERITY QUANTIFIED, NO PAIN PRESENT: ICD-10-PCS | Mod: HCNC,S$GLB,, | Performed by: OTOLARYNGOLOGY

## 2020-01-28 PROCEDURE — 99999 PR PBB SHADOW E&M-EST. PATIENT-LVL III: ICD-10-PCS | Mod: PBBFAC,HCNC,, | Performed by: OTOLARYNGOLOGY

## 2020-01-28 PROCEDURE — 99999 PR PBB SHADOW E&M-EST. PATIENT-LVL III: CPT | Mod: PBBFAC,HCNC,, | Performed by: OTOLARYNGOLOGY

## 2020-01-28 PROCEDURE — 99214 OFFICE O/P EST MOD 30 MIN: CPT | Mod: HCNC,S$GLB,, | Performed by: OTOLARYNGOLOGY

## 2020-01-28 PROCEDURE — 3079F DIAST BP 80-89 MM HG: CPT | Mod: HCNC,CPTII,S$GLB, | Performed by: OTOLARYNGOLOGY

## 2020-01-28 PROCEDURE — 1101F PR PT FALLS ASSESS DOC 0-1 FALLS W/OUT INJ PAST YR: ICD-10-PCS | Mod: HCNC,CPTII,S$GLB, | Performed by: OTOLARYNGOLOGY

## 2020-01-28 PROCEDURE — 1159F MED LIST DOCD IN RCRD: CPT | Mod: HCNC,S$GLB,, | Performed by: OTOLARYNGOLOGY

## 2020-01-28 PROCEDURE — 3077F PR MOST RECENT SYSTOLIC BLOOD PRESSURE >= 140 MM HG: ICD-10-PCS | Mod: HCNC,CPTII,S$GLB, | Performed by: OTOLARYNGOLOGY

## 2020-01-28 PROCEDURE — 1159F PR MEDICATION LIST DOCUMENTED IN MEDICAL RECORD: ICD-10-PCS | Mod: HCNC,S$GLB,, | Performed by: OTOLARYNGOLOGY

## 2020-01-28 PROCEDURE — 99214 PR OFFICE/OUTPT VISIT, EST, LEVL IV, 30-39 MIN: ICD-10-PCS | Mod: HCNC,S$GLB,, | Performed by: OTOLARYNGOLOGY

## 2020-01-28 PROCEDURE — 3077F SYST BP >= 140 MM HG: CPT | Mod: HCNC,CPTII,S$GLB, | Performed by: OTOLARYNGOLOGY

## 2020-01-28 PROCEDURE — 1101F PT FALLS ASSESS-DOCD LE1/YR: CPT | Mod: HCNC,CPTII,S$GLB, | Performed by: OTOLARYNGOLOGY

## 2020-01-28 PROCEDURE — 1126F AMNT PAIN NOTED NONE PRSNT: CPT | Mod: HCNC,S$GLB,, | Performed by: OTOLARYNGOLOGY

## 2020-01-28 PROCEDURE — 3079F PR MOST RECENT DIASTOLIC BLOOD PRESSURE 80-89 MM HG: ICD-10-PCS | Mod: HCNC,CPTII,S$GLB, | Performed by: OTOLARYNGOLOGY

## 2020-01-28 RX ORDER — FLUTICASONE PROPIONATE 50 MCG
2 SPRAY, SUSPENSION (ML) NASAL DAILY
Qty: 9.9 ML | Refills: 11 | Status: SHIPPED | OUTPATIENT
Start: 2020-01-28 | End: 2021-04-29 | Stop reason: SDUPTHER

## 2020-01-28 RX ORDER — LEVOCETIRIZINE DIHYDROCHLORIDE 5 MG/1
5 TABLET, FILM COATED ORAL NIGHTLY
Qty: 30 TABLET | Refills: 11 | Status: SHIPPED | OUTPATIENT
Start: 2020-01-28 | End: 2021-01-29 | Stop reason: SDUPTHER

## 2020-01-28 NOTE — PROGRESS NOTES
Chief Complaint   Patient presents with    Follow-up     Patient states improvement since last visit.   .    HPI:     Scooter Morrissey is a 69 y.o. male who is referred by Dr. Katelynn Rojas for evaluation of a several month history of foul smelling discharge in his nose.  He reports that he has nasal congestion and postnasal drip.  He describes difficulty breathing at night. There is bilateral nasal obstruction. He does use sinus rinses or nasal sprays. He has been using nasal saline rinses. He is on Flonase. He has seen an outside ENT who prescribed a Zpak and gave him an IM steroid injection without relief. He has had an abnormal CT maxillofacial ordered by Dr. Rojas in April 2019.   He denies midface pain and pressure.  He admits to rhinorrhea and postnasal drip. There is not maxillary tooth pain. He  admits to headaches.  He has not had sinus or nasal surgery. There is no history of sinonasal trauma.      Interval HPI 1/28/2020:  Mr. Morrissey follows up today for CRS.  He states since last visit approximately 4 months ago he feels he is doing well. . He relays that he had a URI last week but feels he is getting better with decreased nasal congestion and cough.  He has been utilizing Xyzal as recommended but forgets to use his Flonase regularly.   He has been using nasal saline rinses which he does feel are tremendously helpful.  He does note continued mild nasal congestion and rhinorrhea but denies postnasal drip and facial pain or pressure.    Past Medical History:   Diagnosis Date    Anxiety 6/29/2016    Benign essential hypertension 4/5/2016    Hyperlipidemia 4/13/2016    Interstitial pneumonitis 4/13/2016     Social History     Socioeconomic History    Marital status:      Spouse name: Not on file    Number of children: Not on file    Years of education: Not on file    Highest education level: Not on file   Occupational History    Occupation: lawn service   Social Needs    Financial  resource strain: Not on file    Food insecurity:     Worry: Not on file     Inability: Not on file    Transportation needs:     Medical: Not on file     Non-medical: Not on file   Tobacco Use    Smoking status: Former Smoker     Types: Cigars    Smokeless tobacco: Former User     Quit date: 5/12/1996   Substance and Sexual Activity    Alcohol use: No    Drug use: No    Sexual activity: Yes     Partners: Female   Lifestyle    Physical activity:     Days per week: Not on file     Minutes per session: Not on file    Stress: Not on file   Relationships    Social connections:     Talks on phone: Not on file     Gets together: Not on file     Attends Gnosticist service: Not on file     Active member of club or organization: Not on file     Attends meetings of clubs or organizations: Not on file     Relationship status: Not on file   Other Topics Concern    Not on file   Social History Narrative    Not on file     Past Surgical History:   Procedure Laterality Date    ADENOIDECTOMY      COLONOSCOPY N/A 6/1/2016    Procedure: COLONOSCOPY;  Surgeon: Meme Mills MD;  Location: [facility];  Service: Endoscopy;  Laterality: N/A;    FINGER SURGERY      15 years ago    TONSILLECTOMY       Family History   Problem Relation Age of Onset    No Known Problems Mother     No Known Problems Father     No Known Problems Daughter            Review of Systems  General: negative for chills, fever or weight loss  Psychological: negative for mood changes or depression  Ophthalmic: negative for blurry vision, photophobia or eye pain  ENT: see HPI  Respiratory: no cough, shortness of breath, or wheezing  Cardiovascular: no chest pain or dyspnea on exertion  Gastrointestinal: no abdominal pain, change in bowel habits, or black/ bloody stools  Musculoskeletal: negative for gait disturbance or muscular weakness  Neurological: no syncope or seizures; no ataxia  Dermatological: negative for puritis,  rash and  jaundice  Hematologic/lymphatic: no easy bruising, no new lumps or bumps      Physical Exam:    Vitals:    01/28/20 1400   BP: (!) 148/80   Pulse: 84   Temp: 97.7 °F (36.5 °C)       Constitutional: Well appearing / communicating without difficutly.  NAD.  Eyes: EOM I Bilaterally  Head/Face: Normocephalic.  Negative paranasal sinus pressure/tenderness.  Salivary glands WNL.  House Brackmann I Bilaterally.    Right Ear: Auricle normal appearance. External Auditory Canal within normal limits,TM w/o masses/lesions/perforations. TM mobility noted.   Left Ear: Auricle normal appearance. External Auditory Canal WNL,TM w/o masses/lesions/perforations. TM mobility noted.  Nose: Nasal septal deviation to the right. Inferior Turbinates 3+ bilaterally. No septal perforation. No masses/lesions. External nasal skin appears normal without masses/lesions.  Oral Cavity: Gingiva/lips within normal limits.  Dentition/gingiva healthy appearing. Mucus membranes moist. Floor of mouth soft, no masses palpated. Oral Tongue mobile. Hard Palate appears normal.    Oropharynx: Base of tongue appears normal. No masses/lesions noted. Tonsillar fossa/pharyngeal wall without lesions. Posterior oropharynx WNL.  Soft palate without masses. Midline uvula.   Neck/Lymphatic: No LAD I-VI bilaterally.  No thyromegaly.  No masses noted on exam.    Mirror laryngoscopy/nasopharyngoscopy: Active gag reflex.  Unable to perform.    Neuro/Psychiatric: AOx3.  Normal mood and affect.   Cardiovascular: Normal carotid pulses bilaterally, no increasing jugular venous distention noted at cervical region bilaterally.    Respiratory: Normal respiratory effort, no stridor, no retractions noted.      Assessment:    ICD-10-CM ICD-9-CM    1. Other chronic sinusitis J32.8 473.8    2. Nasal septal deviation J34.2 470    3. Hypertrophy of inferior nasal turbinate J34.3 478.0    4. Nasal obstruction J34.89 478.19      The primary encounter diagnosis was Other chronic  sinusitis. Diagnoses of Nasal septal deviation, Hypertrophy of inferior nasal turbinate, and Nasal obstruction were also pertinent to this visit.      Plan:  No orders of the defined types were placed in this encounter.    Continue Flonase  Continue  Xyzal  Nasal saline rinses BID.   Symptoms are overall improved with medical management. Continue current treatment regimen.     Thank you kindly for allowing me to participate in the patient's care.       Clara Jennings MD

## 2020-03-11 ENCOUNTER — TELEPHONE (OUTPATIENT)
Dept: FAMILY MEDICINE | Facility: CLINIC | Age: 70
End: 2020-03-11

## 2020-03-11 ENCOUNTER — NURSE TRIAGE (OUTPATIENT)
Dept: ADMINISTRATIVE | Facility: CLINIC | Age: 70
End: 2020-03-11

## 2020-03-11 NOTE — TELEPHONE ENCOUNTER
Pt calling, states he's had worsening SOB at rest and dyspnea on exertion off and on since last Monday. Reports SOB has gotten worse over the last few days. Per triage protocol, advised that he go to the nearest ED now. He verbalized understanding but stated he does not want to go today. I informed him I would send a message to PCP.    Reason for Disposition   MODERATE difficulty breathing (e.g., speaks in phrases, SOB even at rest, pulse 100-120) of new onset or worse than normal    Additional Information   Negative: Breathing stopped and hasn't returned   Negative: Choking on something   Negative: SEVERE difficulty breathing (e.g., struggling for each breath, speaks in single words, pulse > 120)   Negative: Bluish (or gray) lips or face   Negative: Difficult to awaken or acting confused (e.g., disoriented, slurred speech)   Negative: Passed out (i.e., fainted, collapsed and was not responding)   Negative: Wheezing started suddenly after medicine, an allergic food, or bee sting   Negative: Stridor   Negative: Slow, shallow and weak breathing   Negative: Sounds like a life-threatening emergency to the triager   Negative: Chest pain   Negative: Wheezing (high pitched whistling sound) and previous asthma attacks or use of asthma medicines    Protocols used: BREATHING DIFFICULTY-A-OH

## 2020-03-11 NOTE — TELEPHONE ENCOUNTER
----- Message from Arlette Robledo sent at 3/11/2020 12:04 PM CDT -----  Contact: [phone]-self  Patient is requesting a call back concerning a Same Day  appt for a cough and Shortness of Breathe that has been going on for over a week. Please call          Transfer the pt to On Call

## 2020-03-13 ENCOUNTER — HOSPITAL ENCOUNTER (OUTPATIENT)
Facility: HOSPITAL | Age: 70
Discharge: HOME | End: 2020-03-16
Attending: EMERGENCY MEDICINE | Admitting: INTERNAL MEDICINE
Payer: MEDICARE

## 2020-03-13 DIAGNOSIS — R06.02 SHORTNESS OF BREATH: ICD-10-CM

## 2020-03-13 DIAGNOSIS — R00.0 TACHYCARDIA: ICD-10-CM

## 2020-03-13 DIAGNOSIS — J44.1 COPD EXACERBATION: Primary | ICD-10-CM

## 2020-03-13 PROBLEM — R09.02 HYPOXIA: Status: ACTIVE | Noted: 2020-03-13

## 2020-03-13 PROBLEM — J20.9 ACUTE BRONCHITIS: Status: ACTIVE | Noted: 2020-03-13

## 2020-03-13 PROBLEM — R73.9 HYPERGLYCEMIA: Status: ACTIVE | Noted: 2020-03-13

## 2020-03-13 PROBLEM — D64.89 OTHER SPECIFIED ANEMIAS: Status: ACTIVE | Noted: 2020-03-13

## 2020-03-13 PROBLEM — D53.9 MACROCYTIC ANEMIA: Status: ACTIVE | Noted: 2020-03-13

## 2020-03-13 LAB
ALBUMIN SERPL BCP-MCNC: 2.7 G/DL (ref 3.5–5.2)
ALLENS TEST: ABNORMAL
ALP SERPL-CCNC: 73 U/L (ref 55–135)
ALT SERPL W/O P-5'-P-CCNC: 20 U/L (ref 10–44)
AMORPH CRY URNS QL MICRO: NORMAL
ANION GAP SERPL CALC-SCNC: 9 MMOL/L (ref 8–16)
AST SERPL-CCNC: 18 U/L (ref 10–40)
BACTERIA #/AREA URNS HPF: NORMAL /HPF
BASOPHILS # BLD AUTO: 0.02 K/UL (ref 0–0.2)
BASOPHILS NFR BLD: 0.2 % (ref 0–1.9)
BILIRUB SERPL-MCNC: 0.7 MG/DL (ref 0.1–1)
BILIRUB UR QL STRIP: NEGATIVE
BNP SERPL-MCNC: 26 PG/ML (ref 0–99)
BUN SERPL-MCNC: 15 MG/DL (ref 8–23)
CALCIUM SERPL-MCNC: 9 MG/DL (ref 8.7–10.5)
CHLORIDE SERPL-SCNC: 107 MMOL/L (ref 95–110)
CLARITY UR: CLEAR
CO2 SERPL-SCNC: 21 MMOL/L (ref 23–29)
COLOR UR: YELLOW
CREAT SERPL-MCNC: 1.1 MG/DL (ref 0.5–1.4)
DELSYS: ABNORMAL
DIFFERENTIAL METHOD: ABNORMAL
EOSINOPHIL # BLD AUTO: 0 K/UL (ref 0–0.5)
EOSINOPHIL NFR BLD: 0.1 % (ref 0–8)
ERYTHROCYTE [DISTWIDTH] IN BLOOD BY AUTOMATED COUNT: 13.5 % (ref 11.5–14.5)
EST. GFR  (AFRICAN AMERICAN): >60 ML/MIN/1.73 M^2
EST. GFR  (NON AFRICAN AMERICAN): >60 ML/MIN/1.73 M^2
FLOW: 2
GLUCOSE SERPL-MCNC: 165 MG/DL (ref 70–110)
GLUCOSE UR QL STRIP: ABNORMAL
HCO3 UR-SCNC: 20 MMOL/L (ref 24–28)
HCT VFR BLD AUTO: 34.8 % (ref 40–54)
HGB BLD-MCNC: 11.2 G/DL (ref 14–18)
HGB UR QL STRIP: NEGATIVE
HYALINE CASTS #/AREA URNS LPF: 1 /LPF
IMM GRANULOCYTES # BLD AUTO: 0.04 K/UL (ref 0–0.04)
IMM GRANULOCYTES NFR BLD AUTO: 0.3 % (ref 0–0.5)
INFLUENZA A, MOLECULAR: NEGATIVE
INFLUENZA B, MOLECULAR: NEGATIVE
KETONES UR QL STRIP: NEGATIVE
LACTATE SERPL-SCNC: 1.1 MMOL/L (ref 0.5–2.2)
LACTATE SERPL-SCNC: 1.5 MMOL/L (ref 0.5–2.2)
LEUKOCYTE ESTERASE UR QL STRIP: NEGATIVE
LYMPHOCYTES # BLD AUTO: 1.4 K/UL (ref 1–4.8)
LYMPHOCYTES NFR BLD: 11.8 % (ref 18–48)
MCH RBC QN AUTO: 32.5 PG (ref 27–31)
MCHC RBC AUTO-ENTMCNC: 32.2 G/DL (ref 32–36)
MCV RBC AUTO: 101 FL (ref 82–98)
MICROSCOPIC COMMENT: NORMAL
MODE: ABNORMAL
MONOCYTES # BLD AUTO: 0.9 K/UL (ref 0.3–1)
MONOCYTES NFR BLD: 7.2 % (ref 4–15)
NEUTROPHILS # BLD AUTO: 9.5 K/UL (ref 1.8–7.7)
NEUTROPHILS NFR BLD: 80.4 % (ref 38–73)
NITRITE UR QL STRIP: NEGATIVE
NRBC BLD-RTO: 0 /100 WBC
PCO2 BLDA: 32.7 MMHG (ref 35–45)
PH SMN: 7.39 [PH] (ref 7.35–7.45)
PH UR STRIP: 6 [PH] (ref 5–8)
PLATELET # BLD AUTO: 385 K/UL (ref 150–350)
PMV BLD AUTO: 9.6 FL (ref 9.2–12.9)
PO2 BLDA: 72 MMHG (ref 80–100)
POC BE: -5 MMOL/L
POC SATURATED O2: 94 % (ref 95–100)
POC TCO2: 21 MMOL/L (ref 23–27)
POTASSIUM SERPL-SCNC: 3.9 MMOL/L (ref 3.5–5.1)
PROCALCITONIN SERPL IA-MCNC: 0.26 NG/ML
PROT SERPL-MCNC: 8.6 G/DL (ref 6–8.4)
PROT UR QL STRIP: ABNORMAL
RBC # BLD AUTO: 3.45 M/UL (ref 4.6–6.2)
RBC #/AREA URNS HPF: 1 /HPF (ref 0–4)
SAMPLE: ABNORMAL
SITE: ABNORMAL
SODIUM SERPL-SCNC: 137 MMOL/L (ref 136–145)
SP GR UR STRIP: 1.02 (ref 1–1.03)
SPECIMEN SOURCE: NORMAL
SQUAMOUS #/AREA URNS HPF: 0 /HPF
TROPONIN I SERPL DL<=0.01 NG/ML-MCNC: <0.006 NG/ML (ref 0–0.03)
URN SPEC COLLECT METH UR: ABNORMAL
UROBILINOGEN UR STRIP-ACNC: ABNORMAL EU/DL
WBC # BLD AUTO: 11.8 K/UL (ref 3.9–12.7)
WBC #/AREA URNS HPF: 1 /HPF (ref 0–5)
WBC CLUMPS URNS QL MICRO: NORMAL
YEAST URNS QL MICRO: NORMAL

## 2020-03-13 PROCEDURE — U0002 COVID-19 LAB TEST NON-CDC: HCPCS | Mod: HCNC

## 2020-03-13 PROCEDURE — 25000003 PHARM REV CODE 250: Mod: HCNC | Performed by: INTERNAL MEDICINE

## 2020-03-13 PROCEDURE — 84484 ASSAY OF TROPONIN QUANT: CPT | Mod: HCNC

## 2020-03-13 PROCEDURE — 25000242 PHARM REV CODE 250 ALT 637 W/ HCPCS: Mod: HCNC | Performed by: EMERGENCY MEDICINE

## 2020-03-13 PROCEDURE — G0378 HOSPITAL OBSERVATION PER HR: HCPCS | Mod: HCNC

## 2020-03-13 PROCEDURE — 94644 CONT INHLJ TX 1ST HOUR: CPT | Mod: HCNC

## 2020-03-13 PROCEDURE — 96365 THER/PROPH/DIAG IV INF INIT: CPT | Performed by: EMERGENCY MEDICINE

## 2020-03-13 PROCEDURE — 96361 HYDRATE IV INFUSION ADD-ON: CPT | Performed by: EMERGENCY MEDICINE

## 2020-03-13 PROCEDURE — 25000003 PHARM REV CODE 250: Mod: HCNC | Performed by: NURSE PRACTITIONER

## 2020-03-13 PROCEDURE — 93010 ELECTROCARDIOGRAM REPORT: CPT | Mod: HCNC,,, | Performed by: INTERNAL MEDICINE

## 2020-03-13 PROCEDURE — 94761 N-INVAS EAR/PLS OXIMETRY MLT: CPT | Mod: HCNC

## 2020-03-13 PROCEDURE — 87040 BLOOD CULTURE FOR BACTERIA: CPT | Mod: HCNC

## 2020-03-13 PROCEDURE — 96361 HYDRATE IV INFUSION ADD-ON: CPT | Mod: HCNC

## 2020-03-13 PROCEDURE — 63600175 PHARM REV CODE 636 W HCPCS: Mod: HCNC | Performed by: INTERNAL MEDICINE

## 2020-03-13 PROCEDURE — 81000 URINALYSIS NONAUTO W/SCOPE: CPT | Mod: HCNC

## 2020-03-13 PROCEDURE — 94640 AIRWAY INHALATION TREATMENT: CPT | Mod: HCNC

## 2020-03-13 PROCEDURE — 63600175 PHARM REV CODE 636 W HCPCS: Mod: HCNC | Performed by: EMERGENCY MEDICINE

## 2020-03-13 PROCEDURE — 93010 EKG 12-LEAD: ICD-10-PCS | Mod: HCNC,,, | Performed by: INTERNAL MEDICINE

## 2020-03-13 PROCEDURE — 85025 COMPLETE CBC W/AUTO DIFF WBC: CPT | Mod: HCNC

## 2020-03-13 PROCEDURE — 96367 TX/PROPH/DG ADDL SEQ IV INF: CPT | Performed by: EMERGENCY MEDICINE

## 2020-03-13 PROCEDURE — 82803 BLOOD GASES ANY COMBINATION: CPT | Mod: HCNC

## 2020-03-13 PROCEDURE — 87502 INFLUENZA DNA AMP PROBE: CPT | Mod: HCNC

## 2020-03-13 PROCEDURE — 93005 ELECTROCARDIOGRAM TRACING: CPT | Mod: HCNC

## 2020-03-13 PROCEDURE — 83605 ASSAY OF LACTIC ACID: CPT | Mod: 91,HCNC

## 2020-03-13 PROCEDURE — 83880 ASSAY OF NATRIURETIC PEPTIDE: CPT | Mod: HCNC

## 2020-03-13 PROCEDURE — 84145 PROCALCITONIN (PCT): CPT | Mod: HCNC

## 2020-03-13 PROCEDURE — 25000242 PHARM REV CODE 250 ALT 637 W/ HCPCS: Mod: HCNC | Performed by: INTERNAL MEDICINE

## 2020-03-13 PROCEDURE — 80053 COMPREHEN METABOLIC PANEL: CPT | Mod: HCNC

## 2020-03-13 PROCEDURE — 36600 WITHDRAWAL OF ARTERIAL BLOOD: CPT | Mod: HCNC

## 2020-03-13 PROCEDURE — 99291 CRITICAL CARE FIRST HOUR: CPT | Mod: HCNC

## 2020-03-13 PROCEDURE — 27000221 HC OXYGEN, UP TO 24 HOURS: Mod: HCNC

## 2020-03-13 RX ORDER — LOSARTAN POTASSIUM 50 MG/1
100 TABLET ORAL DAILY
Status: DISCONTINUED | OUTPATIENT
Start: 2020-03-14 | End: 2020-03-16 | Stop reason: HOSPADM

## 2020-03-13 RX ORDER — PREDNISONE 20 MG/1
60 TABLET ORAL
Status: COMPLETED | OUTPATIENT
Start: 2020-03-13 | End: 2020-03-13

## 2020-03-13 RX ORDER — METOPROLOL SUCCINATE 50 MG/1
100 TABLET, EXTENDED RELEASE ORAL NIGHTLY
Status: DISCONTINUED | OUTPATIENT
Start: 2020-03-13 | End: 2020-03-16 | Stop reason: HOSPADM

## 2020-03-13 RX ORDER — LEVOFLOXACIN 5 MG/ML
750 INJECTION, SOLUTION INTRAVENOUS
Status: DISCONTINUED | OUTPATIENT
Start: 2020-03-13 | End: 2020-03-13

## 2020-03-13 RX ORDER — METOPROLOL SUCCINATE 50 MG/1
100 TABLET, EXTENDED RELEASE ORAL DAILY
Status: DISCONTINUED | OUTPATIENT
Start: 2020-03-14 | End: 2020-03-13

## 2020-03-13 RX ORDER — IPRATROPIUM BROMIDE AND ALBUTEROL SULFATE 2.5; .5 MG/3ML; MG/3ML
3 SOLUTION RESPIRATORY (INHALATION)
Status: DISCONTINUED | OUTPATIENT
Start: 2020-03-13 | End: 2020-03-16 | Stop reason: HOSPADM

## 2020-03-13 RX ORDER — ZOLPIDEM TARTRATE 5 MG/1
5 TABLET ORAL NIGHTLY PRN
Status: DISCONTINUED | OUTPATIENT
Start: 2020-03-13 | End: 2020-03-16 | Stop reason: HOSPADM

## 2020-03-13 RX ORDER — GUAIFENESIN 600 MG/1
600 TABLET, EXTENDED RELEASE ORAL 2 TIMES DAILY
Status: DISCONTINUED | OUTPATIENT
Start: 2020-03-13 | End: 2020-03-16 | Stop reason: HOSPADM

## 2020-03-13 RX ORDER — CETIRIZINE HYDROCHLORIDE 10 MG/1
10 TABLET ORAL DAILY
Status: DISCONTINUED | OUTPATIENT
Start: 2020-03-14 | End: 2020-03-16 | Stop reason: HOSPADM

## 2020-03-13 RX ORDER — FLUTICASONE PROPIONATE 50 MCG
2 SPRAY, SUSPENSION (ML) NASAL DAILY
Status: DISCONTINUED | OUTPATIENT
Start: 2020-03-14 | End: 2020-03-16 | Stop reason: HOSPADM

## 2020-03-13 RX ORDER — AMLODIPINE BESYLATE 5 MG/1
10 TABLET ORAL DAILY
Status: DISCONTINUED | OUTPATIENT
Start: 2020-03-14 | End: 2020-03-13

## 2020-03-13 RX ORDER — ALBUTEROL SULFATE 2.5 MG/.5ML
15 SOLUTION RESPIRATORY (INHALATION)
Status: COMPLETED | OUTPATIENT
Start: 2020-03-13 | End: 2020-03-13

## 2020-03-13 RX ORDER — IPRATROPIUM BROMIDE 0.5 MG/2.5ML
0.5 SOLUTION RESPIRATORY (INHALATION)
Status: COMPLETED | OUTPATIENT
Start: 2020-03-13 | End: 2020-03-13

## 2020-03-13 RX ORDER — FAMOTIDINE 20 MG/1
20 TABLET, FILM COATED ORAL 2 TIMES DAILY
Status: DISCONTINUED | OUTPATIENT
Start: 2020-03-13 | End: 2020-03-16 | Stop reason: HOSPADM

## 2020-03-13 RX ORDER — FLUTICASONE FUROATE AND VILANTEROL 100; 25 UG/1; UG/1
1 POWDER RESPIRATORY (INHALATION) DAILY
Status: DISCONTINUED | OUTPATIENT
Start: 2020-03-14 | End: 2020-03-16 | Stop reason: HOSPADM

## 2020-03-13 RX ORDER — AMLODIPINE BESYLATE 5 MG/1
10 TABLET ORAL NIGHTLY
Status: DISCONTINUED | OUTPATIENT
Start: 2020-03-13 | End: 2020-03-16 | Stop reason: HOSPADM

## 2020-03-13 RX ORDER — ACETAMINOPHEN 325 MG/1
650 TABLET ORAL EVERY 4 HOURS PRN
Status: DISCONTINUED | OUTPATIENT
Start: 2020-03-13 | End: 2020-03-16 | Stop reason: HOSPADM

## 2020-03-13 RX ORDER — SODIUM CHLORIDE 0.9 % (FLUSH) 0.9 %
10 SYRINGE (ML) INJECTION
Status: DISCONTINUED | OUTPATIENT
Start: 2020-03-13 | End: 2020-03-16 | Stop reason: HOSPADM

## 2020-03-13 RX ORDER — ENOXAPARIN SODIUM 100 MG/ML
40 INJECTION SUBCUTANEOUS EVERY 24 HOURS
Status: DISCONTINUED | OUTPATIENT
Start: 2020-03-13 | End: 2020-03-16 | Stop reason: HOSPADM

## 2020-03-13 RX ORDER — ATORVASTATIN CALCIUM 20 MG/1
20 TABLET, FILM COATED ORAL DAILY
Status: DISCONTINUED | OUTPATIENT
Start: 2020-03-14 | End: 2020-03-16 | Stop reason: HOSPADM

## 2020-03-13 RX ADMIN — AZITHROMYCIN MONOHYDRATE 500 MG: 500 INJECTION, POWDER, LYOPHILIZED, FOR SOLUTION INTRAVENOUS at 03:03

## 2020-03-13 RX ADMIN — PREDNISONE 60 MG: 20 TABLET ORAL at 11:03

## 2020-03-13 RX ADMIN — CEFTRIAXONE 1 G: 1 INJECTION, SOLUTION INTRAVENOUS at 02:03

## 2020-03-13 RX ADMIN — SODIUM CHLORIDE 2478 ML: 0.9 INJECTION, SOLUTION INTRAVENOUS at 11:03

## 2020-03-13 RX ADMIN — METHYLPREDNISOLONE SODIUM SUCCINATE 40 MG: 40 INJECTION, POWDER, FOR SOLUTION INTRAMUSCULAR; INTRAVENOUS at 11:03

## 2020-03-13 RX ADMIN — IPRATROPIUM BROMIDE AND ALBUTEROL SULFATE 3 ML: .5; 3 SOLUTION RESPIRATORY (INHALATION) at 07:03

## 2020-03-13 RX ADMIN — IPRATROPIUM BROMIDE 0.5 MG: 0.5 SOLUTION RESPIRATORY (INHALATION) at 11:03

## 2020-03-13 RX ADMIN — GUAIFENESIN 600 MG: 600 TABLET, EXTENDED RELEASE ORAL at 08:03

## 2020-03-13 RX ADMIN — METOPROLOL SUCCINATE 100 MG: 50 TABLET, EXTENDED RELEASE ORAL at 11:03

## 2020-03-13 RX ADMIN — AMLODIPINE BESYLATE 10 MG: 5 TABLET ORAL at 11:03

## 2020-03-13 RX ADMIN — ALBUTEROL SULFATE 15 MG: 2.5 SOLUTION RESPIRATORY (INHALATION) at 11:03

## 2020-03-13 RX ADMIN — FAMOTIDINE 20 MG: 20 TABLET ORAL at 08:03

## 2020-03-13 NOTE — HPI
69 year old male with history of COPD/Asthma/Chronic bronchitis, who usually follows with Dr Guerrier presents with 2 week history of feeling poorly with cough productive of copious amouts of green sputum along with progressive shortness of breath, FARLEY and chest pressure. He states that his symptoms began about two weeks ago. He stayed home from work a few days and started to feel better.  Then a week ago, symptoms again worsened. He can hardly walk without getting dyspneic but it is relieved by rest.  He has a chronic cough and usually the sputum is thicker and darker.  Now he is coughing up a lot more, thinner and less dark.  He states that he has some chest pressure.  Feels like a baby on his chest. No overt chest pain or radiation.  He denies fever or chills.   He states that he takes Symbicort. He denies really using his rescue inhaler at all.   He denies any sick exposures. He denies contact with COVID or travel to high risk areas.

## 2020-03-13 NOTE — ED PROVIDER NOTES
Encounter Date: 3/13/2020    SCRIBE #1 NOTE: I, Sherita Hoff, am scribing for, and in the presence of,  Dr. Castillo. I have scribed the entire note.       History     Chief Complaint   Patient presents with    Shortness of Breath     c/o cough and SOB for the past week and a half. Coughing up thick white sputum     Time seen by provider: 10:30 AM    This is a 69 y.o. Male with PMHx of COPD who presents with complaint of shortness of breath. The patient reports onset began 03/01/20 and included a productive cough with thick white phlegm. He notes progressive alleviation of symptoms, but on 03/10/20 they suddenly worsened. He denies fever, vomiting, abdominal pain, or any positive sick contact. The patient works as a . He is not currently on oxygen.     The history is provided by the patient.     Review of patient's allergies indicates:  No Known Allergies  Past Medical History:   Diagnosis Date    Anxiety 6/29/2016    Benign essential hypertension 4/5/2016    Hyperlipidemia 4/13/2016    Interstitial pneumonitis 4/13/2016     Past Surgical History:   Procedure Laterality Date    ADENOIDECTOMY      COLONOSCOPY N/A 6/1/2016    Procedure: COLONOSCOPY;  Surgeon: Meme Mills MD;  Location: [facility];  Service: Endoscopy;  Laterality: N/A;    FINGER SURGERY      15 years ago    TONSILLECTOMY       Family History   Problem Relation Age of Onset    No Known Problems Mother     No Known Problems Father     No Known Problems Daughter      Social History     Tobacco Use    Smoking status: Former Smoker     Types: Cigars    Smokeless tobacco: Former User     Quit date: 5/12/1996   Substance Use Topics    Alcohol use: No    Drug use: No     Review of Systems   Constitutional: Positive for fatigue. Negative for chills and fever.   HENT: Negative for rhinorrhea, sore throat and trouble swallowing.    Eyes: Negative for visual disturbance.   Respiratory: Positive for cough and shortness of breath.     Cardiovascular: Negative for chest pain.   Gastrointestinal: Negative for abdominal pain, diarrhea and vomiting.   Genitourinary: Negative for dysuria and hematuria.   Musculoskeletal: Negative for back pain.   Skin: Negative for rash.   Neurological: Negative for numbness and headaches.   Hematological: Negative for adenopathy.       Physical Exam     Initial Vitals [03/13/20 1030]   BP Pulse Resp Temp SpO2   (!) 157/74 100 (!) 25 99.5 °F (37.5 °C) (!) 90 %      MAP       --         Physical Exam    Constitutional: He appears well-nourished. He is not diaphoretic.  Non-toxic appearance. No distress.   Speaking in full sentences   HENT:   Head: Normocephalic and atraumatic.   Eyes: Conjunctivae are normal. Pupils are equal, round, and reactive to light. Right eye exhibits no nystagmus. Left eye exhibits no nystagmus.   Neck: Neck supple.   Cardiovascular: Normal rate, regular rhythm, S1 normal and S2 normal. Exam reveals no gallop.    No murmur heard.  Pulmonary/Chest: He has wheezes. He has no rales.   Tachypnic  Diminished breath sounds bilaterally   Abdominal: Soft. He exhibits no distension. There is no tenderness.   Neurological: He is alert and oriented to person, place, and time. He has normal strength. No sensory deficit. GCS score is 15. GCS eye subscore is 4. GCS verbal subscore is 5. GCS motor subscore is 6.   Skin: Skin is warm and dry. No rash noted.   Psychiatric: He has a normal mood and affect. His behavior is normal.         ED Course   Procedures  Labs Reviewed   CBC W/ AUTO DIFFERENTIAL - Abnormal; Notable for the following components:       Result Value    RBC 3.45 (*)     Hemoglobin 11.2 (*)     Hematocrit 34.8 (*)     Mean Corpuscular Volume 101 (*)     Mean Corpuscular Hemoglobin 32.5 (*)     Platelets 385 (*)     Gran # (ANC) 9.5 (*)     Gran% 80.4 (*)     Lymph% 11.8 (*)     All other components within normal limits   COMPREHENSIVE METABOLIC PANEL - Abnormal; Notable for the following  components:    CO2 21 (*)     Glucose 165 (*)     Total Protein 8.6 (*)     Albumin 2.7 (*)     All other components within normal limits   URINALYSIS, REFLEX TO URINE CULTURE - Abnormal; Notable for the following components:    Protein, UA 1+ (*)     Glucose, UA Trace (*)     Urobilinogen, UA 2.0-3.0 (*)     All other components within normal limits    Narrative:     Preferred Collection Type->Urine, Clean Catch   PROCALCITONIN - Abnormal; Notable for the following components:    Procalcitonin 0.26 (*)     All other components within normal limits   ISTAT PROCEDURE - Abnormal; Notable for the following components:    POC PCO2 32.7 (*)     POC PO2 72 (*)     POC HCO3 20.0 (*)     POC SATURATED O2 94 (*)     POC TCO2 21 (*)     All other components within normal limits   INFLUENZA A & B BY MOLECULAR   LACTIC ACID, PLASMA   LACTIC ACID, PLASMA   TROPONIN I   B-TYPE NATRIURETIC PEPTIDE   URINALYSIS MICROSCOPIC    Narrative:     Preferred Collection Type->Urine, Clean Catch   SARS-COV-2 (COVID-19) QUALITATIVE PCR          Imaging Results          X-Ray Chest AP Portable (Final result)  Result time 03/13/20 13:18:27    Final result by Trace Manuel MD (03/13/20 13:18:27)                 Impression:      Nonspecific chronic interstitial coarsening throughout both lungs, mildly increased when compared to 04/05/2016.      Electronically signed by: Trace Manuel MD  Date:    03/13/2020  Time:    13:18             Narrative:    EXAMINATION:  XR CHEST AP PORTABLE    CLINICAL HISTORY:  Sepsis;    TECHNIQUE:  Single frontal view of the chest was performed.    COMPARISON:  04/05/2016.    FINDINGS:  The heart and mediastinal structures are unremarkable.  Pulmonary vasculature is within normal limits.  There is diffuse coarsening of the interstitial markings throughout both lungs, mildly increased when compared to the prior study.  No focal pulmonary consolidation is evident.  There is no evidence for pneumothorax or large  pleural effusions.  Bony structures appear intact.                                 Medical Decision Making:   Initial Assessment:   Patient presents with wheezing shortness of breath and cough with green-yellow sputum his temperature is 99.5° orally he is tachycardic his oxygen saturations go below 90% while speaking and he appears tachypneic.  Will obtain labs duo nebs chest x-ray blood cultures and will reassess.  Differential Diagnosis:   Differential Diagnosis includes, but is not limited to:  PE, MI/ACS, pneumothorax, pericardial effusion/tamonade, pneumonia, lung abscess, pericarditis/myocarditis, pleural effusion, lung mass, CHF exacerbation, asthma exacerbation, COPD exacerbation, aspirated/ingested foreign body, airway obstruction, CO poisoning, anemia, metabolic derangement, allergy/atopy, influenza, viral URI, viral syndrome.    Clinical Tests:   Lab Tests: Reviewed and Ordered  Radiological Study: Reviewed and Ordered  Medical Tests: Reviewed and Ordered  ED Management:  Upon re-evaluation, the patient's status has improved.  At this time, I believe the patient should be admitted to the hospital for further evaluation and management of respiratory distress and COPD exaceration.    Dr. Cazares with Hospital Medicine service was contacted and the case was discussed. The consulting physician agrees with plan and will admit under their service. Additional recommendations at this time: none     The patient and family were updated with test results, overall impression, and further plan of care. All questions were answered. The patient expressed understanding and agreed with the current plan.                  Attending Attestation:         Attending Critical Care:   Critical Care Times:   Direct Patient Care (initial evaluation, reassessments, and time considering the case)................................................................10 minutes.   Additional History from reviewing old medical records or  taking additional history from the family, EMS, PCP, etc.......................5 minutes.   Ordering, Reviewing, and Interpreting Diagnostic Studies...............................................................................................................5 minutes.   Documentation..................................................................................................................................................................................5 minutes.   Consultation with other Physicians. .................................................................................................................................................5 minutes.   ==============================================================  · Total Critical Care Time - exclusive of procedural time: 30 minutes.  ==============================================================  Critical care was necessary to treat or prevent imminent or life-threatening deterioration of the following conditions: respiratory failure.               ED Course as of Mar 14 1319   Fri Mar 13, 2020   1210 EKG:  Rate of 102 sinus tachycardia with occasional PACs no STEMI    [RN]      ED Course User Index  [RN] Roverto Castillo Jr., MD                Clinical Impression:       ICD-10-CM ICD-9-CM   1. Tachycardia R00.0 785.0   2. Shortness of breath R06.02 786.05                   Portions of this note were dictated using voice recognition software and may contain dictation related errors in spelling/grammar/syntax not found on text review    Scribe attestation I, Roverto Castillo,  personally performed the services described in this documentation. All medical record entries made by the scribe were at my direction and in my presence.  I have reviewed the chart and agree that the record reflects my personal performance and is accurate and complete. Roverto Castillo M.D. 1:21 PM03/14/2020               Roverto Castillo Jr., MD  03/14/20 1322

## 2020-03-13 NOTE — SUBJECTIVE & OBJECTIVE
Past Medical History:   Diagnosis Date    Anxiety 6/29/2016    Benign essential hypertension 4/5/2016    Hyperlipidemia 4/13/2016    Interstitial pneumonitis 4/13/2016       Past Surgical History:   Procedure Laterality Date    ADENOIDECTOMY      COLONOSCOPY N/A 6/1/2016    Procedure: COLONOSCOPY;  Surgeon: Meme Mills MD;  Location: [facility];  Service: Endoscopy;  Laterality: N/A;    FINGER SURGERY      15 years ago    TONSILLECTOMY         Review of patient's allergies indicates:  No Known Allergies    No current facility-administered medications on file prior to encounter.      Current Outpatient Medications on File Prior to Encounter   Medication Sig    amLODIPine (NORVASC) 10 MG tablet TAKE 1 TABLET BY MOUTH EVERY DAY    atorvastatin (LIPITOR) 20 MG tablet TAKE 1 TABLET BY MOUTH EVERY DAY    fluticasone propionate (FLONASE) 50 mcg/actuation nasal spray 2 sprays (100 mcg total) by Each Nostril route once daily.    levocetirizine (XYZAL) 5 MG tablet Take 1 tablet (5 mg total) by mouth every evening.    losartan (COZAAR) 100 MG tablet TAKE 1 TABLET(100 MG) BY MOUTH EVERY DAY    metoprolol succinate (TOPROL-XL) 100 MG 24 hr tablet TAKE 1 TABLET(100 MG) BY MOUTH EVERY DAY    multivitamin with minerals tablet Take 1 tablet by mouth once daily.    SYMBICORT 160-4.5 mcg/actuation HFAA INHALE 2 PUFFS BY MOUTH TWICE DAILY     Family History     Problem Relation (Age of Onset)    No Known Problems Mother, Father, Daughter        Tobacco Use    Smoking status: Former Smoker     Types: Cigars    Smokeless tobacco: Former User     Quit date: 5/12/1996   Substance and Sexual Activity    Alcohol use: No    Drug use: No    Sexual activity: Yes     Partners: Female     Review of Systems   Constitutional: Positive for fatigue. Negative for chills and fever.   HENT: Positive for congestion and postnasal drip. Negative for sinus pressure, sinus pain and sore throat.    Eyes: Negative for redness.    Respiratory: Positive for cough, chest tightness, shortness of breath and wheezing.    Cardiovascular: Negative for chest pain, palpitations and leg swelling.   Gastrointestinal: Negative for abdominal pain, blood in stool, constipation, diarrhea, nausea and vomiting.   Endocrine: Negative for polyuria.   Genitourinary: Negative for difficulty urinating and dysuria.   Musculoskeletal: Negative for arthralgias.   Skin: Negative for rash.   Allergic/Immunologic: Negative for immunocompromised state.   Neurological: Negative for dizziness.   Hematological: Negative for adenopathy.     Objective:     Vital Signs (Most Recent):  Temp: 98.7 °F (37.1 °C) (03/13/20 1330)  Pulse: 108 (03/13/20 1800)  Resp: (!) 26 (03/13/20 1800)  BP: (!) 151/72 (03/13/20 1800)  SpO2: 95 % (03/13/20 1800) Vital Signs (24h Range):  Temp:  [98.7 °F (37.1 °C)-99.5 °F (37.5 °C)] 98.7 °F (37.1 °C)  Pulse:  [] 108  Resp:  [22-34] 26  SpO2:  [90 %-97 %] 95 %  BP: (132-157)/(63-81) 151/72     Weight: 82.6 kg (182 lb)  Body mass index is 29.38 kg/m².    Physical Exam   Constitutional: He is oriented to person, place, and time. He appears well-developed and well-nourished. No distress.   HENT:   Head: Normocephalic and atraumatic.   Nose: Nose normal.   Mouth/Throat: Oropharynx is clear and moist.   No sinus pressure   Eyes: Conjunctivae are normal. No scleral icterus.   Neck: Normal range of motion. Neck supple. No JVD present.   Cardiovascular: Normal rate, regular rhythm, normal heart sounds and intact distal pulses.   Pulmonary/Chest: Effort normal. No stridor. No respiratory distress. He has wheezes (wheezes heard throughout all lung fields). He has no rales. He exhibits no tenderness.   Abdominal: Soft. Bowel sounds are normal. He exhibits no distension and no mass. There is no tenderness.   Musculoskeletal: He exhibits no edema.   Lymphadenopathy:     He has no cervical adenopathy.   Neurological: He is alert and oriented to person,  place, and time.   Skin: Skin is warm and dry.   Psychiatric: He has a normal mood and affect.           Significant Labs:   Recent Lab Results       03/13/20  1713   03/13/20  1534   03/13/20  1457   03/13/20  1117   03/13/20  1050        Influenza A, Molecular       Negative       Influenza B, Molecular       Negative       Procalcitonin         0.26  Comment:  A concentration < 0.25 ng/mL represents a low risk bacterial   infection.  Procalcitonin may not be accurate among patients with localized   infection, recent trauma or major surgery, immunosuppressed state,   invasive fungal infection, renal dysfunction. Decisions regarding   initiation or continuation of antibiotic therapy should not be based   solely on procalcitonin levels.       Albumin         2.7     Alkaline Phosphatase         73     Allens Test     Pass         ALT         20     Amorphous, UA Rare             Anion Gap         9     Appearance, UA Clear             AST         18     Bacteria, UA Rare             Baso #         0.02     Basophil%         0.2     Bilirubin (UA) Negative             BILIRUBIN TOTAL         0.7  Comment:  For infants and newborns, interpretation of results should be based  on gestational age, weight and in agreement with clinical  observations.  Premature Infant recommended reference ranges:  Up to 24 hours.............<8.0 mg/dL  Up to 48 hours............<12.0 mg/dL  3-5 days..................<15.0 mg/dL  6-29 days.................<15.0 mg/dL       BNP         26  Comment:  Values of less than 100 pg/ml are consistent with non-CHF populations.     Site     RR         BUN, Bld         15     Calcium         9.0     Chloride         107     CO2         21     Color, UA Yellow             Creatinine         1.1     DelSys     Nasal Can         Differential Method         Automated     eGFR if          >60     eGFR if non          >60  Comment:  Calculation used to obtain the estimated  glomerular filtration  rate (eGFR) is the CKD-EPI equation.        Eos #         0.0     Eosinophil%         0.1     Flow     2         Flu A & B Source       Nasal swab       Glucose         165     Glucose, UA Trace             Gran # (ANC)         9.5     Gran%         80.4     Hematocrit         34.8     Hemoglobin         11.2     Hyaline Casts, UA 1             Immature Grans (Abs)         0.04  Comment:  Mild elevation in immature granulocytes is non specific and   can be seen in a variety of conditions including stress response,   acute inflammation, trauma and pregnancy. Correlation with other   laboratory and clinical findings is essential.       Immature Granulocytes         0.3     Ketones, UA Negative             Lactate, Danielito   1.5  Comment:  Falsely low lactic acid results can be found in samples   containing >=13.0 mg/dL total bilirubin and/or >=3.5 mg/dL   direct bilirubin.       1.1  Comment:  Falsely low lactic acid results can be found in samples   containing >=13.0 mg/dL total bilirubin and/or >=3.5 mg/dL   direct bilirubin.       Leukocytes, UA Negative             Lymph #         1.4     Lymph%         11.8     MCH         32.5     MCHC         32.2     MCV         101     Microscopic Comment SEE COMMENT  Comment:  Other formed elements not mentioned in the report are not   present in the microscopic examination.                Mode     SPONT         Mono #         0.9     Mono%         7.2     MPV         9.6     NITRITE UA Negative             nRBC         0     Occult Blood UA Negative             pH, UA 6.0             Platelets         385     POC BE     -5         POC HCO3     20.0         POC PCO2     32.7         POC PH     7.395         POC PO2     72         POC SATURATED O2     94         POC TCO2     21         Potassium         3.9     PROTEIN TOTAL         8.6     Protein, UA 1+  Comment:  Recommend a 24 hour urine protein or a urine   protein/creatinine ratio if globulin induced  proteinuria is  clinically suspected.               RBC         3.45     RBC, UA 1             RDW         13.5     Sample     ARTERIAL         Sodium         137     Specific [place], UA 1.025             Specimen UA Urine, Clean Catch             Squam Epithel, UA 0             Troponin I         <0.006  Comment:  The reference interval for Troponin I represents the 99th percentile   cutoff   for our facility and is consistent with 3rd generation assay   performance.       UROBILINOGEN UA 2.0-3.0             WBC Clumps, UA None             WBC, UA 1             WBC         11.80     Yeast, UA None                                   BMP:   Recent Labs   Lab 03/13/20  1050   *      K 3.9      CO2 21*   BUN 15   CREATININE 1.1   CALCIUM 9.0     CBC:   Recent Labs   Lab 03/13/20  1050   WBC 11.80   HGB 11.2*   HCT 34.8*   *     Troponin:   Recent Labs   Lab 03/13/20  1050   TROPONINI <0.006       Significant Imaging:  X-Ray Chest AP Portable  Narrative: EXAMINATION:  XR CHEST AP PORTABLE    CLINICAL HISTORY:  Sepsis;    TECHNIQUE:  Single frontal view of the chest was performed.    COMPARISON:  04/05/2016.    FINDINGS:  The heart and mediastinal structures are unremarkable.  Pulmonary vasculature is within normal limits.  There is diffuse coarsening of the interstitial markings throughout both lungs, mildly increased when compared to the prior study.  No focal pulmonary consolidation is evident.  There is no evidence for pneumothorax or large pleural effusions.  Bony structures appear intact.  Impression: Nonspecific chronic interstitial coarsening throughout both lungs, mildly increased when compared to 04/05/2016.    Electronically signed by: Trace Manuel MD  Date:    03/13/2020  Time:    13:18

## 2020-03-13 NOTE — ED NOTES
Pt states he is liking receiving oxygen via nasal cannula. States he is feeling much better. Respirations unlabored.

## 2020-03-13 NOTE — ED NOTES
Pt c/o cough and SOB for the past week and a half. Pt states he always coughs up a lot of sputum, but has been coughing more than normal this week. Pt is tachypneic. O2 sat. 88-90% on room air. Expiratory wheezes noted throughout chest. Skin is warm, dry. Pt denies fever. Denies any chest pain.

## 2020-03-14 PROBLEM — J47.9 BRONCHIECTASIS: Status: ACTIVE | Noted: 2020-03-14

## 2020-03-14 LAB
ANION GAP SERPL CALC-SCNC: 9 MMOL/L (ref 8–16)
BASOPHILS # BLD AUTO: 0.01 K/UL (ref 0–0.2)
BASOPHILS NFR BLD: 0.1 % (ref 0–1.9)
BUN SERPL-MCNC: 14 MG/DL (ref 8–23)
CALCIUM SERPL-MCNC: 9 MG/DL (ref 8.7–10.5)
CHLORIDE SERPL-SCNC: 107 MMOL/L (ref 95–110)
CO2 SERPL-SCNC: 22 MMOL/L (ref 23–29)
CREAT SERPL-MCNC: 0.9 MG/DL (ref 0.5–1.4)
DIFFERENTIAL METHOD: ABNORMAL
EOSINOPHIL # BLD AUTO: 0 K/UL (ref 0–0.5)
EOSINOPHIL NFR BLD: 0 % (ref 0–8)
ERYTHROCYTE [DISTWIDTH] IN BLOOD BY AUTOMATED COUNT: 13.4 % (ref 11.5–14.5)
EST. GFR  (AFRICAN AMERICAN): >60 ML/MIN/1.73 M^2
EST. GFR  (NON AFRICAN AMERICAN): >60 ML/MIN/1.73 M^2
ESTIMATED AVG GLUCOSE: 120 MG/DL (ref 68–131)
GLUCOSE SERPL-MCNC: 142 MG/DL (ref 70–110)
HBA1C MFR BLD HPLC: 5.8 % (ref 4–5.6)
HCT VFR BLD AUTO: 33.4 % (ref 40–54)
HGB BLD-MCNC: 10.8 G/DL (ref 14–18)
IMM GRANULOCYTES # BLD AUTO: 0.06 K/UL (ref 0–0.04)
IMM GRANULOCYTES NFR BLD AUTO: 0.6 % (ref 0–0.5)
LYMPHOCYTES # BLD AUTO: 1.3 K/UL (ref 1–4.8)
LYMPHOCYTES NFR BLD: 11.7 % (ref 18–48)
MCH RBC QN AUTO: 32.2 PG (ref 27–31)
MCHC RBC AUTO-ENTMCNC: 32.3 G/DL (ref 32–36)
MCV RBC AUTO: 100 FL (ref 82–98)
MONOCYTES # BLD AUTO: 0.3 K/UL (ref 0.3–1)
MONOCYTES NFR BLD: 2.7 % (ref 4–15)
NEUTROPHILS # BLD AUTO: 9.2 K/UL (ref 1.8–7.7)
NEUTROPHILS NFR BLD: 84.9 % (ref 38–73)
NRBC BLD-RTO: 0 /100 WBC
PLATELET # BLD AUTO: 383 K/UL (ref 150–350)
PMV BLD AUTO: 10.1 FL (ref 9.2–12.9)
POTASSIUM SERPL-SCNC: 4.7 MMOL/L (ref 3.5–5.1)
RBC # BLD AUTO: 3.35 M/UL (ref 4.6–6.2)
SODIUM SERPL-SCNC: 138 MMOL/L (ref 136–145)
TROPONIN I SERPL DL<=0.01 NG/ML-MCNC: 0.01 NG/ML (ref 0–0.03)
VIT B12 SERPL-MCNC: 789 PG/ML (ref 210–950)
WBC # BLD AUTO: 10.8 K/UL (ref 3.9–12.7)

## 2020-03-14 PROCEDURE — 85025 COMPLETE CBC W/AUTO DIFF WBC: CPT | Mod: HCNC

## 2020-03-14 PROCEDURE — 25000003 PHARM REV CODE 250: Mod: HCNC | Performed by: NURSE PRACTITIONER

## 2020-03-14 PROCEDURE — 80048 BASIC METABOLIC PNL TOTAL CA: CPT | Mod: HCNC

## 2020-03-14 PROCEDURE — G0378 HOSPITAL OBSERVATION PER HR: HCPCS | Mod: HCNC

## 2020-03-14 PROCEDURE — 96376 TX/PRO/DX INJ SAME DRUG ADON: CPT | Performed by: EMERGENCY MEDICINE

## 2020-03-14 PROCEDURE — 99900035 HC TECH TIME PER 15 MIN (STAT): Mod: HCNC

## 2020-03-14 PROCEDURE — 84484 ASSAY OF TROPONIN QUANT: CPT | Mod: HCNC

## 2020-03-14 PROCEDURE — 87205 SMEAR GRAM STAIN: CPT | Mod: HCNC

## 2020-03-14 PROCEDURE — 25000242 PHARM REV CODE 250 ALT 637 W/ HCPCS: Mod: HCNC | Performed by: INTERNAL MEDICINE

## 2020-03-14 PROCEDURE — 94640 AIRWAY INHALATION TREATMENT: CPT | Mod: HCNC

## 2020-03-14 PROCEDURE — 82607 VITAMIN B-12: CPT | Mod: HCNC

## 2020-03-14 PROCEDURE — 27000221 HC OXYGEN, UP TO 24 HOURS: Mod: HCNC

## 2020-03-14 PROCEDURE — 63600175 PHARM REV CODE 636 W HCPCS: Mod: HCNC | Performed by: INTERNAL MEDICINE

## 2020-03-14 PROCEDURE — 87070 CULTURE OTHR SPECIMN AEROBIC: CPT | Mod: HCNC

## 2020-03-14 PROCEDURE — 25000003 PHARM REV CODE 250: Mod: HCNC | Performed by: INTERNAL MEDICINE

## 2020-03-14 PROCEDURE — 94761 N-INVAS EAR/PLS OXIMETRY MLT: CPT | Mod: HCNC

## 2020-03-14 PROCEDURE — 83036 HEMOGLOBIN GLYCOSYLATED A1C: CPT | Mod: HCNC

## 2020-03-14 RX ADMIN — AMLODIPINE BESYLATE 10 MG: 5 TABLET ORAL at 08:03

## 2020-03-14 RX ADMIN — IPRATROPIUM BROMIDE AND ALBUTEROL SULFATE 3 ML: .5; 3 SOLUTION RESPIRATORY (INHALATION) at 07:03

## 2020-03-14 RX ADMIN — CEFTRIAXONE 1 G: 1 INJECTION, SOLUTION INTRAVENOUS at 03:03

## 2020-03-14 RX ADMIN — IPRATROPIUM BROMIDE AND ALBUTEROL SULFATE 3 ML: .5; 3 SOLUTION RESPIRATORY (INHALATION) at 08:03

## 2020-03-14 RX ADMIN — AZITHROMYCIN MONOHYDRATE 500 MG: 500 INJECTION, POWDER, LYOPHILIZED, FOR SOLUTION INTRAVENOUS at 03:03

## 2020-03-14 RX ADMIN — IPRATROPIUM BROMIDE AND ALBUTEROL SULFATE 3 ML: .5; 3 SOLUTION RESPIRATORY (INHALATION) at 01:03

## 2020-03-14 RX ADMIN — GUAIFENESIN 600 MG: 600 TABLET, EXTENDED RELEASE ORAL at 10:03

## 2020-03-14 RX ADMIN — FLUTICASONE FUROATE AND VILANTEROL TRIFENATATE 1 PUFF: 100; 25 POWDER RESPIRATORY (INHALATION) at 09:03

## 2020-03-14 RX ADMIN — METHYLPREDNISOLONE SODIUM SUCCINATE 40 MG: 40 INJECTION, POWDER, FOR SOLUTION INTRAMUSCULAR; INTRAVENOUS at 10:03

## 2020-03-14 RX ADMIN — METOPROLOL SUCCINATE 100 MG: 50 TABLET, EXTENDED RELEASE ORAL at 08:03

## 2020-03-14 RX ADMIN — METHYLPREDNISOLONE SODIUM SUCCINATE 40 MG: 40 INJECTION, POWDER, FOR SOLUTION INTRAMUSCULAR; INTRAVENOUS at 05:03

## 2020-03-14 RX ADMIN — LOSARTAN POTASSIUM 100 MG: 50 TABLET, FILM COATED ORAL at 10:03

## 2020-03-14 RX ADMIN — METHYLPREDNISOLONE SODIUM SUCCINATE 40 MG: 40 INJECTION, POWDER, FOR SOLUTION INTRAMUSCULAR; INTRAVENOUS at 03:03

## 2020-03-14 NOTE — PROGRESS NOTES
[facility] Hospital Medicine  Progress Note    Patient Name: Scooter Morrissey  MRN: [id number]  Patient Class: OP- Observation   Admission Date: 3/13/2020  Length of Stay: 0 days  Attending Physician: Sigrid Humphries MD  Primary Care Provider: Katelynn Rojas MD        Subjective:     Principal Problem:COPD exacerbation        HPI:  69 year old male with history of COPD/Asthma/Chronic bronchitis, who usually follows with Dr Guerrier presents with 2 week history of feeling poorly with cough productive of copious amouts of green sputum along with progressive shortness of breath, FARLEY and chest pressure. He states that his symptoms began about two weeks ago. He stayed home from work a few days and started to feel better.  Then a week ago, symptoms again worsened. He can hardly walk without getting dyspneic but it is relieved by rest.  He has a chronic cough and usually the sputum is thicker and darker.  Now he is coughing up a lot more, thinner and less dark.  He states that he has some chest pressure.  Feels like a baby on his chest. No overt chest pain or radiation.  He denies fever or chills.   He states that he takes Symbicort. He denies really using his rescue inhaler at all.   He denies any sick exposures. He denies contact with COVID or travel to high risk areas.    Overview/Hospital Course:  No notes on file    Interval History:   He feels better.  Still coughing up copious amounts of thin purulent sputum  Breathing better  States that he ambulated to the bathroom without realizing he  Did not have O2 on    Review of Systems   Constitutional: Negative for fever.   HENT: Negative for sore throat.    Respiratory: Positive for cough, shortness of breath and wheezing.    Cardiovascular: Negative for chest pain.   Gastrointestinal: Negative for abdominal pain.     Objective:     Vital Signs (Most Recent):  Temp: 97.5 °F (36.4 °C) (03/14/20 1217)  Pulse: 77 (03/14/20 1353)  Resp: 20 (03/14/20  1353)  BP: (!) 153/75 (03/14/20 1217)  SpO2: 97 % (03/14/20 1217) Vital Signs (24h Range):  Temp:  [96.8 °F (36 °C)-98.3 °F (36.8 °C)] 97.5 °F (36.4 °C)  Pulse:  [] 77  Resp:  [16-34] 20  SpO2:  [93 %-98 %] 97 %  BP: (139-172)/(72-87) 153/75     Weight: 80.1 kg (176 lb 9.4 oz)  Body mass index is 28.5 kg/m².    Intake/Output Summary (Last 24 hours) at 3/14/2020 1445  Last data filed at 3/14/2020 0548  Gross per 24 hour   Intake 700 ml   Output 825 ml   Net -125 ml      Physical Exam   Constitutional: He appears well-developed and well-nourished. No distress.   Cardiovascular: Normal rate, regular rhythm and normal heart sounds.   Pulmonary/Chest: He has wheezes (much improved. Scattered. Good air flow).   Abdominal: Soft. Bowel sounds are normal. There is no tenderness.   Musculoskeletal: He exhibits no edema.   Skin: Skin is warm and dry. He is not diaphoretic.   Nursing note and vitals reviewed.      Significant Labs:   BMP:   Recent Labs   Lab 03/14/20  0548   *      K 4.7      CO2 22*   BUN 14   CREATININE 0.9   CALCIUM 9.0     CBC:   Recent Labs   Lab 03/13/20  1050 03/14/20  0548   WBC 11.80 10.80   HGB 11.2* 10.8*   HCT 34.8* 33.4*   * 383*       Significant Imaging: .  X-Ray Chest AP Portable  Narrative: EXAMINATION:  XR CHEST AP PORTABLE    CLINICAL HISTORY:  copd;    TECHNIQUE:  Single frontal view of the chest was performed.    COMPARISON:  March 13, 2020    FINDINGS:  Single portable chest view is submitted.  The cardiomediastinal silhouette and intrathoracic appearance is stable when compared to the prior study without evidence for significant interval detrimental change.  There is no evidence for significant pleural effusion or pneumothorax, continued appearance of accentuated interstitial markings more prominent at the lung bases, particularly on the left.  The osseous structures demonstrate chronic change.  Impression: Stable radiographic appearance.    Electronically  signed by: Moy Parrish  Date:    03/14/2020  Time:    05:59           Assessment/Plan:      * COPD exacerbation  Steroids, breathing tx  abx- Rocephin and Z max      Shortness of breath on exertion    No chest pain  EKG ok  No bump in troponin    Hypoxia    Oxygen therapy. Steroids, BT  Better    Acute bronchitis  Cover with Rocephin and zmax  Repeat CXR stable  COVID-19 ordered by ER. Low probability      I think he may have a component of chronic lung disease.  Perhaps Bronchiectasis.  He will need OP pulmonary follow up and possible high res CT of the chest    I ordered a sputum cx    CT from 4-2016  Impression      1.  Subtotal interval clearing of interstitial pneumonitis noted chest x-ray 4/5/15.  Residual prominent interstitial markings, scattered areas of dependent lung congestion, atelectasis, and global bronchiectasis particularly areas of involvement.    2.  Granuloma left lower lobe superior segment.    3.  Diverticulosis coli hepatic flexure.     4.  Nonspecific scattered cystic areas of the liver.     Additional findings above.         Benign essential hypertension    Continue amlodipine, losartan, metoprolol with hold parameters    Hyperlipidemia    statin    Macrocytic anemia  Op work up.  B12 is normal    Hyperglycemia  A1C 5.8      VTE Risk Mitigation (From admission, onward)         Ordered     enoxaparin injection 40 mg  Daily      03/13/20 1759     IP VTE HIGH RISK PATIENT  Once      03/13/20 1759                      Sigrid Humphries MD  Department of Hospital Medicine   [facility]

## 2020-03-14 NOTE — PLAN OF CARE
VN cued into pt's room for introduction. VN informed pt that VN would be working along side bedside nurse and PCT throughout shift. Level of present pain assessed. At present no distress noted. Thoroughly discussed with patient today's plan of care and present Isolation status. Discussed with patient High fall risk protocol and interventions that have been initiated and cont be in place for safety. Patient verbalized clear understanding and cooperation using teach back method. Bed alarm presently activated and in use. Will cont to be available to patient and intervene prn.

## 2020-03-14 NOTE — PLAN OF CARE
VIRTUAL NURSE:  Cued into patient's room.  Permission received per patient to turn camera to view patient.  Introduced as VN for night shift that will be working with floor nurse and nursing assistant.  Dyspnea with talking.  Educated patient on VN's role in patient care. Plan of care reviewed with patient. Education per flowsheet.  Opportunity given for questions and questions answered.  Admission assessment questions answered.  Explained frequent rounding by VN to monitor for distress.  No complaints.  Instructed to call for assistance.  Will cont to monitor.    Labs, notes, and orders reviewed.

## 2020-03-14 NOTE — SUBJECTIVE & OBJECTIVE
Interval History:   He feels better.  Still coughing up copious amounts of thin purulent sputum  Breathing better  States that he ambulated to the bathroom without realizing he  Did not have O2 on    Review of Systems   Constitutional: Negative for fever.   HENT: Negative for sore throat.    Respiratory: Positive for cough, shortness of breath and wheezing.    Cardiovascular: Negative for chest pain.   Gastrointestinal: Negative for abdominal pain.     Objective:     Vital Signs (Most Recent):  Temp: 97.5 °F (36.4 °C) (03/14/20 1217)  Pulse: 77 (03/14/20 1353)  Resp: 20 (03/14/20 1353)  BP: (!) 153/75 (03/14/20 1217)  SpO2: 97 % (03/14/20 1217) Vital Signs (24h Range):  Temp:  [96.8 °F (36 °C)-98.3 °F (36.8 °C)] 97.5 °F (36.4 °C)  Pulse:  [] 77  Resp:  [16-34] 20  SpO2:  [93 %-98 %] 97 %  BP: (139-172)/(72-87) 153/75     Weight: 80.1 kg (176 lb 9.4 oz)  Body mass index is 28.5 kg/m².    Intake/Output Summary (Last 24 hours) at 3/14/2020 1445  Last data filed at 3/14/2020 0548  Gross per 24 hour   Intake 700 ml   Output 825 ml   Net -125 ml      Physical Exam   Constitutional: He appears well-developed and well-nourished. No distress.   Cardiovascular: Normal rate, regular rhythm and normal heart sounds.   Pulmonary/Chest: He has wheezes (much improved. Scattered. Good air flow).   Abdominal: Soft. Bowel sounds are normal. There is no tenderness.   Musculoskeletal: He exhibits no edema.   Skin: Skin is warm and dry. He is not diaphoretic.   Nursing note and vitals reviewed.      Significant Labs:   BMP:   Recent Labs   Lab 03/14/20  0548   *      K 4.7      CO2 22*   BUN 14   CREATININE 0.9   CALCIUM 9.0     CBC:   Recent Labs   Lab 03/13/20  1050 03/14/20  0548   WBC 11.80 10.80   HGB 11.2* 10.8*   HCT 34.8* 33.4*   * 383*       Significant Imaging: .  X-Ray Chest AP Portable  Narrative: EXAMINATION:  XR CHEST AP PORTABLE    CLINICAL HISTORY:  copd;    TECHNIQUE:  Single frontal view  of the chest was performed.    COMPARISON:  March 13, 2020    FINDINGS:  Single portable chest view is submitted.  The cardiomediastinal silhouette and intrathoracic appearance is stable when compared to the prior study without evidence for significant interval detrimental change.  There is no evidence for significant pleural effusion or pneumothorax, continued appearance of accentuated interstitial markings more prominent at the lung bases, particularly on the left.  The osseous structures demonstrate chronic change.  Impression: Stable radiographic appearance.    Electronically signed by: Moy Parrish  Date:    03/14/2020  Time:    05:59

## 2020-03-14 NOTE — PLAN OF CARE
VN cued into patients room for rounding - patient sitting up in bed in no acute distress at this time. Will continue to monitor and be available to intervene PRN.

## 2020-03-14 NOTE — ASSESSMENT & PLAN NOTE
Cover with Rocephin and zmax  Repeat CXR stable  COVID-19 ordered by ER. Low probability      I think he may have a component of chronic lung disease.  Perhaps Bronchiectasis.  He will need OP pulmonary follow up and possible high res CT of the chest    I ordered a sputum cx

## 2020-03-14 NOTE — PLAN OF CARE
2052H Received patient from ED per wheelchair; awake and orientedx4. Airborne, droplet and contact precaution initiated. Care plan explained and verbalized understanding. VIP care model introduced. On room air, O2 saturation 88-92%. Hooked to oxygen 2lpm/nasal cannula, O2 saturation 96-97%. Hooked to heart monitor running Sinus tachycardia, heart rat low 100s. IV site to the left forearm 20G; flushed and saline locked. Instructed on low salt diet. Instructed on fall precaution. Bed in lowest position, bed alarms on, call light within reach and instructed to call for help when needed.     Due medications given. Coughing up yellowish thick sputum. Patient verbalized he takes his blood pressure pills at night, NP notified by Virtual Nurse. Ambulates to the bathroom with stand-by assist. Will continue  to monitor.    Patient verbalized that he only had 2 hours of sleep because of frequent coughing.

## 2020-03-14 NOTE — PLAN OF CARE
POC reviewed with patient. Patient AAOx4 and denies any pain. IV antibiotics administered. Tele monitor in place reading NSR. No red alarms or ectopy noted. 2L of oxygen via NC in place with oxygen saturation at 96%. Oxygen being weaned per patient tolerance. Patient has very productive cough. Bed alarm on, bed locked and low, side rails up x2, and call bell in reach. Patient verbalizes clear understanding of POC.

## 2020-03-14 NOTE — PLAN OF CARE
VN cued into patients room for q2h rounding - patient sitting up in bed in no acute distress at this time. Will continue to monitor and be available to intervene PRN.

## 2020-03-14 NOTE — NURSING
VIRTUAL NURSE 2 HOUR ROUNDS:  Cued into patient's room.  Patient not responding to introduction and permission inquiry.  Patient resting comfortably in bed with eyes closed; respirations even and unlabored.  No distress noted.  Will cont to monitor.

## 2020-03-14 NOTE — H&P
[facility] Hospital Medicine  History & Physical    Patient Name: Scooter Morrissey  MRN: [id number]  Admission Date: 3/13/2020  Attending Physician: Sigrid Humphries MD   Primary Care Provider: Katelynn Rojas MD         Patient information was obtained from patient and ER records.     Subjective:     Principal Problem:COPD exacerbation    Chief Complaint:   Chief Complaint   Patient presents with    Shortness of Breath     c/o cough and SOB for the past week and a half. Coughing up thick white sputum        HPI: 69 year old male with history of COPD/Asthma/Chronic bronchitis, who usually follows with Dr Guerrier presents with 2 week history of feeling poorly with cough productive of copious amouts of green sputum along with progressive shortness of breath, FARLEY and chest pressure. He states that his symptoms began about two weeks ago. He stayed home from work a few days and started to feel better.  Then a week ago, symptoms again worsened. He can hardly walk without getting dyspneic but it is relieved by rest.  He has a chronic cough and usually the sputum is thicker and darker.  Now he is coughing up a lot more, thinner and less dark.  He states that he has some chest pressure.  Feels like a baby on his chest. No overt chest pain or radiation.  He denies fever or chills.   He states that he takes Symbicort. He denies really using his rescue inhaler at all.   He denies any sick exposures. He denies contact with COVID or travel to high risk areas.    Past Medical History:   Diagnosis Date    Anxiety 6/29/2016    Benign essential hypertension 4/5/2016    Hyperlipidemia 4/13/2016    Interstitial pneumonitis 4/13/2016       Past Surgical History:   Procedure Laterality Date    ADENOIDECTOMY      COLONOSCOPY N/A 6/1/2016    Procedure: COLONOSCOPY;  Surgeon: Meme Mills MD;  [facility];  Service: Endoscopy;  Laterality: N/A;    FINGER SURGERY      15 years ago    TONSILLECTOMY          Review of patient's allergies indicates:  No Known Allergies    No current facility-administered medications on file prior to encounter.      Current Outpatient Medications on File Prior to Encounter   Medication Sig    amLODIPine (NORVASC) 10 MG tablet TAKE 1 TABLET BY MOUTH EVERY DAY    atorvastatin (LIPITOR) 20 MG tablet TAKE 1 TABLET BY MOUTH EVERY DAY    fluticasone propionate (FLONASE) 50 mcg/actuation nasal spray 2 sprays (100 mcg total) by Each Nostril route once daily.    levocetirizine (XYZAL) 5 MG tablet Take 1 tablet (5 mg total) by mouth every evening.    losartan (COZAAR) 100 MG tablet TAKE 1 TABLET(100 MG) BY MOUTH EVERY DAY    metoprolol succinate (TOPROL-XL) 100 MG 24 hr tablet TAKE 1 TABLET(100 MG) BY MOUTH EVERY DAY    multivitamin with minerals tablet Take 1 tablet by mouth once daily.    SYMBICORT 160-4.5 mcg/actuation HFAA INHALE 2 PUFFS BY MOUTH TWICE DAILY     Family History     Problem Relation (Age of Onset)    No Known Problems Mother, Father, Daughter        Tobacco Use    Smoking status: Former Smoker     Types: Cigars    Smokeless tobacco: Former User     Quit date: 5/12/1996   Substance and Sexual Activity    Alcohol use: No    Drug use: No    Sexual activity: Yes     Partners: Female     Review of Systems   Constitutional: Positive for fatigue. Negative for chills and fever.   HENT: Positive for congestion and postnasal drip. Negative for sinus pressure, sinus pain and sore throat.    Eyes: Negative for redness.   Respiratory: Positive for cough, chest tightness, shortness of breath and wheezing.    Cardiovascular: Negative for chest pain, palpitations and leg swelling.   Gastrointestinal: Negative for abdominal pain, blood in stool, constipation, diarrhea, nausea and vomiting.   Endocrine: Negative for polyuria.   Genitourinary: Negative for difficulty urinating and dysuria.   Musculoskeletal: Negative for arthralgias.   Skin: Negative for rash.    Allergic/Immunologic: Negative for immunocompromised state.   Neurological: Negative for dizziness.   Hematological: Negative for adenopathy.     Objective:     Vital Signs (Most Recent):  Temp: 98.7 °F (37.1 °C) (03/13/20 1330)  Pulse: 108 (03/13/20 1800)  Resp: (!) 26 (03/13/20 1800)  BP: (!) 151/72 (03/13/20 1800)  SpO2: 95 % (03/13/20 1800) Vital Signs (24h Range):  Temp:  [98.7 °F (37.1 °C)-99.5 °F (37.5 °C)] 98.7 °F (37.1 °C)  Pulse:  [] 108  Resp:  [22-34] 26  SpO2:  [90 %-97 %] 95 %  BP: (132-157)/(63-81) 151/72     Weight: 82.6 kg (182 lb)  Body mass index is 29.38 kg/m².    Physical Exam   Constitutional: He is oriented to person, place, and time. He appears well-developed and well-nourished. No distress.   HENT:   Head: Normocephalic and atraumatic.   Nose: Nose normal.   Mouth/Throat: Oropharynx is clear and moist.   No sinus pressure   Eyes: Conjunctivae are normal. No scleral icterus.   Neck: Normal range of motion. Neck supple. No JVD present.   Cardiovascular: Normal rate, regular rhythm, normal heart sounds and intact distal pulses.   Pulmonary/Chest: Effort normal. No stridor. No respiratory distress. He has wheezes (wheezes heard throughout all lung fields). He has no rales. He exhibits no tenderness.   Abdominal: Soft. Bowel sounds are normal. He exhibits no distension and no mass. There is no tenderness.   Musculoskeletal: He exhibits no edema.   Lymphadenopathy:     He has no cervical adenopathy.   Neurological: He is alert and oriented to person, place, and time.   Skin: Skin is warm and dry.   Psychiatric: He has a normal mood and affect.           Significant Labs:   Recent Lab Results       03/13/20  1713   03/13/20  1534   03/13/20  1457   03/13/20  1117   03/13/20  1050        Influenza A, Molecular       Negative       Influenza B, Molecular       Negative       Procalcitonin         0.26  Comment:  A concentration < 0.25 ng/mL represents a low risk bacterial    infection.  Procalcitonin may not be accurate among patients with localized   infection, recent trauma or major surgery, immunosuppressed state,   invasive fungal infection, renal dysfunction. Decisions regarding   initiation or continuation of antibiotic therapy should not be based   solely on procalcitonin levels.       Albumin         2.7     Alkaline Phosphatase         73     Allens Test     Pass         ALT         20     Amorphous, UA Rare             Anion Gap         9     Appearance, UA Clear             AST         18     Bacteria, UA Rare             Baso #         0.02     Basophil%         0.2     Bilirubin (UA) Negative             BILIRUBIN TOTAL         0.7  Comment:  For infants and newborns, interpretation of results should be based  on gestational age, weight and in agreement with clinical  observations.  Premature Infant recommended reference ranges:  Up to 24 hours.............<8.0 mg/dL  Up to 48 hours............<12.0 mg/dL  3-5 days..................<15.0 mg/dL  6-29 days.................<15.0 mg/dL       BNP         26  Comment:  Values of less than 100 pg/ml are consistent with non-CHF populations.     Site     RR         BUN, Bld         15     Calcium         9.0     Chloride         107     CO2         21     Color, UA Yellow             Creatinine         1.1     DelSys     Nasal Can         Differential Method         Automated     eGFR if          >60     eGFR if non          >60  Comment:  Calculation used to obtain the estimated glomerular filtration  rate (eGFR) is the CKD-EPI equation.        Eos #         0.0     Eosinophil%         0.1     Flow     2         Flu A & B Source       Nasal swab       Glucose         165     Glucose, UA Trace             Gran # (ANC)         9.5     Gran%         80.4     Hematocrit         34.8     Hemoglobin         11.2     Hyaline Casts, UA 1             Immature Grans (Abs)         0.04  Comment:  Mild  elevation in immature granulocytes is non specific and   can be seen in a variety of conditions including stress response,   acute inflammation, trauma and pregnancy. Correlation with other   laboratory and clinical findings is essential.       Immature Granulocytes         0.3     Ketones, UA Negative             Lactate, Danielito   1.5  Comment:  Falsely low lactic acid results can be found in samples   containing >=13.0 mg/dL total bilirubin and/or >=3.5 mg/dL   direct bilirubin.       1.1  Comment:  Falsely low lactic acid results can be found in samples   containing >=13.0 mg/dL total bilirubin and/or >=3.5 mg/dL   direct bilirubin.       Leukocytes, UA Negative             Lymph #         1.4     Lymph%         11.8     MCH         32.5     MCHC         32.2     MCV         101     Microscopic Comment SEE COMMENT  Comment:  Other formed elements not mentioned in the report are not   present in the microscopic examination.                Mode     SPONT         Mono #         0.9     Mono%         7.2     MPV         9.6     NITRITE UA Negative             nRBC         0     Occult Blood UA Negative             pH, UA 6.0             Platelets         385     POC BE     -5         POC HCO3     20.0         POC PCO2     32.7         POC PH     7.395         POC PO2     72         POC SATURATED O2     94         POC TCO2     21         Potassium         3.9     PROTEIN TOTAL         8.6     Protein, UA 1+  Comment:  Recommend a 24 hour urine protein or a urine   protein/creatinine ratio if globulin induced proteinuria is  clinically suspected.               RBC         3.45     RBC, UA 1             RDW         13.5     Sample     ARTERIAL         Sodium         137     Specific [place], UA 1.025             Specimen UA Urine, Clean Catch             Squam Epithel, UA 0             Troponin I         <0.006  Comment:  The reference interval for Troponin I represents the 99th percentile   cutoff   for our facility and  is consistent with 3rd generation assay   performance.       UROBILINOGEN UA 2.0-3.0             WBC Clumps, UA None             WBC, UA 1             WBC         11.80     Yeast, UA None                                   BMP:   Recent Labs   Lab 03/13/20  1050   *      K 3.9      CO2 21*   BUN 15   CREATININE 1.1   CALCIUM 9.0     CBC:   Recent Labs   Lab 03/13/20  1050   WBC 11.80   HGB 11.2*   HCT 34.8*   *     Troponin:   Recent Labs   Lab 03/13/20  1050   TROPONINI <0.006       Significant Imaging:  X-Ray Chest AP Portable  Narrative: EXAMINATION:  XR CHEST AP PORTABLE    CLINICAL HISTORY:  Sepsis;    TECHNIQUE:  Single frontal view of the chest was performed.    COMPARISON:  04/05/2016.    FINDINGS:  The heart and mediastinal structures are unremarkable.  Pulmonary vasculature is within normal limits.  There is diffuse coarsening of the interstitial markings throughout both lungs, mildly increased when compared to the prior study.  No focal pulmonary consolidation is evident.  There is no evidence for pneumothorax or large pleural effusions.  Bony structures appear intact.  Impression: Nonspecific chronic interstitial coarsening throughout both lungs, mildly increased when compared to 04/05/2016.    Electronically signed by: Trace Manuel MD  Date:    03/13/2020  Time:    13:18         Assessment/Plan:     * COPD exacerbation  Steroids, breathing tx  abx      Shortness of breath on exertion    As above  Repeat EKG and troponin in AM to rule out anginal equivalent    Hypoxia    Oxygen therapy. Steroids, BT    Acute bronchitis  Cover with Rocephin and zmax  Repeat CXR in AM  COVID-19 ordered by ER. Low probability      Benign essential hypertension    Continue amlodipine, losartan, metoprolol with hold parameters    Hyperlipidemia    statin    Macrocytic anemia  Op work up.  Recheck in AM      Hyperglycemia  Check A1C toby on steroids        VTE Risk Mitigation (From admission, onward)          Ordered     enoxaparin injection 40 mg  Daily      03/13/20 1759     IP VTE HIGH RISK PATIENT  Once      03/13/20 1759                   Sigrid Humphries MD  Department of Hospital Medicine   [facility]

## 2020-03-14 NOTE — NURSING
VIRTUAL NURSE 2 HOUR ROUNDS:  Cued into patient's room.  Patient not responding to introduction and permission inquiry.  Patient resting comfortably in bed with eyes closed; respirations even and unlabored.  No distress noted.  Will cont to monitor.   5

## 2020-03-15 PROBLEM — R73.03 PREDIABETES: Chronic | Status: ACTIVE | Noted: 2019-03-12

## 2020-03-15 PROBLEM — J32.4 CHRONIC PANSINUSITIS: Chronic | Status: ACTIVE | Noted: 2019-04-12

## 2020-03-15 PROCEDURE — 25000242 PHARM REV CODE 250 ALT 637 W/ HCPCS: Mod: HCNC | Performed by: INTERNAL MEDICINE

## 2020-03-15 PROCEDURE — 63600175 PHARM REV CODE 636 W HCPCS: Mod: HCNC | Performed by: INTERNAL MEDICINE

## 2020-03-15 PROCEDURE — 96376 TX/PRO/DX INJ SAME DRUG ADON: CPT | Performed by: EMERGENCY MEDICINE

## 2020-03-15 PROCEDURE — 25000003 PHARM REV CODE 250: Mod: HCNC | Performed by: NURSE PRACTITIONER

## 2020-03-15 PROCEDURE — 94640 AIRWAY INHALATION TREATMENT: CPT | Mod: HCNC

## 2020-03-15 PROCEDURE — 94761 N-INVAS EAR/PLS OXIMETRY MLT: CPT | Mod: HCNC

## 2020-03-15 PROCEDURE — 25000003 PHARM REV CODE 250: Mod: HCNC | Performed by: INTERNAL MEDICINE

## 2020-03-15 PROCEDURE — G0378 HOSPITAL OBSERVATION PER HR: HCPCS | Mod: HCNC

## 2020-03-15 PROCEDURE — 27000221 HC OXYGEN, UP TO 24 HOURS: Mod: HCNC

## 2020-03-15 RX ORDER — BENZONATATE 100 MG/1
100 CAPSULE ORAL 3 TIMES DAILY PRN
Status: DISCONTINUED | OUTPATIENT
Start: 2020-03-15 | End: 2020-03-16 | Stop reason: HOSPADM

## 2020-03-15 RX ADMIN — IPRATROPIUM BROMIDE AND ALBUTEROL SULFATE 3 ML: .5; 3 SOLUTION RESPIRATORY (INHALATION) at 08:03

## 2020-03-15 RX ADMIN — CEFTRIAXONE 1 G: 1 INJECTION, SOLUTION INTRAVENOUS at 09:03

## 2020-03-15 RX ADMIN — METOPROLOL SUCCINATE 100 MG: 50 TABLET, EXTENDED RELEASE ORAL at 09:03

## 2020-03-15 RX ADMIN — FLUTICASONE FUROATE AND VILANTEROL TRIFENATATE 1 PUFF: 100; 25 POWDER RESPIRATORY (INHALATION) at 08:03

## 2020-03-15 RX ADMIN — LOSARTAN POTASSIUM 100 MG: 50 TABLET, FILM COATED ORAL at 09:03

## 2020-03-15 RX ADMIN — BENZONATATE 100 MG: 100 CAPSULE ORAL at 09:03

## 2020-03-15 RX ADMIN — AMLODIPINE BESYLATE 10 MG: 5 TABLET ORAL at 09:03

## 2020-03-15 RX ADMIN — AZITHROMYCIN MONOHYDRATE 500 MG: 500 INJECTION, POWDER, LYOPHILIZED, FOR SOLUTION INTRAVENOUS at 09:03

## 2020-03-15 RX ADMIN — IPRATROPIUM BROMIDE AND ALBUTEROL SULFATE 3 ML: .5; 3 SOLUTION RESPIRATORY (INHALATION) at 01:03

## 2020-03-15 RX ADMIN — METHYLPREDNISOLONE SODIUM SUCCINATE 40 MG: 40 INJECTION, POWDER, FOR SOLUTION INTRAMUSCULAR; INTRAVENOUS at 03:03

## 2020-03-15 RX ADMIN — IPRATROPIUM BROMIDE AND ALBUTEROL SULFATE 3 ML: .5; 3 SOLUTION RESPIRATORY (INHALATION) at 07:03

## 2020-03-15 RX ADMIN — METHYLPREDNISOLONE SODIUM SUCCINATE 40 MG: 40 INJECTION, POWDER, FOR SOLUTION INTRAMUSCULAR; INTRAVENOUS at 05:03

## 2020-03-15 NOTE — SUBJECTIVE & OBJECTIVE
Interval History:   Feeling better. No chest pain  Continues to cough up copious amounts of green thin sputum  Breathing better but remains on oxygen  Overall better    Review of Systems   Constitutional: Negative for fever.   Respiratory: Positive for cough and shortness of breath.    Cardiovascular: Negative for chest pain.   Gastrointestinal: Negative for abdominal pain and diarrhea.     Objective:     Vital Signs (Most Recent):  Temp: 97.4 °F (36.3 °C) (03/15/20 1203)  Pulse: 84 (03/15/20 1326)  Resp: 18 (03/15/20 1326)  BP: (!) 146/68 (03/15/20 1203)  SpO2: 96 % (03/15/20 1326) Vital Signs (24h Range):  Temp:  [96.4 °F (35.8 °C)-97.9 °F (36.6 °C)] 97.4 °F (36.3 °C)  Pulse:  [56-89] 84  Resp:  [18-22] 18  SpO2:  [94 %-98 %] 96 %  BP: (137-155)/(68-74) 146/68     Weight: 80.1 kg (176 lb 9.4 oz)  Body mass index is 28.5 kg/m².    Intake/Output Summary (Last 24 hours) at 3/15/2020 1548  Last data filed at 3/15/2020 0931  Gross per 24 hour   Intake 710 ml   Output --   Net 710 ml      Physical Exam   Constitutional: He is oriented to person, place, and time. He appears well-developed and well-nourished. No distress.   HENT:   Mouth/Throat: Oropharynx is clear and moist.   Cardiovascular: Normal rate and regular rhythm.   Pulmonary/Chest: Effort normal.   Occasional rhonchi, rales at the bases   Abdominal: Soft. Bowel sounds are normal. There is no tenderness.   Musculoskeletal: He exhibits no edema.   Neurological: He is alert and oriented to person, place, and time.   Nursing note and vitals reviewed.      Significant Labs:   BMP:   Recent Labs   Lab 03/14/20  0548   *      K 4.7      CO2 22*   BUN 14   CREATININE 0.9   CALCIUM 9.0     CBC:   Recent Labs   Lab 03/14/20  0548   WBC 10.80   HGB 10.8*   HCT 33.4*   *       Significant Imaging:    X-Ray Chest AP Portable  Narrative: EXAMINATION:  XR CHEST AP PORTABLE    CLINICAL HISTORY:  copd;    TECHNIQUE:  Single frontal view of the chest  was performed.    COMPARISON:  March 13, 2020    FINDINGS:  Single portable chest view is submitted.  The cardiomediastinal silhouette and intrathoracic appearance is stable when compared to the prior study without evidence for significant interval detrimental change.  There is no evidence for significant pleural effusion or pneumothorax, continued appearance of accentuated interstitial markings more prominent at the lung bases, particularly on the left.  The osseous structures demonstrate chronic change.  Impression: Stable radiographic appearance.    Electronically signed by: Moy Parrish  Date:    03/14/2020  Time:    05:59

## 2020-03-15 NOTE — PLAN OF CARE
Pt received on nasal cannula at  2.5  lpm.  SPO2   98%.  Pt in no apparent respiratory distress.  Will continue to monitor.

## 2020-03-15 NOTE — ASSESSMENT & PLAN NOTE
Cover with Rocephin and zmax  Repeat CXR stable  COVID-19 ordered by ER. Low probability      I think he may have a component of chronic lung disease.  Perhaps Bronchiectasis.  He will need OP pulmonary follow up and possible high res CT of the chest    I ordered a sputum cx    Impression      1.  Subtotal interval clearing of interstitial pneumonitis noted chest x-ray 4/5/15.  Residual prominent interstitial markings, scattered areas of dependent lung congestion, atelectasis, and global bronchiectasis particularly areas of involvement.    2.  Granuloma left lower lobe superior segment.    3.  Diverticulosis coli hepatic flexure.     4.  Nonspecific scattered cystic areas of the liver.

## 2020-03-15 NOTE — NURSING
VIRTUAL NURSE 2 HOUR ROUNDS:  Cued into patient's room.  Patient resting comfortably in bed with eyes closed; respirations even and unlabored.  No distress noted.  Will cont to monitor.

## 2020-03-15 NOTE — NURSING
VIRTUAL NURSE 2 HOUR ROUNDS:  Cued into patient's room.  Patient sitting in bed watching TV.  Respirations even and unlabored; no distress noted.  Will cont to monitor.

## 2020-03-15 NOTE — PLAN OF CARE
VN cued into patients room for rounding - patient sitting up on side of bed in no acute distress at this time. O2 in use. Will continue to monitor and be available to intervene PRN.

## 2020-03-15 NOTE — PLAN OF CARE
VN cued into pt's room for introduction. VN informed pt that VN would be working along side bedside nurse and PCT throughout shift. Level of present pain assessed. At present no distress noted. Patient states continued to cough during the night and this am. Notified bedside nurse,Dr Yandel Quintero of patient's cough and requested PRN medication. Thoroughly discussed with patient today's plan of care and continuation of Special Isolation precautions. Discussed with patient High fall risk protocol and interventions that have been initiated and cont be in place for safety. Patient verbalized clear understanding and cooperation using teach back method. Bed alarm presently activated and in use. Will cont to be available to patient and intervene prn.

## 2020-03-15 NOTE — PLAN OF CARE
VIRTUAL NURSE:  Cued into patient's room.  Permission received per patient to turn camera to view patient.  Introduced as VN for night shift that will be working with floor nurse and nursing assistant.  Educated patient on VN's role in patient care. Plan of care reviewed with patient. Education per flowsheet.  Opportunity given for questions and questions answered.  No complaints.  Explained frequent rounding by VN to decrease virus exposure and to monitor for distress.  Instructed to call for assistance.  Will cont to monitor.    Labs, notes, and orders reviewed.

## 2020-03-15 NOTE — PROGRESS NOTES
[facility]  Progress Note    Patient Name: Scooter Morrissey  MRN: [id number]  Patient Class: OP- Observation   Admission Date: 3/13/2020  Length of Stay: 0 days  Attending Physician: Sigrid Humphries MD  Primary Care Provider: Katelynn Rojas MD        Subjective:     Principal Problem:COPD exacerbation        HPI:  69 year old male with history of COPD/Asthma/Chronic bronchitis, who usually follows with Dr Guerrier presents with 2 week history of feeling poorly with cough productive of copious amouts of green sputum along with progressive shortness of breath, FARLEY and chest pressure. He states that his symptoms began about two weeks ago. He stayed home from work a few days and started to feel better.  Then a week ago, symptoms again worsened. He can hardly walk without getting dyspneic but it is relieved by rest.  He has a chronic cough and usually the sputum is thicker and darker.  Now he is coughing up a lot more, thinner and less dark.  He states that he has some chest pressure.  Feels like a baby on his chest. No overt chest pain or radiation.  He denies fever or chills.   He states that he takes Symbicort. He denies really using his rescue inhaler at all.   He denies any sick exposures. He denies contact with COVID or travel to high risk areas.    Overview/Hospital Course:  No notes on file    Interval History:   Feeling better. No chest pain  Continues to cough up copious amounts of green thin sputum  Breathing better but remains on oxygen  Overall better    Review of Systems   Constitutional: Negative for fever.   Respiratory: Positive for cough and shortness of breath.    Cardiovascular: Negative for chest pain.   Gastrointestinal: Negative for abdominal pain and diarrhea.     Objective:     Vital Signs (Most Recent):  Temp: 97.4 °F (36.3 °C) (03/15/20 1203)  Pulse: 84 (03/15/20 1326)  Resp: 18 (03/15/20 1326)  BP: (!) 146/68 (03/15/20 1203)  SpO2: 96 % (03/15/20 1326) Vital  Signs (24h Range):  Temp:  [96.4 °F (35.8 °C)-97.9 °F (36.6 °C)] 97.4 °F (36.3 °C)  Pulse:  [56-89] 84  Resp:  [18-22] 18  SpO2:  [94 %-98 %] 96 %  BP: (137-155)/(68-74) 146/68     Weight: 80.1 kg (176 lb 9.4 oz)  Body mass index is 28.5 kg/m².    Intake/Output Summary (Last 24 hours) at 3/15/2020 1548  Last data filed at 3/15/2020 0931  Gross per 24 hour   Intake 710 ml   Output --   Net 710 ml      Physical Exam   Constitutional: He is oriented to person, place, and time. He appears well-developed and well-nourished. No distress.   HENT:   Mouth/Throat: Oropharynx is clear and moist.   Cardiovascular: Normal rate and regular rhythm.   Pulmonary/Chest: Effort normal.   Occasional rhonchi, rales at the bases   Abdominal: Soft. Bowel sounds are normal. There is no tenderness.   Musculoskeletal: He exhibits no edema.   Neurological: He is alert and oriented to person, place, and time.   Nursing note and vitals reviewed.      Significant Labs:   BMP:   Recent Labs   Lab 03/14/20  0548   *      K 4.7      CO2 22*   BUN 14   CREATININE 0.9   CALCIUM 9.0     CBC:   Recent Labs   Lab 03/14/20  0548   WBC 10.80   HGB 10.8*   HCT 33.4*   *       Significant Imaging:    X-Ray Chest AP Portable  Narrative: EXAMINATION:  XR CHEST AP PORTABLE    CLINICAL HISTORY:  copd;    TECHNIQUE:  Single frontal view of the chest was performed.    COMPARISON:  March 13, 2020    FINDINGS:  Single portable chest view is submitted.  The cardiomediastinal silhouette and intrathoracic appearance is stable when compared to the prior study without evidence for significant interval detrimental change.  There is no evidence for significant pleural effusion or pneumothorax, continued appearance of accentuated interstitial markings more prominent at the lung bases, particularly on the left.  The osseous structures demonstrate chronic change.  Impression: Stable radiographic appearance.    Electronically signed by: Moy  Francois  Date:    03/14/2020  Time:    05:59           Assessment/Plan:      * COPD exacerbation  Steroids, breathing tx  abx- Rocephin and Z max    Will reduce Solumedrol to q 12 hours      Shortness of breath on exertion    No chest pain  EKG ok  No bump in troponin    Hypoxia    Oxygen therapy. Steroids, BT  Better    Acute bronchitis  Cover with Rocephin and zmax  Repeat CXR stable  COVID-19 ordered by ER. Low probability      I think he may have a component of chronic lung disease.  Perhaps Bronchiectasis.  He will need OP pulmonary follow up and possible high res CT of the chest    I ordered a sputum cx    Impression      1.  Subtotal interval clearing of interstitial pneumonitis noted chest x-ray 4/5/15.  Residual prominent interstitial markings, scattered areas of dependent lung congestion, atelectasis, and global bronchiectasis particularly areas of involvement.    2.  Granuloma left lower lobe superior segment.    3.  Diverticulosis coli hepatic flexure.     4.  Nonspecific scattered cystic areas of the liver.         Benign essential hypertension    Continue amlodipine, losartan, metoprolol with hold parameters    Hyperlipidemia    statin    Macrocytic anemia  Op work up.  B12 is normal    Bronchiectasis  Follow up pulmonary as OP    Hyperglycemia  A1C 5.8      VTE Risk Mitigation (From admission, onward)         Ordered     enoxaparin injection 40 mg  Daily      03/13/20 1759     IP VTE HIGH RISK PATIENT  Once      03/13/20 1759              DISPOSITION  DC next 1-2 days        Sigrid Humphries MD  Department of Hospital Medicine   [facility]

## 2020-03-15 NOTE — NURSING
VIRTUAL NURSE 2 HOUR ROUNDS:  Cued into patient's room.  Patient watching TV; no complaints.  Will cont to monitor.

## 2020-03-15 NOTE — NURSING
VIRTUAL NURSE 2 HOUR ROUNDS:  Cued into patient's room.  Patient resting comfortably in bed with eyes closed; respirations even and unlabored.  Patient snoring.  No distress noted.  Will cont to monitor.

## 2020-03-15 NOTE — PLAN OF CARE
Plan of care reviewed with patient and questions answered by RN. Pt up ad howard, pt instructed on fall precaution measures, pt remains free from falls. Call light in reach. Pt reports feeling better

## 2020-03-15 NOTE — PLAN OF CARE
POC reviewed with patient. Patient AAOx4 and denies any pain. IV antibiotics administered. Tele monitor in place reading NSR. No red alarms or ectopy noted. 2L of oxygen via NC in place with oxygen saturation at 96%. Airborne/Droplet/Contact precautions maintained. Oxygen being weaned per patient tolerance. Patient has very productive cough. Bed alarm on, bed locked and low, side rails up x2, and call bell in reach. Patient verbalizes clear understanding of POC.

## 2020-03-16 VITALS
WEIGHT: 176.56 LBS | TEMPERATURE: 98 F | OXYGEN SATURATION: 91 % | BODY MASS INDEX: 28.38 KG/M2 | SYSTOLIC BLOOD PRESSURE: 143 MMHG | DIASTOLIC BLOOD PRESSURE: 69 MMHG | RESPIRATION RATE: 22 BRPM | HEART RATE: 82 BPM | HEIGHT: 66 IN

## 2020-03-16 PROBLEM — R09.02 HYPOXIA: Status: RESOLVED | Noted: 2020-03-13 | Resolved: 2020-03-16

## 2020-03-16 PROBLEM — R06.02 SHORTNESS OF BREATH ON EXERTION: Status: RESOLVED | Noted: 2020-03-13 | Resolved: 2020-03-16

## 2020-03-16 PROCEDURE — 94640 AIRWAY INHALATION TREATMENT: CPT | Mod: HCNC

## 2020-03-16 PROCEDURE — 96375 TX/PRO/DX INJ NEW DRUG ADDON: CPT | Performed by: EMERGENCY MEDICINE

## 2020-03-16 PROCEDURE — 25000003 PHARM REV CODE 250: Mod: HCNC | Performed by: INTERNAL MEDICINE

## 2020-03-16 PROCEDURE — 25000242 PHARM REV CODE 250 ALT 637 W/ HCPCS: Mod: HCNC | Performed by: INTERNAL MEDICINE

## 2020-03-16 PROCEDURE — 94761 N-INVAS EAR/PLS OXIMETRY MLT: CPT | Mod: HCNC

## 2020-03-16 PROCEDURE — 63600175 PHARM REV CODE 636 W HCPCS: Mod: HCNC | Performed by: INTERNAL MEDICINE

## 2020-03-16 PROCEDURE — 96376 TX/PRO/DX INJ SAME DRUG ADON: CPT | Performed by: EMERGENCY MEDICINE

## 2020-03-16 PROCEDURE — G0378 HOSPITAL OBSERVATION PER HR: HCPCS | Mod: HCNC

## 2020-03-16 RX ORDER — AZITHROMYCIN 250 MG/1
500 TABLET, FILM COATED ORAL DAILY
Status: DISCONTINUED | OUTPATIENT
Start: 2020-03-16 | End: 2020-03-16 | Stop reason: HOSPADM

## 2020-03-16 RX ORDER — PREDNISONE 10 MG/1
TABLET ORAL
Qty: 18 TABLET | Refills: 1 | Status: ON HOLD | OUTPATIENT
Start: 2020-03-16 | End: 2021-01-05 | Stop reason: HOSPADM

## 2020-03-16 RX ORDER — LEVOFLOXACIN 750 MG/1
750 TABLET ORAL DAILY
Qty: 5 TABLET | Refills: 0 | Status: SHIPPED | OUTPATIENT
Start: 2020-03-16 | End: 2020-03-21

## 2020-03-16 RX ADMIN — CETIRIZINE HYDROCHLORIDE 10 MG: 10 TABLET, FILM COATED ORAL at 09:03

## 2020-03-16 RX ADMIN — IPRATROPIUM BROMIDE AND ALBUTEROL SULFATE 3 ML: .5; 3 SOLUTION RESPIRATORY (INHALATION) at 08:03

## 2020-03-16 RX ADMIN — IPRATROPIUM BROMIDE AND ALBUTEROL SULFATE 3 ML: .5; 3 SOLUTION RESPIRATORY (INHALATION) at 02:03

## 2020-03-16 RX ADMIN — ATORVASTATIN CALCIUM 20 MG: 20 TABLET, FILM COATED ORAL at 10:03

## 2020-03-16 RX ADMIN — LOSARTAN POTASSIUM 100 MG: 50 TABLET, FILM COATED ORAL at 09:03

## 2020-03-16 RX ADMIN — FLUTICASONE PROPIONATE 100 MCG: 50 SPRAY, METERED NASAL at 09:03

## 2020-03-16 RX ADMIN — FAMOTIDINE 20 MG: 20 TABLET ORAL at 09:03

## 2020-03-16 RX ADMIN — AZITHROMYCIN MONOHYDRATE 500 MG: 250 TABLET ORAL at 10:03

## 2020-03-16 RX ADMIN — FLUTICASONE FUROATE AND VILANTEROL TRIFENATATE 1 PUFF: 100; 25 POWDER RESPIRATORY (INHALATION) at 08:03

## 2020-03-16 RX ADMIN — METHYLPREDNISOLONE SODIUM SUCCINATE 40 MG: 40 INJECTION, POWDER, FOR SOLUTION INTRAMUSCULAR; INTRAVENOUS at 09:03

## 2020-03-16 RX ADMIN — CEFTRIAXONE 1 G: 1 INJECTION, SOLUTION INTRAVENOUS at 09:03

## 2020-03-16 RX ADMIN — GUAIFENESIN 600 MG: 600 TABLET, EXTENDED RELEASE ORAL at 10:03

## 2020-03-16 NOTE — PLAN OF CARE
Plan for d/c home today with family, pt has no DME or HH needs at this time.  Daughter or spouse will be available for d/c home.      Discharge planning brochure provided. White board updated with CM name & contact info.  Pt encouraged to call with any questions or needs. CM will continue to follow patient throughout the transitions of care, and assist with any discharge needs.       03/16/20 1517   Discharge Assessment   Assessment Type Discharge Planning Assessment   Confirmed/corrected address and phone number on facesheet? Yes   Assessment information obtained from? Patient   Expected Length of Stay (days) 2   Prior to hospitilization cognitive status: Alert/Oriented   Prior to hospitalization functional status: Independent   Current cognitive status: Alert/Oriented   Current Functional Status: Independent   Lives With spouse   Able to Return to Prior Arrangements yes   Is patient able to care for self after discharge? Yes   Who are your caregiver(s) and their phone number(s)?   (spouse [phone])   Readmission Within the Last 30 Days no previous admission in last 30 days   Patient currently being followed by outpatient case management? No   Patient currently receives any other outside agency services? No   Equipment Currently Used at Home none   Do you have any problems affording any of your prescribed medications? No   Is the patient taking medications as prescribed? yes   Does the patient have transportation home? Yes   Transportation Anticipated family or friend will provide   Does the patient receive services at the [facility]? No   Discharge Plan A Home   Discharge Plan B Home with family   DME Needed Upon Discharge  none   Patient/Family in Agreement with Plan yes       Hayde Rodgers RN    [phone]

## 2020-03-16 NOTE — PLAN OF CARE
Vitals remain stable. 1L NC weaning per tolerance. No complaints of pain. Safety maintained. Airborne/droplet/contact precautions maintained. POC reviewed with pt. Will continue to monitor.

## 2020-03-16 NOTE — PLAN OF CARE
03/16/20 1524   Final Note   Assessment Type Final Discharge Note   Anticipated Discharge Disposition Home   Hospital Follow Up  Appt(s) scheduled? No  (pt to schedule f/u)   Discharge plans and expectations educations in teach back method with documentation complete? Yes   Right Care Referral Info   Post Acute Recommendation No Care

## 2020-03-16 NOTE — PLAN OF CARE
Scooter Morrissey is a 69 year old black man with hypertension, prediabetes, hyperlipidemia, asthma with chronic obstructive pulmonary disease, chronic pansinusitis, aortic atherosclerosis, sigmoid diverticulosis. He lives in [place], [place]. He is . His primary care physician is Dr. Katelynn Rojas. His pulmonologist is Dr. Vishal Guerrier.   He presented to [facility] on 3/13/2020 with 2 weeks of cough productive of copious green sputum and progressive shortness of breath, dyspnea on exertion, and chest pressure. He stayed home from work for a few days and started feeling better, but symptoms worsened about a week prior. He has chronic cough with thick, dark sputum, but reported that the sputum was thinner and lighter. He takes Symbicort daily and hardly uses his rescue inhaler. He denied any sick contacts, contacts with COVID-19, or travel to areas high risk for COVID-19. He was found to be hypoxic, requiring nasal cannula. He was afebrile. Chest X-ray showed chronic interstitial lung coarsening. He was admitted to [facility].    He was given albuterol-ipratropium nebulizer treatments, methylprednisolone, ceftriaxone, and azithromycin. He was prescribed levofloxacin and prednisone. COVID-19 result was pending at the time of discharge.    X-Ray Chest AP Portable 3/13/20:   FINDINGS: The heart and mediastinal structures are unremarkable.  Pulmonary vasculature is within normal limits.  There is diffuse coarsening of the interstitial markings throughout both lungs, mildly increased when compared to the prior study.  No focal pulmonary consolidation is evident.  There is no evidence for pneumothorax or large pleural effusions.  Bony structures appear intact.  Impression:   Nonspecific chronic interstitial coarsening throughout both lungs, mildly increased when compared to 04/05/2016.  X-Ray Chest AP Portable 3/14/20:   FINDINGS: Single portable chest view is  submitted.  The cardiomediastinal silhouette and intrathoracic appearance is stable when compared to the prior study without evidence for significant interval detrimental change.  There is no evidence for significant pleural effusion or pneumothorax, continued appearance of accentuated interstitial markings more prominent at the lung bases, particularly on the left.  The osseous structures demonstrate chronic change.  Impression: Stable radiographic appearance.

## 2020-03-16 NOTE — NURSING
VIRTUAL NURSE 2 HOUR ROUNDS:  Cued into patient's room.  Watching TV.  Requesting to know when next tesselon perles is due (5am).  States that his arthritis is gone; educated on prednisone.  Took oxygen off because it was drying out his nose; notified respiratory therapist to get saturation and possible humidification if he still needs oxygen.  No distress noted.  Will cont to monitor.

## 2020-03-16 NOTE — PLAN OF CARE
VN entered into the patient's room with permission via [employer] system. The patient was awake and alert in bed. He denies any pain and has no question or concerns regarding his care at this time. Isolation precaution education provided for the patient. Will continue to monitor and remain available as needed.

## 2020-03-16 NOTE — PLAN OF CARE
Discussed fall precautions with pt. Fall precautions given and explained.Call light and personal items are placed within pts reach.Pt. To call for assistance before attempting to ambulate.Pt. Verbalizes understanding.

## 2020-03-16 NOTE — PROGRESS NOTES
Pharmacy New Medication Education    Patient and/or Caregiver ACCEPTED medication education.    Pharmacy has provided education on the name, indication, and possible side effects of the medication(s) prescribed, using teach-back method.     Learners of pharmacy medication education includes:  patient    The following medications have also been discussed, during this admission.     Current Facility-Administered Medications   Medication Frequency    acetaminophen tablet 650 mg Q4H PRN    albuterol-ipratropium 2.5 mg-0.5 mg/3 mL nebulizer solution 3 mL Q6H WAKE    amLODIPine tablet 10 mg QHS    atorvastatin tablet 20 mg Daily    azithromycin tablet 500 mg Daily    benzonatate capsule 100 mg TID PRN    cefTRIAXone (ROCEPHIN) 1 g in dextrose 5 % 50 mL IVPB Daily    cetirizine tablet 10 mg Daily    enoxaparin injection 40 mg Daily    famotidine tablet 20 mg BID    fluticasone furoate-vilanteroL 100-25 mcg/dose diskus inhaler 1 puff Daily    fluticasone propionate 50 mcg/actuation nasal spray 100 mcg Daily    guaiFENesin 12 hr tablet 600 mg BID    losartan tablet 100 mg Daily    methylPREDNISolone sodium succinate injection 40 mg Q12H    metoprolol succinate (TOPROL-XL) 24 hr tablet 100 mg QHS    sodium chloride 0.9% flush 10 mL PRN    zolpidem tablet 5 mg Nightly PRN          Thank you  Piero Cisneros, EmiD

## 2020-03-16 NOTE — PLAN OF CARE
VIRTUAL NURSE:  Cued into patient's room.  Permission received per patient to turn camera to view patient.  Introduced as VN for night shift that will be working with floor nurse and nursing assistant.  Educated patient on VN's role in patient care. Plan of care reviewed with patient. Education per flowsheet.  Opportunity given for questions and questions answered.  Loose productive cough noted; requesting tesselon perles and lemonade.  LOYDA Greenwood notified.  Instructed to call for assistance.  Will cont to monitor.    Labs, notes, and orders reviewed.

## 2020-03-16 NOTE — NURSING
VIRTUAL NURSE 2 HOUR ROUNDS:  Cued into patient's room.  Patient standing up in room; respirations even and unlabored.  No distress noted.  No complaints; trying to find something on TV to watch.  Will cont to monitor.

## 2020-03-16 NOTE — PLAN OF CARE
VN entered the patient's room with permission via [employer] system. Patient is sitting on the side of the bed. He denies any pain and has no needs at this time. Will continue to follow and remain available as needed.

## 2020-03-16 NOTE — DISCHARGE INSTRUCTIONS
Instructions for Home Care of Patients and Caretakers with Coronavirus Disease 2019     · Patients will be given one mask to take home with them if having symptoms of fever, cough, shortness of breath, and within 6 feet of others.  · Limit visitors to the home.  Older persons and those that have chronic medical conditions such as diabetes, lung and heart disease are at increased risk for illness.   · If possible, patients should use a separate bedroom while recovering. Caregivers and household members should avoid prolonged contact with the patient which means to stay 6 feet away and avoid contact with cough droplets.  When close contact is necessary, wash your hands before and immediately after contact.   · Perform hand hygiene frequently. Wash your hands often with soap and water for at least 20 seconds or use an alcohol-based hand , covering all surfaces of your hands and rubbing them together until they feel dry.   · Avoid touching your eyes, nose, and mouth with unwashed hands.  · Avoid sharing household items with the patient. You should not share dishes, drinking glasses, cups, eating utensils, towels, bedding, or other items. After the patient uses these items, you should wash them thoroughly.  · Wash laundry thoroughly.   ? Immediately remove and wash clothes or bedding that have blood, stool, or body fluids on them.  · Clean all high-touch surfaces, such as counters, tabletops, doorknobs, bathroom fixtures, toilets, phones, keyboards, tablets, and bedside tables, every day.   ? Use a household cleaning spray or wipe, according to the label instructions. Labels contain instructions for safe and effective use of the cleaning product including precautions you should take when applying the product, such as wearing gloves and making sure you have good ventilation during use of the product.     For more information see CDC link below.        https://www.cdc.gov/coronavirus/2019-ncov/hcp/guidance-prevent-spread.html#precautions

## 2020-03-17 ENCOUNTER — TELEPHONE (OUTPATIENT)
Dept: FAMILY MEDICINE | Facility: CLINIC | Age: 70
End: 2020-03-17

## 2020-03-17 LAB
BACTERIA SPEC AEROBE CULT: NORMAL
BACTERIA SPEC AEROBE CULT: NORMAL
GRAM STN SPEC: NORMAL

## 2020-03-17 NOTE — ASSESSMENT & PLAN NOTE
Sputum without growth    Impression      1.  Subtotal interval clearing of interstitial pneumonitis noted chest x-ray 4/5/15.  Residual prominent interstitial markings, scattered areas of dependent lung congestion, atelectasis, and global bronchiectasis particularly areas of involvement.    2.  Granuloma left lower lobe superior segment.    3.  Diverticulosis coli hepatic flexure.     4.  Nonspecific scattered cystic areas of the liver.

## 2020-03-17 NOTE — DISCHARGE SUMMARY
[facility] Hospital Medicine  Discharge Summary      Patient Name: Scooter Morrissey  MRN: [id number]  Admission Date: 3/13/2020  Hospital Length of Stay: 0 days  Discharge Date and Time: 3/16/2020  7:50 PM  Attending Physician: No att. providers found   Discharging Provider: Sigrid Humphries MD  Primary Care Provider: Katelynn Rojas MD      HPI:   69 year old male with history of COPD/Asthma/Chronic bronchitis, who usually follows with Dr Guerrier presents with 2 week history of feeling poorly with cough productive of copious amouts of green sputum along with progressive shortness of breath, FARLEY and chest pressure. He states that his symptoms began about two weeks ago. He stayed home from work a few days and started to feel better.  Then a week ago, symptoms again worsened. He can hardly walk without getting dyspneic but it is relieved by rest.  He has a chronic cough and usually the sputum is thicker and darker.  Now he is coughing up a lot more, thinner and less dark.  He states that he has some chest pressure.  Feels like a baby on his chest. No overt chest pain or radiation.  He denies fever or chills.   He states that he takes Symbicort. He denies really using his rescue inhaler at all.   He denies any sick exposures. He denies contact with COVID or travel to high risk areas.    * No surgery found *      Hospital Course:   He was treated with Ceftriaxone and zithromax and steroids.  He had an uneventful hospital  Course  He was sent home in improved condition on Levaquin x 5 days and a steroid taper  COVID precautions emphasized  Self Quarantine  Will call with results when available     Consults:     * COPD exacerbation  Levaquin and steroid taper.   FU pulmonary      Acute bronchitis  Sputum without growth    Impression      1.  Subtotal interval clearing of interstitial pneumonitis noted chest x-ray 4/5/15.  Residual prominent interstitial markings, scattered areas of dependent lung congestion,  atelectasis, and global bronchiectasis particularly areas of involvement.    2.  Granuloma left lower lobe superior segment.    3.  Diverticulosis coli hepatic flexure.     4.  Nonspecific scattered cystic areas of the liver.         Benign essential hypertension    Continue amlodipine, losartan, metoprolol     Hyperlipidemia    statin    Macrocytic anemia  Op work up.  B12 is normal    Asthma with chronic obstructive pulmonary disease (COPD)        Bronchiectasis  Follow up pulmonary as OP    Prediabetes  A1C 5.8  Follow up as OP      Final Active Diagnoses:    Diagnosis Date Noted POA    PRINCIPAL PROBLEM:  COPD exacerbation [J44.1]  Yes    Acute bronchitis [J20.9] 03/13/2020 Yes    Benign essential hypertension [I10] 04/05/2016 Yes     Chronic    Hyperlipidemia [E78.5] 04/13/2016 Yes     Chronic    Macrocytic anemia [D53.9] 03/13/2020 Yes    Asthma with chronic obstructive pulmonary disease (COPD) [J44.9]  Yes     Chronic    Bronchiectasis [J47.9] 03/14/2020 Yes    Prediabetes [R73.03] 03/12/2019 Yes     Chronic      Problems Resolved During this Admission:    Diagnosis Date Noted Date Resolved POA    Shortness of breath on exertion [R06.02] 03/13/2020 03/16/2020 Yes    Hypoxia [R09.02] 03/13/2020 03/16/2020 Yes       Discharged Condition: good    Disposition: Home or Self Care    Follow Up:  Follow-up Information     Katelynn Rojas MD. Schedule an appointment as soon as possible for a visit in 1 week.    Specialty:  Family Medicine  Why:  Hospital Follow up  Contact information:  [address]  [address]  [facility] [address] [phone]                 Patient Instructions:      Ambulatory referral/consult to Pulmonology   Standing Status: Future   Referral Priority: Routine Referral Type: Consultation   Referral Reason: Specialty Services Required   Referred to Provider: HORACIO JOSHUA Requested Specialty: Pulmonary Disease   Number of Visits Requested: 1     Diet Adult Regular     Activity as  tolerated   Order Comments: DO not work until COVID testing has returned. PLEASE SELF QUARANTINE       Significant Diagnostic Studies: Labs:   BMP: No results for input(s): GLU, NA, K, CL, CO2, BUN, CREATININE, CALCIUM, MG in the last 48 hours., CMP No results for input(s): NA, K, CL, CO2, GLU, BUN, CREATININE, CALCIUM, PROT, ALBUMIN, BILITOT, ALKPHOS, AST, ALT, ANIONGAP, ESTGFRAFRICA, EGFRNONAA in the last 48 hours., CBC No results for input(s): WBC, HGB, HCT, PLT in the last 48 hours. and Troponin   Recent Labs   Lab 03/14/20  0548   TROPONINI 0.006     Microbiology:   Sputum Culture   Lab Results   Component Value Date    GSRESP <10 epithelial cells per low power field. 03/14/2020    GSRESP Many WBC's 03/14/2020    GSRESP Many Gram positive cocci 03/14/2020    GSRESP Moderate Gram negative rods 03/14/2020    RESPIRATORYC Normal respiratory lane 03/14/2020    RESPIRATORYC No S aureus or Pseudomonas isolated. 03/14/2020     Radiology: X-Ray: CXR: X-Ray Chest 1 View (CXR): No results found for this visit on 03/13/20. and X-Ray Chest PA and Lateral (CXR): No results found for this visit on 03/13/20.    Pending Diagnostic Studies:     None         Medications:  Reconciled Home Medications:      Medication List      START taking these medications    levoFLOXacin 750 MG tablet  Commonly known as:  LEVAQUIN  Take 1 tablet (750 mg total) by mouth once daily. for 5 days     predniSONE 10 MG tablet  Commonly known as:  DELTASONE  Take 3 tabs by mouth daily for 3 days, then 2 tabs daily for 3 days then 1 tab daily for 3 days then stop.        CHANGE how you take these medications    amLODIPine 10 MG tablet  Commonly known as:  NORVASC  TAKE 1 TABLET BY MOUTH EVERY DAY  What changed:  when to take this        CONTINUE taking these medications    atorvastatin 20 MG tablet  Commonly known as:  LIPITOR  TAKE 1 TABLET BY MOUTH EVERY DAY     fluticasone propionate 50 mcg/actuation nasal spray  Commonly known as:  FLONASE  2  sprays (100 mcg total) by Each Nostril route once daily.     levocetirizine 5 MG tablet  Commonly known as:  XYZAL  Take 1 tablet (5 mg total) by mouth every evening.     losartan 100 MG tablet  Commonly known as:  COZAAR  TAKE 1 TABLET(100 MG) BY MOUTH EVERY DAY     metoprolol succinate 100 MG 24 hr tablet  Commonly known as:  TOPROL-XL  TAKE 1 TABLET(100 MG) BY MOUTH EVERY DAY     multivitamin with minerals tablet  Take 1 tablet by mouth once daily.     SYMBICORT 160-4.5 mcg/actuation Hfaa  Generic drug:  budesonide-formoterol 160-4.5 mcg  INHALE 2 PUFFS BY MOUTH TWICE DAILY            Indwelling Lines/Drains at time of discharge:   Lines/Drains/Airways     None                 Time spent on the discharge of patient: 35 minutes  Patient was seen and examined on the date of discharge and determined to be suitable for discharge.         Sigrid Humphreis MD  Department of Hospital Medicine  [facility]

## 2020-03-17 NOTE — HOSPITAL COURSE
He was treated with Ceftriaxone and zithromax and steroids.  He had an uneventful hospital  Course  He was sent home in improved condition on Levaquin x 5 days and a steroid taper  COVID precautions emphasized  Self Quarantine  Will call with results when available

## 2020-03-18 LAB
BACTERIA BLD CULT: NORMAL
BACTERIA BLD CULT: NORMAL
SARS-COV-2 RNA RESP QL NAA+PROBE: NORMAL

## 2020-03-18 NOTE — CARE UPDATE
NEGATIVE TEST  This result was communicated to the patient by Sigrid Humphries MD on 03/18/2020.    Called in Tessalon Perles to pharmacy ([facility] pharmacy [phone])  100 mg 1 po TID #30 1 RF

## 2020-05-14 ENCOUNTER — TELEPHONE (OUTPATIENT)
Dept: FAMILY MEDICINE | Facility: CLINIC | Age: 70
End: 2020-05-14

## 2020-05-14 DIAGNOSIS — J44.1 COPD EXACERBATION: Primary | ICD-10-CM

## 2020-05-14 DIAGNOSIS — J44.89 ASTHMA WITH CHRONIC OBSTRUCTIVE PULMONARY DISEASE (COPD): Chronic | ICD-10-CM

## 2020-05-14 NOTE — TELEPHONE ENCOUNTER
I returned a call to the patient in regards to a refill on his medication for albuterol sulfate nebulizer solution 15 mg , pt received this medication while he was in the Hospital. I will send this message to Dr. Rojas

## 2020-05-14 NOTE — TELEPHONE ENCOUNTER
----- Message from Arlette Robledo sent at 5/14/2020 12:43 PM CDT -----  Contact: [phone]-self  Patient is requesting a call back concerning pt would like a refill on his medication for albuterol sulfate nebulizer solution 15 mg , pt received this medication while he was in the Hospital. Please call

## 2020-05-15 RX ORDER — ALBUTEROL SULFATE 1.25 MG/3ML
1.25 SOLUTION RESPIRATORY (INHALATION) EVERY 6 HOURS PRN
Qty: 1 BOX | Refills: 11 | Status: ON HOLD | OUTPATIENT
Start: 2020-05-15 | End: 2021-01-05 | Stop reason: SDUPTHER

## 2020-05-15 NOTE — TELEPHONE ENCOUNTER
Per Dr. Rojas, I called the patient to inform him sent Albuterol nebulizer solution prescription to [facility]. If he is consistently needing more and more nebulizer treatments then needs OV for evaluation. There was no answer and I left a message for a return call.

## 2020-05-15 NOTE — TELEPHONE ENCOUNTER
I have sent Albuterol nebulizer solution prescription to [facility]. If he is consistently needing more and more nebulizer treatments then needs OV for evaluation thanks, [place]

## 2020-09-29 ENCOUNTER — PATIENT MESSAGE (OUTPATIENT)
Dept: OTHER | Facility: OTHER | Age: 70
End: 2020-09-29

## 2020-10-02 ENCOUNTER — PES CALL (OUTPATIENT)
Dept: ADMINISTRATIVE | Facility: CLINIC | Age: 70
End: 2020-10-02

## 2020-11-11 ENCOUNTER — PES CALL (OUTPATIENT)
Dept: ADMINISTRATIVE | Facility: CLINIC | Age: 70
End: 2020-11-11

## 2020-12-11 ENCOUNTER — PATIENT MESSAGE (OUTPATIENT)
Dept: OTHER | Facility: OTHER | Age: 70
End: 2020-12-11

## 2020-12-29 ENCOUNTER — PES CALL (OUTPATIENT)
Dept: ADMINISTRATIVE | Facility: CLINIC | Age: 70
End: 2020-12-29

## 2021-01-02 ENCOUNTER — HOSPITAL ENCOUNTER (INPATIENT)
Facility: HOSPITAL | Age: 71
LOS: 3 days | Discharge: HOME | DRG: 177 | End: 2021-01-05
Attending: EMERGENCY MEDICINE | Admitting: INTERNAL MEDICINE
Payer: MEDICARE

## 2021-01-02 DIAGNOSIS — J12.82 PNEUMONIA DUE TO COVID-19 VIRUS: Primary | ICD-10-CM

## 2021-01-02 DIAGNOSIS — U07.1 PNEUMONIA DUE TO COVID-19 VIRUS: Primary | ICD-10-CM

## 2021-01-02 DIAGNOSIS — Z20.822 SUSPECTED COVID-19 VIRUS INFECTION: ICD-10-CM

## 2021-01-02 DIAGNOSIS — J44.89 ASTHMA WITH CHRONIC OBSTRUCTIVE PULMONARY DISEASE (COPD): Chronic | ICD-10-CM

## 2021-01-02 DIAGNOSIS — J44.1 COPD EXACERBATION: ICD-10-CM

## 2021-01-02 LAB
ALBUMIN SERPL BCP-MCNC: 3.4 G/DL (ref 3.5–5.2)
ALP SERPL-CCNC: 72 U/L (ref 55–135)
ALT SERPL W/O P-5'-P-CCNC: 26 U/L (ref 10–44)
ANION GAP SERPL CALC-SCNC: 8 MMOL/L (ref 8–16)
AST SERPL-CCNC: 29 U/L (ref 10–40)
BASOPHILS # BLD AUTO: 0.01 K/UL (ref 0–0.2)
BASOPHILS NFR BLD: 0.2 % (ref 0–1.9)
BILIRUB SERPL-MCNC: 0.5 MG/DL (ref 0.1–1)
BNP SERPL-MCNC: 23 PG/ML (ref 0–99)
BUN SERPL-MCNC: 11 MG/DL (ref 8–23)
CALCIUM SERPL-MCNC: 8.5 MG/DL (ref 8.7–10.5)
CHLORIDE SERPL-SCNC: 104 MMOL/L (ref 95–110)
CK SERPL-CCNC: 169 U/L (ref 20–200)
CO2 SERPL-SCNC: 24 MMOL/L (ref 23–29)
CREAT SERPL-MCNC: 1 MG/DL (ref 0.5–1.4)
CRP SERPL-MCNC: 32.4 MG/L (ref 0–8.2)
CTP QC/QA: YES
DIFFERENTIAL METHOD: ABNORMAL
EOSINOPHIL # BLD AUTO: 0 K/UL (ref 0–0.5)
EOSINOPHIL NFR BLD: 0 % (ref 0–8)
ERYTHROCYTE [DISTWIDTH] IN BLOOD BY AUTOMATED COUNT: 13.5 % (ref 11.5–14.5)
ERYTHROCYTE [SEDIMENTATION RATE] IN BLOOD BY WESTERGREN METHOD: 100 MM/HR (ref 0–10)
EST. GFR  (AFRICAN AMERICAN): >60 ML/MIN/1.73 M^2
EST. GFR  (NON AFRICAN AMERICAN): >60 ML/MIN/1.73 M^2
FERRITIN SERPL-MCNC: 1247 NG/ML (ref 20–300)
GLUCOSE SERPL-MCNC: 103 MG/DL (ref 70–110)
HCT VFR BLD AUTO: 38.1 % (ref 40–54)
HGB BLD-MCNC: 12.3 G/DL (ref 14–18)
IMM GRANULOCYTES # BLD AUTO: 0.02 K/UL (ref 0–0.04)
IMM GRANULOCYTES NFR BLD AUTO: 0.3 % (ref 0–0.5)
LACTATE SERPL-SCNC: 0.9 MMOL/L (ref 0.5–2.2)
LDH SERPL L TO P-CCNC: 316 U/L (ref 110–260)
LYMPHOCYTES # BLD AUTO: 1.6 K/UL (ref 1–4.8)
LYMPHOCYTES NFR BLD: 25.4 % (ref 18–48)
MCH RBC QN AUTO: 32.2 PG (ref 27–31)
MCHC RBC AUTO-ENTMCNC: 32.3 G/DL (ref 32–36)
MCV RBC AUTO: 100 FL (ref 82–98)
MONOCYTES # BLD AUTO: 0.4 K/UL (ref 0.3–1)
MONOCYTES NFR BLD: 5.9 % (ref 4–15)
NEUTROPHILS # BLD AUTO: 4.3 K/UL (ref 1.8–7.7)
NEUTROPHILS NFR BLD: 68.2 % (ref 38–73)
NRBC BLD-RTO: 0 /100 WBC
PLATELET # BLD AUTO: 151 K/UL (ref 150–350)
PMV BLD AUTO: 11 FL (ref 9.2–12.9)
POTASSIUM SERPL-SCNC: 4.2 MMOL/L (ref 3.5–5.1)
PROCALCITONIN SERPL IA-MCNC: 0.03 NG/ML
PROT SERPL-MCNC: 8.5 G/DL (ref 6–8.4)
RBC # BLD AUTO: 3.82 M/UL (ref 4.6–6.2)
SARS-COV-2 RDRP RESP QL NAA+PROBE: POSITIVE
SODIUM SERPL-SCNC: 136 MMOL/L (ref 136–145)
TROPONIN I SERPL DL<=0.01 NG/ML-MCNC: 0.01 NG/ML (ref 0–0.03)
WBC # BLD AUTO: 6.31 K/UL (ref 3.9–12.7)

## 2021-01-02 PROCEDURE — 25000003 PHARM REV CODE 250: Mod: HCNC | Performed by: NURSE PRACTITIONER

## 2021-01-02 PROCEDURE — 83880 ASSAY OF NATRIURETIC PEPTIDE: CPT | Mod: HCNC

## 2021-01-02 PROCEDURE — 96365 THER/PROPH/DIAG IV INF INIT: CPT

## 2021-01-02 PROCEDURE — 93010 ELECTROCARDIOGRAM REPORT: CPT | Mod: HCNC,,, | Performed by: INTERNAL MEDICINE

## 2021-01-02 PROCEDURE — 83615 LACTATE (LD) (LDH) ENZYME: CPT | Mod: HCNC

## 2021-01-02 PROCEDURE — U0002 COVID-19 LAB TEST NON-CDC: HCPCS | Mod: HCNC | Performed by: EMERGENCY MEDICINE

## 2021-01-02 PROCEDURE — 85025 COMPLETE CBC W/AUTO DIFF WBC: CPT | Mod: HCNC

## 2021-01-02 PROCEDURE — 93010 EKG 12-LEAD: ICD-10-PCS | Mod: HCNC,,, | Performed by: INTERNAL MEDICINE

## 2021-01-02 PROCEDURE — 63600175 PHARM REV CODE 636 W HCPCS: Mod: HCNC | Performed by: NURSE PRACTITIONER

## 2021-01-02 PROCEDURE — 84484 ASSAY OF TROPONIN QUANT: CPT | Mod: HCNC

## 2021-01-02 PROCEDURE — 85652 RBC SED RATE AUTOMATED: CPT | Mod: HCNC

## 2021-01-02 PROCEDURE — 63600175 PHARM REV CODE 636 W HCPCS: Mod: HCNC | Performed by: EMERGENCY MEDICINE

## 2021-01-02 PROCEDURE — 83605 ASSAY OF LACTIC ACID: CPT | Mod: HCNC

## 2021-01-02 PROCEDURE — 84145 PROCALCITONIN (PCT): CPT | Mod: HCNC

## 2021-01-02 PROCEDURE — 99285 EMERGENCY DEPT VISIT HI MDM: CPT | Mod: 25,HCNC

## 2021-01-02 PROCEDURE — 93005 ELECTROCARDIOGRAM TRACING: CPT | Mod: HCNC

## 2021-01-02 PROCEDURE — 82728 ASSAY OF FERRITIN: CPT | Mod: HCNC

## 2021-01-02 PROCEDURE — 94640 AIRWAY INHALATION TREATMENT: CPT | Mod: HCNC

## 2021-01-02 PROCEDURE — G0378 HOSPITAL OBSERVATION PER HR: HCPCS | Mod: HCNC

## 2021-01-02 PROCEDURE — 25000242 PHARM REV CODE 250 ALT 637 W/ HCPCS: Mod: HCNC | Performed by: NURSE PRACTITIONER

## 2021-01-02 PROCEDURE — 82550 ASSAY OF CK (CPK): CPT | Mod: HCNC

## 2021-01-02 PROCEDURE — 86140 C-REACTIVE PROTEIN: CPT | Mod: HCNC

## 2021-01-02 PROCEDURE — 36415 COLL VENOUS BLD VENIPUNCTURE: CPT | Mod: HCNC

## 2021-01-02 PROCEDURE — 11000001 HC ACUTE MED/SURG PRIVATE ROOM: Mod: HCNC

## 2021-01-02 PROCEDURE — 80053 COMPREHEN METABOLIC PANEL: CPT | Mod: HCNC

## 2021-01-02 PROCEDURE — 87040 BLOOD CULTURE FOR BACTERIA: CPT | Mod: HCNC

## 2021-01-02 PROCEDURE — 96375 TX/PRO/DX INJ NEW DRUG ADDON: CPT

## 2021-01-02 PROCEDURE — 96372 THER/PROPH/DIAG INJ SC/IM: CPT

## 2021-01-02 RX ORDER — ALBUTEROL SULFATE 90 UG/1
2 AEROSOL, METERED RESPIRATORY (INHALATION) EVERY 6 HOURS
Status: DISCONTINUED | OUTPATIENT
Start: 2021-01-02 | End: 2021-01-05 | Stop reason: HOSPADM

## 2021-01-02 RX ORDER — ONDANSETRON 8 MG/1
8 TABLET, ORALLY DISINTEGRATING ORAL EVERY 8 HOURS PRN
Status: DISCONTINUED | OUTPATIENT
Start: 2021-01-02 | End: 2021-01-05 | Stop reason: HOSPADM

## 2021-01-02 RX ORDER — ONDANSETRON 2 MG/ML
4 INJECTION INTRAMUSCULAR; INTRAVENOUS EVERY 8 HOURS PRN
Status: DISCONTINUED | OUTPATIENT
Start: 2021-01-02 | End: 2021-01-05 | Stop reason: HOSPADM

## 2021-01-02 RX ORDER — FLUTICASONE FUROATE AND VILANTEROL 100; 25 UG/1; UG/1
1 POWDER RESPIRATORY (INHALATION) DAILY
Status: DISCONTINUED | OUTPATIENT
Start: 2021-01-03 | End: 2021-01-05 | Stop reason: HOSPADM

## 2021-01-02 RX ORDER — ENOXAPARIN SODIUM 100 MG/ML
40 INJECTION SUBCUTANEOUS EVERY 24 HOURS
Status: DISCONTINUED | OUTPATIENT
Start: 2021-01-02 | End: 2021-01-05 | Stop reason: HOSPADM

## 2021-01-02 RX ORDER — METOPROLOL SUCCINATE 50 MG/1
100 TABLET, EXTENDED RELEASE ORAL DAILY
Status: DISCONTINUED | OUTPATIENT
Start: 2021-01-03 | End: 2021-01-02

## 2021-01-02 RX ORDER — ATORVASTATIN CALCIUM 20 MG/1
20 TABLET, FILM COATED ORAL DAILY
Status: DISCONTINUED | OUTPATIENT
Start: 2021-01-03 | End: 2021-01-02

## 2021-01-02 RX ORDER — METOPROLOL SUCCINATE 50 MG/1
100 TABLET, EXTENDED RELEASE ORAL NIGHTLY
Status: DISCONTINUED | OUTPATIENT
Start: 2021-01-02 | End: 2021-01-05 | Stop reason: HOSPADM

## 2021-01-02 RX ORDER — ACETAMINOPHEN 325 MG/1
650 TABLET ORAL EVERY 4 HOURS PRN
Status: DISCONTINUED | OUTPATIENT
Start: 2021-01-02 | End: 2021-01-05 | Stop reason: HOSPADM

## 2021-01-02 RX ORDER — IBUPROFEN 200 MG
24 TABLET ORAL
Status: DISCONTINUED | OUTPATIENT
Start: 2021-01-02 | End: 2021-01-05 | Stop reason: HOSPADM

## 2021-01-02 RX ORDER — ATORVASTATIN CALCIUM 20 MG/1
20 TABLET, FILM COATED ORAL NIGHTLY
Status: DISCONTINUED | OUTPATIENT
Start: 2021-01-02 | End: 2021-01-05 | Stop reason: HOSPADM

## 2021-01-02 RX ORDER — DEXAMETHASONE SODIUM PHOSPHATE 4 MG/ML
8 INJECTION, SOLUTION INTRA-ARTICULAR; INTRALESIONAL; INTRAMUSCULAR; INTRAVENOUS; SOFT TISSUE
Status: COMPLETED | OUTPATIENT
Start: 2021-01-02 | End: 2021-01-02

## 2021-01-02 RX ORDER — SODIUM CHLORIDE 0.9 % (FLUSH) 0.9 %
10 SYRINGE (ML) INJECTION
Status: CANCELLED | OUTPATIENT
Start: 2021-01-02

## 2021-01-02 RX ORDER — IBUPROFEN 200 MG
16 TABLET ORAL
Status: DISCONTINUED | OUTPATIENT
Start: 2021-01-02 | End: 2021-01-05 | Stop reason: HOSPADM

## 2021-01-02 RX ORDER — CHOLECALCIFEROL (VITAMIN D3) 25 MCG
1000 TABLET ORAL DAILY
Status: DISCONTINUED | OUTPATIENT
Start: 2021-01-02 | End: 2021-01-05 | Stop reason: HOSPADM

## 2021-01-02 RX ORDER — GLUCAGON 1 MG
1 KIT INJECTION
Status: DISCONTINUED | OUTPATIENT
Start: 2021-01-02 | End: 2021-01-05 | Stop reason: HOSPADM

## 2021-01-02 RX ORDER — ASCORBIC ACID 500 MG
500 TABLET ORAL 2 TIMES DAILY
Status: DISCONTINUED | OUTPATIENT
Start: 2021-01-02 | End: 2021-01-05 | Stop reason: HOSPADM

## 2021-01-02 RX ORDER — SODIUM CHLORIDE 0.9 % (FLUSH) 0.9 %
10 SYRINGE (ML) INJECTION
Status: DISCONTINUED | OUTPATIENT
Start: 2021-01-02 | End: 2021-01-05 | Stop reason: HOSPADM

## 2021-01-02 RX ORDER — AMLODIPINE BESYLATE 5 MG/1
10 TABLET ORAL DAILY
Status: DISCONTINUED | OUTPATIENT
Start: 2021-01-03 | End: 2021-01-05 | Stop reason: HOSPADM

## 2021-01-02 RX ORDER — FLUTICASONE PROPIONATE 50 MCG
2 SPRAY, SUSPENSION (ML) NASAL DAILY
Status: DISCONTINUED | OUTPATIENT
Start: 2021-01-03 | End: 2021-01-05 | Stop reason: HOSPADM

## 2021-01-02 RX ORDER — GUAIFENESIN/DEXTROMETHORPHAN 100-10MG/5
10 SYRUP ORAL EVERY 4 HOURS PRN
Status: DISCONTINUED | OUTPATIENT
Start: 2021-01-02 | End: 2021-01-05 | Stop reason: HOSPADM

## 2021-01-02 RX ORDER — AZITHROMYCIN 250 MG/1
250 TABLET, FILM COATED ORAL DAILY
Status: DISCONTINUED | OUTPATIENT
Start: 2021-01-03 | End: 2021-01-05

## 2021-01-02 RX ORDER — TALC
6 POWDER (GRAM) TOPICAL NIGHTLY PRN
Status: CANCELLED | OUTPATIENT
Start: 2021-01-02

## 2021-01-02 RX ADMIN — DEXAMETHASONE SODIUM PHOSPHATE 8 MG: 4 INJECTION, SOLUTION INTRAMUSCULAR; INTRAVENOUS at 02:01

## 2021-01-02 RX ADMIN — REMDESIVIR 200 MG: 100 INJECTION, POWDER, LYOPHILIZED, FOR SOLUTION INTRAVENOUS at 03:01

## 2021-01-02 RX ADMIN — Medication 1000 UNITS: at 04:01

## 2021-01-02 RX ADMIN — AZITHROMYCIN MONOHYDRATE 500 MG: 500 INJECTION, POWDER, LYOPHILIZED, FOR SOLUTION INTRAVENOUS at 11:01

## 2021-01-02 RX ADMIN — ATORVASTATIN CALCIUM 20 MG: 20 TABLET, FILM COATED ORAL at 11:01

## 2021-01-02 RX ADMIN — THERA TABS 1 TABLET: TAB at 04:01

## 2021-01-02 RX ADMIN — CEFTRIAXONE 1 G: 1 INJECTION, SOLUTION INTRAVENOUS at 09:01

## 2021-01-02 RX ADMIN — OXYCODONE HYDROCHLORIDE AND ACETAMINOPHEN 500 MG: 500 TABLET ORAL at 09:01

## 2021-01-02 RX ADMIN — METOPROLOL SUCCINATE 100 MG: 50 TABLET, EXTENDED RELEASE ORAL at 11:01

## 2021-01-02 RX ADMIN — ALBUTEROL SULFATE 2 PUFF: 90 AEROSOL, METERED RESPIRATORY (INHALATION) at 08:01

## 2021-01-02 RX ADMIN — ENOXAPARIN SODIUM 40 MG: 40 INJECTION SUBCUTANEOUS at 04:01

## 2021-01-03 LAB
ALBUMIN SERPL BCP-MCNC: 2.9 G/DL (ref 3.5–5.2)
ALP SERPL-CCNC: 63 U/L (ref 55–135)
ALT SERPL W/O P-5'-P-CCNC: 27 U/L (ref 10–44)
ANION GAP SERPL CALC-SCNC: 7 MMOL/L (ref 8–16)
AST SERPL-CCNC: 27 U/L (ref 10–40)
BASOPHILS # BLD AUTO: 0.01 K/UL (ref 0–0.2)
BASOPHILS NFR BLD: 0.1 % (ref 0–1.9)
BILIRUB SERPL-MCNC: 0.3 MG/DL (ref 0.1–1)
BUN SERPL-MCNC: 16 MG/DL (ref 8–23)
CALCIUM SERPL-MCNC: 8.1 MG/DL (ref 8.7–10.5)
CHLORIDE SERPL-SCNC: 106 MMOL/L (ref 95–110)
CO2 SERPL-SCNC: 22 MMOL/L (ref 23–29)
CREAT SERPL-MCNC: 0.9 MG/DL (ref 0.5–1.4)
DIFFERENTIAL METHOD: ABNORMAL
EOSINOPHIL # BLD AUTO: 0 K/UL (ref 0–0.5)
EOSINOPHIL NFR BLD: 0 % (ref 0–8)
ERYTHROCYTE [DISTWIDTH] IN BLOOD BY AUTOMATED COUNT: 13.1 % (ref 11.5–14.5)
EST. GFR  (AFRICAN AMERICAN): >60 ML/MIN/1.73 M^2
EST. GFR  (NON AFRICAN AMERICAN): >60 ML/MIN/1.73 M^2
GLUCOSE SERPL-MCNC: 150 MG/DL (ref 70–110)
HCT VFR BLD AUTO: 32.5 % (ref 40–54)
HGB BLD-MCNC: 10.8 G/DL (ref 14–18)
IMM GRANULOCYTES # BLD AUTO: 0.02 K/UL (ref 0–0.04)
IMM GRANULOCYTES NFR BLD AUTO: 0.2 % (ref 0–0.5)
LYMPHOCYTES # BLD AUTO: 1.4 K/UL (ref 1–4.8)
LYMPHOCYTES NFR BLD: 17.2 % (ref 18–48)
MAGNESIUM SERPL-MCNC: 1.8 MG/DL (ref 1.6–2.6)
MCH RBC QN AUTO: 32.6 PG (ref 27–31)
MCHC RBC AUTO-ENTMCNC: 33.2 G/DL (ref 32–36)
MCV RBC AUTO: 98 FL (ref 82–98)
MONOCYTES # BLD AUTO: 0.4 K/UL (ref 0.3–1)
MONOCYTES NFR BLD: 4.5 % (ref 4–15)
NEUTROPHILS # BLD AUTO: 6.4 K/UL (ref 1.8–7.7)
NEUTROPHILS NFR BLD: 78 % (ref 38–73)
NRBC BLD-RTO: 0 /100 WBC
PHOSPHATE SERPL-MCNC: 3.3 MG/DL (ref 2.7–4.5)
PLATELET # BLD AUTO: 157 K/UL (ref 150–350)
PMV BLD AUTO: 10.5 FL (ref 9.2–12.9)
POTASSIUM SERPL-SCNC: 4.1 MMOL/L (ref 3.5–5.1)
PROT SERPL-MCNC: 7.5 G/DL (ref 6–8.4)
RBC # BLD AUTO: 3.31 M/UL (ref 4.6–6.2)
SODIUM SERPL-SCNC: 135 MMOL/L (ref 136–145)
WBC # BLD AUTO: 8.22 K/UL (ref 3.9–12.7)

## 2021-01-03 PROCEDURE — 25000003 PHARM REV CODE 250: Mod: HCNC | Performed by: NURSE PRACTITIONER

## 2021-01-03 PROCEDURE — 11000001 HC ACUTE MED/SURG PRIVATE ROOM: Mod: HCNC

## 2021-01-03 PROCEDURE — 96366 THER/PROPH/DIAG IV INF ADDON: CPT

## 2021-01-03 PROCEDURE — 96376 TX/PRO/DX INJ SAME DRUG ADON: CPT

## 2021-01-03 PROCEDURE — 94640 AIRWAY INHALATION TREATMENT: CPT | Mod: HCNC

## 2021-01-03 PROCEDURE — 36415 COLL VENOUS BLD VENIPUNCTURE: CPT | Mod: HCNC

## 2021-01-03 PROCEDURE — 80053 COMPREHEN METABOLIC PANEL: CPT | Mod: HCNC

## 2021-01-03 PROCEDURE — 63700000 PHARM REV CODE 250 ALT 637 W/O HCPCS: Mod: HCNC | Performed by: NURSE PRACTITIONER

## 2021-01-03 PROCEDURE — 83735 ASSAY OF MAGNESIUM: CPT | Mod: HCNC

## 2021-01-03 PROCEDURE — 94761 N-INVAS EAR/PLS OXIMETRY MLT: CPT | Mod: HCNC

## 2021-01-03 PROCEDURE — 25000242 PHARM REV CODE 250 ALT 637 W/ HCPCS: Mod: HCNC | Performed by: NURSE PRACTITIONER

## 2021-01-03 PROCEDURE — G0378 HOSPITAL OBSERVATION PER HR: HCPCS | Mod: HCNC

## 2021-01-03 PROCEDURE — 85025 COMPLETE CBC W/AUTO DIFF WBC: CPT | Mod: HCNC

## 2021-01-03 PROCEDURE — 84100 ASSAY OF PHOSPHORUS: CPT | Mod: HCNC

## 2021-01-03 PROCEDURE — 96372 THER/PROPH/DIAG INJ SC/IM: CPT

## 2021-01-03 PROCEDURE — 63600175 PHARM REV CODE 636 W HCPCS: Mod: HCNC | Performed by: NURSE PRACTITIONER

## 2021-01-03 RX ORDER — LOSARTAN POTASSIUM 50 MG/1
100 TABLET ORAL DAILY
Status: DISCONTINUED | OUTPATIENT
Start: 2021-01-03 | End: 2021-01-05 | Stop reason: HOSPADM

## 2021-01-03 RX ADMIN — ALBUTEROL SULFATE 2 PUFF: 90 AEROSOL, METERED RESPIRATORY (INHALATION) at 07:01

## 2021-01-03 RX ADMIN — ENOXAPARIN SODIUM 40 MG: 40 INJECTION SUBCUTANEOUS at 04:01

## 2021-01-03 RX ADMIN — THERA TABS 1 TABLET: TAB at 09:01

## 2021-01-03 RX ADMIN — METOPROLOL SUCCINATE 100 MG: 50 TABLET, EXTENDED RELEASE ORAL at 08:01

## 2021-01-03 RX ADMIN — OXYCODONE HYDROCHLORIDE AND ACETAMINOPHEN 500 MG: 500 TABLET ORAL at 09:01

## 2021-01-03 RX ADMIN — DEXAMETHASONE 6 MG: 4 TABLET ORAL at 09:01

## 2021-01-03 RX ADMIN — LOSARTAN POTASSIUM 100 MG: 50 TABLET, FILM COATED ORAL at 03:01

## 2021-01-03 RX ADMIN — CEFTRIAXONE 1 G: 1 INJECTION, SOLUTION INTRAVENOUS at 08:01

## 2021-01-03 RX ADMIN — ALBUTEROL SULFATE 2 PUFF: 90 AEROSOL, METERED RESPIRATORY (INHALATION) at 12:01

## 2021-01-03 RX ADMIN — FLUTICASONE FUROATE AND VILANTEROL TRIFENATATE 1 PUFF: 100; 25 POWDER RESPIRATORY (INHALATION) at 07:01

## 2021-01-03 RX ADMIN — REMDESIVIR 100 MG: 100 INJECTION, POWDER, LYOPHILIZED, FOR SOLUTION INTRAVENOUS at 04:01

## 2021-01-03 RX ADMIN — ALBUTEROL SULFATE 2 PUFF: 90 AEROSOL, METERED RESPIRATORY (INHALATION) at 01:01

## 2021-01-03 RX ADMIN — Medication 1000 UNITS: at 09:01

## 2021-01-03 RX ADMIN — FLUTICASONE PROPIONATE 100 MCG: 50 SPRAY, METERED NASAL at 03:01

## 2021-01-03 RX ADMIN — AZITHROMYCIN MONOHYDRATE 250 MG: 250 TABLET ORAL at 09:01

## 2021-01-03 RX ADMIN — AMLODIPINE BESYLATE 10 MG: 5 TABLET ORAL at 09:01

## 2021-01-03 RX ADMIN — ATORVASTATIN CALCIUM 20 MG: 20 TABLET, FILM COATED ORAL at 08:01

## 2021-01-03 RX ADMIN — OXYCODONE HYDROCHLORIDE AND ACETAMINOPHEN 500 MG: 500 TABLET ORAL at 08:01

## 2021-01-04 ENCOUNTER — PATIENT MESSAGE (OUTPATIENT)
Dept: ADMINISTRATIVE | Facility: HOSPITAL | Age: 71
End: 2021-01-04

## 2021-01-04 LAB
ALBUMIN SERPL BCP-MCNC: 3.1 G/DL (ref 3.5–5.2)
ALP SERPL-CCNC: 61 U/L (ref 55–135)
ALT SERPL W/O P-5'-P-CCNC: 27 U/L (ref 10–44)
ANION GAP SERPL CALC-SCNC: 8 MMOL/L (ref 8–16)
AST SERPL-CCNC: 26 U/L (ref 10–40)
BASOPHILS # BLD AUTO: 0.01 K/UL (ref 0–0.2)
BASOPHILS NFR BLD: 0.1 % (ref 0–1.9)
BILIRUB SERPL-MCNC: 0.4 MG/DL (ref 0.1–1)
BUN SERPL-MCNC: 19 MG/DL (ref 8–23)
CALCIUM SERPL-MCNC: 8.2 MG/DL (ref 8.7–10.5)
CHLORIDE SERPL-SCNC: 106 MMOL/L (ref 95–110)
CO2 SERPL-SCNC: 22 MMOL/L (ref 23–29)
CREAT SERPL-MCNC: 0.8 MG/DL (ref 0.5–1.4)
D DIMER PPP IA.FEU-MCNC: 0.26 MG/L FEU
DIFFERENTIAL METHOD: ABNORMAL
EOSINOPHIL # BLD AUTO: 0 K/UL (ref 0–0.5)
EOSINOPHIL NFR BLD: 0 % (ref 0–8)
ERYTHROCYTE [DISTWIDTH] IN BLOOD BY AUTOMATED COUNT: 13.4 % (ref 11.5–14.5)
EST. GFR  (AFRICAN AMERICAN): >60 ML/MIN/1.73 M^2
EST. GFR  (NON AFRICAN AMERICAN): >60 ML/MIN/1.73 M^2
GLUCOSE SERPL-MCNC: 126 MG/DL (ref 70–110)
HCT VFR BLD AUTO: 34.3 % (ref 40–54)
HGB BLD-MCNC: 11.2 G/DL (ref 14–18)
IMM GRANULOCYTES # BLD AUTO: 0.05 K/UL (ref 0–0.04)
IMM GRANULOCYTES NFR BLD AUTO: 0.4 % (ref 0–0.5)
LYMPHOCYTES # BLD AUTO: 1.5 K/UL (ref 1–4.8)
LYMPHOCYTES NFR BLD: 12.2 % (ref 18–48)
MAGNESIUM SERPL-MCNC: 2 MG/DL (ref 1.6–2.6)
MCH RBC QN AUTO: 32.1 PG (ref 27–31)
MCHC RBC AUTO-ENTMCNC: 32.7 G/DL (ref 32–36)
MCV RBC AUTO: 98 FL (ref 82–98)
MONOCYTES # BLD AUTO: 0.7 K/UL (ref 0.3–1)
MONOCYTES NFR BLD: 5.3 % (ref 4–15)
NEUTROPHILS # BLD AUTO: 10.1 K/UL (ref 1.8–7.7)
NEUTROPHILS NFR BLD: 82 % (ref 38–73)
NRBC BLD-RTO: 0 /100 WBC
PHOSPHATE SERPL-MCNC: 4 MG/DL (ref 2.7–4.5)
PLATELET # BLD AUTO: 180 K/UL (ref 150–350)
PMV BLD AUTO: 10.7 FL (ref 9.2–12.9)
POTASSIUM SERPL-SCNC: 4.2 MMOL/L (ref 3.5–5.1)
PROT SERPL-MCNC: 7.7 G/DL (ref 6–8.4)
RBC # BLD AUTO: 3.49 M/UL (ref 4.6–6.2)
SODIUM SERPL-SCNC: 136 MMOL/L (ref 136–145)
WBC # BLD AUTO: 12.35 K/UL (ref 3.9–12.7)

## 2021-01-04 PROCEDURE — 63700000 PHARM REV CODE 250 ALT 637 W/O HCPCS: Mod: HCNC | Performed by: NURSE PRACTITIONER

## 2021-01-04 PROCEDURE — 85379 FIBRIN DEGRADATION QUANT: CPT | Mod: HCNC

## 2021-01-04 PROCEDURE — 80053 COMPREHEN METABOLIC PANEL: CPT | Mod: HCNC

## 2021-01-04 PROCEDURE — 25000003 PHARM REV CODE 250: Mod: HCNC | Performed by: NURSE PRACTITIONER

## 2021-01-04 PROCEDURE — 25000242 PHARM REV CODE 250 ALT 637 W/ HCPCS: Mod: HCNC | Performed by: NURSE PRACTITIONER

## 2021-01-04 PROCEDURE — 36415 COLL VENOUS BLD VENIPUNCTURE: CPT | Mod: HCNC

## 2021-01-04 PROCEDURE — 11000001 HC ACUTE MED/SURG PRIVATE ROOM: Mod: HCNC

## 2021-01-04 PROCEDURE — 84100 ASSAY OF PHOSPHORUS: CPT | Mod: HCNC

## 2021-01-04 PROCEDURE — 83735 ASSAY OF MAGNESIUM: CPT | Mod: HCNC

## 2021-01-04 PROCEDURE — 85025 COMPLETE CBC W/AUTO DIFF WBC: CPT | Mod: HCNC

## 2021-01-04 PROCEDURE — 94640 AIRWAY INHALATION TREATMENT: CPT | Mod: HCNC

## 2021-01-04 PROCEDURE — 63600175 PHARM REV CODE 636 W HCPCS: Mod: HCNC | Performed by: NURSE PRACTITIONER

## 2021-01-04 PROCEDURE — 94761 N-INVAS EAR/PLS OXIMETRY MLT: CPT | Mod: HCNC

## 2021-01-04 RX ADMIN — ALBUTEROL SULFATE 2 PUFF: 90 AEROSOL, METERED RESPIRATORY (INHALATION) at 07:01

## 2021-01-04 RX ADMIN — FLUTICASONE FUROATE AND VILANTEROL TRIFENATATE 1 PUFF: 100; 25 POWDER RESPIRATORY (INHALATION) at 07:01

## 2021-01-04 RX ADMIN — Medication 1000 UNITS: at 08:01

## 2021-01-04 RX ADMIN — DEXAMETHASONE 6 MG: 4 TABLET ORAL at 08:01

## 2021-01-04 RX ADMIN — ALBUTEROL SULFATE 2 PUFF: 90 AEROSOL, METERED RESPIRATORY (INHALATION) at 12:01

## 2021-01-04 RX ADMIN — LOSARTAN POTASSIUM 100 MG: 50 TABLET, FILM COATED ORAL at 08:01

## 2021-01-04 RX ADMIN — AZITHROMYCIN MONOHYDRATE 250 MG: 250 TABLET ORAL at 08:01

## 2021-01-04 RX ADMIN — ATORVASTATIN CALCIUM 20 MG: 20 TABLET, FILM COATED ORAL at 09:01

## 2021-01-04 RX ADMIN — REMDESIVIR 100 MG: 100 INJECTION, POWDER, LYOPHILIZED, FOR SOLUTION INTRAVENOUS at 04:01

## 2021-01-04 RX ADMIN — AMLODIPINE BESYLATE 10 MG: 5 TABLET ORAL at 08:01

## 2021-01-04 RX ADMIN — CEFTRIAXONE 1 G: 1 INJECTION, SOLUTION INTRAVENOUS at 09:01

## 2021-01-04 RX ADMIN — OXYCODONE HYDROCHLORIDE AND ACETAMINOPHEN 500 MG: 500 TABLET ORAL at 09:01

## 2021-01-04 RX ADMIN — ALBUTEROL SULFATE 2 PUFF: 90 AEROSOL, METERED RESPIRATORY (INHALATION) at 01:01

## 2021-01-04 RX ADMIN — FLUTICASONE PROPIONATE 100 MCG: 50 SPRAY, METERED NASAL at 08:01

## 2021-01-04 RX ADMIN — ENOXAPARIN SODIUM 40 MG: 40 INJECTION SUBCUTANEOUS at 04:01

## 2021-01-04 RX ADMIN — METOPROLOL SUCCINATE 100 MG: 50 TABLET, EXTENDED RELEASE ORAL at 09:01

## 2021-01-04 RX ADMIN — THERA TABS 1 TABLET: TAB at 08:01

## 2021-01-04 RX ADMIN — OXYCODONE HYDROCHLORIDE AND ACETAMINOPHEN 500 MG: 500 TABLET ORAL at 08:01

## 2021-01-05 VITALS
SYSTOLIC BLOOD PRESSURE: 116 MMHG | HEIGHT: 66 IN | TEMPERATURE: 98 F | BODY MASS INDEX: 28.16 KG/M2 | RESPIRATION RATE: 18 BRPM | HEART RATE: 64 BPM | DIASTOLIC BLOOD PRESSURE: 60 MMHG | OXYGEN SATURATION: 95 % | WEIGHT: 175.25 LBS

## 2021-01-05 LAB
ALBUMIN SERPL BCP-MCNC: 3 G/DL (ref 3.5–5.2)
ALP SERPL-CCNC: 62 U/L (ref 55–135)
ALT SERPL W/O P-5'-P-CCNC: 28 U/L (ref 10–44)
ANION GAP SERPL CALC-SCNC: 7 MMOL/L (ref 8–16)
AST SERPL-CCNC: 25 U/L (ref 10–40)
BASOPHILS # BLD AUTO: 0.01 K/UL (ref 0–0.2)
BASOPHILS NFR BLD: 0.1 % (ref 0–1.9)
BILIRUB SERPL-MCNC: 0.3 MG/DL (ref 0.1–1)
BUN SERPL-MCNC: 25 MG/DL (ref 8–23)
CALCIUM SERPL-MCNC: 8.3 MG/DL (ref 8.7–10.5)
CHLORIDE SERPL-SCNC: 107 MMOL/L (ref 95–110)
CO2 SERPL-SCNC: 22 MMOL/L (ref 23–29)
CREAT SERPL-MCNC: 1.1 MG/DL (ref 0.5–1.4)
DIFFERENTIAL METHOD: ABNORMAL
EOSINOPHIL # BLD AUTO: 0 K/UL (ref 0–0.5)
EOSINOPHIL NFR BLD: 0 % (ref 0–8)
ERYTHROCYTE [DISTWIDTH] IN BLOOD BY AUTOMATED COUNT: 13.5 % (ref 11.5–14.5)
EST. GFR  (AFRICAN AMERICAN): >60 ML/MIN/1.73 M^2
EST. GFR  (NON AFRICAN AMERICAN): >60 ML/MIN/1.73 M^2
GLUCOSE SERPL-MCNC: 129 MG/DL (ref 70–110)
HCT VFR BLD AUTO: 36.2 % (ref 40–54)
HGB BLD-MCNC: 11.8 G/DL (ref 14–18)
IMM GRANULOCYTES # BLD AUTO: 0.07 K/UL (ref 0–0.04)
IMM GRANULOCYTES NFR BLD AUTO: 0.5 % (ref 0–0.5)
LYMPHOCYTES # BLD AUTO: 1.7 K/UL (ref 1–4.8)
LYMPHOCYTES NFR BLD: 12.8 % (ref 18–48)
MAGNESIUM SERPL-MCNC: 2 MG/DL (ref 1.6–2.6)
MCH RBC QN AUTO: 32.2 PG (ref 27–31)
MCHC RBC AUTO-ENTMCNC: 32.6 G/DL (ref 32–36)
MCV RBC AUTO: 99 FL (ref 82–98)
MONOCYTES # BLD AUTO: 0.8 K/UL (ref 0.3–1)
MONOCYTES NFR BLD: 6 % (ref 4–15)
NEUTROPHILS # BLD AUTO: 10.5 K/UL (ref 1.8–7.7)
NEUTROPHILS NFR BLD: 80.6 % (ref 38–73)
NRBC BLD-RTO: 0 /100 WBC
PHOSPHATE SERPL-MCNC: 4.1 MG/DL (ref 2.7–4.5)
PLATELET # BLD AUTO: 218 K/UL (ref 150–350)
PMV BLD AUTO: 10.6 FL (ref 9.2–12.9)
POTASSIUM SERPL-SCNC: 4.3 MMOL/L (ref 3.5–5.1)
PROT SERPL-MCNC: 7.7 G/DL (ref 6–8.4)
RBC # BLD AUTO: 3.66 M/UL (ref 4.6–6.2)
SODIUM SERPL-SCNC: 136 MMOL/L (ref 136–145)
WBC # BLD AUTO: 13.01 K/UL (ref 3.9–12.7)

## 2021-01-05 PROCEDURE — 80053 COMPREHEN METABOLIC PANEL: CPT | Mod: HCNC

## 2021-01-05 PROCEDURE — 25000003 PHARM REV CODE 250: Mod: HCNC | Performed by: NURSE PRACTITIONER

## 2021-01-05 PROCEDURE — 94640 AIRWAY INHALATION TREATMENT: CPT | Mod: HCNC

## 2021-01-05 PROCEDURE — 83735 ASSAY OF MAGNESIUM: CPT | Mod: HCNC

## 2021-01-05 PROCEDURE — 63600175 PHARM REV CODE 636 W HCPCS: Mod: HCNC | Performed by: NURSE PRACTITIONER

## 2021-01-05 PROCEDURE — 36415 COLL VENOUS BLD VENIPUNCTURE: CPT | Mod: HCNC

## 2021-01-05 PROCEDURE — 25000242 PHARM REV CODE 250 ALT 637 W/ HCPCS: Mod: HCNC | Performed by: NURSE PRACTITIONER

## 2021-01-05 PROCEDURE — 84100 ASSAY OF PHOSPHORUS: CPT | Mod: HCNC

## 2021-01-05 PROCEDURE — 85025 COMPLETE CBC W/AUTO DIFF WBC: CPT | Mod: HCNC

## 2021-01-05 PROCEDURE — 94761 N-INVAS EAR/PLS OXIMETRY MLT: CPT | Mod: HCNC

## 2021-01-05 PROCEDURE — 63700000 PHARM REV CODE 250 ALT 637 W/O HCPCS: Mod: HCNC | Performed by: NURSE PRACTITIONER

## 2021-01-05 RX ORDER — ASCORBIC ACID 500 MG
500 TABLET ORAL 2 TIMES DAILY
COMMUNITY
Start: 2021-01-05

## 2021-01-05 RX ORDER — ALBUTEROL SULFATE 1.25 MG/3ML
1.25 SOLUTION RESPIRATORY (INHALATION) EVERY 6 HOURS PRN
Qty: 75 ML | Refills: 11 | Status: SHIPPED | OUTPATIENT
Start: 2021-01-05 | End: 2021-12-02 | Stop reason: SDUPTHER

## 2021-01-05 RX ORDER — GUAIFENESIN/DEXTROMETHORPHAN 100-10MG/5
10 SYRUP ORAL EVERY 4 HOURS PRN
Refills: 0 | COMMUNITY
Start: 2021-01-05 | End: 2021-01-15

## 2021-01-05 RX ADMIN — AZITHROMYCIN MONOHYDRATE 250 MG: 250 TABLET ORAL at 08:01

## 2021-01-05 RX ADMIN — FLUTICASONE PROPIONATE 100 MCG: 50 SPRAY, METERED NASAL at 08:01

## 2021-01-05 RX ADMIN — FLUTICASONE FUROATE AND VILANTEROL TRIFENATATE 1 PUFF: 100; 25 POWDER RESPIRATORY (INHALATION) at 07:01

## 2021-01-05 RX ADMIN — Medication 1000 UNITS: at 08:01

## 2021-01-05 RX ADMIN — DEXAMETHASONE 6 MG: 4 TABLET ORAL at 08:01

## 2021-01-05 RX ADMIN — ALBUTEROL SULFATE 2 PUFF: 90 AEROSOL, METERED RESPIRATORY (INHALATION) at 01:01

## 2021-01-05 RX ADMIN — OXYCODONE HYDROCHLORIDE AND ACETAMINOPHEN 500 MG: 500 TABLET ORAL at 08:01

## 2021-01-05 RX ADMIN — ALBUTEROL SULFATE 2 PUFF: 90 AEROSOL, METERED RESPIRATORY (INHALATION) at 12:01

## 2021-01-05 RX ADMIN — THERA TABS 1 TABLET: TAB at 08:01

## 2021-01-05 RX ADMIN — ALBUTEROL SULFATE 2 PUFF: 90 AEROSOL, METERED RESPIRATORY (INHALATION) at 07:01

## 2021-01-06 ENCOUNTER — PATIENT OUTREACH (OUTPATIENT)
Dept: ADMINISTRATIVE | Facility: CLINIC | Age: 71
End: 2021-01-06

## 2021-01-08 LAB
BACTERIA BLD CULT: NORMAL
BACTERIA BLD CULT: NORMAL

## 2021-01-12 ENCOUNTER — TELEPHONE (OUTPATIENT)
Dept: ADMINISTRATIVE | Facility: CLINIC | Age: 71
End: 2021-01-12

## 2021-01-13 ENCOUNTER — TELEPHONE (OUTPATIENT)
Dept: ADMINISTRATIVE | Facility: CLINIC | Age: 71
End: 2021-01-13

## 2021-01-15 ENCOUNTER — TELEPHONE (OUTPATIENT)
Dept: FAMILY MEDICINE | Facility: CLINIC | Age: 71
End: 2021-01-15

## 2021-01-15 DIAGNOSIS — Z20.822 CONTACT WITH AND (SUSPECTED) EXPOSURE TO COVID-19: ICD-10-CM

## 2021-01-15 DIAGNOSIS — U07.1 LAB TEST POSITIVE FOR DETECTION OF COVID-19 VIRUS: Primary | ICD-10-CM

## 2021-01-22 ENCOUNTER — PATIENT OUTREACH (OUTPATIENT)
Dept: ADMINISTRATIVE | Facility: OTHER | Age: 71
End: 2021-01-22

## 2021-02-04 ENCOUNTER — OFFICE VISIT (OUTPATIENT)
Dept: PRIMARY CARE CLINIC | Facility: CLINIC | Age: 71
End: 2021-02-04
Payer: MEDICARE

## 2021-02-04 VITALS
BODY MASS INDEX: 28.25 KG/M2 | HEART RATE: 97 BPM | SYSTOLIC BLOOD PRESSURE: 155 MMHG | WEIGHT: 175.06 LBS | DIASTOLIC BLOOD PRESSURE: 82 MMHG | TEMPERATURE: 97 F | OXYGEN SATURATION: 95 %

## 2021-02-04 DIAGNOSIS — U07.1 PNEUMONIA DUE TO COVID-19 VIRUS: Primary | ICD-10-CM

## 2021-02-04 DIAGNOSIS — I10 ESSENTIAL HYPERTENSION: ICD-10-CM

## 2021-02-04 DIAGNOSIS — J12.82 PNEUMONIA DUE TO COVID-19 VIRUS: Primary | ICD-10-CM

## 2021-02-04 PROBLEM — J20.9 ACUTE BRONCHITIS: Status: RESOLVED | Noted: 2020-03-13 | Resolved: 2021-02-04

## 2021-02-04 PROBLEM — Z20.822 SUSPECTED COVID-19 VIRUS INFECTION: Status: RESOLVED | Noted: 2021-01-02 | Resolved: 2021-02-04

## 2021-02-04 PROCEDURE — 3077F SYST BP >= 140 MM HG: CPT | Mod: CPTII,S$GLB,, | Performed by: INTERNAL MEDICINE

## 2021-02-04 PROCEDURE — 1159F MED LIST DOCD IN RCRD: CPT | Mod: S$GLB,,, | Performed by: INTERNAL MEDICINE

## 2021-02-04 PROCEDURE — 1101F PR PT FALLS ASSESS DOC 0-1 FALLS W/OUT INJ PAST YR: ICD-10-PCS | Mod: CPTII,S$GLB,, | Performed by: INTERNAL MEDICINE

## 2021-02-04 PROCEDURE — 99499 RISK ADDL DX/OHS AUDIT: ICD-10-PCS | Mod: S$GLB,,, | Performed by: INTERNAL MEDICINE

## 2021-02-04 PROCEDURE — 99215 OFFICE O/P EST HI 40 MIN: CPT | Mod: S$GLB,,, | Performed by: INTERNAL MEDICINE

## 2021-02-04 PROCEDURE — 3008F PR BODY MASS INDEX (BMI) DOCUMENTED: ICD-10-PCS | Mod: CPTII,S$GLB,, | Performed by: INTERNAL MEDICINE

## 2021-02-04 PROCEDURE — 1126F PR PAIN SEVERITY QUANTIFIED, NO PAIN PRESENT: ICD-10-PCS | Mod: S$GLB,,, | Performed by: INTERNAL MEDICINE

## 2021-02-04 PROCEDURE — 3079F DIAST BP 80-89 MM HG: CPT | Mod: CPTII,S$GLB,, | Performed by: INTERNAL MEDICINE

## 2021-02-04 PROCEDURE — 99999 PR PBB SHADOW E&M-EST. PATIENT-LVL III: ICD-10-PCS | Mod: PBBFAC,,, | Performed by: INTERNAL MEDICINE

## 2021-02-04 PROCEDURE — 3079F PR MOST RECENT DIASTOLIC BLOOD PRESSURE 80-89 MM HG: ICD-10-PCS | Mod: CPTII,S$GLB,, | Performed by: INTERNAL MEDICINE

## 2021-02-04 PROCEDURE — 1159F PR MEDICATION LIST DOCUMENTED IN MEDICAL RECORD: ICD-10-PCS | Mod: S$GLB,,, | Performed by: INTERNAL MEDICINE

## 2021-02-04 PROCEDURE — 3288F PR FALLS RISK ASSESSMENT DOCUMENTED: ICD-10-PCS | Mod: CPTII,S$GLB,, | Performed by: INTERNAL MEDICINE

## 2021-02-04 PROCEDURE — 1101F PT FALLS ASSESS-DOCD LE1/YR: CPT | Mod: CPTII,S$GLB,, | Performed by: INTERNAL MEDICINE

## 2021-02-04 PROCEDURE — 3077F PR MOST RECENT SYSTOLIC BLOOD PRESSURE >= 140 MM HG: ICD-10-PCS | Mod: CPTII,S$GLB,, | Performed by: INTERNAL MEDICINE

## 2021-02-04 PROCEDURE — 99499 UNLISTED E&M SERVICE: CPT | Mod: S$GLB,,, | Performed by: INTERNAL MEDICINE

## 2021-02-04 PROCEDURE — 3008F BODY MASS INDEX DOCD: CPT | Mod: CPTII,S$GLB,, | Performed by: INTERNAL MEDICINE

## 2021-02-04 PROCEDURE — 99999 PR PBB SHADOW E&M-EST. PATIENT-LVL III: CPT | Mod: PBBFAC,,, | Performed by: INTERNAL MEDICINE

## 2021-02-04 PROCEDURE — 1126F AMNT PAIN NOTED NONE PRSNT: CPT | Mod: S$GLB,,, | Performed by: INTERNAL MEDICINE

## 2021-02-04 PROCEDURE — 99215 PR OFFICE/OUTPT VISIT, EST, LEVL V, 40-54 MIN: ICD-10-PCS | Mod: S$GLB,,, | Performed by: INTERNAL MEDICINE

## 2021-02-04 PROCEDURE — 3288F FALL RISK ASSESSMENT DOCD: CPT | Mod: CPTII,S$GLB,, | Performed by: INTERNAL MEDICINE

## 2021-02-04 RX ORDER — LEVOCETIRIZINE DIHYDROCHLORIDE 5 MG/1
5 TABLET, FILM COATED ORAL NIGHTLY
Qty: 30 TABLET | Refills: 11 | Status: SHIPPED | OUTPATIENT
Start: 2021-02-04 | End: 2022-02-14

## 2021-02-17 ENCOUNTER — PES CALL (OUTPATIENT)
Dept: ADMINISTRATIVE | Facility: CLINIC | Age: 71
End: 2021-02-17

## 2021-04-12 ENCOUNTER — PATIENT MESSAGE (OUTPATIENT)
Dept: ADMINISTRATIVE | Facility: HOSPITAL | Age: 71
End: 2021-04-12

## 2021-04-16 ENCOUNTER — PATIENT MESSAGE (OUTPATIENT)
Dept: RESEARCH | Facility: HOSPITAL | Age: 71
End: 2021-04-16

## 2021-04-29 RX ORDER — FLUTICASONE PROPIONATE 50 MCG
2 SPRAY, SUSPENSION (ML) NASAL DAILY
Qty: 9.9 ML | Refills: 11 | Status: SHIPPED | OUTPATIENT
Start: 2021-04-29 | End: 2022-06-13 | Stop reason: SDUPTHER

## 2021-04-30 ENCOUNTER — TELEPHONE (OUTPATIENT)
Dept: FAMILY MEDICINE | Facility: CLINIC | Age: 71
End: 2021-04-30

## 2021-04-30 ENCOUNTER — OFFICE VISIT (OUTPATIENT)
Dept: FAMILY MEDICINE | Facility: CLINIC | Age: 71
End: 2021-04-30
Payer: MEDICARE

## 2021-04-30 VITALS
HEIGHT: 66 IN | OXYGEN SATURATION: 95 % | DIASTOLIC BLOOD PRESSURE: 70 MMHG | HEART RATE: 85 BPM | SYSTOLIC BLOOD PRESSURE: 138 MMHG | WEIGHT: 175.25 LBS | BODY MASS INDEX: 28.16 KG/M2

## 2021-04-30 DIAGNOSIS — J32.4 CHRONIC PANSINUSITIS: Chronic | ICD-10-CM

## 2021-04-30 DIAGNOSIS — J44.89 ASTHMA WITH COPD: ICD-10-CM

## 2021-04-30 DIAGNOSIS — R73.03 PREDIABETES: Chronic | ICD-10-CM

## 2021-04-30 DIAGNOSIS — Z87.09 H/O PLEURAL EMPYEMA: ICD-10-CM

## 2021-04-30 DIAGNOSIS — U07.1 PNEUMONIA DUE TO COVID-19 VIRUS: ICD-10-CM

## 2021-04-30 DIAGNOSIS — Z23 NEED FOR SHINGLES VACCINE: ICD-10-CM

## 2021-04-30 DIAGNOSIS — Z00.00 ROUTINE GENERAL MEDICAL EXAMINATION AT A HEALTH CARE FACILITY: Primary | ICD-10-CM

## 2021-04-30 DIAGNOSIS — K76.9 LESION OF LIVER: ICD-10-CM

## 2021-04-30 DIAGNOSIS — I10 ESSENTIAL HYPERTENSION: ICD-10-CM

## 2021-04-30 DIAGNOSIS — E66.3 OVERWEIGHT (BMI 25.0-29.9): ICD-10-CM

## 2021-04-30 DIAGNOSIS — R79.89 ELEVATED FERRITIN: ICD-10-CM

## 2021-04-30 DIAGNOSIS — G51.0 RIGHT-SIDED BELL'S PALSY: ICD-10-CM

## 2021-04-30 DIAGNOSIS — E55.9 VITAMIN D DEFICIENCY: ICD-10-CM

## 2021-04-30 DIAGNOSIS — Z79.899 MEDICATION MANAGEMENT: ICD-10-CM

## 2021-04-30 DIAGNOSIS — I70.0 AORTIC ATHEROSCLEROSIS: ICD-10-CM

## 2021-04-30 DIAGNOSIS — D53.9 MACROCYTIC ANEMIA: ICD-10-CM

## 2021-04-30 DIAGNOSIS — Z23 HIGH PRIORITY FOR COVID-19 VIRUS VACCINATION: ICD-10-CM

## 2021-04-30 DIAGNOSIS — J12.82 PNEUMONIA DUE TO COVID-19 VIRUS: ICD-10-CM

## 2021-04-30 DIAGNOSIS — J30.89 NON-SEASONAL ALLERGIC RHINITIS, UNSPECIFIED TRIGGER: ICD-10-CM

## 2021-04-30 DIAGNOSIS — Z86.16 PERSONAL HISTORY OF COVID-19: ICD-10-CM

## 2021-04-30 DIAGNOSIS — E78.2 MIXED HYPERLIPIDEMIA: Chronic | ICD-10-CM

## 2021-04-30 PROCEDURE — 1101F PR PT FALLS ASSESS DOC 0-1 FALLS W/OUT INJ PAST YR: ICD-10-PCS | Mod: CPTII,S$GLB,, | Performed by: FAMILY MEDICINE

## 2021-04-30 PROCEDURE — 1125F PR PAIN SEVERITY QUANTIFIED, PAIN PRESENT: ICD-10-PCS | Mod: S$GLB,,, | Performed by: FAMILY MEDICINE

## 2021-04-30 PROCEDURE — 99397 PER PM REEVAL EST PAT 65+ YR: CPT | Mod: 25,S$GLB,, | Performed by: FAMILY MEDICINE

## 2021-04-30 PROCEDURE — 3288F PR FALLS RISK ASSESSMENT DOCUMENTED: ICD-10-PCS | Mod: CPTII,S$GLB,, | Performed by: FAMILY MEDICINE

## 2021-04-30 PROCEDURE — 99499 UNLISTED E&M SERVICE: CPT | Mod: S$GLB,,, | Performed by: FAMILY MEDICINE

## 2021-04-30 PROCEDURE — 99397 PR PREVENTIVE VISIT,EST,65 & OVER: ICD-10-PCS | Mod: 25,S$GLB,, | Performed by: FAMILY MEDICINE

## 2021-04-30 PROCEDURE — 3008F BODY MASS INDEX DOCD: CPT | Mod: CPTII,S$GLB,, | Performed by: FAMILY MEDICINE

## 2021-04-30 PROCEDURE — 99499 RISK ADDL DX/OHS AUDIT: ICD-10-PCS | Mod: S$GLB,,, | Performed by: FAMILY MEDICINE

## 2021-04-30 PROCEDURE — 99999 PR PBB SHADOW E&M-EST. PATIENT-LVL IV: CPT | Mod: PBBFAC,,, | Performed by: FAMILY MEDICINE

## 2021-04-30 PROCEDURE — 1125F AMNT PAIN NOTED PAIN PRSNT: CPT | Mod: S$GLB,,, | Performed by: FAMILY MEDICINE

## 2021-04-30 PROCEDURE — 3008F PR BODY MASS INDEX (BMI) DOCUMENTED: ICD-10-PCS | Mod: CPTII,S$GLB,, | Performed by: FAMILY MEDICINE

## 2021-04-30 PROCEDURE — 3288F FALL RISK ASSESSMENT DOCD: CPT | Mod: CPTII,S$GLB,, | Performed by: FAMILY MEDICINE

## 2021-04-30 PROCEDURE — 1101F PT FALLS ASSESS-DOCD LE1/YR: CPT | Mod: CPTII,S$GLB,, | Performed by: FAMILY MEDICINE

## 2021-04-30 PROCEDURE — 99999 PR PBB SHADOW E&M-EST. PATIENT-LVL IV: ICD-10-PCS | Mod: PBBFAC,,, | Performed by: FAMILY MEDICINE

## 2021-04-30 RX ORDER — ALBUTEROL SULFATE 90 UG/1
2 AEROSOL, METERED RESPIRATORY (INHALATION) EVERY 4 HOURS PRN
Qty: 18 G | Refills: 11 | Status: ON HOLD | OUTPATIENT
Start: 2021-04-30 | End: 2021-12-09 | Stop reason: HOSPADM

## 2021-04-30 RX ORDER — BUDESONIDE AND FORMOTEROL FUMARATE DIHYDRATE 160; 4.5 UG/1; UG/1
2 AEROSOL RESPIRATORY (INHALATION) 2 TIMES DAILY
Qty: 10.2 G | Refills: 11 | Status: SHIPPED | OUTPATIENT
Start: 2021-04-30 | End: 2022-06-16

## 2021-04-30 RX ORDER — METHYLPREDNISOLONE 4 MG/1
TABLET ORAL
Qty: 30 TABLET | Refills: 0 | Status: SHIPPED | OUTPATIENT
Start: 2021-04-30 | End: 2021-12-06

## 2021-04-30 RX ORDER — VALACYCLOVIR HYDROCHLORIDE 1 G/1
1000 TABLET, FILM COATED ORAL 2 TIMES DAILY
Qty: 20 TABLET | Refills: 0 | Status: SHIPPED | OUTPATIENT
Start: 2021-04-30 | End: 2021-12-06

## 2021-05-01 ENCOUNTER — LAB VISIT (OUTPATIENT)
Dept: LAB | Facility: HOSPITAL | Age: 71
End: 2021-05-01
Attending: FAMILY MEDICINE
Payer: MEDICARE

## 2021-05-01 DIAGNOSIS — E55.9 VITAMIN D DEFICIENCY: ICD-10-CM

## 2021-05-01 DIAGNOSIS — R79.89 ELEVATED FERRITIN: ICD-10-CM

## 2021-05-01 DIAGNOSIS — Z79.899 MEDICATION MANAGEMENT: ICD-10-CM

## 2021-05-01 DIAGNOSIS — Z00.00 ROUTINE GENERAL MEDICAL EXAMINATION AT A HEALTH CARE FACILITY: ICD-10-CM

## 2021-05-01 DIAGNOSIS — R73.03 PREDIABETES: Chronic | ICD-10-CM

## 2021-05-01 DIAGNOSIS — D53.9 MACROCYTIC ANEMIA: ICD-10-CM

## 2021-05-01 LAB
25(OH)D3+25(OH)D2 SERPL-MCNC: 32 NG/ML (ref 30–96)
ALBUMIN SERPL BCP-MCNC: 3.7 G/DL (ref 3.5–5.2)
ALP SERPL-CCNC: 77 U/L (ref 55–135)
ALT SERPL W/O P-5'-P-CCNC: 22 U/L (ref 10–44)
ANION GAP SERPL CALC-SCNC: 9 MMOL/L (ref 8–16)
AST SERPL-CCNC: 17 U/L (ref 10–40)
BASOPHILS # BLD AUTO: 0.02 K/UL (ref 0–0.2)
BASOPHILS NFR BLD: 0.1 % (ref 0–1.9)
BILIRUB SERPL-MCNC: 0.3 MG/DL (ref 0.1–1)
BUN SERPL-MCNC: 16 MG/DL (ref 8–23)
CALCIUM SERPL-MCNC: 9.2 MG/DL (ref 8.7–10.5)
CHLORIDE SERPL-SCNC: 108 MMOL/L (ref 95–110)
CHOLEST SERPL-MCNC: 135 MG/DL (ref 120–199)
CHOLEST/HDLC SERPL: 4.4 {RATIO} (ref 2–5)
CO2 SERPL-SCNC: 23 MMOL/L (ref 23–29)
CREAT SERPL-MCNC: 1.1 MG/DL (ref 0.5–1.4)
DIFFERENTIAL METHOD: ABNORMAL
EOSINOPHIL # BLD AUTO: 0 K/UL (ref 0–0.5)
EOSINOPHIL NFR BLD: 0 % (ref 0–8)
ERYTHROCYTE [DISTWIDTH] IN BLOOD BY AUTOMATED COUNT: 13.3 % (ref 11.5–14.5)
EST. GFR  (AFRICAN AMERICAN): >60 ML/MIN/1.73 M^2
EST. GFR  (NON AFRICAN AMERICAN): >60 ML/MIN/1.73 M^2
ESTIMATED AVG GLUCOSE: 111 MG/DL (ref 68–131)
FERRITIN SERPL-MCNC: 489 NG/ML (ref 20–300)
GLUCOSE SERPL-MCNC: 114 MG/DL (ref 70–110)
HBA1C MFR BLD: 5.5 % (ref 4–5.6)
HCT VFR BLD AUTO: 39.9 % (ref 40–54)
HDLC SERPL-MCNC: 31 MG/DL (ref 40–75)
HDLC SERPL: 23 % (ref 20–50)
HGB BLD-MCNC: 13.3 G/DL (ref 14–18)
IMM GRANULOCYTES # BLD AUTO: 0.07 K/UL (ref 0–0.04)
IMM GRANULOCYTES NFR BLD AUTO: 0.4 % (ref 0–0.5)
IRON SERPL-MCNC: 89 UG/DL (ref 45–160)
LDLC SERPL CALC-MCNC: 91.4 MG/DL (ref 63–159)
LYMPHOCYTES # BLD AUTO: 3.1 K/UL (ref 1–4.8)
LYMPHOCYTES NFR BLD: 19.5 % (ref 18–48)
MAGNESIUM SERPL-MCNC: 2.1 MG/DL (ref 1.6–2.6)
MCH RBC QN AUTO: 32.9 PG (ref 27–31)
MCHC RBC AUTO-ENTMCNC: 33.3 G/DL (ref 32–36)
MCV RBC AUTO: 99 FL (ref 82–98)
MONOCYTES # BLD AUTO: 0.9 K/UL (ref 0.3–1)
MONOCYTES NFR BLD: 5.5 % (ref 4–15)
NEUTROPHILS # BLD AUTO: 11.7 K/UL (ref 1.8–7.7)
NEUTROPHILS NFR BLD: 74.5 % (ref 38–73)
NONHDLC SERPL-MCNC: 104 MG/DL
NRBC BLD-RTO: 0 /100 WBC
PLATELET # BLD AUTO: 257 K/UL (ref 150–450)
PMV BLD AUTO: 10.1 FL (ref 9.2–12.9)
POTASSIUM SERPL-SCNC: 4.3 MMOL/L (ref 3.5–5.1)
PROT SERPL-MCNC: 9 G/DL (ref 6–8.4)
RBC # BLD AUTO: 4.04 M/UL (ref 4.6–6.2)
SATURATED IRON: 27 % (ref 20–50)
SODIUM SERPL-SCNC: 140 MMOL/L (ref 136–145)
TOTAL IRON BINDING CAPACITY: 324 UG/DL (ref 250–450)
TRANSFERRIN SERPL-MCNC: 219 MG/DL (ref 200–375)
TRIGL SERPL-MCNC: 63 MG/DL (ref 30–150)
TSH SERPL DL<=0.005 MIU/L-ACNC: 0.74 UIU/ML (ref 0.4–4)
VIT B12 SERPL-MCNC: 503 PG/ML (ref 210–950)
WBC # BLD AUTO: 15.68 K/UL (ref 3.9–12.7)

## 2021-05-01 PROCEDURE — 82607 VITAMIN B-12: CPT | Performed by: FAMILY MEDICINE

## 2021-05-01 PROCEDURE — 80061 LIPID PANEL: CPT | Performed by: FAMILY MEDICINE

## 2021-05-01 PROCEDURE — 83735 ASSAY OF MAGNESIUM: CPT | Performed by: FAMILY MEDICINE

## 2021-05-01 PROCEDURE — 36415 COLL VENOUS BLD VENIPUNCTURE: CPT | Performed by: FAMILY MEDICINE

## 2021-05-01 PROCEDURE — 84443 ASSAY THYROID STIM HORMONE: CPT | Performed by: FAMILY MEDICINE

## 2021-05-01 PROCEDURE — 82728 ASSAY OF FERRITIN: CPT | Performed by: FAMILY MEDICINE

## 2021-05-01 PROCEDURE — 80053 COMPREHEN METABOLIC PANEL: CPT | Performed by: FAMILY MEDICINE

## 2021-05-01 PROCEDURE — 82306 VITAMIN D 25 HYDROXY: CPT | Performed by: FAMILY MEDICINE

## 2021-05-01 PROCEDURE — 83540 ASSAY OF IRON: CPT | Performed by: FAMILY MEDICINE

## 2021-05-01 PROCEDURE — 83036 HEMOGLOBIN GLYCOSYLATED A1C: CPT | Performed by: FAMILY MEDICINE

## 2021-05-01 PROCEDURE — 85025 COMPLETE CBC W/AUTO DIFF WBC: CPT | Performed by: FAMILY MEDICINE

## 2021-05-03 ENCOUNTER — TELEPHONE (OUTPATIENT)
Dept: FAMILY MEDICINE | Facility: CLINIC | Age: 71
End: 2021-05-03

## 2021-05-17 ENCOUNTER — HOSPITAL ENCOUNTER (OUTPATIENT)
Dept: RADIOLOGY | Facility: HOSPITAL | Age: 71
Discharge: HOME | End: 2021-05-17
Attending: FAMILY MEDICINE
Payer: MEDICARE

## 2021-05-17 DIAGNOSIS — K76.9 LESION OF LIVER: ICD-10-CM

## 2021-05-17 PROCEDURE — 76700 US ABDOMEN COMPLETE: ICD-10-PCS | Mod: 26,,, | Performed by: RADIOLOGY

## 2021-05-17 PROCEDURE — 76700 US EXAM ABDOM COMPLETE: CPT | Mod: 26,,, | Performed by: RADIOLOGY

## 2021-05-17 PROCEDURE — 76700 US EXAM ABDOM COMPLETE: CPT | Mod: TC

## 2021-05-18 ENCOUNTER — PES CALL (OUTPATIENT)
Dept: ADMINISTRATIVE | Facility: CLINIC | Age: 71
End: 2021-05-18

## 2021-08-06 ENCOUNTER — PES CALL (OUTPATIENT)
Dept: ADMINISTRATIVE | Facility: CLINIC | Age: 71
End: 2021-08-06

## 2021-08-20 DIAGNOSIS — I10 BENIGN ESSENTIAL HYPERTENSION: ICD-10-CM

## 2021-08-20 DIAGNOSIS — E78.2 MIXED HYPERLIPIDEMIA: ICD-10-CM

## 2021-08-20 RX ORDER — AMLODIPINE BESYLATE 10 MG/1
10 TABLET ORAL DAILY
Qty: 90 TABLET | Refills: 4 | Status: SHIPPED | OUTPATIENT
Start: 2021-08-20 | End: 2021-09-15

## 2021-08-20 RX ORDER — ATORVASTATIN CALCIUM 20 MG/1
20 TABLET, FILM COATED ORAL NIGHTLY
Qty: 90 TABLET | Refills: 3 | Status: SHIPPED | OUTPATIENT
Start: 2021-08-20 | End: 2022-01-28 | Stop reason: SDUPTHER

## 2021-08-20 RX ORDER — LOSARTAN POTASSIUM 100 MG/1
100 TABLET ORAL DAILY
Qty: 90 TABLET | Refills: 4 | Status: SHIPPED | OUTPATIENT
Start: 2021-08-20 | End: 2021-10-26

## 2021-08-20 RX ORDER — METOPROLOL SUCCINATE 100 MG/1
100 TABLET, EXTENDED RELEASE ORAL DAILY
Qty: 90 TABLET | Refills: 4 | Status: SHIPPED | OUTPATIENT
Start: 2021-08-20 | End: 2021-10-21

## 2021-12-02 DIAGNOSIS — J44.1 COPD EXACERBATION: ICD-10-CM

## 2021-12-02 DIAGNOSIS — J44.89 ASTHMA WITH CHRONIC OBSTRUCTIVE PULMONARY DISEASE (COPD): Chronic | ICD-10-CM

## 2021-12-02 RX ORDER — ALBUTEROL SULFATE 1.25 MG/3ML
1.25 SOLUTION RESPIRATORY (INHALATION) EVERY 6 HOURS PRN
Qty: 75 ML | Refills: 11 | Status: SHIPPED | OUTPATIENT
Start: 2021-12-02 | End: 2022-12-02

## 2021-12-03 ENCOUNTER — TELEPHONE (OUTPATIENT)
Dept: FAMILY MEDICINE | Facility: CLINIC | Age: 71
End: 2021-12-03
Payer: MEDICARE

## 2021-12-06 ENCOUNTER — HOSPITAL ENCOUNTER (INPATIENT)
Facility: HOSPITAL | Age: 71
LOS: 3 days | Discharge: HOME | DRG: 190 | End: 2021-12-09
Attending: EMERGENCY MEDICINE | Admitting: FAMILY MEDICINE
Payer: MEDICARE

## 2021-12-06 DIAGNOSIS — J18.9 MULTIFOCAL PNEUMONIA: Primary | ICD-10-CM

## 2021-12-06 DIAGNOSIS — J44.89 ASTHMA WITH COPD: ICD-10-CM

## 2021-12-06 DIAGNOSIS — R07.9 CHEST PAIN: ICD-10-CM

## 2021-12-06 LAB
ALBUMIN SERPL BCP-MCNC: 2.6 G/DL (ref 3.5–5.2)
ALLENS TEST: ABNORMAL
ALP SERPL-CCNC: 69 U/L (ref 55–135)
ALT SERPL W/O P-5'-P-CCNC: 13 U/L (ref 10–44)
ANION GAP SERPL CALC-SCNC: 7 MMOL/L (ref 8–16)
AST SERPL-CCNC: 13 U/L (ref 10–40)
BASOPHILS # BLD AUTO: 0.03 K/UL (ref 0–0.2)
BASOPHILS NFR BLD: 0.3 % (ref 0–1.9)
BILIRUB SERPL-MCNC: 0.4 MG/DL (ref 0.1–1)
BNP SERPL-MCNC: 27 PG/ML (ref 0–99)
BUN SERPL-MCNC: 17 MG/DL (ref 8–23)
CALCIUM SERPL-MCNC: 9.2 MG/DL (ref 8.7–10.5)
CHLORIDE SERPL-SCNC: 107 MMOL/L (ref 95–110)
CO2 SERPL-SCNC: 22 MMOL/L (ref 23–29)
CREAT SERPL-MCNC: 1 MG/DL (ref 0.5–1.4)
DIFFERENTIAL METHOD: ABNORMAL
EOSINOPHIL # BLD AUTO: 0.2 K/UL (ref 0–0.5)
EOSINOPHIL NFR BLD: 1.6 % (ref 0–8)
ERYTHROCYTE [DISTWIDTH] IN BLOOD BY AUTOMATED COUNT: 15.4 % (ref 11.5–14.5)
EST. GFR  (AFRICAN AMERICAN): >60 ML/MIN/1.73 M^2
EST. GFR  (NON AFRICAN AMERICAN): >60 ML/MIN/1.73 M^2
GLUCOSE SERPL-MCNC: 139 MG/DL (ref 70–110)
HCO3 UR-SCNC: 24.5 MMOL/L (ref 24–28)
HCT VFR BLD AUTO: 33.6 % (ref 40–54)
HCT VFR BLD CALC: 31 %PCV (ref 36–54)
HGB BLD-MCNC: 10.3 G/DL (ref 14–18)
HGB BLD-MCNC: 11 G/DL
IMM GRANULOCYTES # BLD AUTO: 0.04 K/UL (ref 0–0.04)
IMM GRANULOCYTES NFR BLD AUTO: 0.4 % (ref 0–0.5)
INFLUENZA A, MOLECULAR: NEGATIVE
INFLUENZA B, MOLECULAR: NEGATIVE
LYMPHOCYTES # BLD AUTO: 1.4 K/UL (ref 1–4.8)
LYMPHOCYTES NFR BLD: 13.7 % (ref 18–48)
MCH RBC QN AUTO: 29.3 PG (ref 27–31)
MCHC RBC AUTO-ENTMCNC: 30.7 G/DL (ref 32–36)
MCV RBC AUTO: 96 FL (ref 82–98)
MONOCYTES # BLD AUTO: 0.8 K/UL (ref 0.3–1)
MONOCYTES NFR BLD: 7.6 % (ref 4–15)
NEUTROPHILS # BLD AUTO: 7.7 K/UL (ref 1.8–7.7)
NEUTROPHILS NFR BLD: 76.4 % (ref 38–73)
NRBC BLD-RTO: 0 /100 WBC
PCO2 BLDA: 34.2 MMHG (ref 35–45)
PH SMN: 7.46 [PH] (ref 7.35–7.45)
PLATELET # BLD AUTO: 420 K/UL (ref 150–450)
PMV BLD AUTO: 9.5 FL (ref 9.2–12.9)
PO2 BLDA: 56 MMHG (ref 80–100)
POC BE: 1 MMOL/L
POC SATURATED O2: 91 % (ref 95–100)
POC TCO2: 25 MMOL/L (ref 23–27)
POTASSIUM BLD-SCNC: 3.9 MMOL/L (ref 3.5–5.1)
POTASSIUM SERPL-SCNC: 3.8 MMOL/L (ref 3.5–5.1)
PROCALCITONIN SERPL IA-MCNC: 0.1 NG/ML
PROT SERPL-MCNC: 8.9 G/DL (ref 6–8.4)
RBC # BLD AUTO: 3.52 M/UL (ref 4.6–6.2)
SAMPLE: ABNORMAL
SARS-COV-2 RDRP RESP QL NAA+PROBE: NEGATIVE
SITE: ABNORMAL
SODIUM BLD-SCNC: 138 MMOL/L (ref 136–145)
SODIUM SERPL-SCNC: 136 MMOL/L (ref 136–145)
SPECIMEN SOURCE: NORMAL
TROPONIN I SERPL DL<=0.01 NG/ML-MCNC: <0.006 NG/ML (ref 0–0.03)
WBC # BLD AUTO: 10.01 K/UL (ref 3.9–12.7)

## 2021-12-06 PROCEDURE — 63600175 PHARM REV CODE 636 W HCPCS: Mod: HCNC | Performed by: NURSE PRACTITIONER

## 2021-12-06 PROCEDURE — 87502 INFLUENZA DNA AMP PROBE: CPT | Mod: HCNC | Performed by: EMERGENCY MEDICINE

## 2021-12-06 PROCEDURE — 63600175 PHARM REV CODE 636 W HCPCS: Mod: HCNC | Performed by: EMERGENCY MEDICINE

## 2021-12-06 PROCEDURE — 25000003 PHARM REV CODE 250: Mod: HCNC | Performed by: NURSE PRACTITIONER

## 2021-12-06 PROCEDURE — 25000242 PHARM REV CODE 250 ALT 637 W/ HCPCS: Mod: HCNC | Performed by: EMERGENCY MEDICINE

## 2021-12-06 PROCEDURE — 84145 PROCALCITONIN (PCT): CPT | Mod: HCNC | Performed by: EMERGENCY MEDICINE

## 2021-12-06 PROCEDURE — 85025 COMPLETE CBC W/AUTO DIFF WBC: CPT | Mod: HCNC | Performed by: NURSE PRACTITIONER

## 2021-12-06 PROCEDURE — 96365 THER/PROPH/DIAG IV INF INIT: CPT | Mod: HCNC

## 2021-12-06 PROCEDURE — 80053 COMPREHEN METABOLIC PANEL: CPT | Mod: HCNC | Performed by: NURSE PRACTITIONER

## 2021-12-06 PROCEDURE — 83880 ASSAY OF NATRIURETIC PEPTIDE: CPT | Mod: HCNC | Performed by: NURSE PRACTITIONER

## 2021-12-06 PROCEDURE — 94761 N-INVAS EAR/PLS OXIMETRY MLT: CPT | Mod: HCNC

## 2021-12-06 PROCEDURE — 93010 EKG 12-LEAD: ICD-10-PCS | Mod: HCNC,,, | Performed by: INTERNAL MEDICINE

## 2021-12-06 PROCEDURE — 25000003 PHARM REV CODE 250: Mod: HCNC | Performed by: FAMILY MEDICINE

## 2021-12-06 PROCEDURE — 87040 BLOOD CULTURE FOR BACTERIA: CPT | Mod: 59,HCNC | Performed by: EMERGENCY MEDICINE

## 2021-12-06 PROCEDURE — 93010 ELECTROCARDIOGRAM REPORT: CPT | Mod: HCNC,,, | Performed by: INTERNAL MEDICINE

## 2021-12-06 PROCEDURE — 84484 ASSAY OF TROPONIN QUANT: CPT | Mod: HCNC | Performed by: NURSE PRACTITIONER

## 2021-12-06 PROCEDURE — 27000221 HC OXYGEN, UP TO 24 HOURS: Mod: HCNC

## 2021-12-06 PROCEDURE — 87449 NOS EACH ORGANISM AG IA: CPT | Mod: HCNC | Performed by: EMERGENCY MEDICINE

## 2021-12-06 PROCEDURE — 99285 EMERGENCY DEPT VISIT HI MDM: CPT | Mod: 25,HCNC

## 2021-12-06 PROCEDURE — 93005 ELECTROCARDIOGRAM TRACING: CPT | Mod: HCNC

## 2021-12-06 PROCEDURE — 11000001 HC ACUTE MED/SURG PRIVATE ROOM: Mod: HCNC

## 2021-12-06 PROCEDURE — U0002 COVID-19 LAB TEST NON-CDC: HCPCS | Mod: HCNC | Performed by: EMERGENCY MEDICINE

## 2021-12-06 PROCEDURE — 99900035 HC TECH TIME PER 15 MIN (STAT): Mod: HCNC

## 2021-12-06 RX ORDER — ACETAMINOPHEN 325 MG/1
650 TABLET ORAL EVERY 6 HOURS PRN
Status: DISCONTINUED | OUTPATIENT
Start: 2021-12-06 | End: 2021-12-09 | Stop reason: HOSPADM

## 2021-12-06 RX ORDER — IPRATROPIUM BROMIDE AND ALBUTEROL SULFATE 2.5; .5 MG/3ML; MG/3ML
3 SOLUTION RESPIRATORY (INHALATION)
Status: COMPLETED | OUTPATIENT
Start: 2021-12-06 | End: 2021-12-06

## 2021-12-06 RX ORDER — ASPIRIN 325 MG
325 TABLET ORAL
Status: COMPLETED | OUTPATIENT
Start: 2021-12-06 | End: 2021-12-06

## 2021-12-06 RX ORDER — AMLODIPINE BESYLATE 5 MG/1
10 TABLET ORAL DAILY
Status: DISCONTINUED | OUTPATIENT
Start: 2021-12-07 | End: 2021-12-09 | Stop reason: HOSPADM

## 2021-12-06 RX ORDER — LEVOFLOXACIN 5 MG/ML
750 INJECTION, SOLUTION INTRAVENOUS
Status: COMPLETED | OUTPATIENT
Start: 2021-12-06 | End: 2021-12-06

## 2021-12-06 RX ORDER — SODIUM CHLORIDE 0.9 % (FLUSH) 0.9 %
10 SYRINGE (ML) INJECTION EVERY 12 HOURS PRN
Status: DISCONTINUED | OUTPATIENT
Start: 2021-12-06 | End: 2021-12-09 | Stop reason: HOSPADM

## 2021-12-06 RX ORDER — GUAIFENESIN/DEXTROMETHORPHAN 100-10MG/5
5 SYRUP ORAL EVERY 4 HOURS PRN
Status: DISCONTINUED | OUTPATIENT
Start: 2021-12-06 | End: 2021-12-09 | Stop reason: HOSPADM

## 2021-12-06 RX ORDER — ONDANSETRON 2 MG/ML
4 INJECTION INTRAMUSCULAR; INTRAVENOUS EVERY 6 HOURS PRN
Status: DISCONTINUED | OUTPATIENT
Start: 2021-12-06 | End: 2021-12-09 | Stop reason: HOSPADM

## 2021-12-06 RX ORDER — SODIUM CHLORIDE 9 MG/ML
INJECTION, SOLUTION INTRAVENOUS CONTINUOUS
Status: DISCONTINUED | OUTPATIENT
Start: 2021-12-06 | End: 2021-12-07

## 2021-12-06 RX ORDER — LOSARTAN POTASSIUM 50 MG/1
100 TABLET ORAL DAILY
Status: DISCONTINUED | OUTPATIENT
Start: 2021-12-07 | End: 2021-12-09 | Stop reason: HOSPADM

## 2021-12-06 RX ORDER — ATORVASTATIN CALCIUM 20 MG/1
20 TABLET, FILM COATED ORAL NIGHTLY
Status: DISCONTINUED | OUTPATIENT
Start: 2021-12-06 | End: 2021-12-09 | Stop reason: HOSPADM

## 2021-12-06 RX ORDER — CHOLECALCIFEROL (VITAMIN D3) 25 MCG
1000 TABLET ORAL DAILY
COMMUNITY

## 2021-12-06 RX ORDER — METOPROLOL SUCCINATE 50 MG/1
100 TABLET, EXTENDED RELEASE ORAL NIGHTLY
Status: DISCONTINUED | OUTPATIENT
Start: 2021-12-06 | End: 2021-12-09 | Stop reason: HOSPADM

## 2021-12-06 RX ORDER — METOPROLOL SUCCINATE 50 MG/1
100 TABLET, EXTENDED RELEASE ORAL DAILY
Status: DISCONTINUED | OUTPATIENT
Start: 2021-12-07 | End: 2021-12-06

## 2021-12-06 RX ADMIN — METOPROLOL SUCCINATE 100 MG: 50 TABLET, EXTENDED RELEASE ORAL at 11:12

## 2021-12-06 RX ADMIN — AZITHROMYCIN MONOHYDRATE 500 MG: 500 INJECTION, POWDER, LYOPHILIZED, FOR SOLUTION INTRAVENOUS at 05:12

## 2021-12-06 RX ADMIN — CEFTRIAXONE SODIUM 1 G: 1 INJECTION, POWDER, FOR SOLUTION INTRAMUSCULAR; INTRAVENOUS at 04:12

## 2021-12-06 RX ADMIN — SODIUM CHLORIDE 500 ML: 0.9 INJECTION, SOLUTION INTRAVENOUS at 10:12

## 2021-12-06 RX ADMIN — LEVOFLOXACIN 750 MG: 750 INJECTION, SOLUTION INTRAVENOUS at 02:12

## 2021-12-06 RX ADMIN — ASPIRIN 325 MG ORAL TABLET 325 MG: 325 PILL ORAL at 12:12

## 2021-12-06 RX ADMIN — SODIUM CHLORIDE: 0.9 INJECTION, SOLUTION INTRAVENOUS at 10:12

## 2021-12-06 RX ADMIN — ATORVASTATIN CALCIUM 20 MG: 20 TABLET, FILM COATED ORAL at 10:12

## 2021-12-06 RX ADMIN — IPRATROPIUM BROMIDE AND ALBUTEROL SULFATE 3 ML: .5; 3 SOLUTION RESPIRATORY (INHALATION) at 12:12

## 2021-12-07 ENCOUNTER — PATIENT OUTREACH (OUTPATIENT)
Dept: ADMINISTRATIVE | Facility: OTHER | Age: 71
End: 2021-12-07
Payer: MEDICARE

## 2021-12-07 ENCOUNTER — TELEPHONE (OUTPATIENT)
Dept: FAMILY MEDICINE | Facility: CLINIC | Age: 71
End: 2021-12-07
Payer: MEDICARE

## 2021-12-07 LAB
ANION GAP SERPL CALC-SCNC: 6 MMOL/L (ref 8–16)
BASOPHILS # BLD AUTO: 0.03 K/UL (ref 0–0.2)
BASOPHILS NFR BLD: 0.4 % (ref 0–1.9)
BUN SERPL-MCNC: 16 MG/DL (ref 8–23)
CALCIUM SERPL-MCNC: 8.1 MG/DL (ref 8.7–10.5)
CHLORIDE SERPL-SCNC: 108 MMOL/L (ref 95–110)
CO2 SERPL-SCNC: 22 MMOL/L (ref 23–29)
CREAT SERPL-MCNC: 0.9 MG/DL (ref 0.5–1.4)
DIFFERENTIAL METHOD: ABNORMAL
EOSINOPHIL # BLD AUTO: 0.3 K/UL (ref 0–0.5)
EOSINOPHIL NFR BLD: 3.4 % (ref 0–8)
ERYTHROCYTE [DISTWIDTH] IN BLOOD BY AUTOMATED COUNT: 15.3 % (ref 11.5–14.5)
EST. GFR  (AFRICAN AMERICAN): >60 ML/MIN/1.73 M^2
EST. GFR  (NON AFRICAN AMERICAN): >60 ML/MIN/1.73 M^2
GLUCOSE SERPL-MCNC: 94 MG/DL (ref 70–110)
HCT VFR BLD AUTO: 27.9 % (ref 40–54)
HGB BLD-MCNC: 8.6 G/DL (ref 14–18)
IMM GRANULOCYTES # BLD AUTO: 0.04 K/UL (ref 0–0.04)
IMM GRANULOCYTES NFR BLD AUTO: 0.5 % (ref 0–0.5)
LYMPHOCYTES # BLD AUTO: 1.1 K/UL (ref 1–4.8)
LYMPHOCYTES NFR BLD: 13.4 % (ref 18–48)
MAGNESIUM SERPL-MCNC: 1.8 MG/DL (ref 1.6–2.6)
MCH RBC QN AUTO: 29.9 PG (ref 27–31)
MCHC RBC AUTO-ENTMCNC: 30.8 G/DL (ref 32–36)
MCV RBC AUTO: 97 FL (ref 82–98)
MONOCYTES # BLD AUTO: 0.7 K/UL (ref 0.3–1)
MONOCYTES NFR BLD: 8.7 % (ref 4–15)
NEUTROPHILS # BLD AUTO: 6 K/UL (ref 1.8–7.7)
NEUTROPHILS NFR BLD: 73.6 % (ref 38–73)
NRBC BLD-RTO: 0 /100 WBC
PHOSPHATE SERPL-MCNC: 3.5 MG/DL (ref 2.7–4.5)
PLATELET # BLD AUTO: 367 K/UL (ref 150–450)
PMV BLD AUTO: 9.5 FL (ref 9.2–12.9)
POTASSIUM SERPL-SCNC: 4.3 MMOL/L (ref 3.5–5.1)
RBC # BLD AUTO: 2.88 M/UL (ref 4.6–6.2)
SODIUM SERPL-SCNC: 136 MMOL/L (ref 136–145)
WBC # BLD AUTO: 8.19 K/UL (ref 3.9–12.7)

## 2021-12-07 PROCEDURE — 63700000 PHARM REV CODE 250 ALT 637 W/O HCPCS: Mod: HCNC | Performed by: INTERNAL MEDICINE

## 2021-12-07 PROCEDURE — 83735 ASSAY OF MAGNESIUM: CPT | Mod: HCNC | Performed by: NURSE PRACTITIONER

## 2021-12-07 PROCEDURE — 25000003 PHARM REV CODE 250: Mod: HCNC | Performed by: FAMILY MEDICINE

## 2021-12-07 PROCEDURE — 63600175 PHARM REV CODE 636 W HCPCS: Mod: HCNC | Performed by: NURSE PRACTITIONER

## 2021-12-07 PROCEDURE — 27000221 HC OXYGEN, UP TO 24 HOURS: Mod: HCNC

## 2021-12-07 PROCEDURE — 36415 COLL VENOUS BLD VENIPUNCTURE: CPT | Mod: HCNC | Performed by: NURSE PRACTITIONER

## 2021-12-07 PROCEDURE — 25000003 PHARM REV CODE 250: Mod: HCNC | Performed by: NURSE PRACTITIONER

## 2021-12-07 PROCEDURE — 85025 COMPLETE CBC W/AUTO DIFF WBC: CPT | Mod: HCNC | Performed by: NURSE PRACTITIONER

## 2021-12-07 PROCEDURE — 80048 BASIC METABOLIC PNL TOTAL CA: CPT | Mod: HCNC | Performed by: NURSE PRACTITIONER

## 2021-12-07 PROCEDURE — 11000001 HC ACUTE MED/SURG PRIVATE ROOM: Mod: HCNC

## 2021-12-07 PROCEDURE — 84100 ASSAY OF PHOSPHORUS: CPT | Mod: HCNC | Performed by: NURSE PRACTITIONER

## 2021-12-07 PROCEDURE — 94761 N-INVAS EAR/PLS OXIMETRY MLT: CPT | Mod: HCNC

## 2021-12-07 RX ORDER — FLUTICASONE FUROATE AND VILANTEROL 100; 25 UG/1; UG/1
1 POWDER RESPIRATORY (INHALATION) DAILY
Refills: 11 | Status: DISCONTINUED | OUTPATIENT
Start: 2021-12-07 | End: 2021-12-09 | Stop reason: HOSPADM

## 2021-12-07 RX ORDER — AZITHROMYCIN 250 MG/1
500 TABLET, FILM COATED ORAL DAILY
Status: DISCONTINUED | OUTPATIENT
Start: 2021-12-07 | End: 2021-12-09 | Stop reason: HOSPADM

## 2021-12-07 RX ADMIN — LOSARTAN POTASSIUM 100 MG: 50 TABLET, FILM COATED ORAL at 09:12

## 2021-12-07 RX ADMIN — ATORVASTATIN CALCIUM 20 MG: 20 TABLET, FILM COATED ORAL at 10:12

## 2021-12-07 RX ADMIN — AZITHROMYCIN MONOHYDRATE 500 MG: 250 TABLET ORAL at 02:12

## 2021-12-07 RX ADMIN — CEFTRIAXONE SODIUM 1 G: 1 INJECTION, POWDER, FOR SOLUTION INTRAMUSCULAR; INTRAVENOUS at 04:12

## 2021-12-07 RX ADMIN — AMLODIPINE BESYLATE 10 MG: 5 TABLET ORAL at 09:12

## 2021-12-07 RX ADMIN — METOPROLOL SUCCINATE 100 MG: 50 TABLET, EXTENDED RELEASE ORAL at 10:12

## 2021-12-07 RX ADMIN — SODIUM CHLORIDE: 0.9 INJECTION, SOLUTION INTRAVENOUS at 06:12

## 2021-12-08 LAB
ANION GAP SERPL CALC-SCNC: 4 MMOL/L (ref 8–16)
BASOPHILS # BLD AUTO: 0.02 K/UL (ref 0–0.2)
BASOPHILS NFR BLD: 0.2 % (ref 0–1.9)
BUN SERPL-MCNC: 11 MG/DL (ref 8–23)
CALCIUM SERPL-MCNC: 8.2 MG/DL (ref 8.7–10.5)
CHLORIDE SERPL-SCNC: 106 MMOL/L (ref 95–110)
CO2 SERPL-SCNC: 26 MMOL/L (ref 23–29)
CREAT SERPL-MCNC: 0.8 MG/DL (ref 0.5–1.4)
DIFFERENTIAL METHOD: ABNORMAL
EOSINOPHIL # BLD AUTO: 0.5 K/UL (ref 0–0.5)
EOSINOPHIL NFR BLD: 5.7 % (ref 0–8)
ERYTHROCYTE [DISTWIDTH] IN BLOOD BY AUTOMATED COUNT: 15.4 % (ref 11.5–14.5)
EST. GFR  (AFRICAN AMERICAN): >60 ML/MIN/1.73 M^2
EST. GFR  (NON AFRICAN AMERICAN): >60 ML/MIN/1.73 M^2
GLUCOSE SERPL-MCNC: 84 MG/DL (ref 70–110)
HCT VFR BLD AUTO: 29.5 % (ref 40–54)
HGB BLD-MCNC: 9 G/DL (ref 14–18)
IMM GRANULOCYTES # BLD AUTO: 0.02 K/UL (ref 0–0.04)
IMM GRANULOCYTES NFR BLD AUTO: 0.2 % (ref 0–0.5)
LYMPHOCYTES # BLD AUTO: 1.2 K/UL (ref 1–4.8)
LYMPHOCYTES NFR BLD: 13.7 % (ref 18–48)
MAGNESIUM SERPL-MCNC: 1.8 MG/DL (ref 1.6–2.6)
MCH RBC QN AUTO: 29.4 PG (ref 27–31)
MCHC RBC AUTO-ENTMCNC: 30.5 G/DL (ref 32–36)
MCV RBC AUTO: 96 FL (ref 82–98)
MONOCYTES # BLD AUTO: 0.8 K/UL (ref 0.3–1)
MONOCYTES NFR BLD: 9.1 % (ref 4–15)
NEUTROPHILS # BLD AUTO: 6.1 K/UL (ref 1.8–7.7)
NEUTROPHILS NFR BLD: 71.1 % (ref 38–73)
NRBC BLD-RTO: 0 /100 WBC
PHOSPHATE SERPL-MCNC: 4 MG/DL (ref 2.7–4.5)
PLATELET # BLD AUTO: 380 K/UL (ref 150–450)
PMV BLD AUTO: 9.9 FL (ref 9.2–12.9)
POTASSIUM SERPL-SCNC: 4.3 MMOL/L (ref 3.5–5.1)
RBC # BLD AUTO: 3.06 M/UL (ref 4.6–6.2)
SODIUM SERPL-SCNC: 136 MMOL/L (ref 136–145)
WBC # BLD AUTO: 8.64 K/UL (ref 3.9–12.7)

## 2021-12-08 PROCEDURE — 11000001 HC ACUTE MED/SURG PRIVATE ROOM: Mod: HCNC

## 2021-12-08 PROCEDURE — 94640 AIRWAY INHALATION TREATMENT: CPT | Mod: HCNC

## 2021-12-08 PROCEDURE — 83735 ASSAY OF MAGNESIUM: CPT | Mod: HCNC | Performed by: NURSE PRACTITIONER

## 2021-12-08 PROCEDURE — 63700000 PHARM REV CODE 250 ALT 637 W/O HCPCS: Mod: HCNC | Performed by: INTERNAL MEDICINE

## 2021-12-08 PROCEDURE — 25000003 PHARM REV CODE 250: Mod: HCNC | Performed by: NURSE PRACTITIONER

## 2021-12-08 PROCEDURE — 80048 BASIC METABOLIC PNL TOTAL CA: CPT | Mod: HCNC | Performed by: NURSE PRACTITIONER

## 2021-12-08 PROCEDURE — 25000003 PHARM REV CODE 250: Mod: HCNC | Performed by: FAMILY MEDICINE

## 2021-12-08 PROCEDURE — 36415 COLL VENOUS BLD VENIPUNCTURE: CPT | Mod: HCNC | Performed by: NURSE PRACTITIONER

## 2021-12-08 PROCEDURE — 94761 N-INVAS EAR/PLS OXIMETRY MLT: CPT | Mod: HCNC

## 2021-12-08 PROCEDURE — 84100 ASSAY OF PHOSPHORUS: CPT | Mod: HCNC | Performed by: NURSE PRACTITIONER

## 2021-12-08 PROCEDURE — 85025 COMPLETE CBC W/AUTO DIFF WBC: CPT | Mod: HCNC | Performed by: NURSE PRACTITIONER

## 2021-12-08 PROCEDURE — 63600175 PHARM REV CODE 636 W HCPCS: Mod: HCNC | Performed by: NURSE PRACTITIONER

## 2021-12-08 PROCEDURE — 25000242 PHARM REV CODE 250 ALT 637 W/ HCPCS: Mod: HCNC

## 2021-12-08 RX ADMIN — METOPROLOL SUCCINATE 100 MG: 50 TABLET, EXTENDED RELEASE ORAL at 09:12

## 2021-12-08 RX ADMIN — FLUTICASONE FUROATE AND VILANTEROL TRIFENATATE 1 PUFF: 100; 25 POWDER RESPIRATORY (INHALATION) at 08:12

## 2021-12-08 RX ADMIN — LOSARTAN POTASSIUM 100 MG: 50 TABLET, FILM COATED ORAL at 08:12

## 2021-12-08 RX ADMIN — AZITHROMYCIN MONOHYDRATE 500 MG: 250 TABLET ORAL at 08:12

## 2021-12-08 RX ADMIN — CEFTRIAXONE SODIUM 1 G: 1 INJECTION, POWDER, FOR SOLUTION INTRAMUSCULAR; INTRAVENOUS at 03:12

## 2021-12-08 RX ADMIN — AMLODIPINE BESYLATE 10 MG: 5 TABLET ORAL at 08:12

## 2021-12-08 RX ADMIN — ATORVASTATIN CALCIUM 20 MG: 20 TABLET, FILM COATED ORAL at 09:12

## 2021-12-09 VITALS
SYSTOLIC BLOOD PRESSURE: 137 MMHG | HEIGHT: 66 IN | OXYGEN SATURATION: 90 % | TEMPERATURE: 99 F | WEIGHT: 162.69 LBS | RESPIRATION RATE: 18 BRPM | BODY MASS INDEX: 26.14 KG/M2 | HEART RATE: 106 BPM | DIASTOLIC BLOOD PRESSURE: 69 MMHG

## 2021-12-09 LAB
ANION GAP SERPL CALC-SCNC: 7 MMOL/L (ref 8–16)
BASOPHILS # BLD AUTO: 0.03 K/UL (ref 0–0.2)
BASOPHILS NFR BLD: 0.3 % (ref 0–1.9)
BUN SERPL-MCNC: 15 MG/DL (ref 8–23)
CALCIUM SERPL-MCNC: 8.3 MG/DL (ref 8.7–10.5)
CHLORIDE SERPL-SCNC: 105 MMOL/L (ref 95–110)
CO2 SERPL-SCNC: 24 MMOL/L (ref 23–29)
CREAT SERPL-MCNC: 0.8 MG/DL (ref 0.5–1.4)
DIFFERENTIAL METHOD: ABNORMAL
EOSINOPHIL # BLD AUTO: 0.4 K/UL (ref 0–0.5)
EOSINOPHIL NFR BLD: 4.5 % (ref 0–8)
ERYTHROCYTE [DISTWIDTH] IN BLOOD BY AUTOMATED COUNT: 15.4 % (ref 11.5–14.5)
EST. GFR  (AFRICAN AMERICAN): >60 ML/MIN/1.73 M^2
EST. GFR  (NON AFRICAN AMERICAN): >60 ML/MIN/1.73 M^2
GLUCOSE SERPL-MCNC: 90 MG/DL (ref 70–110)
HCT VFR BLD AUTO: 30.9 % (ref 40–54)
HGB BLD-MCNC: 9.5 G/DL (ref 14–18)
IMM GRANULOCYTES # BLD AUTO: 0.03 K/UL (ref 0–0.04)
IMM GRANULOCYTES NFR BLD AUTO: 0.3 % (ref 0–0.5)
L PNEUMO AG UR QL IA: NEGATIVE
LYMPHOCYTES # BLD AUTO: 1.3 K/UL (ref 1–4.8)
LYMPHOCYTES NFR BLD: 14.4 % (ref 18–48)
MAGNESIUM SERPL-MCNC: 1.7 MG/DL (ref 1.6–2.6)
MCH RBC QN AUTO: 29.1 PG (ref 27–31)
MCHC RBC AUTO-ENTMCNC: 30.7 G/DL (ref 32–36)
MCV RBC AUTO: 95 FL (ref 82–98)
MONOCYTES # BLD AUTO: 0.8 K/UL (ref 0.3–1)
MONOCYTES NFR BLD: 8.5 % (ref 4–15)
NEUTROPHILS # BLD AUTO: 6.6 K/UL (ref 1.8–7.7)
NEUTROPHILS NFR BLD: 72 % (ref 38–73)
NRBC BLD-RTO: 0 /100 WBC
PHOSPHATE SERPL-MCNC: 3.9 MG/DL (ref 2.7–4.5)
PLATELET # BLD AUTO: 367 K/UL (ref 150–450)
PMV BLD AUTO: 9.7 FL (ref 9.2–12.9)
POTASSIUM SERPL-SCNC: 4.2 MMOL/L (ref 3.5–5.1)
RBC # BLD AUTO: 3.26 M/UL (ref 4.6–6.2)
SODIUM SERPL-SCNC: 136 MMOL/L (ref 136–145)
WBC # BLD AUTO: 9.17 K/UL (ref 3.9–12.7)

## 2021-12-09 PROCEDURE — 63600175 PHARM REV CODE 636 W HCPCS: Mod: HCNC | Performed by: NURSE PRACTITIONER

## 2021-12-09 PROCEDURE — 63700000 PHARM REV CODE 250 ALT 637 W/O HCPCS: Mod: HCNC | Performed by: INTERNAL MEDICINE

## 2021-12-09 PROCEDURE — 94761 N-INVAS EAR/PLS OXIMETRY MLT: CPT | Mod: HCNC

## 2021-12-09 PROCEDURE — 25000242 PHARM REV CODE 250 ALT 637 W/ HCPCS: Mod: HCNC

## 2021-12-09 PROCEDURE — 85025 COMPLETE CBC W/AUTO DIFF WBC: CPT | Mod: HCNC | Performed by: NURSE PRACTITIONER

## 2021-12-09 PROCEDURE — 36415 COLL VENOUS BLD VENIPUNCTURE: CPT | Mod: HCNC | Performed by: NURSE PRACTITIONER

## 2021-12-09 PROCEDURE — 94640 AIRWAY INHALATION TREATMENT: CPT | Mod: HCNC

## 2021-12-09 PROCEDURE — 84100 ASSAY OF PHOSPHORUS: CPT | Mod: HCNC | Performed by: NURSE PRACTITIONER

## 2021-12-09 PROCEDURE — 80048 BASIC METABOLIC PNL TOTAL CA: CPT | Mod: HCNC | Performed by: NURSE PRACTITIONER

## 2021-12-09 PROCEDURE — 63600175 PHARM REV CODE 636 W HCPCS: Mod: HCNC

## 2021-12-09 PROCEDURE — 83735 ASSAY OF MAGNESIUM: CPT | Mod: HCNC | Performed by: NURSE PRACTITIONER

## 2021-12-09 PROCEDURE — 25000003 PHARM REV CODE 250: Mod: HCNC | Performed by: NURSE PRACTITIONER

## 2021-12-09 RX ORDER — METHYLPREDNISOLONE 4 MG/1
TABLET ORAL
Qty: 21 EACH | Refills: 0 | Status: SHIPPED | OUTPATIENT
Start: 2021-12-09 | End: 2021-12-30

## 2021-12-09 RX ORDER — AZITHROMYCIN 500 MG/1
500 TABLET, FILM COATED ORAL DAILY
Qty: 3 TABLET | Refills: 0 | Status: SHIPPED | OUTPATIENT
Start: 2021-12-09 | End: 2021-12-12

## 2021-12-09 RX ORDER — PREDNISONE 20 MG/1
40 TABLET ORAL ONCE
Status: COMPLETED | OUTPATIENT
Start: 2021-12-09 | End: 2021-12-09

## 2021-12-09 RX ORDER — CEFDINIR 300 MG/1
300 CAPSULE ORAL 2 TIMES DAILY
Qty: 14 CAPSULE | Refills: 0 | Status: SHIPPED | OUTPATIENT
Start: 2021-12-09 | End: 2021-12-16

## 2021-12-09 RX ORDER — IPRATROPIUM BROMIDE AND ALBUTEROL SULFATE 2.5; .5 MG/3ML; MG/3ML
3 SOLUTION RESPIRATORY (INHALATION) EVERY 4 HOURS
Status: DISCONTINUED | OUTPATIENT
Start: 2021-12-09 | End: 2021-12-09 | Stop reason: HOSPADM

## 2021-12-09 RX ADMIN — AMLODIPINE BESYLATE 10 MG: 5 TABLET ORAL at 09:12

## 2021-12-09 RX ADMIN — PREDNISONE 40 MG: 20 TABLET ORAL at 11:12

## 2021-12-09 RX ADMIN — AZITHROMYCIN MONOHYDRATE 500 MG: 250 TABLET ORAL at 09:12

## 2021-12-09 RX ADMIN — FLUTICASONE FUROATE AND VILANTEROL TRIFENATATE 1 PUFF: 100; 25 POWDER RESPIRATORY (INHALATION) at 08:12

## 2021-12-09 RX ADMIN — IPRATROPIUM BROMIDE AND ALBUTEROL SULFATE 3 ML: .5; 3 SOLUTION RESPIRATORY (INHALATION) at 10:12

## 2021-12-09 RX ADMIN — CEFTRIAXONE SODIUM 1 G: 1 INJECTION, POWDER, FOR SOLUTION INTRAMUSCULAR; INTRAVENOUS at 03:12

## 2021-12-09 RX ADMIN — LOSARTAN POTASSIUM 100 MG: 50 TABLET, FILM COATED ORAL at 09:12

## 2021-12-09 RX ADMIN — IPRATROPIUM BROMIDE AND ALBUTEROL SULFATE 3 ML: .5; 3 SOLUTION RESPIRATORY (INHALATION) at 03:12

## 2021-12-11 LAB
BACTERIA BLD CULT: NORMAL
BACTERIA BLD CULT: NORMAL

## 2021-12-13 ENCOUNTER — OFFICE VISIT (OUTPATIENT)
Dept: FAMILY MEDICINE | Facility: CLINIC | Age: 71
End: 2021-12-13
Payer: MEDICARE

## 2021-12-13 ENCOUNTER — PATIENT OUTREACH (OUTPATIENT)
Dept: ADMINISTRATIVE | Facility: OTHER | Age: 71
End: 2021-12-13
Payer: MEDICARE

## 2021-12-13 VITALS
SYSTOLIC BLOOD PRESSURE: 150 MMHG | BODY MASS INDEX: 26.22 KG/M2 | WEIGHT: 163.13 LBS | OXYGEN SATURATION: 94 % | DIASTOLIC BLOOD PRESSURE: 70 MMHG | HEIGHT: 66 IN | HEART RATE: 91 BPM

## 2021-12-13 DIAGNOSIS — Z86.16 PERSONAL HISTORY OF COVID-19: ICD-10-CM

## 2021-12-13 DIAGNOSIS — J47.0 BRONCHIECTASIS WITH ACUTE LOWER RESPIRATORY INFECTION: ICD-10-CM

## 2021-12-13 DIAGNOSIS — J44.89 ASTHMA WITH COPD: ICD-10-CM

## 2021-12-13 DIAGNOSIS — I10 ESSENTIAL HYPERTENSION: ICD-10-CM

## 2021-12-13 DIAGNOSIS — J30.89 NON-SEASONAL ALLERGIC RHINITIS, UNSPECIFIED TRIGGER: ICD-10-CM

## 2021-12-13 DIAGNOSIS — D53.9 MACROCYTIC ANEMIA: ICD-10-CM

## 2021-12-13 DIAGNOSIS — E55.9 VITAMIN D DEFICIENCY: ICD-10-CM

## 2021-12-13 DIAGNOSIS — J32.4 CHRONIC PANSINUSITIS: Chronic | ICD-10-CM

## 2021-12-13 DIAGNOSIS — Z87.09 H/O PLEURAL EMPYEMA: ICD-10-CM

## 2021-12-13 DIAGNOSIS — J18.9 MULTIFOCAL PNEUMONIA: Primary | ICD-10-CM

## 2021-12-13 DIAGNOSIS — R73.03 PREDIABETES: Chronic | ICD-10-CM

## 2021-12-13 DIAGNOSIS — J84.10 LUNG GRANULOMA: ICD-10-CM

## 2021-12-13 DIAGNOSIS — Z23 NEEDS FLU SHOT: ICD-10-CM

## 2021-12-13 DIAGNOSIS — Z79.899 MEDICATION MANAGEMENT: ICD-10-CM

## 2021-12-13 DIAGNOSIS — E66.3 OVERWEIGHT (BMI 25.0-29.9): ICD-10-CM

## 2021-12-13 DIAGNOSIS — I70.0 AORTIC ATHEROSCLEROSIS: ICD-10-CM

## 2021-12-13 DIAGNOSIS — E78.2 MIXED HYPERLIPIDEMIA: Chronic | ICD-10-CM

## 2021-12-13 PROCEDURE — 99999 PR PBB SHADOW E&M-EST. PATIENT-LVL IV: CPT | Mod: PBBFAC,HCNC,, | Performed by: FAMILY MEDICINE

## 2021-12-13 PROCEDURE — 99214 OFFICE O/P EST MOD 30 MIN: CPT | Mod: HCNC,S$GLB,, | Performed by: FAMILY MEDICINE

## 2021-12-13 PROCEDURE — 99214 PR OFFICE/OUTPT VISIT, EST, LEVL IV, 30-39 MIN: ICD-10-PCS | Mod: HCNC,S$GLB,, | Performed by: FAMILY MEDICINE

## 2021-12-13 PROCEDURE — 99499 UNLISTED E&M SERVICE: CPT | Mod: HCNC,S$GLB,, | Performed by: FAMILY MEDICINE

## 2021-12-13 PROCEDURE — 4010F PR ACE/ARB THEARPY RXD/TAKEN: ICD-10-PCS | Mod: HCNC,CPTII,S$GLB, | Performed by: FAMILY MEDICINE

## 2021-12-13 PROCEDURE — 99499 RISK ADDL DX/OHS AUDIT: ICD-10-PCS | Mod: HCNC,S$GLB,, | Performed by: FAMILY MEDICINE

## 2021-12-13 PROCEDURE — 4010F ACE/ARB THERAPY RXD/TAKEN: CPT | Mod: HCNC,CPTII,S$GLB, | Performed by: FAMILY MEDICINE

## 2021-12-13 PROCEDURE — 99999 PR PBB SHADOW E&M-EST. PATIENT-LVL IV: ICD-10-PCS | Mod: PBBFAC,HCNC,, | Performed by: FAMILY MEDICINE

## 2021-12-13 RX ORDER — FLUTICASONE PROPIONATE AND SALMETEROL 500; 50 UG/1; UG/1
1 POWDER RESPIRATORY (INHALATION) 2 TIMES DAILY
Qty: 180 EACH | Refills: 4 | Status: SHIPPED | OUTPATIENT
Start: 2021-12-13 | End: 2022-10-03 | Stop reason: ALTCHOICE

## 2021-12-13 RX ORDER — ALBUTEROL SULFATE 90 UG/1
2 AEROSOL, METERED RESPIRATORY (INHALATION) EVERY 4 HOURS PRN
Qty: 18 G | Refills: 11 | Status: SHIPPED | OUTPATIENT
Start: 2021-12-13

## 2022-01-12 ENCOUNTER — HOSPITAL ENCOUNTER (INPATIENT)
Facility: HOSPITAL | Age: 72
LOS: 2 days | Discharge: HOME HEALTH | DRG: 177 | End: 2022-01-14
Attending: EMERGENCY MEDICINE | Admitting: FAMILY MEDICINE
Payer: MEDICARE

## 2022-01-12 DIAGNOSIS — R06.02 SHORTNESS OF BREATH: ICD-10-CM

## 2022-01-12 DIAGNOSIS — R07.9 CHEST PAIN: ICD-10-CM

## 2022-01-12 DIAGNOSIS — J12.82 PNEUMONIA DUE TO COVID-19 VIRUS: Primary | ICD-10-CM

## 2022-01-12 DIAGNOSIS — J18.9 MULTIFOCAL PNEUMONIA: ICD-10-CM

## 2022-01-12 DIAGNOSIS — J96.01 ACUTE RESPIRATORY FAILURE WITH HYPOXIA: ICD-10-CM

## 2022-01-12 DIAGNOSIS — J96.01 ACUTE HYPOXEMIC RESPIRATORY FAILURE: ICD-10-CM

## 2022-01-12 DIAGNOSIS — U07.1 PNEUMONIA DUE TO COVID-19 VIRUS: Primary | ICD-10-CM

## 2022-01-12 PROBLEM — D64.9 ANEMIA: Status: ACTIVE | Noted: 2020-03-13

## 2022-01-12 LAB
ALBUMIN SERPL BCP-MCNC: 2.5 G/DL (ref 3.5–5.2)
ALLENS TEST: ABNORMAL
ALP SERPL-CCNC: 75 U/L (ref 55–135)
ALT SERPL W/O P-5'-P-CCNC: 23 U/L (ref 10–44)
ANION GAP SERPL CALC-SCNC: 11 MMOL/L (ref 8–16)
APTT BLDCRRT: 25.3 SEC (ref 21–32)
AST SERPL-CCNC: 22 U/L (ref 10–40)
BASOPHILS # BLD AUTO: 0.01 K/UL (ref 0–0.2)
BASOPHILS NFR BLD: 0.1 % (ref 0–1.9)
BILIRUB SERPL-MCNC: 0.4 MG/DL (ref 0.1–1)
BNP SERPL-MCNC: 58 PG/ML (ref 0–99)
BUN SERPL-MCNC: 17 MG/DL (ref 8–23)
CALCIUM SERPL-MCNC: 8.7 MG/DL (ref 8.7–10.5)
CHLORIDE SERPL-SCNC: 108 MMOL/L (ref 95–110)
CK SERPL-CCNC: 54 U/L (ref 20–200)
CO2 SERPL-SCNC: 21 MMOL/L (ref 23–29)
CREAT SERPL-MCNC: 1.1 MG/DL (ref 0.5–1.4)
CTP QC/QA: YES
CTP QC/QA: YES
D DIMER PPP IA.FEU-MCNC: 4.47 MG/L FEU
DIFFERENTIAL METHOD: ABNORMAL
EOSINOPHIL # BLD AUTO: 0.1 K/UL (ref 0–0.5)
EOSINOPHIL NFR BLD: 1.2 % (ref 0–8)
ERYTHROCYTE [DISTWIDTH] IN BLOOD BY AUTOMATED COUNT: 17.1 % (ref 11.5–14.5)
ERYTHROCYTE [SEDIMENTATION RATE] IN BLOOD BY WESTERGREN METHOD: 120 MM/HR (ref 0–10)
EST. GFR  (AFRICAN AMERICAN): >60 ML/MIN/1.73 M^2
EST. GFR  (NON AFRICAN AMERICAN): >60 ML/MIN/1.73 M^2
FERRITIN SERPL-MCNC: 1038 NG/ML (ref 20–300)
GLUCOSE SERPL-MCNC: 155 MG/DL (ref 70–110)
HCO3 UR-SCNC: 22.5 MMOL/L (ref 24–28)
HCT VFR BLD AUTO: 35 % (ref 40–54)
HCT VFR BLD CALC: 33 %PCV (ref 36–54)
HGB BLD-MCNC: 10.8 G/DL (ref 14–18)
HGB BLD-MCNC: 11 G/DL
IMM GRANULOCYTES # BLD AUTO: 0.06 K/UL (ref 0–0.04)
IMM GRANULOCYTES NFR BLD AUTO: 0.6 % (ref 0–0.5)
INR PPP: 1.2 (ref 0.8–1.2)
LACTATE SERPL-SCNC: 0.8 MMOL/L (ref 0.5–2.2)
LDH SERPL L TO P-CCNC: 191 U/L (ref 110–260)
LYMPHOCYTES # BLD AUTO: 1.1 K/UL (ref 1–4.8)
LYMPHOCYTES NFR BLD: 10.4 % (ref 18–48)
MCH RBC QN AUTO: 29.3 PG (ref 27–31)
MCHC RBC AUTO-ENTMCNC: 30.9 G/DL (ref 32–36)
MCV RBC AUTO: 95 FL (ref 82–98)
MONOCYTES # BLD AUTO: 0.6 K/UL (ref 0.3–1)
MONOCYTES NFR BLD: 5.5 % (ref 4–15)
NEUTROPHILS # BLD AUTO: 9 K/UL (ref 1.8–7.7)
NEUTROPHILS NFR BLD: 82.2 % (ref 38–73)
NRBC BLD-RTO: 0 /100 WBC
PCO2 BLDA: 33.5 MMHG (ref 35–45)
PH SMN: 7.43 [PH] (ref 7.35–7.45)
PLATELET # BLD AUTO: 500 K/UL (ref 150–450)
PMV BLD AUTO: 9.4 FL (ref 9.2–12.9)
PO2 BLDA: 77 MMHG (ref 80–100)
POC BE: -2 MMOL/L
POC MOLECULAR INFLUENZA A AGN: NEGATIVE
POC MOLECULAR INFLUENZA B AGN: NEGATIVE
POC SATURATED O2: 96 % (ref 95–100)
POC TCO2: 23 MMOL/L (ref 23–27)
POCT GLUCOSE: 163 MG/DL (ref 70–110)
POTASSIUM BLD-SCNC: 3.6 MMOL/L (ref 3.5–5.1)
POTASSIUM SERPL-SCNC: 4.1 MMOL/L (ref 3.5–5.1)
PROCALCITONIN SERPL IA-MCNC: 0.2 NG/ML
PROT SERPL-MCNC: 8.5 G/DL (ref 6–8.4)
PROTHROMBIN TIME: 12.6 SEC (ref 9–12.5)
RBC # BLD AUTO: 3.68 M/UL (ref 4.6–6.2)
SAMPLE: ABNORMAL
SARS-COV-2 RDRP RESP QL NAA+PROBE: POSITIVE
SITE: ABNORMAL
SODIUM BLD-SCNC: 142 MMOL/L (ref 136–145)
SODIUM SERPL-SCNC: 140 MMOL/L (ref 136–145)
TROPONIN I SERPL DL<=0.01 NG/ML-MCNC: <0.006 NG/ML (ref 0–0.03)
WBC # BLD AUTO: 10.89 K/UL (ref 3.9–12.7)

## 2022-01-12 PROCEDURE — 82803 BLOOD GASES ANY COMBINATION: CPT | Mod: HCNC

## 2022-01-12 PROCEDURE — 63600175 PHARM REV CODE 636 W HCPCS: Mod: HCNC

## 2022-01-12 PROCEDURE — 96375 TX/PRO/DX INJ NEW DRUG ADDON: CPT | Mod: HCNC

## 2022-01-12 PROCEDURE — 99900035 HC TECH TIME PER 15 MIN (STAT): Mod: HCNC

## 2022-01-12 PROCEDURE — 96374 THER/PROPH/DIAG INJ IV PUSH: CPT | Mod: HCNC

## 2022-01-12 PROCEDURE — 80053 COMPREHEN METABOLIC PANEL: CPT | Mod: HCNC | Performed by: EMERGENCY MEDICINE

## 2022-01-12 PROCEDURE — 36600 WITHDRAWAL OF ARTERIAL BLOOD: CPT | Mod: HCNC

## 2022-01-12 PROCEDURE — 85025 COMPLETE CBC W/AUTO DIFF WBC: CPT | Mod: HCNC | Performed by: EMERGENCY MEDICINE

## 2022-01-12 PROCEDURE — 96365 THER/PROPH/DIAG IV INF INIT: CPT | Mod: HCNC

## 2022-01-12 PROCEDURE — 25000003 PHARM REV CODE 250: Mod: HCNC

## 2022-01-12 PROCEDURE — 63600175 PHARM REV CODE 636 W HCPCS: Mod: HCNC | Performed by: EMERGENCY MEDICINE

## 2022-01-12 PROCEDURE — 83615 LACTATE (LD) (LDH) ENZYME: CPT | Mod: HCNC

## 2022-01-12 PROCEDURE — 93010 EKG 12-LEAD: ICD-10-PCS | Mod: HCNC,,, | Performed by: INTERNAL MEDICINE

## 2022-01-12 PROCEDURE — 93010 ELECTROCARDIOGRAM REPORT: CPT | Mod: HCNC,,, | Performed by: INTERNAL MEDICINE

## 2022-01-12 PROCEDURE — 83605 ASSAY OF LACTIC ACID: CPT | Mod: HCNC

## 2022-01-12 PROCEDURE — 94761 N-INVAS EAR/PLS OXIMETRY MLT: CPT | Mod: HCNC

## 2022-01-12 PROCEDURE — 85730 THROMBOPLASTIN TIME PARTIAL: CPT | Mod: HCNC

## 2022-01-12 PROCEDURE — 83880 ASSAY OF NATRIURETIC PEPTIDE: CPT | Mod: HCNC | Performed by: EMERGENCY MEDICINE

## 2022-01-12 PROCEDURE — U0002 COVID-19 LAB TEST NON-CDC: HCPCS | Mod: HCNC | Performed by: EMERGENCY MEDICINE

## 2022-01-12 PROCEDURE — 82550 ASSAY OF CK (CPK): CPT | Mod: HCNC

## 2022-01-12 PROCEDURE — 84484 ASSAY OF TROPONIN QUANT: CPT | Mod: HCNC

## 2022-01-12 PROCEDURE — 96367 TX/PROPH/DG ADDL SEQ IV INF: CPT | Mod: HCNC

## 2022-01-12 PROCEDURE — 25000003 PHARM REV CODE 250: Mod: HCNC | Performed by: EMERGENCY MEDICINE

## 2022-01-12 PROCEDURE — 84145 PROCALCITONIN (PCT): CPT | Mod: HCNC

## 2022-01-12 PROCEDURE — 85610 PROTHROMBIN TIME: CPT | Mod: HCNC

## 2022-01-12 PROCEDURE — 85652 RBC SED RATE AUTOMATED: CPT | Mod: HCNC

## 2022-01-12 PROCEDURE — 82962 GLUCOSE BLOOD TEST: CPT | Mod: HCNC

## 2022-01-12 PROCEDURE — 27000207 HC ISOLATION: Mod: HCNC

## 2022-01-12 PROCEDURE — 93005 ELECTROCARDIOGRAM TRACING: CPT | Mod: HCNC

## 2022-01-12 PROCEDURE — 11000001 HC ACUTE MED/SURG PRIVATE ROOM: Mod: HCNC

## 2022-01-12 PROCEDURE — 85379 FIBRIN DEGRADATION QUANT: CPT | Mod: HCNC

## 2022-01-12 PROCEDURE — 99291 CRITICAL CARE FIRST HOUR: CPT | Mod: 25,HCNC

## 2022-01-12 PROCEDURE — 82728 ASSAY OF FERRITIN: CPT | Mod: HCNC

## 2022-01-12 RX ORDER — TALC
6 POWDER (GRAM) TOPICAL NIGHTLY PRN
Status: DISCONTINUED | OUTPATIENT
Start: 2022-01-12 | End: 2022-01-12 | Stop reason: SDUPTHER

## 2022-01-12 RX ORDER — ASCORBIC ACID 500 MG
500 TABLET ORAL 2 TIMES DAILY
Status: DISCONTINUED | OUTPATIENT
Start: 2022-01-12 | End: 2022-01-14 | Stop reason: HOSPADM

## 2022-01-12 RX ORDER — DEXAMETHASONE SODIUM PHOSPHATE 4 MG/ML
12 INJECTION, SOLUTION INTRA-ARTICULAR; INTRALESIONAL; INTRAMUSCULAR; INTRAVENOUS; SOFT TISSUE
Status: COMPLETED | OUTPATIENT
Start: 2022-01-12 | End: 2022-01-12

## 2022-01-12 RX ORDER — ACETAMINOPHEN 325 MG/1
650 TABLET ORAL EVERY 6 HOURS PRN
Status: DISCONTINUED | OUTPATIENT
Start: 2022-01-12 | End: 2022-01-14 | Stop reason: HOSPADM

## 2022-01-12 RX ORDER — SIMETHICONE 80 MG
1 TABLET,CHEWABLE ORAL 4 TIMES DAILY PRN
Status: DISCONTINUED | OUTPATIENT
Start: 2022-01-12 | End: 2022-01-14 | Stop reason: HOSPADM

## 2022-01-12 RX ORDER — ONDANSETRON 2 MG/ML
4 INJECTION INTRAMUSCULAR; INTRAVENOUS EVERY 8 HOURS PRN
Status: DISCONTINUED | OUTPATIENT
Start: 2022-01-12 | End: 2022-01-14 | Stop reason: HOSPADM

## 2022-01-12 RX ORDER — TALC
6 POWDER (GRAM) TOPICAL NIGHTLY PRN
Status: DISCONTINUED | OUTPATIENT
Start: 2022-01-12 | End: 2022-01-14 | Stop reason: HOSPADM

## 2022-01-12 RX ORDER — LOSARTAN POTASSIUM 50 MG/1
100 TABLET ORAL DAILY
Status: DISCONTINUED | OUTPATIENT
Start: 2022-01-13 | End: 2022-01-14 | Stop reason: HOSPADM

## 2022-01-12 RX ORDER — SODIUM CHLORIDE 0.9 % (FLUSH) 0.9 %
10 SYRINGE (ML) INJECTION
Status: DISCONTINUED | OUTPATIENT
Start: 2022-01-12 | End: 2022-01-14 | Stop reason: HOSPADM

## 2022-01-12 RX ORDER — ENOXAPARIN SODIUM 100 MG/ML
1 INJECTION SUBCUTANEOUS 2 TIMES DAILY
Status: DISCONTINUED | OUTPATIENT
Start: 2022-01-12 | End: 2022-01-14 | Stop reason: HOSPADM

## 2022-01-12 RX ORDER — LEVOFLOXACIN 5 MG/ML
750 INJECTION, SOLUTION INTRAVENOUS
Status: DISCONTINUED | OUTPATIENT
Start: 2022-01-12 | End: 2022-01-14 | Stop reason: HOSPADM

## 2022-01-12 RX ORDER — ATORVASTATIN CALCIUM 20 MG/1
20 TABLET, FILM COATED ORAL NIGHTLY
Status: DISCONTINUED | OUTPATIENT
Start: 2022-01-12 | End: 2022-01-14 | Stop reason: HOSPADM

## 2022-01-12 RX ORDER — ALBUTEROL SULFATE 90 UG/1
2 AEROSOL, METERED RESPIRATORY (INHALATION) EVERY 6 HOURS
Status: DISCONTINUED | OUTPATIENT
Start: 2022-01-12 | End: 2022-01-14 | Stop reason: HOSPADM

## 2022-01-12 RX ORDER — FLUTICASONE FUROATE AND VILANTEROL 200; 25 UG/1; UG/1
1 POWDER RESPIRATORY (INHALATION) DAILY
Refills: 4 | Status: DISCONTINUED | OUTPATIENT
Start: 2022-01-12 | End: 2022-01-14 | Stop reason: HOSPADM

## 2022-01-12 RX ORDER — METOPROLOL SUCCINATE 50 MG/1
100 TABLET, EXTENDED RELEASE ORAL DAILY
Status: DISCONTINUED | OUTPATIENT
Start: 2022-01-13 | End: 2022-01-14 | Stop reason: HOSPADM

## 2022-01-12 RX ORDER — CETIRIZINE HYDROCHLORIDE 5 MG/1
5 TABLET ORAL NIGHTLY
Status: DISCONTINUED | OUTPATIENT
Start: 2022-01-12 | End: 2022-01-14 | Stop reason: HOSPADM

## 2022-01-12 RX ORDER — IBUPROFEN 200 MG
16 TABLET ORAL
Status: DISCONTINUED | OUTPATIENT
Start: 2022-01-12 | End: 2022-01-14 | Stop reason: HOSPADM

## 2022-01-12 RX ORDER — LEVOFLOXACIN 5 MG/ML
750 INJECTION, SOLUTION INTRAVENOUS
Status: DISCONTINUED | OUTPATIENT
Start: 2022-01-12 | End: 2022-01-12

## 2022-01-12 RX ORDER — LOPERAMIDE HYDROCHLORIDE 2 MG/1
2 CAPSULE ORAL EVERY 6 HOURS PRN
Status: DISCONTINUED | OUTPATIENT
Start: 2022-01-12 | End: 2022-01-14 | Stop reason: HOSPADM

## 2022-01-12 RX ORDER — GUAIFENESIN/DEXTROMETHORPHAN 100-10MG/5
10 SYRUP ORAL EVERY 4 HOURS PRN
Status: DISCONTINUED | OUTPATIENT
Start: 2022-01-12 | End: 2022-01-14 | Stop reason: HOSPADM

## 2022-01-12 RX ORDER — MAG HYDROX/ALUMINUM HYD/SIMETH 200-200-20
30 SUSPENSION, ORAL (FINAL DOSE FORM) ORAL 4 TIMES DAILY PRN
Status: DISCONTINUED | OUTPATIENT
Start: 2022-01-12 | End: 2022-01-14 | Stop reason: HOSPADM

## 2022-01-12 RX ORDER — IBUPROFEN 200 MG
24 TABLET ORAL
Status: DISCONTINUED | OUTPATIENT
Start: 2022-01-12 | End: 2022-01-14 | Stop reason: HOSPADM

## 2022-01-12 RX ORDER — FLUTICASONE PROPIONATE 50 MCG
2 SPRAY, SUSPENSION (ML) NASAL DAILY
Status: DISCONTINUED | OUTPATIENT
Start: 2022-01-13 | End: 2022-01-14 | Stop reason: HOSPADM

## 2022-01-12 RX ORDER — GLUCAGON 1 MG
1 KIT INJECTION
Status: DISCONTINUED | OUTPATIENT
Start: 2022-01-12 | End: 2022-01-14 | Stop reason: HOSPADM

## 2022-01-12 RX ORDER — PROCHLORPERAZINE EDISYLATE 5 MG/ML
5 INJECTION INTRAMUSCULAR; INTRAVENOUS EVERY 6 HOURS PRN
Status: DISCONTINUED | OUTPATIENT
Start: 2022-01-12 | End: 2022-01-14 | Stop reason: HOSPADM

## 2022-01-12 RX ORDER — AMLODIPINE BESYLATE 5 MG/1
10 TABLET ORAL DAILY
Status: DISCONTINUED | OUTPATIENT
Start: 2022-01-13 | End: 2022-01-14 | Stop reason: HOSPADM

## 2022-01-12 RX ADMIN — DEXAMETHASONE SODIUM PHOSPHATE 12 MG: 4 INJECTION, SOLUTION INTRA-ARTICULAR; INTRALESIONAL; INTRAMUSCULAR; INTRAVENOUS; SOFT TISSUE at 03:01

## 2022-01-12 RX ADMIN — GUAIFENESIN AND DEXTROMETHORPHAN 10 ML: 100; 10 SYRUP ORAL at 08:01

## 2022-01-12 RX ADMIN — REMDESIVIR 200 MG: 100 INJECTION, POWDER, LYOPHILIZED, FOR SOLUTION INTRAVENOUS at 04:01

## 2022-01-12 RX ADMIN — ATORVASTATIN CALCIUM 20 MG: 20 TABLET, FILM COATED ORAL at 08:01

## 2022-01-12 RX ADMIN — CETIRIZINE HYDROCHLORIDE 5 MG: 5 TABLET, FILM COATED ORAL at 08:01

## 2022-01-12 RX ADMIN — ENOXAPARIN SODIUM 70 MG: 80 INJECTION SUBCUTANEOUS at 08:01

## 2022-01-12 RX ADMIN — THERA TABS 1 TABLET: TAB at 04:01

## 2022-01-12 RX ADMIN — LEVOFLOXACIN 750 MG: 5 INJECTION, SOLUTION INTRAVENOUS at 05:01

## 2022-01-12 RX ADMIN — MELATONIN TAB 3 MG 6 MG: 3 TAB at 08:01

## 2022-01-12 RX ADMIN — OXYCODONE HYDROCHLORIDE AND ACETAMINOPHEN 500 MG: 500 TABLET ORAL at 08:01

## 2022-01-12 NOTE — ASSESSMENT & PLAN NOTE
Patient with Hypoxic respiratory failure which is Acute on Chronic which is not related to a recent procedure.  he is not on home oxygen.   Supplemental oxygen was provided and noted- Nasal Cannula 3 LPM with SpO2 94%  Signs/symptoms of respiratory failure include- tachypnea, increased work of breathing and use of accessory muscles. Contributing diagnoses includes - COPD and Pneumonia Labs and images were reviewed. Patient Has recent ABG, which has been reviewed..   Will treat underlying causes and adjust management of respiratory failure as follows-  -albuterol inhaler q6hr for SOB/wheezing, supplemental oxygen for SpO2 <90%, initiated empiric antibiotics.

## 2022-01-12 NOTE — ASSESSMENT & PLAN NOTE
Multifocal pneumonia  -patient was admitted one month ago for multifocal PNA and was on azithromycin and rocephin.  - COVID positive 1/12/2022; initiated on protocol  - Isolation: Airborne/Droplet. Surgical mask on patient. Notify Infection Control  - CXR with perihilar airspace opacities and interstitial prominence suspicious for pulmonary edema or multifocal pneumonia, requiring O2  - Telemetry  - initiated on dexamethasone, will continue for 10d course  - initiated on remdesivir x5d; daily CMP  - initiated on Levofloxacin 750mg IV daily x5d due to concern for superimposed CAP  - CRP pending; D-dimer 4.47  -Covid Labs remarkable for Ferritin 1,038, Sed rate 120  - Order RT consult via Respiratory Communication for COVID Protocols  - Incentive Spirometer Q4h  - Continuous Pulse Oximetry, goal SpO2 92-96%  - supplemental O2, will wean as tolerated  - Albuterol INH Q6h scheduled & PRN  - MVI & ascorbic acid 500mg PO BID  -Anti-tussives for cough  - Acetaminophen Q6hr PRN fever/headache  - Loperamide PRN viral diarrhea  - VTE PPx: enoxaparin   - PT/OT for weakness  - If deterioration, may warrant trial of NIPPV in neg pressure room or immediate ICU consult

## 2022-01-12 NOTE — ASSESSMENT & PLAN NOTE
Patient's anemia is currently controlled. Has not received any PRBCs to date.. Etiology likely d/t chronic pulmonary disease  Current CBC reviewed-   Lab Results   Component Value Date    HGB 10.8 (L) 01/12/2022    HCT 33 (L) 01/12/2022     Monitor serial CBC and transfuse if patient becomes hemodynamically unstable, symptomatic or H/H drops below 7/21.

## 2022-01-12 NOTE — ASSESSMENT & PLAN NOTE
Last LDL was   Lab Results   Component Value Date    LDLCALC 91.4 05/01/2021       Patient is chronically on statin.will continue for now. Monitor clinically.

## 2022-01-12 NOTE — ASSESSMENT & PLAN NOTE
Chronic, controlled.  Latest blood pressure and vitals reviewed-   Temp:  [98.7 °F (37.1 °C)-98.9 °F (37.2 °C)]   Pulse:  []   Resp:  [24]   BP: (132-162)/(73-77)   SpO2:  [88 %-95 %] .   Home meds for hypertension were reviewed and noted below.   Hypertension Medications             amLODIPine (NORVASC) 10 MG tablet TAKE 1 TABLET BY MOUTH EVERY DAY    losartan (COZAAR) 100 MG tablet TAKE 1 TABLET(100 MG) BY MOUTH EVERY DAY    metoprolol succinate (TOPROL-XL) 100 MG 24 hr tablet TAKE 1 TABLET(100 MG) BY MOUTH EVERY DAY          While in the hospital, will manage blood pressure as follows; Continue home antihypertensive regimen    Will utilize p.r.n. blood pressure medication only if patient's blood pressure greater than  180/110 and he develops symptoms such as worsening chest pain or shortness of breath.

## 2022-01-12 NOTE — ED PROVIDER NOTES
Encounter Date: 1/12/2022       History     Chief Complaint   Patient presents with    Shortness of Breath     Pt complains of Increased SOB over the week,  + cough  hx of asthma sats= 88% on room air, ambulatory  sats= 83%, denies fever      HPI   This is a 71 y.o. male who has a past medical history of Anxiety (6/29/2016), Asthma with chronic obstructive pulmonary disease (COPD), Benign essential hypertension (4/5/2016), Hyperlipidemia (4/13/2016), Interstitial pneumonitis (4/13/2016), and Other specified anemias (3/13/2020).     The patient presents to the Emergency Department with shortness of breath x1 week, assoc with cough, weakness. No CP, congestion, n/v/d. Aggravated by exertion and no allev factors. Pt states he just had pneumonia about a month ago.    Review of patient's allergies indicates:  No Known Allergies  Past Medical History:   Diagnosis Date    Anxiety 6/29/2016    Asthma with chronic obstructive pulmonary disease (COPD)     Benign essential hypertension 4/5/2016    Hyperlipidemia 4/13/2016    Interstitial pneumonitis 4/13/2016    Other specified anemias 3/13/2020     Past Surgical History:   Procedure Laterality Date    ADENOIDECTOMY      COLONOSCOPY N/A 6/1/2016    Procedure: COLONOSCOPY;  Surgeon: Meme Mills MD;  Location[facility];  Service: Endoscopy;  Laterality: N/A;    FINGER SURGERY      15 years ago    TONSILLECTOMY       Family History   Problem Relation Age of Onset    No Known Problems Mother     No Known Problems Father     No Known Problems Daughter      Social History     Tobacco Use    Smoking status: Former Smoker     Types: Cigars    Smokeless tobacco: Former User     Quit date: 5/12/1996   Substance Use Topics    Alcohol use: No    Drug use: No     Review of Systems   All other systems reviewed and are negative.      Physical Exam     Initial Vitals [01/12/22 1207]   BP Pulse Resp Temp SpO2   (!) 162/73 101 (!) 24 98.9 °F (37.2 °C) (!) 88 %       MAP       --         Physical Exam    Nursing note and vitals reviewed.  Constitutional: He appears well-developed and well-nourished. He appears distressed (Respiratory, mild).   HENT:   Head: Normocephalic and atraumatic.   Mouth/Throat: Oropharynx is clear and moist.   Eyes: Conjunctivae are normal. Pupils are equal, round, and reactive to light.   Neck: Neck supple.   Normal range of motion.  Cardiovascular: Normal rate, regular rhythm, normal heart sounds and intact distal pulses.   Pulmonary/Chest: He is in respiratory distress. He has wheezes. He has rales.   Scattered wheezing and rales   Abdominal: Abdomen is soft. Bowel sounds are normal. He exhibits no distension. There is no abdominal tenderness.   Musculoskeletal:         General: No tenderness or edema. Normal range of motion.      Cervical back: Normal range of motion and neck supple.     Lymphadenopathy:     He has no cervical adenopathy.   Neurological: He is alert and oriented to person, place, and time.   Skin: Skin is warm and dry. Capillary refill takes less than 2 seconds. No rash noted. No erythema.   Psychiatric: He has a normal mood and affect. Thought content normal.         ED Course   Procedures  Labs Reviewed   CBC W/ AUTO DIFFERENTIAL - Abnormal; Notable for the following components:       Result Value    RBC 3.68 (*)     Hemoglobin 10.8 (*)     Hematocrit 35.0 (*)     MCHC 30.9 (*)     RDW 17.1 (*)     Platelets 500 (*)     Immature Granulocytes 0.6 (*)     Gran # (ANC) 9.0 (*)     Immature Grans (Abs) 0.06 (*)     Gran % 82.2 (*)     Lymph % 10.4 (*)     All other components within normal limits   COMPREHENSIVE METABOLIC PANEL - Abnormal; Notable for the following components:    CO2 21 (*)     Glucose 155 (*)     Total Protein 8.5 (*)     Albumin 2.5 (*)     All other components within normal limits   SARS-COV-2 RDRP GENE - Abnormal; Notable for the following components:    POC Rapid COVID Positive (*)     All other components  within normal limits   ISTAT PROCEDURE - Abnormal; Notable for the following components:    POC PCO2 33.5 (*)     POC PO2 77 (*)     POC HCO3 22.5 (*)     POC Hematocrit 33 (*)     All other components within normal limits   B-TYPE NATRIURETIC PEPTIDE   POCT INFLUENZA A/B MOLECULAR     EKG Readings: (Independently Interpreted)   Initial Reading: No STEMI. Rhythm: Normal Sinus Rhythm. Heart Rate: 99. Ectopy: No Ectopy. Conduction: LAFB. ST Segments: Normal ST Segments. T Waves: Normal. Clinical Impression: Normal Sinus Rhythm and Left Ventricular Hypertrophy (LDH)       Imaging Results          X-Ray Chest AP Portable (Final result)  Result time 01/12/22 13:45:32    Final result by Darian Quintero DO (01/12/22 13:45:32)                 Impression:      Perihilar airspace opacities and interstitial prominence suspicious for pulmonary edema or multifocal pneumonia.      Electronically signed by: Darian Quintero  Date:    01/12/2022  Time:    13:45             Narrative:    EXAMINATION:  XR CHEST AP PORTABLE    CLINICAL HISTORY:  CHF;    TECHNIQUE:  Single frontal view of the chest was performed.    COMPARISON:  Chest radiograph from 12/06/2021.    FINDINGS:  The lungs are well expanded.  There are perihilar airspace opacities with interstitial prominence suspicious for pulmonary edema or multifocal pneumonia, improved in comparison with prior radiograph from 12/06/2021.  The pleural spaces are clear.    The cardiac silhouette is unremarkable.    The visualized osseous structures demonstrate degenerative changes.                              X-Rays:   Independently Interpreted Readings:   Chest X-Ray: Normal heart size. There is an infiltrate in the Left hilum and Right hilum. bilat pneumonia     Medications - No data to display  Medical Decision Making:   Initial Assessment:   Pt was seen and examined.    Pt presents with symptoms concerning for Covid -19, including cough, SOB  Other DDx: CAP, other viral syndrome,  pharyngitis, laryngitis, bronchitis, sinusitis  Covid test was POSITIVE.    Pt was hypoxic, in mild respiratory distress. Desaturation to 83% with ambulation, 88% on RA.  After 2L NC, ox sats to 94%.  Pt meets criteria for admission at this time.    Will order dexamethasone, remdesevir and admit.    Case dw [facility] NP [facility].  Clinical Tests:   Lab Tests: Ordered and Reviewed  Radiological Study: Ordered and Reviewed  Medical Tests: Ordered and Reviewed                      Attending Critical Care:   Critical Care Times:   ==============================================================  · Total Critical Care Time - exclusive of procedural time: 60 minutes.  ==============================================================  Critical Care Condition: potentially life-threatening   Critical Care Comments: Critical Care time was inclusive of direct patient care, review of previous records, interpretation of labs, imaging and ekg, as well as discussion of my impression and plan of care with the patient, family and other clinicians/consultants. This time is exclusive of any separate billable procedures and of treating other patients. Critical care was required for this patient who presented with COVID pneumonia causing hypoxic resp failure, requiring my emergent evaluation and management in the emergency department.         Clinical Impression:   Final diagnoses:  [R06.02] Shortness of breath  [U07.1, J12.82] Pneumonia due to COVID-19 virus (Primary)  [J96.01] Acute respiratory failure with hypoxia  [J18.9] Multifocal pneumonia          ED Disposition Condition    Admit               Luis Vo MD  01/12/22 2557

## 2022-01-12 NOTE — ED NOTES
71 y.o. male to ED with c.o. shortness of breath and sore throat x 1 week with progressively worsening symptoms. Patient also endorses productive cough a green sputum. Patient denies fever/chills, denies n/v/d, denies chest pain. Patient awake, alert, and oriented x 4. No apparent distress noted. VS currently stable. Patient assisted onto stretcher and changed into a gown. Patient placed on cardiac monitor, continuous pulse oximetry and automatic blood pressure cuff. Bed placed in low locked position, side rails up x 2, call light is within reach of patient orientation to room and explanation of wait provided to patient, alarms set and turned on for monitor and pulse ox, awaiting MD evaluation and orders, will continue to monitor.

## 2022-01-13 ENCOUNTER — TELEPHONE (OUTPATIENT)
Dept: FAMILY MEDICINE | Facility: CLINIC | Age: 72
End: 2022-01-13
Payer: MEDICARE

## 2022-01-13 ENCOUNTER — PATIENT OUTREACH (OUTPATIENT)
Dept: ADMINISTRATIVE | Facility: OTHER | Age: 72
End: 2022-01-13
Payer: MEDICARE

## 2022-01-13 LAB
ALBUMIN SERPL BCP-MCNC: 2.4 G/DL (ref 3.5–5.2)
ALBUMIN SERPL BCP-MCNC: 2.4 G/DL (ref 3.5–5.2)
ALP SERPL-CCNC: 77 U/L (ref 55–135)
ALP SERPL-CCNC: 77 U/L (ref 55–135)
ALT SERPL W/O P-5'-P-CCNC: 23 U/L (ref 10–44)
ALT SERPL W/O P-5'-P-CCNC: 23 U/L (ref 10–44)
ANION GAP SERPL CALC-SCNC: 9 MMOL/L (ref 8–16)
ANION GAP SERPL CALC-SCNC: 9 MMOL/L (ref 8–16)
AST SERPL-CCNC: 16 U/L (ref 10–40)
AST SERPL-CCNC: 16 U/L (ref 10–40)
BASOPHILS # BLD AUTO: 0.01 K/UL (ref 0–0.2)
BASOPHILS NFR BLD: 0.1 % (ref 0–1.9)
BILIRUB SERPL-MCNC: 0.3 MG/DL (ref 0.1–1)
BILIRUB SERPL-MCNC: 0.3 MG/DL (ref 0.1–1)
BUN SERPL-MCNC: 27 MG/DL (ref 8–23)
BUN SERPL-MCNC: 27 MG/DL (ref 8–23)
CALCIUM SERPL-MCNC: 8.8 MG/DL (ref 8.7–10.5)
CALCIUM SERPL-MCNC: 8.8 MG/DL (ref 8.7–10.5)
CHLORIDE SERPL-SCNC: 107 MMOL/L (ref 95–110)
CHLORIDE SERPL-SCNC: 107 MMOL/L (ref 95–110)
CO2 SERPL-SCNC: 20 MMOL/L (ref 23–29)
CO2 SERPL-SCNC: 20 MMOL/L (ref 23–29)
CREAT SERPL-MCNC: 1 MG/DL (ref 0.5–1.4)
CREAT SERPL-MCNC: 1 MG/DL (ref 0.5–1.4)
DIFFERENTIAL METHOD: ABNORMAL
EOSINOPHIL # BLD AUTO: 0 K/UL (ref 0–0.5)
EOSINOPHIL NFR BLD: 0 % (ref 0–8)
ERYTHROCYTE [DISTWIDTH] IN BLOOD BY AUTOMATED COUNT: 17.1 % (ref 11.5–14.5)
EST. GFR  (AFRICAN AMERICAN): >60 ML/MIN/1.73 M^2
EST. GFR  (AFRICAN AMERICAN): >60 ML/MIN/1.73 M^2
EST. GFR  (NON AFRICAN AMERICAN): >60 ML/MIN/1.73 M^2
EST. GFR  (NON AFRICAN AMERICAN): >60 ML/MIN/1.73 M^2
GLUCOSE SERPL-MCNC: 164 MG/DL (ref 70–110)
GLUCOSE SERPL-MCNC: 164 MG/DL (ref 70–110)
HCT VFR BLD AUTO: 31.9 % (ref 40–54)
HGB BLD-MCNC: 9.5 G/DL (ref 14–18)
IMM GRANULOCYTES # BLD AUTO: 0.05 K/UL (ref 0–0.04)
IMM GRANULOCYTES NFR BLD AUTO: 0.4 % (ref 0–0.5)
LYMPHOCYTES # BLD AUTO: 1.3 K/UL (ref 1–4.8)
LYMPHOCYTES NFR BLD: 10.9 % (ref 18–48)
MAGNESIUM SERPL-MCNC: 2.1 MG/DL (ref 1.6–2.6)
MCH RBC QN AUTO: 28.4 PG (ref 27–31)
MCHC RBC AUTO-ENTMCNC: 29.8 G/DL (ref 32–36)
MCV RBC AUTO: 96 FL (ref 82–98)
MONOCYTES # BLD AUTO: 0.2 K/UL (ref 0.3–1)
MONOCYTES NFR BLD: 1.9 % (ref 4–15)
NEUTROPHILS # BLD AUTO: 10.4 K/UL (ref 1.8–7.7)
NEUTROPHILS NFR BLD: 86.7 % (ref 38–73)
NRBC BLD-RTO: 0 /100 WBC
PHOSPHATE SERPL-MCNC: 3.7 MG/DL (ref 2.7–4.5)
PLATELET # BLD AUTO: 521 K/UL (ref 150–450)
PMV BLD AUTO: 10 FL (ref 9.2–12.9)
POCT GLUCOSE: 152 MG/DL (ref 70–110)
POCT GLUCOSE: 163 MG/DL (ref 70–110)
POCT GLUCOSE: 215 MG/DL (ref 70–110)
POTASSIUM SERPL-SCNC: 4.9 MMOL/L (ref 3.5–5.1)
POTASSIUM SERPL-SCNC: 4.9 MMOL/L (ref 3.5–5.1)
PROT SERPL-MCNC: 8 G/DL (ref 6–8.4)
PROT SERPL-MCNC: 8 G/DL (ref 6–8.4)
RBC # BLD AUTO: 3.34 M/UL (ref 4.6–6.2)
SODIUM SERPL-SCNC: 136 MMOL/L (ref 136–145)
SODIUM SERPL-SCNC: 136 MMOL/L (ref 136–145)
WBC # BLD AUTO: 11.99 K/UL (ref 3.9–12.7)

## 2022-01-13 PROCEDURE — 99900035 HC TECH TIME PER 15 MIN (STAT): Mod: HCNC

## 2022-01-13 PROCEDURE — 87070 CULTURE OTHR SPECIMN AEROBIC: CPT | Mod: HCNC

## 2022-01-13 PROCEDURE — 36415 COLL VENOUS BLD VENIPUNCTURE: CPT | Mod: HCNC | Performed by: EMERGENCY MEDICINE

## 2022-01-13 PROCEDURE — 94640 AIRWAY INHALATION TREATMENT: CPT | Mod: HCNC

## 2022-01-13 PROCEDURE — 94799 UNLISTED PULMONARY SVC/PX: CPT | Mod: HCNC

## 2022-01-13 PROCEDURE — 83735 ASSAY OF MAGNESIUM: CPT | Mod: HCNC

## 2022-01-13 PROCEDURE — 87186 SC STD MICRODIL/AGAR DIL: CPT | Mod: HCNC

## 2022-01-13 PROCEDURE — 25000003 PHARM REV CODE 250: Mod: HCNC | Performed by: NURSE PRACTITIONER

## 2022-01-13 PROCEDURE — 87077 CULTURE AEROBIC IDENTIFY: CPT | Mod: HCNC

## 2022-01-13 PROCEDURE — 11000001 HC ACUTE MED/SURG PRIVATE ROOM: Mod: HCNC

## 2022-01-13 PROCEDURE — 63600175 PHARM REV CODE 636 W HCPCS: Mod: HCNC

## 2022-01-13 PROCEDURE — 25000003 PHARM REV CODE 250: Mod: HCNC | Performed by: EMERGENCY MEDICINE

## 2022-01-13 PROCEDURE — 94761 N-INVAS EAR/PLS OXIMETRY MLT: CPT | Mod: HCNC

## 2022-01-13 PROCEDURE — 27000221 HC OXYGEN, UP TO 24 HOURS: Mod: HCNC

## 2022-01-13 PROCEDURE — 25000003 PHARM REV CODE 250: Mod: HCNC

## 2022-01-13 PROCEDURE — 25000242 PHARM REV CODE 250 ALT 637 W/ HCPCS: Mod: HCNC

## 2022-01-13 PROCEDURE — 84100 ASSAY OF PHOSPHORUS: CPT | Mod: HCNC

## 2022-01-13 PROCEDURE — 80053 COMPREHEN METABOLIC PANEL: CPT | Mod: HCNC | Performed by: EMERGENCY MEDICINE

## 2022-01-13 PROCEDURE — 85025 COMPLETE CBC W/AUTO DIFF WBC: CPT | Mod: HCNC

## 2022-01-13 PROCEDURE — 87205 SMEAR GRAM STAIN: CPT | Mod: HCNC

## 2022-01-13 PROCEDURE — 63600175 PHARM REV CODE 636 W HCPCS: Mod: HCNC | Performed by: EMERGENCY MEDICINE

## 2022-01-13 PROCEDURE — 27000207 HC ISOLATION: Mod: HCNC

## 2022-01-13 RX ORDER — FERROUS SULFATE, DRIED 160(50) MG
2 TABLET, EXTENDED RELEASE ORAL DAILY
Status: DISCONTINUED | OUTPATIENT
Start: 2022-01-13 | End: 2022-01-14 | Stop reason: HOSPADM

## 2022-01-13 RX ADMIN — ALBUTEROL SULFATE 2 PUFF: 90 AEROSOL, METERED RESPIRATORY (INHALATION) at 02:01

## 2022-01-13 RX ADMIN — REMDESIVIR 100 MG: 100 INJECTION, POWDER, LYOPHILIZED, FOR SOLUTION INTRAVENOUS at 09:01

## 2022-01-13 RX ADMIN — METOPROLOL SUCCINATE 100 MG: 50 TABLET, EXTENDED RELEASE ORAL at 08:01

## 2022-01-13 RX ADMIN — ENOXAPARIN SODIUM 70 MG: 80 INJECTION SUBCUTANEOUS at 08:01

## 2022-01-13 RX ADMIN — FLUTICASONE PROPIONATE 100 MCG: 50 SPRAY, METERED NASAL at 09:01

## 2022-01-13 RX ADMIN — FLUTICASONE FUROATE AND VILANTEROL TRIFENATATE 1 PUFF: 200; 25 POWDER RESPIRATORY (INHALATION) at 08:01

## 2022-01-13 RX ADMIN — LOSARTAN POTASSIUM 100 MG: 50 TABLET, FILM COATED ORAL at 08:01

## 2022-01-13 RX ADMIN — DEXAMETHASONE 6 MG: 4 TABLET ORAL at 08:01

## 2022-01-13 RX ADMIN — OXYCODONE HYDROCHLORIDE AND ACETAMINOPHEN 500 MG: 500 TABLET ORAL at 08:01

## 2022-01-13 RX ADMIN — CETIRIZINE HYDROCHLORIDE 5 MG: 5 TABLET, FILM COATED ORAL at 08:01

## 2022-01-13 RX ADMIN — ATORVASTATIN CALCIUM 20 MG: 20 TABLET, FILM COATED ORAL at 08:01

## 2022-01-13 RX ADMIN — LEVOFLOXACIN 750 MG: 5 INJECTION, SOLUTION INTRAVENOUS at 05:01

## 2022-01-13 RX ADMIN — THERA TABS 1 TABLET: TAB at 08:01

## 2022-01-13 RX ADMIN — ALBUTEROL SULFATE 2 PUFF: 90 AEROSOL, METERED RESPIRATORY (INHALATION) at 07:01

## 2022-01-13 RX ADMIN — AMLODIPINE BESYLATE 10 MG: 5 TABLET ORAL at 08:01

## 2022-01-13 RX ADMIN — Medication 2 TABLET: at 08:01

## 2022-01-13 RX ADMIN — ALBUTEROL SULFATE 2 PUFF: 90 AEROSOL, METERED RESPIRATORY (INHALATION) at 08:01

## 2022-01-13 NOTE — H&P
[facility]  History & Physical    Patient Name: Scooter Morrissey  MRN: [id number]  Patient Class: IP- Inpatient  Admission Date: 2022  Attending Physician: Carmelina Caon*   Primary Care Provider: Katelynn Rojas MD         Patient information was obtained from patient and ER records.     Subjective:     Principal Problem:Acute hypoxemic respiratory failure    Chief Complaint:   Chief Complaint   Patient presents with    Shortness of Breath     Pt complains of Increased SOB over the week,  + cough  hx of asthma sats= 88% on room air, ambulatory  sats= 83%, denies fever         HPI: Mr. Scooter Morrissey is a 72 y/o male with PMHx COPD, asthma, HTN, HLD, and anxiety presents to [facility] ED with complaints of shortness of breath, productive cough, and fatigue for one week. Patient reports he attended his daughter's party 20 and was talking to someone who he suspected to have COVID. Patient also reports attending a  which he is unsure where he could have been exposed. Patient denies fever, poor appetite, orthopnea, chest pain, palpitations, nausea, vomiting, or diarrhea. Patient was recently admitted on 2021 for multifocal PNA and was treated with azithromycin and Rocephin. He is a . He denies tobacco use. In ED: COVID +, Flu negative. CXR revealed perihilar airspace opacities and interstitial prominence suspicious for pulmonary edema or multifocal pneumonia. Will admit patient for observation services for further evaluation and treatment.       Past Medical History:   Diagnosis Date    Acute hypoxemic respiratory failure 2022    Anxiety 2016    Asthma with chronic obstructive pulmonary disease (COPD)     Benign essential hypertension 2016    Hyperlipidemia 2016    Interstitial pneumonitis 2016    Other specified anemias 3/13/2020       Past Surgical History:   Procedure Laterality Date    ADENOIDECTOMY      COLONOSCOPY N/A  6/1/2016    Procedure: COLONOSCOPY;  Surgeon: Meme Mills MD;  Location: [facility];  Service: Endoscopy;  Laterality: N/A;    FINGER SURGERY      15 years ago    TONSILLECTOMY         Review of patient's allergies indicates:  No Known Allergies    No current facility-administered medications on file prior to encounter.     Current Outpatient Medications on File Prior to Encounter   Medication Sig    albuterol (ACCUNEB) 1.25 mg/3 mL Nebu Take 3 mLs (1.25 mg total) by nebulization every 6 (six) hours as needed (shortness of breath or wheezing). Rescue    amLODIPine (NORVASC) 10 MG tablet TAKE 1 TABLET BY MOUTH EVERY DAY (Patient taking differently: Take 10 mg by mouth once daily.)    ascorbic acid, vitamin C, (VITAMIN C) 500 MG tablet Take 1 tablet (500 mg total) by mouth 2 (two) times daily.    atorvastatin (LIPITOR) 20 MG tablet Take 1 tablet (20 mg total) by mouth every evening.    budesonide-formoterol 160-4.5 mcg (SYMBICORT) 160-4.5 mcg/actuation HFAA Inhale 2 puffs into the lungs 2 (two) times daily. Controller    fluticasone-salmeterol diskus inhaler 500-50 mcg Inhale 1 puff into the lungs 2 (two) times daily. Controller    levocetirizine (XYZAL) 5 MG tablet Take 1 tablet (5 mg total) by mouth every evening.    losartan (COZAAR) 100 MG tablet TAKE 1 TABLET(100 MG) BY MOUTH EVERY DAY (Patient taking differently: Take 100 mg by mouth once daily.)    metoprolol succinate (TOPROL-XL) 100 MG 24 hr tablet TAKE 1 TABLET(100 MG) BY MOUTH EVERY DAY (Patient taking differently: Take 100 mg by mouth once daily.)    multivitamin with minerals tablet Take 1 tablet by mouth nightly.     pulse oximeter (PULSE OXIMETER) device by Apply Externally route 2 (two) times a day. Use twice daily at 8 AM and 3 PM and record the value in [employer] as directed.    vitamin D (VITAMIN D3) 1000 units Tab Take 1,000 Units by mouth once daily.    albuterol (VENTOLIN HFA) 90 mcg/actuation inhaler Inhale 2 puffs into  the lungs every 4 (four) hours as needed for Wheezing or Shortness of Breath. Rescue    fluticasone propionate (FLONASE) 50 mcg/actuation nasal spray 2 sprays (100 mcg total) by Each Nostril route once daily.     Family History     Problem Relation (Age of Onset)    No Known Problems Mother, Father, Daughter        Tobacco Use    Smoking status: Former Smoker     Types: Cigars    Smokeless tobacco: Former User     Quit date: 5/12/1996   Substance and Sexual Activity    Alcohol use: No    Drug use: No    Sexual activity: Yes     Partners: Female     Review of Systems   Constitutional: Positive for fatigue. Negative for chills, diaphoresis and fever.   HENT: Negative for hearing loss and sore throat.    Eyes: Negative for visual disturbance.   Respiratory: Positive for cough and shortness of breath.    Cardiovascular: Negative for chest pain, palpitations and leg swelling.   Gastrointestinal: Negative for abdominal pain, diarrhea, nausea and vomiting.   Genitourinary: Negative for difficulty urinating and flank pain.   Musculoskeletal: Negative for back pain and myalgias.   Skin: Negative for rash and wound.   Neurological: Negative for dizziness, weakness and headaches.   Psychiatric/Behavioral: Negative for agitation and confusion. The patient is not nervous/anxious.      Objective:     Vital Signs (Most Recent):  Temp: 98.4 °F (36.9 °C) (01/12/22 1901)  Pulse: 100 (01/12/22 1901)  Resp: 20 (01/12/22 1901)  BP: (!) 142/67 (01/12/22 1901)  SpO2: (!) 93 % (01/12/22 1901) Vital Signs (24h Range):  Temp:  [98.4 °F (36.9 °C)-98.9 °F (37.2 °C)] 98.4 °F (36.9 °C)  Pulse:  [] 100  Resp:  [18-24] 20  SpO2:  [88 %-96 %] 93 %  BP: (117-162)/(61-78) 142/67     Weight: 71.7 kg (158 lb)  Body mass index is 25.5 kg/m².    Physical Exam  Vitals and nursing note reviewed.   Constitutional:       General: He is not in acute distress.     Appearance: Normal appearance. He is not ill-appearing.      Interventions: Nasal  cannula in place.      Comments: 2L   HENT:      Head: Normocephalic and atraumatic.      Mouth/Throat:      Mouth: Mucous membranes are moist.   Eyes:      Extraocular Movements: Extraocular movements intact.      Pupils: Pupils are equal, round, and reactive to light.   Cardiovascular:      Rate and Rhythm: Normal rate and regular rhythm.      Pulses: Normal pulses.      Heart sounds: Normal heart sounds. No murmur heard.  No friction rub. No gallop.    Pulmonary:      Effort: Tachypnea, accessory muscle usage and respiratory distress present.      Breath sounds: Examination of the right-upper field reveals rhonchi. Examination of the left-upper field reveals rhonchi. Examination of the right-middle field reveals rhonchi. Examination of the left-middle field reveals rhonchi. Rhonchi present. No wheezing.      Comments: Mild respiratory distress  Increase work of breathing  Abdominal:      General: Bowel sounds are normal. There is no distension.      Palpations: Abdomen is soft.      Tenderness: There is no abdominal tenderness. There is no guarding.   Musculoskeletal:         General: No swelling.      Cervical back: Normal range of motion and neck supple.      Right lower leg: No edema.      Left lower leg: No edema.   Skin:     General: Skin is warm and dry.      Capillary Refill: Capillary refill takes less than 2 seconds.      Findings: No bruising, erythema or rash.   Neurological:      General: No focal deficit present.      Mental Status: He is alert and oriented to person, place, and time.      GCS: GCS eye subscore is 4. GCS verbal subscore is 5. GCS motor subscore is 6.   Psychiatric:         Mood and Affect: Mood normal.         Behavior: Behavior normal. Behavior is cooperative.           CRANIAL NERVES     CN III, IV, VI   Pupils are equal, round, and reactive to light.       Significant Labs:   All pertinent labs within the past 24 hours have been reviewed.  ABGs:   Recent Labs   Lab 01/12/22  1323    PH 7.435   PCO2 33.5*   HCO3 22.5*   POCSATURATED 96   BE -2   PO2 77*     CBC:   Recent Labs   Lab 01/12/22  1307 01/12/22  1323   WBC 10.89  --    HGB 10.8*  --    HCT 35.0* 33*   *  --      CMP:   Recent Labs   Lab 01/12/22  1307      K 4.1      CO2 21*   *   BUN 17   CREATININE 1.1   CALCIUM 8.7   PROT 8.5*   ALBUMIN 2.5*   BILITOT 0.4   ALKPHOS 75   AST 22   ALT 23   ANIONGAP 11   EGFRNONAA >60     Cardiac Markers:   Recent Labs   Lab 01/12/22  1307   BNP 58     Lactic Acid:   Recent Labs   Lab 01/12/22  1508   LACTATE 0.8     Troponin:   Recent Labs   Lab 01/12/22  1508   TROPONINI <0.006       Significant Imaging: I have reviewed all pertinent imaging results/findings within the past 24 hours.    Assessment/Plan:     * Acute hypoxemic respiratory failure  Patient with Hypoxic respiratory failure which is Acute on Chronic which is not related to a recent procedure.  he is not on home oxygen.   Supplemental oxygen was provided and noted- Nasal Cannula 3 LPM with SpO2 94%  Signs/symptoms of respiratory failure include- tachypnea, increased work of breathing and use of accessory muscles. Contributing diagnoses includes - COPD and Pneumonia Labs and images were reviewed. Patient Has recent ABG, which has been reviewed..   Will treat underlying causes and adjust management of respiratory failure as follows-  -albuterol inhaler q6hr for SOB/wheezing, supplemental oxygen for SpO2 <90%, initiated empiric antibiotics.          Multifocal pneumonia  See above      Non-seasonal allergic rhinitis  -continue home Flonase PRN and Cetirizine 5mg Daily      Pneumonia due to COVID-19 virus  Multifocal pneumonia  -patient was admitted one month ago for multifocal PNA and was on azithromycin and rocephin.  - COVID positive 1/12/2022; initiated on protocol  - Isolation: Airborne/Droplet. Surgical mask on patient. Notify Infection Control  - CXR with perihilar airspace opacities and interstitial  prominence suspicious for pulmonary edema or multifocal pneumonia, requiring O2  - Telemetry  - initiated on dexamethasone, will continue for 10d course  - initiated on remdesivir x5d; daily CMP  - initiated on Levofloxacin 750mg IV daily x5d due to concern for superimposed CAP  - CRP pending; D-dimer 4.47  -Covid Labs remarkable for Ferritin 1,038, Sed rate 120  - Order RT consult via Respiratory Communication for COVID Protocols  - Incentive Spirometer Q4h  - Continuous Pulse Oximetry, goal SpO2 92-96%  - supplemental O2, will wean as tolerated  - Albuterol INH Q6h scheduled & PRN  - MVI & ascorbic acid 500mg PO BID  -Anti-tussives for cough  - Acetaminophen Q6hr PRN fever/headache  - Loperamide PRN viral diarrhea  - VTE PPx: enoxaparin   - PT/OT for weakness  - If deterioration, may warrant trial of NIPPV in neg pressure room or immediate ICU consult      Asthma with COPD  See above        Anemia  Patient's anemia is currently controlled. Has not received any PRBCs to date.. Etiology likely d/t chronic pulmonary disease  Current CBC reviewed-   Lab Results   Component Value Date    HGB 10.8 (L) 01/12/2022    HCT 33 (L) 01/12/2022     Monitor serial CBC and transfuse if patient becomes hemodynamically unstable, symptomatic or H/H drops below 7/21.         COPD exacerbation  Asthma with COPD  -presents with tachypnea, increase work of breathing, mild respiratory distress  -exacerbation likely 2/2 to COVID-19 PNA  -CXR revealed perihilar airspace opacities and interstitial prominence suspicious for pulmonary edema or multifocal pneumonia.   -Supplemental oxygen for SpO2 <88%  -Abg 7.51810.52213.596%  -CBC with differential and eosinophil, BMP, sputum gram stain and C&S  -Albuterol inhalers every 6 hours in setting of COVID-19  -Fluticasone furoate-vilanterol 200-25mcg/dose diskus daily  -Continuous pulse ox  -Flu Swab negative  -Cetirizine 5mg daily   -Levaquin IV 750mg daily x5 days      Hyperlipidemia  Last  LDL was   Lab Results   Component Value Date    LDLCALC 91.4 05/01/2021       Patient is chronically on statin.will continue for now. Monitor clinically.      Essential hypertension  Chronic, controlled.  Latest blood pressure and vitals reviewed-   Temp:  [98.7 °F (37.1 °C)-98.9 °F (37.2 °C)]   Pulse:  []   Resp:  [24]   BP: (132-162)/(73-77)   SpO2:  [88 %-95 %] .   Home meds for hypertension were reviewed and noted below.   Hypertension Medications             amLODIPine (NORVASC) 10 MG tablet TAKE 1 TABLET BY MOUTH EVERY DAY    losartan (COZAAR) 100 MG tablet TAKE 1 TABLET(100 MG) BY MOUTH EVERY DAY    metoprolol succinate (TOPROL-XL) 100 MG 24 hr tablet TAKE 1 TABLET(100 MG) BY MOUTH EVERY DAY          While in the hospital, will manage blood pressure as follows; Continue home antihypertensive regimen    Will utilize p.r.n. blood pressure medication only if patient's blood pressure greater than  180/110 and he develops symptoms such as worsening chest pain or shortness of breath.        VTE Risk Mitigation (From admission, onward)         Ordered     enoxaparin injection 70 mg  2 times daily         01/12/22 1459     IP VTE HIGH RISK PATIENT  Once         01/12/22 1459     Place sequential compression device  Until discontinued         01/12/22 1459                   Annabella Loo DNP, ACNPC-AG, CCRN  Hospitalist  Department of Hospital Medicine  [facility]   [phone]

## 2022-01-13 NOTE — ASSESSMENT & PLAN NOTE
Last LDL was   Lab Results   Component Value Date    LDLCALC 91.4 05/01/2021       Patient is chronically on statin.will continue for now. Monitor clinically.Continue Lipitor

## 2022-01-13 NOTE — PLAN OF CARE
"Pt under Covid isolation. DCA done virtually with patient with [employer] on unit. Pt granted permission to virtually enter the room. Demographics/Ins/NextofKin/PCP verified with patient/family. Denies any DME needs at present from pt/family. Patient/family plans to return home with family. Educated on bedside RX. Patient consents for [place] to discuss discharge plan with next of kin. Preferences appointment times and location obtained. Patient reports they will have transportation home upon discharge.    Pt agreeable to [facility] program and reports he has a pulse oximeter at home already.      TN educated to oxygen weaning. Pt eager to discharge home without oxygen.    Future Appointments   Date Time Provider Department Center   1/19/2022  2:20 PM Clara Jennings MD [facility]EL [facility]   1/24/2022  7:00 AM APPOINTMENT LAB, [facility] LAB [facility]   1/24/2022  7:15 AM [facility] ODC XR-A LIMIT 350 LBS [facility] XRAY OP [facility]   1/31/2022  2:00 PM Katelynn Rojas MD [facility] FAM MED [facility]     /68   Pulse 88   Temp 97.7 °F (36.5 °C)   Resp 18   Ht 5' 6" (1.676 m)   Wt 71.7 kg (158 lb)   SpO2 (!) 94%   BMI 25.50 kg/m²      albuterol  2 puff Inhalation Q6H    amLODIPine  10 mg Oral Daily    ascorbic acid (vitamin C)  500 mg Oral BID    atorvastatin  20 mg Oral QHS    calcium-vitamin D3  2 tablet Oral Daily    cetirizine  5 mg Oral QHS    dexAMETHasone  6 mg Oral Daily    enoxaparin  1 mg/kg Subcutaneous BID    fluticasone furoate-vilanteroL  1 puff Inhalation Daily    fluticasone propionate  2 spray Each Nostril Daily    levoFLOXacin  750 mg Intravenous Q24H    losartan  100 mg Oral Daily    metoprolol succinate  100 mg Oral Daily    multivitamin  1 tablet Oral Daily    remdesivir infusion  100 mg Intravenous Daily        01/13/22 1421   Discharge Planning   Assessment Type Discharge Planning Brief Assessment   Resource/Environmental Concerns none   Support Systems Spouse/significant other "   Equipment Currently Used at Home respiratory supplies  (Pt reports he has his own pulse oximeter at home)   Current Living Arrangements home/apartment/condo   Patient/Family Anticipates Transition to home;home with family   Patient/Family Anticipated Services at Transition none   DME Needed Upon Discharge  none   Discharge Plan A Home;Home with family

## 2022-01-13 NOTE — ASSESSMENT & PLAN NOTE
Chronic, controlled.  Latest blood pressure and vitals reviewed-   Temp:  [97.7 °F (36.5 °C)-98.9 °F (37.2 °C)]   Pulse:  []   Resp:  [18-24]   BP: (117-162)/(58-79)   SpO2:  [88 %-96 %] .   Continue home medication metoprolol, Norvasc, losartan

## 2022-01-13 NOTE — ASSESSMENT & PLAN NOTE
Multifocal pneumonia  -patient was admitted one month ago for multifocal PNA and was on azithromycin and rocephin.  - COVID positive 1/12/2022; initiated on protocol  - Isolation: Airborne/Droplet. Surgical mask on patient. Notify Infection Control  - CXR with perihilar airspace opacities and interstitial prominence suspicious for pulmonary edema or multifocal pneumonia, requiring O2  - Telemetry  - continue dexamethasone, for 10d course  - continue remdesivir x5d; daily CMP  - initiated on Levofloxacin 750mg IV daily x5d due to concern for superimposed CAP  -  D-dimer 4.47  -Covid Labs remarkable for Ferritin 1,038, Sed rate 120  - Order RT consult via Respiratory Communication for COVID Protocols  - Incentive Spirometer Q4h  - Continuous Pulse Oximetry, goal SpO2 92-96%  - supplemental O2, will wean as tolerated  - Albuterol INH Q6h scheduled & PRN  - MVI & ascorbic acid 500mg PO BID  -Anti-tussives for cough  - Acetaminophen Q6hr PRN fever/headache  - Loperamide PRN viral diarrhea  - VTE PPx: enoxaparin   - PT/OT for weakness  - If deterioration, may warrant trial of NIPPV in neg pressure room or immediate ICU consult

## 2022-01-13 NOTE — ED NOTES
Report given to LOYDA Collins using SBAR. All questions answered. Patient transported to [facility] by transport tech on tele monitor and oxygen. VS stable.

## 2022-01-13 NOTE — HPI
Mr. Scooter Morrissey is a 70 y/o male with PMHx COPD, asthma, HTN, HLD, and anxiety presents to [facility] ED with complaints of shortness of breath, productive cough, and fatigue for one week. Patient reports he attended his daughter's party 21 and was talking to someone who he suspected to have COVID. Patient also reports attending a  which he is unsure where he could have been exposed. Patient denies fever, poor appetite, orthopnea, chest pain, palpitations, nausea, vomiting, or diarrhea. Patient was recently admitted on 2021 for multifocal PNA and was treated with azithromycin and Rocephin. He is a . He denies tobacco use. In ED: COVID +, Flu negative. CXR revealed perihilar airspace opacities and interstitial prominence suspicious for pulmonary edema or multifocal pneumonia. Will admit patient for observation services for further evaluation and treatment.

## 2022-01-13 NOTE — TELEPHONE ENCOUNTER
----- Message from Sue Lemus sent at 1/13/2022  2:29 PM CST -----  Regarding: Hosp f/u appt  Patient is preparing for discharge from [facility] and is requiring a hospital follow up appointment with  within 7 days.  If possible, can you please assist with scheduling this patient and message me back? pt has a current appt 01/31 @ 2:00 they would like for him to be seen sooner    Dx Pneumonia due to COVID-19     Thank you,    Sue Lemus  High End Access Navigator/[place] discharge project   [facility]

## 2022-01-13 NOTE — NURSING
Patient arrived to floor via stretcher. Patient AAOX4 in NAD. VSS. Telemetry monitor in place. Report given to PM nurse.

## 2022-01-13 NOTE — PROGRESS NOTES
[facility] Medicine  Progress Note    Patient Name: Scooter Morrissey  MRN: [id number]  Patient Class: IP- Inpatient   Admission Date: 2022  Length of Stay: 1 days  Attending Physician: Carmelina Cano*  Primary Care Provider: Katelynn Rojas MD        Subjective:     Principal Problem:Acute hypoxemic respiratory failure        HPI:  Mr. Scooter Morrissey is a 72 y/o male with PMHx COPD, asthma, HTN, HLD, and anxiety presents to [facility] ED with complaints of shortness of breath, productive cough, and fatigue for one week. Patient reports he attended his daughter's party 20 and was talking to someone who he suspected to have COVID. Patient also reports attending a  which he is unsure where he could have been exposed. Patient denies fever, poor appetite, orthopnea, chest pain, palpitations, nausea, vomiting, or diarrhea. Patient was recently admitted on 2021 for multifocal PNA and was treated with azithromycin and Rocephin. He is a . He denies tobacco use. In ED: COVID +, Flu negative. CXR revealed perihilar airspace opacities and interstitial prominence suspicious for pulmonary edema or multifocal pneumonia. Will admit patient for observation services for further evaluation and treatment.       Overview/Hospital Course:  No notes on file    Interval History: awake and alert, night was good   SOB with activity, coughing is improving, On 2 L NC     Review of Systems   Constitutional: Positive for fatigue. Negative for chills, diaphoresis and fever.   HENT: Negative for hearing loss and sore throat.    Respiratory: Positive for cough and shortness of breath.    Cardiovascular: Negative for chest pain, palpitations and leg swelling.   Gastrointestinal: Negative for abdominal pain, diarrhea, nausea and vomiting.   Genitourinary: Negative for difficulty urinating and flank pain.   Musculoskeletal: Negative for back pain and myalgias.   Skin: Negative for rash and wound.    Neurological: Negative for dizziness, weakness and headaches.   Psychiatric/Behavioral: Negative for agitation and confusion. The patient is not nervous/anxious.      Objective:     Vital Signs (Most Recent):  Temp: 97.7 °F (36.5 °C) (01/13/22 1141)  Pulse: 83 (01/13/22 1141)  Resp: 18 (01/13/22 1141)  BP: 131/68 (01/13/22 1141)  SpO2: 95 % (01/13/22 1141) Vital Signs (24h Range):  Temp:  [97.7 °F (36.5 °C)-98.9 °F (37.2 °C)] 97.7 °F (36.5 °C)  Pulse:  [] 83  Resp:  [18-24] 18  SpO2:  [88 %-96 %] 95 %  BP: (117-162)/(58-79) 131/68     Weight: 71.7 kg (158 lb)  Body mass index is 25.5 kg/m².    Intake/Output Summary (Last 24 hours) at 1/13/2022 1147  Last data filed at 1/12/2022 1749  Gross per 24 hour   Intake 245.46 ml   Output --   Net 245.46 ml      Physical Exam  Vitals and nursing note reviewed.   Constitutional:       General: He is not in acute distress.     Appearance: Normal appearance. He is not ill-appearing.      Interventions: Nasal cannula in place.      Comments: 2L   HENT:      Head: Normocephalic and atraumatic.   Cardiovascular:      Rate and Rhythm: Normal rate and regular rhythm.      Pulses: Normal pulses.      Heart sounds: Normal heart sounds.   Pulmonary:      Effort: Pulmonary effort is normal. No respiratory distress.   Abdominal:      General: There is no distension.      Palpations: Abdomen is soft.   Musculoskeletal:         General: No swelling.      Cervical back: Normal range of motion and neck supple.      Right lower leg: No edema.      Left lower leg: No edema.   Skin:     General: Skin is warm and dry.      Capillary Refill: Capillary refill takes less than 2 seconds.      Findings: No bruising, erythema or rash.   Neurological:      General: No focal deficit present.      Mental Status: He is alert and oriented to person, place, and time.      GCS: GCS eye subscore is 4. GCS verbal subscore is 5. GCS motor subscore is 6.   Psychiatric:         Mood and Affect: Mood  normal.         Behavior: Behavior normal. Behavior is cooperative.         Significant Labs:   A1C: No results for input(s): HGBA1C in the last 4320 hours.  ABGs:   Recent Labs   Lab 01/12/22  1323   PH 7.435   PCO2 33.5*   HCO3 22.5*   POCSATURATED 96   BE -2   PO2 77*     Blood Culture: No results for input(s): LABBLOO in the last 48 hours.  CBC:   Recent Labs   Lab 01/12/22  1307 01/12/22  1323 01/13/22  0429   WBC 10.89  --  11.99   HGB 10.8*  --  9.5*   HCT 35.0* 33* 31.9*   *  --  521*     CMP:   Recent Labs   Lab 01/12/22  1307 01/13/22  0429    136  136   K 4.1 4.9  4.9    107  107   CO2 21* 20*  20*   * 164*  164*   BUN 17 27*  27*   CREATININE 1.1 1.0  1.0   CALCIUM 8.7 8.8  8.8   PROT 8.5* 8.0  8.0   ALBUMIN 2.5* 2.4*  2.4*   BILITOT 0.4 0.3  0.3   ALKPHOS 75 77  77   AST 22 16  16   ALT 23 23  23   ANIONGAP 11 9  9   EGFRNONAA >60 >60  >60     Lactic Acid:   Recent Labs   Lab 01/12/22  1508   LACTATE 0.8     Lipase: No results for input(s): LIPASE in the last 48 hours.  Lipid Panel: No results for input(s): CHOL, HDL, LDLCALC, TRIG, CHOLHDL in the last 48 hours.  Magnesium:   Recent Labs   Lab 01/13/22  0429   MG 2.1     Troponin:   Recent Labs   Lab 01/12/22  1508   TROPONINI <0.006     TSH: No results for input(s): TSH in the last 4320 hours.  Urine Culture: No results for input(s): LABURIN in the last 48 hours.  Urine Studies: No results for input(s): COLORU, APPEARANCEUA, PHUR, SPECGRAV, PROTEINUA, GLUCUA, KETONESU, BILIRUBINUA, OCCULTUA, NITRITE, UROBILINOGEN, LEUKOCYTESUR, RBCUA, WBCUA, BACTERIA, SQUAMEPITHEL, HYALINECASTS in the last 48 hours.    Invalid input(s): ZULEMA    Significant Imaging: I have reviewed all pertinent imaging results/findings within the past 24 hours.      Assessment/Plan:      * Acute hypoxemic respiratory failure  Patient with Hypoxic respiratory failure which is Acute on Chronic which is not related to a recent procedure.  he  is not on home oxygen.   Supplemental oxygen was provided and noted- Nasal Cannula 3 LPM with SpO2 94%  Signs/symptoms of respiratory failure include- tachypnea, increased work of breathing and use of accessory muscles. Contributing diagnoses includes - COPD and Pneumonia Labs and images were reviewed. Patient Has recent ABG, which has been reviewed..   Will treat underlying causes and adjust management of respiratory failure as follows-  -albuterol inhaler q6hr for SOB/wheezing, supplemental oxygen for SpO2 <90%, initiated empiric antibiotics.          Multifocal pneumonia  See above      Non-seasonal allergic rhinitis  -continue home Flonase PRN and Cetirizine 5mg Daily      Pneumonia due to COVID-19 virus  Multifocal pneumonia  -patient was admitted one month ago for multifocal PNA and was on azithromycin and rocephin.  - COVID positive 1/12/2022; initiated on protocol  - Isolation: Airborne/Droplet. Surgical mask on patient. Notify Infection Control  - CXR with perihilar airspace opacities and interstitial prominence suspicious for pulmonary edema or multifocal pneumonia, requiring O2  - Telemetry  - continue dexamethasone, for 10d course  - continue remdesivir x5d; daily CMP  - initiated on Levofloxacin 750mg IV daily x5d due to concern for superimposed CAP  -  D-dimer 4.47  -Covid Labs remarkable for Ferritin 1,038, Sed rate 120  - Order RT consult via Respiratory Communication for COVID Protocols  - Incentive Spirometer Q4h  - Continuous Pulse Oximetry, goal SpO2 92-96%  - supplemental O2, will wean as tolerated  - Albuterol INH Q6h scheduled & PRN  - MVI & ascorbic acid 500mg PO BID  -Anti-tussives for cough  - Acetaminophen Q6hr PRN fever/headache  - Loperamide PRN viral diarrhea  - VTE PPx: enoxaparin   - PT/OT for weakness  - If deterioration, may warrant trial of NIPPV in neg pressure room or immediate ICU consult      Asthma with COPD  See above        Anemia  Patient's anemia is currently controlled.  Has not received any PRBCs to date.. Etiology likely d/t chronic pulmonary disease  Current CBC reviewed-   Lab Results   Component Value Date    HGB 10.8 (L) 01/12/2022    HCT 33 (L) 01/12/2022     Monitor serial CBC and transfuse if patient becomes hemodynamically unstable, symptomatic or H/H drops below 7/21.         COPD exacerbation  Asthma with COPD  -presents with tachypnea, increase work of breathing, mild respiratory distress  -exacerbation likely 2/2 to COVID-19 PNA  -CXR revealed perihilar airspace opacities and interstitial prominence suspicious for pulmonary edema or multifocal pneumonia.   -Supplemental oxygen for SpO2 <88%  -Abg 7.05656.49922.596%  -CBC with differential and eosinophil, BMP, sputum gram stain and C&S  -Albuterol inhalers every 6 hours in setting of COVID-19  -Fluticasone furoate-vilanterol 200-25mcg/dose diskus daily  -Continuous pulse ox  -Flu Swab negative  -Cetirizine 5mg daily   -Levaquin IV 750mg daily x5 days      Hyperlipidemia  Last LDL was   Lab Results   Component Value Date    LDLCALC 91.4 05/01/2021       Patient is chronically on statin.will continue for now. Monitor clinically.Continue Lipitor    Essential hypertension  Chronic, controlled.  Latest blood pressure and vitals reviewed-   Temp:  [97.7 °F (36.5 °C)-98.9 °F (37.2 °C)]   Pulse:  []   Resp:  [18-24]   BP: (117-162)/(58-79)   SpO2:  [88 %-96 %] .   Continue home medication metoprolol, Norvasc, losartan            VTE Risk Mitigation (From admission, onward)         Ordered     enoxaparin injection 70 mg  2 times daily         01/12/22 1459     IP VTE HIGH RISK PATIENT  Once         01/12/22 1459     Place sequential compression device  Until discontinued         01/12/22 1459                Discharge Planning   KATE:      Code Status: Full Code   Is the patient medically ready for discharge?:     Reason for patient still in hospital (select all that apply): Patient trending condition                      Carmelina Cano MD  Department of [facility] Medicine   [place] [facility]

## 2022-01-13 NOTE — SUBJECTIVE & OBJECTIVE
Past Medical History:   Diagnosis Date    Acute hypoxemic respiratory failure 1/12/2022    Anxiety 6/29/2016    Asthma with chronic obstructive pulmonary disease (COPD)     Benign essential hypertension 4/5/2016    Hyperlipidemia 4/13/2016    Interstitial pneumonitis 4/13/2016    Other specified anemias 3/13/2020       Past Surgical History:   Procedure Laterality Date    ADENOIDECTOMY      COLONOSCOPY N/A 6/1/2016    Procedure: COLONOSCOPY;  Surgeon: Meme Mills MD;  Location: [facility];  Service: Endoscopy;  Laterality: N/A;    FINGER SURGERY      15 years ago    TONSILLECTOMY         Review of patient's allergies indicates:  No Known Allergies    No current facility-administered medications on file prior to encounter.     Current Outpatient Medications on File Prior to Encounter   Medication Sig    albuterol (ACCUNEB) 1.25 mg/3 mL Nebu Take 3 mLs (1.25 mg total) by nebulization every 6 (six) hours as needed (shortness of breath or wheezing). Rescue    amLODIPine (NORVASC) 10 MG tablet TAKE 1 TABLET BY MOUTH EVERY DAY (Patient taking differently: Take 10 mg by mouth once daily.)    ascorbic acid, vitamin C, (VITAMIN C) 500 MG tablet Take 1 tablet (500 mg total) by mouth 2 (two) times daily.    atorvastatin (LIPITOR) 20 MG tablet Take 1 tablet (20 mg total) by mouth every evening.    budesonide-formoterol 160-4.5 mcg (SYMBICORT) 160-4.5 mcg/actuation HFAA Inhale 2 puffs into the lungs 2 (two) times daily. Controller    fluticasone-salmeterol diskus inhaler 500-50 mcg Inhale 1 puff into the lungs 2 (two) times daily. Controller    levocetirizine (XYZAL) 5 MG tablet Take 1 tablet (5 mg total) by mouth every evening.    losartan (COZAAR) 100 MG tablet TAKE 1 TABLET(100 MG) BY MOUTH EVERY DAY (Patient taking differently: Take 100 mg by mouth once daily.)    metoprolol succinate (TOPROL-XL) 100 MG 24 hr tablet TAKE 1 TABLET(100 MG) BY MOUTH EVERY DAY (Patient taking differently: Take 100  mg by mouth once daily.)    multivitamin with minerals tablet Take 1 tablet by mouth nightly.     pulse oximeter (PULSE OXIMETER) device by Apply Externally route 2 (two) times a day. Use twice daily at 8 AM and 3 PM and record the value in MyChart as directed.    vitamin D (VITAMIN D3) 1000 units Tab Take 1,000 Units by mouth once daily.    albuterol (VENTOLIN HFA) 90 mcg/actuation inhaler Inhale 2 puffs into the lungs every 4 (four) hours as needed for Wheezing or Shortness of Breath. Rescue    fluticasone propionate (FLONASE) 50 mcg/actuation nasal spray 2 sprays (100 mcg total) by Each Nostril route once daily.     Family History     Problem Relation (Age of Onset)    No Known Problems Mother, Father, Daughter        Tobacco Use    Smoking status: Former Smoker     Types: Cigars    Smokeless tobacco: Former User     Quit date: 5/12/1996   Substance and Sexual Activity    Alcohol use: No    Drug use: No    Sexual activity: Yes     Partners: Female     Review of Systems   Constitutional: Positive for fatigue. Negative for chills, diaphoresis and fever.   HENT: Negative for hearing loss and sore throat.    Eyes: Negative for visual disturbance.   Respiratory: Positive for cough and shortness of breath.    Cardiovascular: Negative for chest pain, palpitations and leg swelling.   Gastrointestinal: Negative for abdominal pain, diarrhea, nausea and vomiting.   Genitourinary: Negative for difficulty urinating and flank pain.   Musculoskeletal: Negative for back pain and myalgias.   Skin: Negative for rash and wound.   Neurological: Negative for dizziness, weakness and headaches.   Psychiatric/Behavioral: Negative for agitation and confusion. The patient is not nervous/anxious.      Objective:     Vital Signs (Most Recent):  Temp: 98.4 °F (36.9 °C) (01/12/22 1901)  Pulse: 100 (01/12/22 1901)  Resp: 20 (01/12/22 1901)  BP: (!) 142/67 (01/12/22 1901)  SpO2: (!) 93 % (01/12/22 1901) Vital Signs (24h Range):  Temp:   [98.4 °F (36.9 °C)-98.9 °F (37.2 °C)] 98.4 °F (36.9 °C)  Pulse:  [] 100  Resp:  [18-24] 20  SpO2:  [88 %-96 %] 93 %  BP: (117-162)/(61-78) 142/67     Weight: 71.7 kg (158 lb)  Body mass index is 25.5 kg/m².    Physical Exam  Vitals and nursing note reviewed.   Constitutional:       General: He is not in acute distress.     Appearance: Normal appearance. He is not ill-appearing.      Interventions: Nasal cannula in place.      Comments: 2L   HENT:      Head: Normocephalic and atraumatic.      Mouth/Throat:      Mouth: Mucous membranes are moist.   Eyes:      Extraocular Movements: Extraocular movements intact.      Pupils: Pupils are equal, round, and reactive to light.   Cardiovascular:      Rate and Rhythm: Normal rate and regular rhythm.      Pulses: Normal pulses.      Heart sounds: Normal heart sounds. No murmur heard.  No friction rub. No gallop.    Pulmonary:      Effort: Tachypnea, accessory muscle usage and respiratory distress present.      Breath sounds: Examination of the right-upper field reveals rhonchi. Examination of the left-upper field reveals rhonchi. Examination of the right-middle field reveals rhonchi. Examination of the left-middle field reveals rhonchi. Rhonchi present. No wheezing.      Comments: Mild respiratory distress  Increase work of breathing  Abdominal:      General: Bowel sounds are normal. There is no distension.      Palpations: Abdomen is soft.      Tenderness: There is no abdominal tenderness. There is no guarding.   Musculoskeletal:         General: No swelling.      Cervical back: Normal range of motion and neck supple.      Right lower leg: No edema.      Left lower leg: No edema.   Skin:     General: Skin is warm and dry.      Capillary Refill: Capillary refill takes less than 2 seconds.      Findings: No bruising, erythema or rash.   Neurological:      General: No focal deficit present.      Mental Status: He is alert and oriented to person, place, and time.      GCS:  GCS eye subscore is 4. GCS verbal subscore is 5. GCS motor subscore is 6.   Psychiatric:         Mood and Affect: Mood normal.         Behavior: Behavior normal. Behavior is cooperative.           CRANIAL NERVES     CN III, IV, VI   Pupils are equal, round, and reactive to light.       Significant Labs:   All pertinent labs within the past 24 hours have been reviewed.  ABGs:   Recent Labs   Lab 01/12/22  1323   PH 7.435   PCO2 33.5*   HCO3 22.5*   POCSATURATED 96   BE -2   PO2 77*     CBC:   Recent Labs   Lab 01/12/22  1307 01/12/22  1323   WBC 10.89  --    HGB 10.8*  --    HCT 35.0* 33*   *  --      CMP:   Recent Labs   Lab 01/12/22  1307      K 4.1      CO2 21*   *   BUN 17   CREATININE 1.1   CALCIUM 8.7   PROT 8.5*   ALBUMIN 2.5*   BILITOT 0.4   ALKPHOS 75   AST 22   ALT 23   ANIONGAP 11   EGFRNONAA >60     Cardiac Markers:   Recent Labs   Lab 01/12/22  1307   BNP 58     Lactic Acid:   Recent Labs   Lab 01/12/22  1508   LACTATE 0.8     Troponin:   Recent Labs   Lab 01/12/22  1508   TROPONINI <0.006       Significant Imaging: I have reviewed all pertinent imaging results/findings within the past 24 hours.

## 2022-01-13 NOTE — PLAN OF CARE
Problem: Adult Inpatient Plan of Care  Goal: Admit completed, Chart reviewed, and care plan updated  Outcome: Ongoing, Progressing

## 2022-01-13 NOTE — PLAN OF CARE
Plan of care reviewed with the patient. The patient is AAO*4. Verbalizes to staff understanding of care plan. NSR on tele, on 2L NC, with a patent piv. No signs of acute distress observed at this time. Side rails 2x, bed in lowest position, call bell within reach, and bed alarm on. Monitoring for changes.

## 2022-01-13 NOTE — ED NOTES
Attempted to call reports x2- receiving nurse, Karina,  unable to take report x2. Will call again.

## 2022-01-13 NOTE — ASSESSMENT & PLAN NOTE
Asthma with COPD  -presents with tachypnea, increase work of breathing, mild respiratory distress  -exacerbation likely 2/2 to COVID-19 PNA  -CXR revealed perihilar airspace opacities and interstitial prominence suspicious for pulmonary edema or multifocal pneumonia.   -Supplemental oxygen for SpO2 <88%  -Abg 7.09233.40671.596%  -CBC with differential and eosinophil, BMP, sputum gram stain and C&S  -Albuterol inhalers every 6 hours in setting of COVID-19  -Fluticasone furoate-vilanterol 200-25mcg/dose diskus daily  -Continuous pulse ox  -Flu Swab negative  -Cetirizine 5mg daily   -Levaquin IV 750mg daily x5 days

## 2022-01-13 NOTE — PROGRESS NOTES
01/13/2022 @ 3:51 PM- GIANNAW attempted to contact pt via room phone to complete SDOH questionnaire and liaison assessment. RSW received no answer at this time. RSW will follow up with patient at a later time to complete initial visit assessment.    IP Liaison - Initial Visit Note    Patient: Scooter Morrissey  MRN:  [id number]  Date of Service:  1/14/2022  Completed by:  DAVID Luna    Reason for Visit   Patient presents with    IP Liaison Initial Visit       RSW contacted pt via room phone in order to complete SDOH questionnaire and liaison assessment.  Pt has identified no social barriers to care.  Per pt, pt is not in need of resources at this time.    The following were addressed during this visit:  - Review SDOH Questions   - Complete patient assessment   - Complete initial visit with patient        Patient Summary     IP Liaison Patient Assessment    General  Level of Caregiver support: Member independent and does not need caregiver assistance  Have you had to make a decision between paying for any of the following in the last 2 months?: None  Transportation means: Self  Employment status: Part time  Assessments  Was the PHQ Depression Screening completed this visit?: No  Was the ASTON-7 Screening completed this visit?: No           DAVID Luna

## 2022-01-14 ENCOUNTER — PATIENT OUTREACH (OUTPATIENT)
Dept: ADMINISTRATIVE | Facility: OTHER | Age: 72
End: 2022-01-14
Payer: MEDICARE

## 2022-01-14 ENCOUNTER — PATIENT MESSAGE (OUTPATIENT)
Dept: ADMINISTRATIVE | Facility: CLINIC | Age: 72
End: 2022-01-14
Payer: MEDICARE

## 2022-01-14 VITALS
RESPIRATION RATE: 20 BRPM | TEMPERATURE: 99 F | HEART RATE: 83 BPM | DIASTOLIC BLOOD PRESSURE: 60 MMHG | BODY MASS INDEX: 26.08 KG/M2 | HEIGHT: 66 IN | SYSTOLIC BLOOD PRESSURE: 128 MMHG | OXYGEN SATURATION: 92 % | WEIGHT: 162.25 LBS

## 2022-01-14 LAB
POCT GLUCOSE: 131 MG/DL (ref 70–110)
POCT GLUCOSE: 209 MG/DL (ref 70–110)

## 2022-01-14 PROCEDURE — 27000221 HC OXYGEN, UP TO 24 HOURS: Mod: HCNC

## 2022-01-14 PROCEDURE — 25000003 PHARM REV CODE 250: Mod: HCNC | Performed by: EMERGENCY MEDICINE

## 2022-01-14 PROCEDURE — 94640 AIRWAY INHALATION TREATMENT: CPT | Mod: HCNC

## 2022-01-14 PROCEDURE — 63600175 PHARM REV CODE 636 W HCPCS: Mod: HCNC

## 2022-01-14 PROCEDURE — 25000003 PHARM REV CODE 250: Mod: HCNC

## 2022-01-14 PROCEDURE — 94761 N-INVAS EAR/PLS OXIMETRY MLT: CPT | Mod: HCNC

## 2022-01-14 PROCEDURE — 99900035 HC TECH TIME PER 15 MIN (STAT): Mod: HCNC

## 2022-01-14 PROCEDURE — 25000003 PHARM REV CODE 250: Mod: HCNC | Performed by: NURSE PRACTITIONER

## 2022-01-14 PROCEDURE — 63600175 PHARM REV CODE 636 W HCPCS: Mod: HCNC | Performed by: EMERGENCY MEDICINE

## 2022-01-14 RX ORDER — GUAIFENESIN/DEXTROMETHORPHAN 100-10MG/5
10 SYRUP ORAL EVERY 4 HOURS PRN
Qty: 118 ML | Refills: 0 | Status: SHIPPED | OUTPATIENT
Start: 2022-01-14 | End: 2022-01-24

## 2022-01-14 RX ORDER — FERROUS SULFATE, DRIED 160(50) MG
2 TABLET, EXTENDED RELEASE ORAL DAILY
Qty: 60 TABLET | Refills: 11 | Status: SHIPPED | OUTPATIENT
Start: 2022-01-14 | End: 2023-11-06

## 2022-01-14 RX ORDER — LEVOFLOXACIN 750 MG/1
750 TABLET ORAL DAILY
Qty: 2 TABLET | Refills: 0 | Status: SHIPPED | OUTPATIENT
Start: 2022-01-15 | End: 2022-01-17

## 2022-01-14 RX ORDER — DEXAMETHASONE 6 MG/1
6 TABLET ORAL DAILY
Qty: 6 TABLET | Refills: 0 | Status: SHIPPED | OUTPATIENT
Start: 2022-01-14 | End: 2022-01-20

## 2022-01-14 RX ORDER — LOPERAMIDE HYDROCHLORIDE 2 MG/1
2 CAPSULE ORAL EVERY 6 HOURS PRN
Qty: 6 CAPSULE | Refills: 0 | Status: SHIPPED | OUTPATIENT
Start: 2022-01-14 | End: 2022-01-24

## 2022-01-14 RX ADMIN — ALBUTEROL SULFATE 2 PUFF: 90 AEROSOL, METERED RESPIRATORY (INHALATION) at 12:01

## 2022-01-14 RX ADMIN — REMDESIVIR 100 MG: 100 INJECTION, POWDER, LYOPHILIZED, FOR SOLUTION INTRAVENOUS at 10:01

## 2022-01-14 RX ADMIN — THERA TABS 1 TABLET: TAB at 10:01

## 2022-01-14 RX ADMIN — ENOXAPARIN SODIUM 70 MG: 80 INJECTION SUBCUTANEOUS at 10:01

## 2022-01-14 RX ADMIN — DEXAMETHASONE 6 MG: 4 TABLET ORAL at 10:01

## 2022-01-14 RX ADMIN — Medication 2 TABLET: at 10:01

## 2022-01-14 RX ADMIN — OXYCODONE HYDROCHLORIDE AND ACETAMINOPHEN 500 MG: 500 TABLET ORAL at 10:01

## 2022-01-14 RX ADMIN — ALBUTEROL SULFATE 2 PUFF: 90 AEROSOL, METERED RESPIRATORY (INHALATION) at 09:01

## 2022-01-14 RX ADMIN — FLUTICASONE FUROATE AND VILANTEROL TRIFENATATE 1 PUFF: 200; 25 POWDER RESPIRATORY (INHALATION) at 09:01

## 2022-01-14 RX ADMIN — AMLODIPINE BESYLATE 10 MG: 5 TABLET ORAL at 10:01

## 2022-01-14 RX ADMIN — LOSARTAN POTASSIUM 100 MG: 50 TABLET, FILM COATED ORAL at 10:01

## 2022-01-14 RX ADMIN — METOPROLOL SUCCINATE 100 MG: 50 TABLET, EXTENDED RELEASE ORAL at 10:01

## 2022-01-14 NOTE — PLAN OF CARE
01/14/22 1145   Post-Acute Status   Post-Acute Authorization HME   HME Status Pending payor review  (MD notified of ordered need for home oxygen. Order to be placed. Pending approval to deliver. Discussed with pt.)   Home Health Status Set-up Complete/Auth obtained     Katelynn Rojas MD  Go on 1/21/2022  FOLLOW UP WITH PCP; MD to evaluate continued need for home oxygen. Please call [facility] to  equipment if no longer needed.  [address]  [address]  [facility] [address] [phone]   [facility]  Call  As needed, [facility], Call as needed for any issue or to get equipment  [address] [facility] [address] [phone]   [facility] [place]  Call  As needed, HOME HEALTH, If date/time not listed,  will contact  [address] [place] [address].  [address]  [place] [id number] [phone]   [facility]  Go on 1/31/2022  at 0900 am; FOLLOW UP WITH PULMONOLOGIST  Bello Pantoja kathleen  [facility] [address] [phone]     Pt has previous referral to F/U with pulm. Pt cancelled appt. Appt rescheduled.

## 2022-01-14 NOTE — PROGRESS NOTES
IP Liaison - Final Visit Note    Patient: Scooter Morrissey  MRN:  [id number]  Date of Service:  1/14/2022  Completed by:  DAVID Luna    Reason for Visit   Patient presents with    IP Liaison Chart Review     Patient discharged from hospital before DAVID was able to complete follow-up visit.        Patient Summary     Discharge Date: 01/14/2022  Discharge telephone number/address: [phone] / [address] [place] [address]  Follow up provider: 1/21/2022 @ 1:00pm / 1/31/2022 @ 9:00am  Follow up appointments: Katelynn Rojas MD / Margarito Sanchez MD  Home Health [facility] & [facility]  DME ordered &  name: Home O2  Assigned OPCM RN/SW: n/a  Report sent to follow up team (PCP/OPCM) via in basket message: n/a  Community Resources arranged including agency name & contact info: n/a        DAVID Luna

## 2022-01-14 NOTE — DISCHARGE SUMMARY
[facility] Medicine  Discharge Summary      Patient Name: Scooter Morrissey  MRN: [id number]  Patient Class: IP- Inpatient  Admission Date: 2022  Hospital Length of Stay: 2 days  Discharge Date and Time: 2022  3:01 PM  Attending Physician: Carmelina Pressley*   Discharging Provider: Carmelina Pressley MD  Primary Care Provider: Katelynn Rojas MD      HPI:   Mr. Scooter Morrissey is a 72 y/o male with PMHx COPD, asthma, HTN, HLD, and anxiety presents to [facility] ED with complaints of shortness of breath, productive cough, and fatigue for one week. Patient reports he attended his daughter's party 21 and was talking to someone who he suspected to have COVID. Patient also reports attending a  which he is unsure where he could have been exposed. Patient denies fever, poor appetite, orthopnea, chest pain, palpitations, nausea, vomiting, or diarrhea. Patient was recently admitted on 2021 for multifocal PNA and was treated with azithromycin and Rocephin. He is a . He denies tobacco use. In ED: COVID +, Flu negative. CXR revealed perihilar airspace opacities and interstitial prominence suspicious for pulmonary edema or multifocal pneumonia. Will admit patient for observation services for further evaluation and treatment.       * No surgery found *      Hospital Course:   No notes on file     Goals of Care Treatment Preferences:  Code Status: Full Code      Consults:   Consults (From admission, onward)          Status Ordering Provider     Inpatient consult to Social Work/Case Management  Once        Provider:  (Not yet assigned)    Acknowledged CARMELINA PRESSLEY            * Acute hypoxemic respiratory failure  Patient with Hypoxic respiratory failure which is Acute on Chronic which is not related to a recent procedure.  he is not on home oxygen.   Supplemental oxygen was provided and noted- Nasal Cannula 3 LPM with SpO2 94%  Signs/symptoms of respiratory failure  include- tachypnea, increased work of breathing and use of accessory muscles. Contributing diagnoses includes - COPD and Pneumonia Labs and images were reviewed.   Patient Has recent ABG, which has been reviewed..   Will treat underlying causes and adjust management of respiratory failure as follows-  -albuterol inhaler q6hr for SOB/wheezing, supplemental oxygen for SpO2 <90%, initiated empiric antibiotics.          Non-seasonal allergic rhinitis  -continue home Flonase PRN and Cetirizine 5mg Daily      Pneumonia due to COVID-19 virus  Multifocal pneumonia  -patient was admitted one month ago for multifocal PNA and was on azithromycin and rocephin.  - COVID positive 1/12/2022; initiated on protocol  - Isolation: Airborne/Droplet. Surgical mask on patient. Notify Infection Control  - CXR with perihilar airspace opacities and interstitial prominence suspicious for pulmonary edema or multifocal pneumonia, requiring O2  - Telemetry  - continue dexamethasone, for 10d course  - continue remdesivir daily CMP  - initiated on Levofloxacin 750mg IV daily x5d due to concern for superimposed CAP  -  D-dimer 4.47  -Covid Labs remarkable for Ferritin 1,038, Sed rate 120  - Order RT consult via Respiratory Communication for COVID Protocols  - Incentive Spirometer Q4h  - Continuous Pulse Oximetry, goal SpO2 92-96%  - supplemental O2, will wean as tolerated  - Albuterol INH Q6h scheduled & PRN  - MVI & ascorbic acid 500mg PO BID  -Anti-tussives for cough  - Acetaminophen Q6hr PRN fever/headache  - Loperamide PRN viral diarrhea  - VTE PPx: enoxaparin   - PT/OT for weakness  - If deterioration, may warrant trial of NIPPV in neg pressure room or immediate ICU consult      Anemia  Patient's anemia is currently controlled. Has not received any PRBCs to date.. Etiology likely d/t chronic pulmonary disease  Current CBC reviewed-   Lab Results   Component Value Date    HGB 9.5 (L) 01/13/2022    HCT 31.9 (L) 01/13/2022     Monitor serial CBC  and transfuse if patient becomes hemodynamically unstable, symptomatic or H/H drops below 7/21.         COPD exacerbation  Asthma with COPD  -presents with tachypnea, increase work of breathing, mild respiratory distress  -exacerbation likely 2/2 to COVID-19 PNA  -CXR revealed perihilar airspace opacities and interstitial prominence suspicious for pulmonary edema or multifocal pneumonia.   -Supplemental oxygen for SpO2 <88%  -Abg [id number]%  -CBC with differential and eosinophil, BMP, sputum gram stain and C&S  -Albuterol inhalers every 6 hours in setting of COVID-19  -Fluticasone furoate-vilanterol 200-25mcg/dose diskus daily  -Continuous pulse ox  -Flu Swab negative  -Cetirizine 5mg daily   -Levaquin IV 750mg daily x5 days      Hyperlipidemia  Last LDL was   Lab Results   Component Value Date    LDLCALC 91.4 05/01/2021       Patient is chronically on statin.will continue for now. Monitor clinically.Continue Lipitor    Essential hypertension  Chronic, controlled.  Latest blood pressure and vitals reviewed-   Temp:  [96 °F (35.6 °C)-98.9 °F (37.2 °C)]   Pulse:  [56-92]   Resp:  [16-20]   BP: (111-131)/(59-68)   SpO2:  [94 %-98 %] .   Continue home medication metoprolol, Norvasc, losartan            Final Active Diagnoses:    Diagnosis Date Noted POA    PRINCIPAL PROBLEM:  Acute hypoxemic respiratory failure [J96.01] 01/12/2022 Yes    Multifocal pneumonia [J18.9] 12/06/2021 Yes    Non-seasonal allergic rhinitis [J30.89] 04/30/2021 Yes    Pneumonia due to COVID-19 virus [U07.1, J12.82] 01/02/2021 Yes    Asthma with COPD [J44.9]  Yes    Anemia [D64.9] 03/13/2020 Yes    COPD exacerbation [J44.1]  Yes    Hyperlipidemia [E78.5] 04/13/2016 Yes     Chronic    Essential hypertension [I10] 04/05/2016 Yes      Problems Resolved During this Admission:       Discharged Condition: stable    Disposition: Home-Health Care [facility]    Follow Up:   Follow-up Information       Katelynn Rojas MD. Go in 1 week.    Specialty:  Family Medicine  Why: FOLLOW UP WITH PCP  Contact information:  [address]  [address]  [place] [address]  [phone]                           Patient Instructions:      Diet Cardiac     COVID-19 Surveillance Program     Order Specific Question Answer Comments   Does patient have a smartphone? Yes    Does patient have the [facility] roxi on their smartphone? Yes    While in surveillance program, will patient be using home oxygen? Yes      Activity as tolerated           Pending Diagnostic Studies:       None           Medications:  Reconciled Home Medications:      Medication List        START taking these medications      dexAMETHasone 6 MG tablet  Commonly known as: DECADRON  Take 1 tablet (6 mg total) by mouth once daily. for 6 days     levoFLOXacin 750 MG tablet  Commonly known as: LEVAQUIN  Take 1 tablet (750 mg total) by mouth once daily. for 2 doses  Start taking on: January 15, 2022     loperamide 2 mg capsule  Commonly known as: IMODIUM  Take 1 capsule (2 mg total) by mouth every 6 (six) hours as needed for Diarrhea.     OYSCO 500/D 500 mg-5 mcg (200 unit) per tablet  Generic drug: calcium-vitamin D3  Take 2 tablets by mouth once daily.     SILTUSSIN-DM  mg/5 mL liquid  Generic drug: dextromethorphan-guaiFENesin  mg/5 ml  Take 10 mLs by mouth every 4 (four) hours as needed.            CHANGE how you take these medications      * pulse oximeter device  Commonly known as: pulse oximeter  by Apply Externally route 2 (two) times a day. Use twice daily at 8 AM and 3 PM and record the value in MyChart as directed.  What changed: Another medication with the same name was added. Make sure you understand how and when to take each.     * pulse oximeter device  Commonly known as: pulse oximeter  by Apply Externally route 2 (two) times a day. Use twice daily at 8 AM and 3 PM and record the value in MyChart as directed.  What changed: You were already taking a medication with the same name, and this  prescription was added. Make sure you understand how and when to take each.           * This list has 2 medication(s) that are the same as other medications prescribed for you. Read the directions carefully, and ask your doctor or other care provider to review them with you.                CONTINUE taking these medications      * albuterol 1.25 mg/3 mL Nebu  Commonly known as: ACCUNEB  Take 3 mLs (1.25 mg total) by nebulization every 6 (six) hours as needed (shortness of breath or wheezing). Rescue     * albuterol 90 mcg/actuation inhaler  Commonly known as: VENTOLIN HFA  Inhale 2 puffs into the lungs every 4 (four) hours as needed for Wheezing or Shortness of Breath. Rescue     amLODIPine 10 MG tablet  Commonly known as: NORVASC  TAKE 1 TABLET BY MOUTH EVERY DAY     ascorbic acid (vitamin C) 500 MG tablet  Commonly known as: VITAMIN C  Take 1 tablet (500 mg total) by mouth 2 (two) times daily.     atorvastatin 20 MG tablet  Commonly known as: LIPITOR  Take 1 tablet (20 mg total) by mouth every evening.     budesonide-formoterol 160-4.5 mcg 160-4.5 mcg/actuation Hfaa  Commonly known as: SYMBICORT  Inhale 2 puffs into the lungs 2 (two) times daily. Controller     fluticasone propionate 50 mcg/actuation nasal spray  Commonly known as: FLONASE  2 sprays (100 mcg total) by Each Nostril route once daily.     fluticasone-salmeterol 500-50 mcg/dose 500-50 mcg/dose Dsdv diskus inhaler  Commonly known as: ADVAIR  Inhale 1 puff into the lungs 2 (two) times daily. Controller     levocetirizine 5 MG tablet  Commonly known as: XYZAL  Take 1 tablet (5 mg total) by mouth every evening.     losartan 100 MG tablet  Commonly known as: COZAAR  TAKE 1 TABLET(100 MG) BY MOUTH EVERY DAY     metoprolol succinate 100 MG 24 hr tablet  Commonly known as: TOPROL-XL  TAKE 1 TABLET(100 MG) BY MOUTH EVERY DAY     multivitamin with minerals tablet  Take 1 tablet by mouth nightly.     vitamin D 1000 units Tab  Commonly known as: VITAMIN D3  Take  1,000 Units by mouth once daily.           * This list has 2 medication(s) that are the same as other medications prescribed for you. Read the directions carefully, and ask your doctor or other care provider to review them with you.                  Indwelling Lines/Drains at time of discharge:   Lines/Drains/Airways       None                   Time spent on the discharge of patient: 35 minutes         Carmelina Cano MD  Department of Hospital Medicine  [facility]

## 2022-01-14 NOTE — PLAN OF CARE
Patient on oxygen in no apparent distress, given MDI treatments, no adverse reactions will continue to monitor.

## 2022-01-14 NOTE — ASSESSMENT & PLAN NOTE
Multifocal pneumonia  -patient was admitted one month ago for multifocal PNA and was on azithromycin and rocephin.  - COVID positive 1/12/2022; initiated on protocol  - Isolation: Airborne/Droplet. Surgical mask on patient. Notify Infection Control  - CXR with perihilar airspace opacities and interstitial prominence suspicious for pulmonary edema or multifocal pneumonia, requiring O2  - Telemetry  - continue dexamethasone, for 10d course  - continue remdesivir daily CMP  - initiated on Levofloxacin 750mg IV daily x5d due to concern for superimposed CAP  -  D-dimer 4.47  -Covid Labs remarkable for Ferritin 1,038, Sed rate 120  - Order RT consult via Respiratory Communication for COVID Protocols  - Incentive Spirometer Q4h  - Continuous Pulse Oximetry, goal SpO2 92-96%  - supplemental O2, will wean as tolerated  - Albuterol INH Q6h scheduled & PRN  - MVI & ascorbic acid 500mg PO BID  -Anti-tussives for cough  - Acetaminophen Q6hr PRN fever/headache  - Loperamide PRN viral diarrhea  - VTE PPx: enoxaparin   - PT/OT for weakness  - If deterioration, may warrant trial of NIPPV in neg pressure room or immediate ICU consult

## 2022-01-14 NOTE — ASSESSMENT & PLAN NOTE
Patient's anemia is currently controlled. Has not received any PRBCs to date.. Etiology likely d/t chronic pulmonary disease  Current CBC reviewed-   Lab Results   Component Value Date    HGB 9.5 (L) 01/13/2022    HCT 31.9 (L) 01/13/2022     Monitor serial CBC and transfuse if patient becomes hemodynamically unstable, symptomatic or H/H drops below 7/21.

## 2022-01-14 NOTE — PLAN OF CARE
[facility] - Telemetry      HOME HEALTH ORDERS  FACE TO FACE ENCOUNTER    Patient Name: Scooter Morrissey  YOB: 1950    PCP: Katelynn Rojas MD   PCP Address: [address] / [place] [address]  PCP Phone Number: [phone]  PCP Fax: [phone]    Encounter Date: 1/12/22    Admit to Home Health    Diagnoses:  Active Hospital Problems    Diagnosis  POA    *Acute hypoxemic respiratory failure [J96.01]  Yes    Multifocal pneumonia [J18.9]  Yes    Non-seasonal allergic rhinitis [J30.89]  Yes    Pneumonia due to COVID-19 virus [U07.1, J12.82]  Yes    Asthma with COPD [J44.9]  Yes    Anemia [D64.9]  Yes    COPD exacerbation [J44.1]  Yes    Hyperlipidemia [E78.5]  Yes     Chronic    Essential hypertension [I10]  Yes      Resolved Hospital Problems   No resolved problems to display.       Follow Up Appointments:  Future Appointments   Date Time Provider Department Center   1/19/2022  2:20 PM Clara Jennings MD [facility]EL [facility]   1/21/2022  1:00 PM Katelynn Rojas MD [facility] MED [facility]   1/24/2022  7:00 AM APPOINTMENT LAB, [place]   1/24/2022  7:15 AM [facility] ODC XR-A LIMIT 350 LBS [facility] XRAY OP [facility]       Allergies:Review of patient's allergies indicates:  No Known Allergies    Medications: Review discharge medications with patient and family and provide education.    Current Facility-Administered Medications   Medication Dose Route Frequency Provider Last Rate Last Admin    acetaminophen tablet 650 mg  650 mg Oral Q6H PRN Annabella JENNIFER Loo NP        albuterol inhaler 2 puff  2 puff Inhalation Q6H Annabella JENNIFER Loo NP   2 puff at 01/14/22 0039    aluminum-magnesium hydroxide-simethicone 200-200-20 mg/5 mL suspension 30 mL  30 mL Oral QID PRN Annabelladerick Loo NP        amLODIPine tablet 10 mg  10 mg Oral Daily Annabella Loo NP   10 mg at 01/13/22 0851    ascorbic acid (vitamin C) tablet 500 mg  500 mg Oral BID Annabella  JENNIFER Loo, NP   500 mg at 01/13/22 2048    atorvastatin tablet 20 mg  20 mg Oral QHS Annabella JENNIFER Loo, NP   20 mg at 01/13/22 2048    calcium-vitamin D3 500 mg-5 mcg (200 unit) per tablet 2 tablet  2 tablet Oral Daily Nancy RiderVITOR   2 tablet at 01/13/22 0851    cetirizine tablet 5 mg  5 mg Oral QHS Annabella JENNIFER Loo, NP   5 mg at 01/13/22 2050    dexAMETHasone tablet 6 mg  6 mg Oral Daily Annabella TEligio Loo, NP   6 mg at 01/13/22 0851    dextromethorphan-guaiFENesin  mg/5 ml liquid 10 mL  10 mL Oral Q4H PRN Annabella JENNIFER Loo, NP   10 mL at 01/12/22 2050    dextrose 50% injection 12.5 g  12.5 g Intravenous PRN Annabella JENNIFER Loo, NP        dextrose 50% injection 25 g  25 g Intravenous PRN Annabella JENNIFER Loo, NP        enoxaparin injection 70 mg  1 mg/kg Subcutaneous BID Annabella JENNIFER Loo, NP   70 mg at 01/13/22 2048    fluticasone furoate-vilanteroL 200-25 mcg/dose diskus inhaler 1 puff  1 puff Inhalation Daily Annabella JENNIFER Loo, NP   1 puff at 01/13/22 0836    fluticasone propionate 50 mcg/actuation nasal spray 100 mcg  2 spray Each Nostril Daily Annabella JENNIFER Loo, NP   100 mcg at 01/13/22 0903    glucagon (human recombinant) injection 1 mg  1 mg Intramuscular PRN Annabella TEligio Loo, NP        glucose chewable tablet 16 g  16 g Oral PRN Annabella JENNIFER Loo, NP        glucose chewable tablet 24 g  24 g Oral PRN Annabella JENNIFER Loo, NP        influenza (QUADRIVALENT ADJUVANTED PF) vaccine 0.5 mL  0.5 mL Intramuscular Prior to discharge Carmelina Cano MD        levoFLOXacin 750 mg/150 mL IVPB 750 mg  750 mg Intravenous Q24H Annabelladerick Loo, NP   Stopped at 01/13/22 1904    loperamide capsule 2 mg  2 mg Oral Q6H PRN Annabella JENNIFER Fowler-Quentin, NP        losartan tablet 100 mg  100 mg Oral Daily Annabella JENNIFER Fowler-Quentin, NP   100 mg at 01/13/22 0851    melatonin tablet 6 mg  6 mg Oral Nightly PRN Annabella JENNIFER Fowler-Quentin, NP   6 mg at 01/12/22 2049     metoprolol succinate (TOPROL-XL) 24 hr tablet 100 mg  100 mg Oral Daily Annabella Loo NP   100 mg at 01/13/22 0851    multivitamin tablet  1 tablet Oral Daily Annabella Loo NP   1 tablet at 01/13/22 0851    ondansetron injection 4 mg  4 mg Intravenous Q8H PRN Annabella Loo NP        prochlorperazine injection Soln 5 mg  5 mg Intravenous Q6H PRN Annabella Loo NP        remdesivir 100 mg in sodium chloride 0.9% 250 mL infusion  100 mg Intravenous Daily Luis Vo MD   Stopped at 01/13/22 1002    simethicone chewable tablet 80 mg  1 tablet Oral QID PRN Annabella Loo NP        sodium chloride 0.9% flush 10 mL  10 mL Intravenous PRN Annabella Loo NP         Current Discharge Medication List      START taking these medications    Details   calcium-vitamin D3 (OS-SALLY 500 + D3) 500 mg-5 mcg (200 unit) per tablet Take 2 tablets by mouth once daily.  Qty: 60 tablet, Refills: 11      dexAMETHasone (DECADRON) 6 MG tablet Take 1 tablet (6 mg total) by mouth once daily. for 6 days  Qty: 6 tablet, Refills: 0      dextromethorphan-guaiFENesin  mg/5 ml (ROBITUSSIN-DM)  mg/5 mL liquid Take 10 mLs by mouth every 4 (four) hours as needed.  Qty: 118 mL, Refills: 0      levoFLOXacin (LEVAQUIN) 750 MG tablet Take 1 tablet (750 mg total) by mouth once daily. for 2 doses  Qty: 2 tablet, Refills: 0      loperamide (IMODIUM) 2 mg capsule Take 1 capsule (2 mg total) by mouth every 6 (six) hours as needed for Diarrhea.  Qty: 6 capsule, Refills: 0      !! pulse oximeter (PULSE OXIMETER) device by Apply Externally route 2 (two) times a day. Use twice daily at 8 AM and 3 PM and record the value in MessagePartyhart as directed.  Qty: 1 each, Refills: 0    Comments: This is a NO CHARGE item.  Please override price to zero.  DO NOT PRINT.  NORMAL MODE e-PRESCRIBE ONLY.       !! - Potential duplicate medications found. Please discuss with provider.      CONTINUE these medications which have  NOT CHANGED    Details   albuterol (ACCUNEB) 1.25 mg/3 mL Nebu Take 3 mLs (1.25 mg total) by nebulization every 6 (six) hours as needed (shortness of breath or wheezing). Rescue  Qty: 75 mL, Refills: 11    Associated Diagnoses: COPD exacerbation; Asthma with chronic obstructive pulmonary disease (COPD)      amLODIPine (NORVASC) 10 MG tablet TAKE 1 TABLET BY MOUTH EVERY DAY  Qty: 90 tablet, Refills: 4    Associated Diagnoses: Benign essential hypertension      ascorbic acid, vitamin C, (VITAMIN C) 500 MG tablet Take 1 tablet (500 mg total) by mouth 2 (two) times daily.  Qty:        atorvastatin (LIPITOR) 20 MG tablet Take 1 tablet (20 mg total) by mouth every evening.  Qty: 90 tablet, Refills: 3    Associated Diagnoses: Mixed hyperlipidemia      budesonide-formoterol 160-4.5 mcg (SYMBICORT) 160-4.5 mcg/actuation HFAA Inhale 2 puffs into the lungs 2 (two) times daily. Controller  Qty: 10.2 g, Refills: 11    Associated Diagnoses: Asthma with COPD      fluticasone-salmeterol diskus inhaler 500-50 mcg Inhale 1 puff into the lungs 2 (two) times daily. Controller  Qty: 180 each, Refills: 4    Associated Diagnoses: Asthma with COPD; Bronchiectasis with acute lower respiratory infection      levocetirizine (XYZAL) 5 MG tablet Take 1 tablet (5 mg total) by mouth every evening.  Qty: 30 tablet, Refills: 11      losartan (COZAAR) 100 MG tablet TAKE 1 TABLET(100 MG) BY MOUTH EVERY DAY  Qty: 90 tablet, Refills: 4    Associated Diagnoses: Benign essential hypertension      metoprolol succinate (TOPROL-XL) 100 MG 24 hr tablet TAKE 1 TABLET(100 MG) BY MOUTH EVERY DAY  Qty: 90 tablet, Refills: 4    Associated Diagnoses: Benign essential hypertension      multivitamin with minerals tablet Take 1 tablet by mouth nightly.       !! pulse oximeter (PULSE OXIMETER) device by Apply Externally route 2 (two) times a day. Use twice daily at 8 AM and 3 PM and record the value in ProFounderhart as directed.  Qty: 1 each, Refills: 0    Comments: This is  a NO CHARGE item.  Please override price to zero.  DO NOT PRINT.  NORMAL MODE e-PRESCRIBE ONLY.      vitamin D (VITAMIN D3) 1000 units Tab Take 1,000 Units by mouth once daily.      albuterol (VENTOLIN HFA) 90 mcg/actuation inhaler Inhale 2 puffs into the lungs every 4 (four) hours as needed for Wheezing or Shortness of Breath. Rescue  Qty: 18 g, Refills: 11    Associated Diagnoses: Asthma with COPD; Bronchiectasis with acute lower respiratory infection      fluticasone propionate (FLONASE) 50 mcg/actuation nasal spray 2 sprays (100 mcg total) by Each Nostril route once daily.  Qty: 9.9 mL, Refills: 11       !! - Potential duplicate medications found. Please discuss with provider.            I have seen and examined this patient within the last 30 days. My clinical findings that support the need for the home health skilled services and home bound status are the following:no   Weakness/numbness causing balance and gait disturbance due to Infection and Weakness/Debility making it taxing to leave home.  Patient with medication mismanagement issues requiring home bound status as evidenced by  Poor understanding of medication regimen/dosage.     Diet:   cardiac diet      Referrals/ Consults  Physical Therapy to evaluate and treat. Evaluate for home safety and equipment needs; Establish/upgrade home exercise program. Perform / instruct on therapeutic exercises, gait training, transfer training, and Range of Motion.  Occupational Therapy to evaluate and treat. Evaluate home environment for safety and equipment needs. Perform/Instruct on transfers, ADL training, ROM, and therapeutic exercises.    Activities:   activity as tolerated    Nursing:   Agency to admit patient within 24 hours of hospital discharge unless specified on physician order or at patient request    SN to complete comprehensive assessment including routine vital signs. Instruct on disease process and s/s of complications to report to MD. Review/verify  medication list sent home with the patient at time of discharge  and instruct patient/caregiver as needed. Frequency may be adjusted depending on start of care date.     Skilled nurse to perform up to 3 visits PRN for symptoms related to diagnosis    Notify MD if SBP > 160 or < 90; DBP > 90 or < 50; HR > 120 or < 50; Temp > 101; O2 < 88%; Other:       Ok to schedule additional visits based on staff availability and patient request on consecutive days within the home health episode.    When multiple disciplines ordered:    Start of Care occurs on Sunday - Wednesday schedule remaining discipline evaluations as ordered on separate consecutive days following the start of care.    Thursday SOC -schedule subsequent evaluations Friday and Monday the following week.     Friday - Saturday SOC - schedule subsequent discipline evaluations on consecutive days starting Monday of the following week.    For all post-discharge communication and subsequent orders please contact patient's primary care physician. If unable to reach primary care physician or do not receive response within 30 minutes, please contact  for clinical staff order clarification        Home Health Aide:  Nursing Three times weekly, Physical Therapy Three times weekly and Occupational Therapy Three times weekly        I certify that this patient is confined to his home and needs intermittent skilled nursing care, physical therapy and occupational therapy.

## 2022-01-14 NOTE — SUBJECTIVE & OBJECTIVE
Interval History: awake and alert, no new complaint.   SOB with activity, coughing is improving,  Plan for discharge today       Review of Systems   Constitutional: Negative for chills, diaphoresis, fatigue and fever.   Respiratory: Negative for cough and shortness of breath.    Cardiovascular: Negative for chest pain, palpitations and leg swelling.   Gastrointestinal: Negative for abdominal pain, diarrhea, nausea and vomiting.   Genitourinary: Negative for difficulty urinating and flank pain.   Musculoskeletal: Negative for back pain and myalgias.   Skin: Negative for rash and wound.   Neurological: Negative for dizziness, weakness and headaches.   Psychiatric/Behavioral: Negative for agitation and confusion. The patient is not nervous/anxious.      Objective:     Vital Signs (Most Recent):  Temp: 97.5 °F (36.4 °C) (01/14/22 0912)  Pulse: 72 (01/14/22 0912)  Resp: 20 (01/14/22 0912)  BP: (!) 111/59 (01/14/22 0912)  SpO2: 98 % (01/14/22 0912) Vital Signs (24h Range):  Temp:  [96 °F (35.6 °C)-98.9 °F (37.2 °C)] 97.5 °F (36.4 °C)  Pulse:  [56-92] 72  Resp:  [16-20] 20  SpO2:  [94 %-98 %] 98 %  BP: (111-131)/(59-68) 111/59     Weight: 73.6 kg (162 lb 4.1 oz)  Body mass index is 26.19 kg/m².    Intake/Output Summary (Last 24 hours) at 1/14/2022 0912  Last data filed at 1/13/2022 1734  Gross per 24 hour   Intake 385 ml   Output 300 ml   Net 85 ml      Physical Exam  Vitals and nursing note reviewed.   Constitutional:       General: He is not in acute distress.     Appearance: Normal appearance. He is not ill-appearing.      Interventions: Nasal cannula in place.      Comments: 2L   HENT:      Head: Normocephalic and atraumatic.   Cardiovascular:      Rate and Rhythm: Normal rate and regular rhythm.      Pulses: Normal pulses.      Heart sounds: Normal heart sounds.   Pulmonary:      Effort: Pulmonary effort is normal. No respiratory distress.   Abdominal:      General: There is no distension.      Palpations: Abdomen is  soft.   Musculoskeletal:         General: No swelling.      Cervical back: Normal range of motion and neck supple.      Right lower leg: No edema.      Left lower leg: No edema.   Skin:     General: Skin is warm and dry.      Capillary Refill: Capillary refill takes less than 2 seconds.      Findings: No bruising, erythema or rash.   Neurological:      General: No focal deficit present.      Mental Status: He is alert and oriented to person, place, and time.      GCS: GCS eye subscore is 4. GCS verbal subscore is 5. GCS motor subscore is 6.   Psychiatric:         Mood and Affect: Mood normal.         Behavior: Behavior normal. Behavior is cooperative.         Significant Labs:   A1C: No results for input(s): HGBA1C in the last 4320 hours.  ABGs:   Recent Labs   Lab 01/12/22  1323   PH 7.435   PCO2 33.5*   HCO3 22.5*   POCSATURATED 96   BE -2   PO2 77*     Blood Culture: No results for input(s): LABBLOO in the last 48 hours.  CBC:   Recent Labs   Lab 01/12/22  1307 01/12/22  1323 01/13/22  0429   WBC 10.89  --  11.99   HGB 10.8*  --  9.5*   HCT 35.0* 33* 31.9*   *  --  521*     CMP:   Recent Labs   Lab 01/12/22  1307 01/13/22  0429    136  136   K 4.1 4.9  4.9    107  107   CO2 21* 20*  20*   * 164*  164*   BUN 17 27*  27*   CREATININE 1.1 1.0  1.0   CALCIUM 8.7 8.8  8.8   PROT 8.5* 8.0  8.0   ALBUMIN 2.5* 2.4*  2.4*   BILITOT 0.4 0.3  0.3   ALKPHOS 75 77  77   AST 22 16  16   ALT 23 23  23   ANIONGAP 11 9  9   EGFRNONAA >60 >60  >60     Lactic Acid:   Recent Labs   Lab 01/12/22  1508   LACTATE 0.8     Lipase: No results for input(s): LIPASE in the last 48 hours.  Lipid Panel: No results for input(s): CHOL, HDL, LDLCALC, TRIG, CHOLHDL in the last 48 hours.  Magnesium:   Recent Labs   Lab 01/13/22  0429   MG 2.1     Troponin:   Recent Labs   Lab 01/12/22  1508   TROPONINI <0.006     TSH: No results for input(s): TSH in the last 4320 hours.  Urine Culture: No results for  input(s): LABURIN in the last 48 hours.  Urine Studies: No results for input(s): COLORU, APPEARANCEUA, PHUR, SPECGRAV, PROTEINUA, GLUCUA, KETONESU, BILIRUBINUA, OCCULTUA, NITRITE, UROBILINOGEN, LEUKOCYTESUR, RBCUA, WBCUA, BACTERIA, SQUAMEPITHEL, HYALINECASTS in the last 48 hours.    Invalid input(s): WRIGHTVIVIANA    Significant Imaging: I have reviewed all pertinent imaging results/findings within the past 24 hours.

## 2022-01-14 NOTE — ASSESSMENT & PLAN NOTE
Chronic, controlled.  Latest blood pressure and vitals reviewed-   Temp:  [96 °F (35.6 °C)-98.9 °F (37.2 °C)]   Pulse:  [56-92]   Resp:  [16-20]   BP: (111-131)/(59-68)   SpO2:  [94 %-98 %] .   Continue home medication metoprolol, Norvasc, losartan

## 2022-01-14 NOTE — ASSESSMENT & PLAN NOTE
Asthma with COPD  -presents with tachypnea, increase work of breathing, mild respiratory distress  -exacerbation likely 2/2 to COVID-19 PNA  -CXR revealed perihilar airspace opacities and interstitial prominence suspicious for pulmonary edema or multifocal pneumonia.   -Supplemental oxygen for SpO2 <88%  -Abg [id number]%  -CBC with differential and eosinophil, BMP, sputum gram stain and C&S  -Albuterol inhalers every 6 hours in setting of COVID-19  -Fluticasone furoate-vilanterol 200-25mcg/dose diskus daily  -Continuous pulse ox  -Flu Swab negative  -Cetirizine 5mg daily   -Levaquin IV 750mg daily x5 days

## 2022-01-14 NOTE — PROGRESS NOTES
[facility] Medicine  Progress Note    Patient Name: Scooter Morrissey  MRN: [id number]  Patient Class: IP- Inpatient   Admission Date: 2022  Length of Stay: 2 days  Attending Physician: Carmelina Cano*  Primary Care Provider: Katelynn Rojas MD        Subjective:     Principal Problem:Acute hypoxemic respiratory failure        HPI:  Mr. Scooter Morrissey is a 72 y/o male with PMHx COPD, asthma, HTN, HLD, and anxiety presents to [facility] ED with complaints of shortness of breath, productive cough, and fatigue for one week. Patient reports he attended his daughter's party 20 and was talking to someone who he suspected to have COVID. Patient also reports attending a  which he is unsure where he could have been exposed. Patient denies fever, poor appetite, orthopnea, chest pain, palpitations, nausea, vomiting, or diarrhea. Patient was recently admitted on 2021 for multifocal PNA and was treated with azithromycin and Rocephin. He is a . He denies tobacco use. In ED: COVID +, Flu negative. CXR revealed perihilar airspace opacities and interstitial prominence suspicious for pulmonary edema or multifocal pneumonia. Will admit patient for observation services for further evaluation and treatment.       Overview/Hospital Course:  No notes on file    Interval History: awake and alert, no new complaint.   SOB with activity, coughing is improving,  Plan for discharge today       Review of Systems   Constitutional: Negative for chills, diaphoresis, fatigue and fever.   Respiratory: Negative for cough and shortness of breath.    Cardiovascular: Negative for chest pain, palpitations and leg swelling.   Gastrointestinal: Negative for abdominal pain, diarrhea, nausea and vomiting.   Genitourinary: Negative for difficulty urinating and flank pain.   Musculoskeletal: Negative for back pain and myalgias.   Skin: Negative for rash and wound.   Neurological: Negative for dizziness, weakness  and headaches.   Psychiatric/Behavioral: Negative for agitation and confusion. The patient is not nervous/anxious.      Objective:     Vital Signs (Most Recent):  Temp: 97.5 °F (36.4 °C) (01/14/22 0912)  Pulse: 72 (01/14/22 0912)  Resp: 20 (01/14/22 0912)  BP: (!) 111/59 (01/14/22 0912)  SpO2: 98 % (01/14/22 0912) Vital Signs (24h Range):  Temp:  [96 °F (35.6 °C)-98.9 °F (37.2 °C)] 97.5 °F (36.4 °C)  Pulse:  [56-92] 72  Resp:  [16-20] 20  SpO2:  [94 %-98 %] 98 %  BP: (111-131)/(59-68) 111/59     Weight: 73.6 kg (162 lb 4.1 oz)  Body mass index is 26.19 kg/m².    Intake/Output Summary (Last 24 hours) at 1/14/2022 0912  Last data filed at 1/13/2022 1734  Gross per 24 hour   Intake 385 ml   Output 300 ml   Net 85 ml      Physical Exam  Vitals and nursing note reviewed.   Constitutional:       General: He is not in acute distress.     Appearance: Normal appearance. He is not ill-appearing.      Interventions: Nasal cannula in place.      Comments: 2L   HENT:      Head: Normocephalic and atraumatic.   Cardiovascular:      Rate and Rhythm: Normal rate and regular rhythm.      Pulses: Normal pulses.      Heart sounds: Normal heart sounds.   Pulmonary:      Effort: Pulmonary effort is normal. No respiratory distress.   Abdominal:      General: There is no distension.      Palpations: Abdomen is soft.   Musculoskeletal:         General: No swelling.      Cervical back: Normal range of motion and neck supple.      Right lower leg: No edema.      Left lower leg: No edema.   Skin:     General: Skin is warm and dry.      Capillary Refill: Capillary refill takes less than 2 seconds.      Findings: No bruising, erythema or rash.   Neurological:      General: No focal deficit present.      Mental Status: He is alert and oriented to person, place, and time.      GCS: GCS eye subscore is 4. GCS verbal subscore is 5. GCS motor subscore is 6.   Psychiatric:         Mood and Affect: Mood normal.         Behavior: Behavior normal.  Behavior is cooperative.         Significant Labs:   A1C: No results for input(s): HGBA1C in the last 4320 hours.  ABGs:   Recent Labs   Lab 01/12/22  1323   PH 7.435   PCO2 33.5*   HCO3 22.5*   POCSATURATED 96   BE -2   PO2 77*     Blood Culture: No results for input(s): LABBLOO in the last 48 hours.  CBC:   Recent Labs   Lab 01/12/22  1307 01/12/22  1323 01/13/22  0429   WBC 10.89  --  11.99   HGB 10.8*  --  9.5*   HCT 35.0* 33* 31.9*   *  --  521*     CMP:   Recent Labs   Lab 01/12/22  1307 01/13/22  0429    136  136   K 4.1 4.9  4.9    107  107   CO2 21* 20*  20*   * 164*  164*   BUN 17 27*  27*   CREATININE 1.1 1.0  1.0   CALCIUM 8.7 8.8  8.8   PROT 8.5* 8.0  8.0   ALBUMIN 2.5* 2.4*  2.4*   BILITOT 0.4 0.3  0.3   ALKPHOS 75 77  77   AST 22 16  16   ALT 23 23  23   ANIONGAP 11 9  9   EGFRNONAA >60 >60  >60     Lactic Acid:   Recent Labs   Lab 01/12/22  1508   LACTATE 0.8     Lipase: No results for input(s): LIPASE in the last 48 hours.  Lipid Panel: No results for input(s): CHOL, HDL, LDLCALC, TRIG, CHOLHDL in the last 48 hours.  Magnesium:   Recent Labs   Lab 01/13/22  0429   MG 2.1     Troponin:   Recent Labs   Lab 01/12/22  1508   TROPONINI <0.006     TSH: No results for input(s): TSH in the last 4320 hours.  Urine Culture: No results for input(s): LABURIN in the last 48 hours.  Urine Studies: No results for input(s): COLORU, APPEARANCEUA, PHUR, SPECGRAV, PROTEINUA, GLUCUA, KETONESU, BILIRUBINUA, OCCULTUA, NITRITE, UROBILINOGEN, LEUKOCYTESUR, RBCUA, WBCUA, BACTERIA, SQUAMEPITHEL, HYALINECASTS in the last 48 hours.    Invalid input(s): WRIGHTSUR    Significant Imaging: I have reviewed all pertinent imaging results/findings within the past 24 hours.      Assessment/Plan:      * Acute hypoxemic respiratory failure  Patient with Hypoxic respiratory failure which is Acute on Chronic which is not related to a recent procedure.  he is not on home oxygen.   Supplemental  oxygen was provided and noted- Nasal Cannula 3 LPM with SpO2 94%  Signs/symptoms of respiratory failure include- tachypnea, increased work of breathing and use of accessory muscles. Contributing diagnoses includes - COPD and Pneumonia Labs and images were reviewed.   Patient Has recent ABG, which has been reviewed..   Will treat underlying causes and adjust management of respiratory failure as follows-  -albuterol inhaler q6hr for SOB/wheezing, supplemental oxygen for SpO2 <90%, initiated empiric antibiotics.          Multifocal pneumonia  See above      Non-seasonal allergic rhinitis  -continue home Flonase PRN and Cetirizine 5mg Daily      Pneumonia due to COVID-19 virus  Multifocal pneumonia  -patient was admitted one month ago for multifocal PNA and was on azithromycin and rocephin.  - COVID positive 1/12/2022; initiated on protocol  - Isolation: Airborne/Droplet. Surgical mask on patient. Notify Infection Control  - CXR with perihilar airspace opacities and interstitial prominence suspicious for pulmonary edema or multifocal pneumonia, requiring O2  - Telemetry  - continue dexamethasone, for 10d course  - continue remdesivir daily CMP  - initiated on Levofloxacin 750mg IV daily x5d due to concern for superimposed CAP  -  D-dimer 4.47  -Covid Labs remarkable for Ferritin 1,038, Sed rate 120  - Order RT consult via Respiratory Communication for COVID Protocols  - Incentive Spirometer Q4h  - Continuous Pulse Oximetry, goal SpO2 92-96%  - supplemental O2, will wean as tolerated  - Albuterol INH Q6h scheduled & PRN  - MVI & ascorbic acid 500mg PO BID  -Anti-tussives for cough  - Acetaminophen Q6hr PRN fever/headache  - Loperamide PRN viral diarrhea  - VTE PPx: enoxaparin   - PT/OT for weakness  - If deterioration, may warrant trial of NIPPV in neg pressure room or immediate ICU consult      Asthma with COPD  See above        Anemia  Patient's anemia is currently controlled. Has not received any PRBCs to date..  Etiology likely d/t chronic pulmonary disease  Current CBC reviewed-   Lab Results   Component Value Date    HGB 9.5 (L) 01/13/2022    HCT 31.9 (L) 01/13/2022     Monitor serial CBC and transfuse if patient becomes hemodynamically unstable, symptomatic or H/H drops below 7/21.         COPD exacerbation  Asthma with COPD  -presents with tachypnea, increase work of breathing, mild respiratory distress  -exacerbation likely 2/2 to COVID-19 PNA  -CXR revealed perihilar airspace opacities and interstitial prominence suspicious for pulmonary edema or multifocal pneumonia.   -Supplemental oxygen for SpO2 <88%  -Abg 7.94031.55791.596%  -CBC with differential and eosinophil, BMP, sputum gram stain and C&S  -Albuterol inhalers every 6 hours in setting of COVID-19  -Fluticasone furoate-vilanterol 200-25mcg/dose diskus daily  -Continuous pulse ox  -Flu Swab negative  -Cetirizine 5mg daily   -Levaquin IV 750mg daily x5 days      Hyperlipidemia  Last LDL was   Lab Results   Component Value Date    LDLCALC 91.4 05/01/2021       Patient is chronically on statin.will continue for now. Monitor clinically.Continue Lipitor    Essential hypertension  Chronic, controlled.  Latest blood pressure and vitals reviewed-   Temp:  [96 °F (35.6 °C)-98.9 °F (37.2 °C)]   Pulse:  [56-92]   Resp:  [16-20]   BP: (111-131)/(59-68)   SpO2:  [94 %-98 %] .   Continue home medication metoprolol, Norvasc, losartan            VTE Risk Mitigation (From admission, onward)         Ordered     enoxaparin injection 70 mg  2 times daily         01/12/22 1459     IP VTE HIGH RISK PATIENT  Once         01/12/22 1459     Place sequential compression device  Until discontinued         01/12/22 1459                Discharge Planning   KATE: 1/14/2022     Code Status: Full Code   Is the patient medically ready for discharge?: No    Reason for patient still in hospital (select all that apply): Pending disposition  Discharge Plan A: Home,Home with family                   Carmelina Cano MD  Department of [facility] Medicine   [facility]

## 2022-01-14 NOTE — PROGRESS NOTES
01/14/22 1434   AVS Confirmation   Discharge instructions and AVS given to and reviewed with patient and/or significant other. Yes   Discharge orders noted. AVS prepared with medication list, importance of medication compliance, follow up appointments, diet, home care instructions, treatment plan, self management, and when to seek medical attention. Detailed clinical reference list attached. AVS printed and handed to patient by bedside nurse. VN reviewed discharge instructions with patient using teachback method.  Allowed time for questions, all questions answered.  Patient verbalized complete understanding of discharge instructions and would like to have the pulmonary follow up appointment changes to a pulmonologist closer to home if possible.  Laurence notified.   Discharge instructions complete.  Patient awaiting bedside delivery of medications and for daughter to arrive. Bedside nurse notified.

## 2022-01-14 NOTE — NURSING
Home Oxygen Evaluation    Date Performed: 2022    1) Patient's Home O2 Sat on room air, while at rest: 91%        If O2 sats on room air at rest are 88% or below, patient qualifies. No additional testing needed. Document N/A in steps 2 and 3. If 89% or above, complete steps 2.      2) Patient's O2 Sat on room air while exercisin%        If O2 sats on room air while exercising remain 89% or above patient does not qualify, no further testing needed Document N/A in step 3. If O2 sats on room air while exercising are 88% or below, continue to step 3.      3) Patient's O2 Sat while exercising on O2: 94% at 2 LPM         (Must show improvement from #2 for patients to qualify)    If O2 sats improve on oxygen, patient qualifies for portable oxygen. If not, the patient does not qualify.

## 2022-01-14 NOTE — PLAN OF CARE
01/14/22 1325   Post-Acute Status   Post-Acute Authorization HME   HME Status Set-up Complete/Auth obtained  (Pt approved for 1 etank and one additional tank. Pt encouraged to call [employer] when at home so they can bring the other equipment. Daughter to  on DC.)     2 Tanks delivered to bedside nurse [place]. Nurse signed delivery ticket.

## 2022-01-14 NOTE — PLAN OF CARE
Pharmacist will go over home medications and reasons for medications. VN and bedside nurse to reiterate final discharge instructions.     Future Appointments   Date Time Provider Department Center   1/21/2022  1:00 PM Katelynn Rojas MD [facility] FAM MED [facility]   1/24/2022  7:00 AM APPOINTMENT LAB, [facility] LAB [place] [facility]   1/24/2022  7:15 AM [facility] ODC XR-A LIMIT 350 LBS [facility] XRAY OP [facility]   1/31/2022  9:00 AM Margarito Sanchez MD [facility] PUL[address]VC [address]   2/14/2022  2:20 PM Clara Jennings MD [facility] MICHAEL [place] [facility]     Katelynn Rojas MD  Go on 1/21/2022  FOLLOW UP WITH PCP; MD to evaluate continued need for home oxygen. Please call [employer] to  equipment if no longer needed.  [address]  [address]  [facility] [address] [phone]   [facility]  Call  As needed, [facility], Call as needed for any issue or to get equipment  [address] [place] [address] [phone]   [facility] [place]  Call  As needed, HOME HEALTH, If date/time not listed,  will contact  [address] [place] [address].  [address]  [place] [address] [phone]        01/14/22 1054   Final Note   Assessment Type Final Discharge Note   Anticipated Discharge Disposition Home-Health  ([place])   Hospital Resources/Appts/Education Provided Appointments scheduled and added to AVS   Post-Acute Status   Post-Acute Authorization Home Health;HME   HME Status Pending payor review  (Home eval done. Orders needed. MD notified. Educated pt to continued need for home oxygen. Pt verbalizes an understanding.)   Home Health Status Set-up Complete/Auth obtained  ( orders sent and accepted by [facility]. SOC Saturday)   Discharge Delays (!) Home Medical Equipment (Insurance, Delivery)        Medication List        START taking these medications      calcium-vitamin D3 500 mg-5 mcg (200 unit) per tablet  Commonly known as: OS-SALLY 500 + D3  Take 2 tablets by mouth once daily.     dexAMETHasone 6 MG tablet  Commonly  known as: DECADRON  Take 1 tablet (6 mg total) by mouth once daily. for 6 days     dextromethorphan-guaiFENesin  mg/5 ml  mg/5 mL liquid  Commonly known as: ROBITUSSIN-DM  Take 10 mLs by mouth every 4 (four) hours as needed.     levoFLOXacin 750 MG tablet  Commonly known as: LEVAQUIN  Take 1 tablet (750 mg total) by mouth once daily. for 2 doses  Start taking on: January 15, 2022     loperamide 2 mg capsule  Commonly known as: IMODIUM  Take 1 capsule (2 mg total) by mouth every 6 (six) hours as needed for Diarrhea.            CHANGE how you take these medications      * pulse oximeter device  Commonly known as: pulse oximeter  by Apply Externally route 2 (two) times a day. Use twice daily at 8 AM and 3 PM and record the value in [place] as directed.  What changed: Another medication with the same name was added. Make sure you understand how and when to take each.     * pulse oximeter device  Commonly known as: pulse oximeter  by Apply Externally route 2 (two) times a day. Use twice daily at 8 AM and 3 PM and record the value in [employer] as directed.  What changed: You were already taking a medication with the same name, and this prescription was added. Make sure you understand how and when to take each.           * This list has 2 medication(s) that are the same as other medications prescribed for you. Read the directions carefully, and ask your doctor or other care provider to review them with you.                CONTINUE taking these medications      * albuterol 1.25 mg/3 mL Nebu  Commonly known as: ACCUNEB  Take 3 mLs (1.25 mg total) by nebulization every 6 (six) hours as needed (shortness of breath or wheezing). Rescue     * albuterol 90 mcg/actuation inhaler  Commonly known as: VENTOLIN HFA  Inhale 2 puffs into the lungs every 4 (four) hours as needed for Wheezing or Shortness of Breath. Rescue     amLODIPine 10 MG tablet  Commonly known as: NORVASC  TAKE 1 TABLET BY MOUTH EVERY DAY     ascorbic acid  (vitamin C) 500 MG tablet  Commonly known as: VITAMIN C  Take 1 tablet (500 mg total) by mouth 2 (two) times daily.     atorvastatin 20 MG tablet  Commonly known as: LIPITOR  Take 1 tablet (20 mg total) by mouth every evening.     budesonide-formoterol 160-4.5 mcg 160-4.5 mcg/actuation Hfaa  Commonly known as: SYMBICORT  Inhale 2 puffs into the lungs 2 (two) times daily. Controller     fluticasone propionate 50 mcg/actuation nasal spray  Commonly known as: FLONASE  2 sprays (100 mcg total) by Each Nostril route once daily.     fluticasone-salmeterol 500-50 mcg/dose 500-50 mcg/dose Dsdv diskus inhaler  Commonly known as: ADVAIR  Inhale 1 puff into the lungs 2 (two) times daily. Controller     levocetirizine 5 MG tablet  Commonly known as: XYZAL  Take 1 tablet (5 mg total) by mouth every evening.     losartan 100 MG tablet  Commonly known as: COZAAR  TAKE 1 TABLET(100 MG) BY MOUTH EVERY DAY     metoprolol succinate 100 MG 24 hr tablet  Commonly known as: TOPROL-XL  TAKE 1 TABLET(100 MG) BY MOUTH EVERY DAY     multivitamin with minerals tablet     vitamin D 1000 units Tab  Commonly known as: VITAMIN D3           * This list has 2 medication(s) that are the same as other medications prescribed for you. Read the directions carefully, and ask your doctor or other care provider to review them with you.                   Where to Get Your Medications        These medications were sent to [facility] [address], [place] [address]      Hours: Mon-Fri, 8a-5:30p Phone: [phone]   · calcium-vitamin D3 500 mg-5 mcg (200 unit) per tablet  · dexAMETHasone 6 MG tablet  · dextromethorphan-guaiFENesin  mg/5 ml  mg/5 mL liquid  · levoFLOXacin 750 MG tablet  · loperamide 2 mg capsule  · pulse oximeter device

## 2022-01-15 PROCEDURE — G0180 PR HOME HEALTH MD CERTIFICATION: ICD-10-PCS | Mod: ,,, | Performed by: FAMILY MEDICINE

## 2022-01-15 PROCEDURE — G0180 MD CERTIFICATION HHA PATIENT: HCPCS | Mod: ,,, | Performed by: FAMILY MEDICINE

## 2022-01-16 ENCOUNTER — TELEPHONE (OUTPATIENT)
Dept: FAMILY MEDICINE | Facility: CLINIC | Age: 72
End: 2022-01-16
Payer: MEDICARE

## 2022-01-16 NOTE — TELEPHONE ENCOUNTER
----- Message from Mony Schwab LPN sent at 1/14/2022 11:11 AM CST -----  Regarding: FW: Pt has a hospital follow up appt.  Just an FYI  ----- Message -----  From: Laurence Lazaro RN  Sent: 1/14/2022  11:01 AM CST  To: [facility] Staff  Subject: Pt has a hospital follow up appt.                 Pt discharging home with oxygen. At F/U visit, pt will need to be evaluated for continued need of home oxygen. If no longer needed, please contact [facility] [phone] to  equipment for pt.

## 2022-01-17 ENCOUNTER — TELEPHONE (OUTPATIENT)
Dept: ADMINISTRATIVE | Facility: CLINIC | Age: 72
End: 2022-01-17
Payer: MEDICARE

## 2022-01-17 LAB
BACTERIA SPEC AEROBE CULT: ABNORMAL
BACTERIA SPEC AEROBE CULT: ABNORMAL
GRAM STN SPEC: ABNORMAL

## 2022-01-18 ENCOUNTER — TELEPHONE (OUTPATIENT)
Dept: ADMINISTRATIVE | Facility: CLINIC | Age: 72
End: 2022-01-18
Payer: MEDICARE

## 2022-01-18 ENCOUNTER — PATIENT MESSAGE (OUTPATIENT)
Dept: ADMINISTRATIVE | Facility: CLINIC | Age: 72
End: 2022-01-18
Payer: MEDICARE

## 2022-01-24 ENCOUNTER — HOSPITAL ENCOUNTER (OUTPATIENT)
Dept: RADIOLOGY | Facility: HOSPITAL | Age: 72
Discharge: HOME | End: 2022-01-24
Attending: FAMILY MEDICINE
Payer: MEDICARE

## 2022-01-24 DIAGNOSIS — J18.9 MULTIFOCAL PNEUMONIA: ICD-10-CM

## 2022-01-24 PROCEDURE — 71046 X-RAY EXAM CHEST 2 VIEWS: CPT | Mod: TC,HCNC,FY

## 2022-01-24 PROCEDURE — 71046 X-RAY EXAM CHEST 2 VIEWS: CPT | Mod: 26,HCNC,, | Performed by: RADIOLOGY

## 2022-01-24 PROCEDURE — 71046 XR CHEST PA AND LATERAL: ICD-10-PCS | Mod: 26,HCNC,, | Performed by: RADIOLOGY

## 2022-01-25 ENCOUNTER — TELEPHONE (OUTPATIENT)
Dept: PULMONOLOGY | Facility: CLINIC | Age: 72
End: 2022-01-25
Payer: MEDICARE

## 2022-01-25 DIAGNOSIS — J45.909 ASTHMA, UNSPECIFIED ASTHMA SEVERITY, UNSPECIFIED WHETHER COMPLICATED, UNSPECIFIED WHETHER PERSISTENT: Primary | ICD-10-CM

## 2022-01-26 ENCOUNTER — TELEPHONE (OUTPATIENT)
Dept: FAMILY MEDICINE | Facility: CLINIC | Age: 72
End: 2022-01-26
Payer: MEDICARE

## 2022-01-26 ENCOUNTER — EXTERNAL HOME HEALTH (OUTPATIENT)
Dept: HOME HEALTH SERVICES | Facility: HOSPITAL | Age: 72
End: 2022-01-26
Payer: MEDICARE

## 2022-01-28 ENCOUNTER — PATIENT OUTREACH (OUTPATIENT)
Dept: ADMINISTRATIVE | Facility: OTHER | Age: 72
End: 2022-01-28
Payer: MEDICARE

## 2022-01-28 DIAGNOSIS — E78.2 MIXED HYPERLIPIDEMIA: ICD-10-CM

## 2022-01-28 RX ORDER — ATORVASTATIN CALCIUM 20 MG/1
20 TABLET, FILM COATED ORAL NIGHTLY
Qty: 90 TABLET | Refills: 3 | Status: ON HOLD | OUTPATIENT
Start: 2022-01-28 | End: 2022-03-14 | Stop reason: HOSPADM

## 2022-01-28 NOTE — PROGRESS NOTES
Health Maintenance Due   Topic Date Due    Shingles Vaccine (1 of 2) Never done    Influenza Vaccine (1) Never done     Updates were requested from care everywhere.  Chart was reviewed for overdue Proactive [facility] Encounters ([facility]) topics (CRS, Breast Cancer Screening, Eye exam)  Health Maintenance has been updated.  LINKS immunization registry triggered.  Immunizations were reconciled.

## 2022-01-28 NOTE — TELEPHONE ENCOUNTER
No new care gaps identified.  Powered by [employer]. Reference number: [id number].   1/28/2022 2:56:55 PM CST

## 2022-02-14 ENCOUNTER — OFFICE VISIT (OUTPATIENT)
Dept: OTOLARYNGOLOGY | Facility: CLINIC | Age: 72
End: 2022-02-14
Payer: MEDICARE

## 2022-02-14 VITALS
WEIGHT: 160.94 LBS | DIASTOLIC BLOOD PRESSURE: 75 MMHG | BODY MASS INDEX: 25.86 KG/M2 | HEIGHT: 66 IN | HEART RATE: 93 BPM | SYSTOLIC BLOOD PRESSURE: 132 MMHG

## 2022-02-14 DIAGNOSIS — J32.8 OTHER CHRONIC SINUSITIS: Primary | ICD-10-CM

## 2022-02-14 DIAGNOSIS — J34.3 HYPERTROPHY OF INFERIOR NASAL TURBINATE: ICD-10-CM

## 2022-02-14 DIAGNOSIS — J34.2 NASAL SEPTAL DEVIATION: ICD-10-CM

## 2022-02-14 PROCEDURE — 3078F DIAST BP <80 MM HG: CPT | Mod: HCNC,CPTII,S$GLB, | Performed by: OTOLARYNGOLOGY

## 2022-02-14 PROCEDURE — 99999 PR PBB SHADOW E&M-EST. PATIENT-LVL III: ICD-10-PCS | Mod: PBBFAC,HCNC,, | Performed by: OTOLARYNGOLOGY

## 2022-02-14 PROCEDURE — 3288F FALL RISK ASSESSMENT DOCD: CPT | Mod: HCNC,CPTII,S$GLB, | Performed by: OTOLARYNGOLOGY

## 2022-02-14 PROCEDURE — 3078F PR MOST RECENT DIASTOLIC BLOOD PRESSURE < 80 MM HG: ICD-10-PCS | Mod: HCNC,CPTII,S$GLB, | Performed by: OTOLARYNGOLOGY

## 2022-02-14 PROCEDURE — 3075F PR MOST RECENT SYSTOLIC BLOOD PRESS GE 130-139MM HG: ICD-10-PCS | Mod: HCNC,CPTII,S$GLB, | Performed by: OTOLARYNGOLOGY

## 2022-02-14 PROCEDURE — 1160F RVW MEDS BY RX/DR IN RCRD: CPT | Mod: HCNC,CPTII,S$GLB, | Performed by: OTOLARYNGOLOGY

## 2022-02-14 PROCEDURE — 4010F ACE/ARB THERAPY RXD/TAKEN: CPT | Mod: HCNC,CPTII,S$GLB, | Performed by: OTOLARYNGOLOGY

## 2022-02-14 PROCEDURE — 1101F PR PT FALLS ASSESS DOC 0-1 FALLS W/OUT INJ PAST YR: ICD-10-PCS | Mod: HCNC,CPTII,S$GLB, | Performed by: OTOLARYNGOLOGY

## 2022-02-14 PROCEDURE — 3288F PR FALLS RISK ASSESSMENT DOCUMENTED: ICD-10-PCS | Mod: HCNC,CPTII,S$GLB, | Performed by: OTOLARYNGOLOGY

## 2022-02-14 PROCEDURE — 1159F MED LIST DOCD IN RCRD: CPT | Mod: HCNC,CPTII,S$GLB, | Performed by: OTOLARYNGOLOGY

## 2022-02-14 PROCEDURE — 3008F BODY MASS INDEX DOCD: CPT | Mod: HCNC,CPTII,S$GLB, | Performed by: OTOLARYNGOLOGY

## 2022-02-14 PROCEDURE — 1101F PT FALLS ASSESS-DOCD LE1/YR: CPT | Mod: HCNC,CPTII,S$GLB, | Performed by: OTOLARYNGOLOGY

## 2022-02-14 PROCEDURE — 1126F PR PAIN SEVERITY QUANTIFIED, NO PAIN PRESENT: ICD-10-PCS | Mod: HCNC,CPTII,S$GLB, | Performed by: OTOLARYNGOLOGY

## 2022-02-14 PROCEDURE — 31231 NASAL/SINUS ENDOSCOPY: ICD-10-PCS | Mod: HCNC,S$GLB,, | Performed by: OTOLARYNGOLOGY

## 2022-02-14 PROCEDURE — 3075F SYST BP GE 130 - 139MM HG: CPT | Mod: HCNC,CPTII,S$GLB, | Performed by: OTOLARYNGOLOGY

## 2022-02-14 PROCEDURE — 31231 NASAL ENDOSCOPY DX: CPT | Mod: HCNC,S$GLB,, | Performed by: OTOLARYNGOLOGY

## 2022-02-14 PROCEDURE — 4010F PR ACE/ARB THEARPY RXD/TAKEN: ICD-10-PCS | Mod: HCNC,CPTII,S$GLB, | Performed by: OTOLARYNGOLOGY

## 2022-02-14 PROCEDURE — 3044F HG A1C LEVEL LT 7.0%: CPT | Mod: HCNC,CPTII,S$GLB, | Performed by: OTOLARYNGOLOGY

## 2022-02-14 PROCEDURE — 1159F PR MEDICATION LIST DOCUMENTED IN MEDICAL RECORD: ICD-10-PCS | Mod: HCNC,CPTII,S$GLB, | Performed by: OTOLARYNGOLOGY

## 2022-02-14 PROCEDURE — 99999 PR PBB SHADOW E&M-EST. PATIENT-LVL III: CPT | Mod: PBBFAC,HCNC,, | Performed by: OTOLARYNGOLOGY

## 2022-02-14 PROCEDURE — 1160F PR REVIEW ALL MEDS BY PRESCRIBER/CLIN PHARMACIST DOCUMENTED: ICD-10-PCS | Mod: HCNC,CPTII,S$GLB, | Performed by: OTOLARYNGOLOGY

## 2022-02-14 PROCEDURE — 3008F PR BODY MASS INDEX (BMI) DOCUMENTED: ICD-10-PCS | Mod: HCNC,CPTII,S$GLB, | Performed by: OTOLARYNGOLOGY

## 2022-02-14 PROCEDURE — 99214 PR OFFICE/OUTPT VISIT, EST, LEVL IV, 30-39 MIN: ICD-10-PCS | Mod: 25,HCNC,S$GLB, | Performed by: OTOLARYNGOLOGY

## 2022-02-14 PROCEDURE — 99214 OFFICE O/P EST MOD 30 MIN: CPT | Mod: 25,HCNC,S$GLB, | Performed by: OTOLARYNGOLOGY

## 2022-02-14 PROCEDURE — 3044F PR MOST RECENT HEMOGLOBIN A1C LEVEL <7.0%: ICD-10-PCS | Mod: HCNC,CPTII,S$GLB, | Performed by: OTOLARYNGOLOGY

## 2022-02-14 PROCEDURE — 1126F AMNT PAIN NOTED NONE PRSNT: CPT | Mod: HCNC,CPTII,S$GLB, | Performed by: OTOLARYNGOLOGY

## 2022-02-14 RX ORDER — MUPIROCIN 20 MG/G
OINTMENT TOPICAL NIGHTLY
Qty: 15 G | Refills: 0 | Status: SHIPPED | OUTPATIENT
Start: 2022-02-14 | End: 2022-02-24

## 2022-02-14 RX ORDER — AMOXICILLIN AND CLAVULANATE POTASSIUM 875; 125 MG/1; MG/1
1 TABLET, FILM COATED ORAL 2 TIMES DAILY
Qty: 28 TABLET | Refills: 0 | Status: SHIPPED | OUTPATIENT
Start: 2022-02-14 | End: 2022-02-24

## 2022-02-14 RX ORDER — LEVOCETIRIZINE DIHYDROCHLORIDE 5 MG/1
5 TABLET, FILM COATED ORAL NIGHTLY
Qty: 30 TABLET | Refills: 11 | Status: SHIPPED | OUTPATIENT
Start: 2022-02-14 | End: 2023-02-20 | Stop reason: SDUPTHER

## 2022-02-14 NOTE — PROGRESS NOTES
Chief Complaint   Patient presents with    Nasal Congestion     Right sided, some blood in mucous when blowing nose   .    HPI:     Scooter Morrissey is a 71 y.o. male who is known to me for CRS, NSD, inferior turbinate hypertorphy last seen in 2020. He  presents for evaluation of a several month history of nasal congestion, post-nasal drip, and bloody rhinorrhea.  He feels that he has had right sided facial tenderness and swelling. He does feel that he has mild tenderness in the right cheek region.  He describes this as a 1-2/10 on pain scale. He does not have any pain on the left side.  He does use sinus rinses or nasal sprays. He stopped FLonase due to nose bleeds. He admits to rhinorrhea and postnasal drip. He has not had sinus or nasal surgery. There is no history of sinonasal trauma. He does work as a .           Past Medical History:   Diagnosis Date    Acute hypoxemic respiratory failure 1/12/2022    Anxiety 6/29/2016    Asthma with chronic obstructive pulmonary disease (COPD)     Benign essential hypertension 4/5/2016    Hyperlipidemia 4/13/2016    Interstitial pneumonitis 4/13/2016    Other specified anemias 3/13/2020     Social History     Socioeconomic History    Marital status:    Occupational History    Occupation: lawn service   Tobacco Use    Smoking status: Former Smoker     Types: Cigars    Smokeless tobacco: Former User     Quit date: 5/12/1996   Substance and Sexual Activity    Alcohol use: No    Drug use: No    Sexual activity: Yes     Partners: Female     Social Determinants of Health     Financial Resource Strain: Low Risk     Difficulty of Paying Living Expenses: Not hard at all   Food Insecurity: No Food Insecurity    Worried About Running Out of Food in the Last Year: Never true    Ran Out of Food in the Last Year: Never true   Transportation Needs: No Transportation Needs    Lack of Transportation (Medical): No    Lack of Transportation (Non-Medical):  No   Physical Activity: Sufficiently Active    Days of Exercise per Week: 6 days    Minutes of Exercise per Session: 90 min   Stress: No Stress Concern Present    Feeling of Stress : Only a little   Social Connections: Moderately Isolated    Frequency of Communication with Friends and Family: Three times a week    Frequency of Social Gatherings with Friends and Family: Twice a week    Attends [employer] Services: Never    Active Member of Clubs or Organizations: No    Attends Club or Organization Meetings: Never    Marital Status:    Housing Stability: Low Risk     Unable to Pay for Housing in the Last Year: No    Number of Places Lived in the Last Year: 1    Unstable Housing in the Last Year: No     Past Surgical History:   Procedure Laterality Date    ADENOIDECTOMY      COLONOSCOPY N/A 6/1/2016    Procedure: COLONOSCOPY;  Surgeon: Meme Mills MD;  Location: [facility];  Service: Endoscopy;  Laterality: N/A;    FINGER SURGERY      15 years ago    TONSILLECTOMY       Family History   Problem Relation Age of Onset    No Known Problems Mother     No Known Problems Father     No Known Problems Daughter            Review of Systems  General: negative for chills, fever or weight loss  Psychological: negative for mood changes or depression  Ophthalmic: negative for blurry vision, photophobia or eye pain  ENT: see HPI  Respiratory: no cough, shortness of breath, or wheezing  Cardiovascular: no chest pain or dyspnea on exertion  Gastrointestinal: no abdominal pain, change in bowel habits, or black/ bloody stools  Musculoskeletal: negative for gait disturbance or muscular weakness  Neurological: no syncope or seizures; no ataxia  Dermatological: negative for puritis,  rash and jaundice  Hematologic/lymphatic: no easy bruising, no new lumps or bumps      Physical Exam:    Vitals:    02/14/22 1427   BP: 132/75   Pulse: 93       Constitutional: Well appearing / communicating without  difficutly.  NAD.  Eyes: EOM I Bilaterally  Head/Face: Normocephalic.  Negative paranasal sinus pressure/tenderness.  Salivary glands WNL.  House Brackmann I Bilaterally.    Right Ear: Auricle normal appearance. External Auditory Canal within normal limits,TM w/o masses/lesions/perforations. TM mobility noted.   Left Ear: Auricle normal appearance. External Auditory Canal WNL,TM w/o masses/lesions/perforations. TM mobility noted.  Nose: Nasal septal deviation to the right. Inferior Turbinates 3+ bilaterally. No septal perforation. No masses/lesions. External nasal skin appears normal without masses/lesions.  Oral Cavity: Gingiva/lips within normal limits.  Dentition/gingiva healthy appearing. Mucus membranes moist. Floor of mouth soft, no masses palpated. Oral Tongue mobile. Hard Palate appears normal.    Oropharynx: Base of tongue appears normal. No masses/lesions noted. Tonsillar fossa/pharyngeal wall without lesions. Posterior oropharynx WNL.  Soft palate without masses. Midline uvula.   Neck/Lymphatic: No LAD I-VI bilaterally.  No thyromegaly.  No masses noted on exam.      See separate procedure note for nasal endoscopy.       Assessment:    ICD-10-CM ICD-9-CM    1. Other chronic sinusitis  J32.8 473.8    2. Nasal septal deviation  J34.2 470    3. Hypertrophy of inferior nasal turbinate  J34.3 478.0      The primary encounter diagnosis was Other chronic sinusitis. Diagnoses of Nasal septal deviation and Hypertrophy of inferior nasal turbinate were also pertinent to this visit.      Plan:  No orders of the defined types were placed in this encounter.    Nasal saline rinses BID  Start mupirocin ointment to bilateral nostrils daily  Start xyzal 5 mg PO daily  Start Augmentin 875 mg PO BID   Follow up in 2 weeks with nasal endoscopy.     Clara Jennings MD

## 2022-02-14 NOTE — PROCEDURES
Nasal/sinus endoscopy    Date/Time: 2/14/2022 2:20 PM  Performed by: Clara Jennings MD  Authorized by: Clara Jennings MD     Consent Done?:  Yes (Verbal)  Anesthesia:     Local anesthetic:  Lidocaine 2% and Zheng-Synephrine 1/2%  Nose:     Procedure Performed:  Nasal Endoscopy  External:      No external nasal deformity  Intranasal:      Mucosa no polyps     Mucosa ulcers not present     No mucosa lesions present     Enlarged turbinates     Septum gross deformity  Nasopharynx:      Posterior choanae patent     Eustachian tube patent     Bilateral middle turbinate edema with narrowing of the middle meatus; + narrowing of sphenoethmoid recess

## 2022-02-23 ENCOUNTER — DOCUMENT SCAN (OUTPATIENT)
Dept: HOME HEALTH SERVICES | Facility: HOSPITAL | Age: 72
End: 2022-02-23
Payer: MEDICARE

## 2022-02-24 ENCOUNTER — OFFICE VISIT (OUTPATIENT)
Dept: OTOLARYNGOLOGY | Facility: CLINIC | Age: 72
End: 2022-02-24
Payer: MEDICARE

## 2022-02-24 VITALS
HEART RATE: 93 BPM | WEIGHT: 168.44 LBS | SYSTOLIC BLOOD PRESSURE: 135 MMHG | BODY MASS INDEX: 27.07 KG/M2 | DIASTOLIC BLOOD PRESSURE: 73 MMHG | HEIGHT: 66 IN

## 2022-02-24 DIAGNOSIS — J34.2 NASAL SEPTAL DEVIATION: Chronic | ICD-10-CM

## 2022-02-24 DIAGNOSIS — J32.8 OTHER CHRONIC SINUSITIS: Primary | Chronic | ICD-10-CM

## 2022-02-24 DIAGNOSIS — J34.3 HYPERTROPHY OF INFERIOR NASAL TURBINATE: ICD-10-CM

## 2022-02-24 PROCEDURE — 3078F PR MOST RECENT DIASTOLIC BLOOD PRESSURE < 80 MM HG: ICD-10-PCS | Mod: HCNC,CPTII,S$GLB, | Performed by: OTOLARYNGOLOGY

## 2022-02-24 PROCEDURE — 3008F BODY MASS INDEX DOCD: CPT | Mod: HCNC,CPTII,S$GLB, | Performed by: OTOLARYNGOLOGY

## 2022-02-24 PROCEDURE — 1126F AMNT PAIN NOTED NONE PRSNT: CPT | Mod: HCNC,CPTII,S$GLB, | Performed by: OTOLARYNGOLOGY

## 2022-02-24 PROCEDURE — 4010F ACE/ARB THERAPY RXD/TAKEN: CPT | Mod: HCNC,CPTII,S$GLB, | Performed by: OTOLARYNGOLOGY

## 2022-02-24 PROCEDURE — 1159F MED LIST DOCD IN RCRD: CPT | Mod: HCNC,CPTII,S$GLB, | Performed by: OTOLARYNGOLOGY

## 2022-02-24 PROCEDURE — 99999 PR PBB SHADOW E&M-EST. PATIENT-LVL III: ICD-10-PCS | Mod: PBBFAC,HCNC,, | Performed by: OTOLARYNGOLOGY

## 2022-02-24 PROCEDURE — 1160F PR REVIEW ALL MEDS BY PRESCRIBER/CLIN PHARMACIST DOCUMENTED: ICD-10-PCS | Mod: HCNC,CPTII,S$GLB, | Performed by: OTOLARYNGOLOGY

## 2022-02-24 PROCEDURE — 99999 PR PBB SHADOW E&M-EST. PATIENT-LVL III: CPT | Mod: PBBFAC,HCNC,, | Performed by: OTOLARYNGOLOGY

## 2022-02-24 PROCEDURE — 3075F PR MOST RECENT SYSTOLIC BLOOD PRESS GE 130-139MM HG: ICD-10-PCS | Mod: HCNC,CPTII,S$GLB, | Performed by: OTOLARYNGOLOGY

## 2022-02-24 PROCEDURE — 3288F PR FALLS RISK ASSESSMENT DOCUMENTED: ICD-10-PCS | Mod: HCNC,CPTII,S$GLB, | Performed by: OTOLARYNGOLOGY

## 2022-02-24 PROCEDURE — 3008F PR BODY MASS INDEX (BMI) DOCUMENTED: ICD-10-PCS | Mod: HCNC,CPTII,S$GLB, | Performed by: OTOLARYNGOLOGY

## 2022-02-24 PROCEDURE — 3075F SYST BP GE 130 - 139MM HG: CPT | Mod: HCNC,CPTII,S$GLB, | Performed by: OTOLARYNGOLOGY

## 2022-02-24 PROCEDURE — 1160F RVW MEDS BY RX/DR IN RCRD: CPT | Mod: HCNC,CPTII,S$GLB, | Performed by: OTOLARYNGOLOGY

## 2022-02-24 PROCEDURE — 1101F PT FALLS ASSESS-DOCD LE1/YR: CPT | Mod: HCNC,CPTII,S$GLB, | Performed by: OTOLARYNGOLOGY

## 2022-02-24 PROCEDURE — 3078F DIAST BP <80 MM HG: CPT | Mod: HCNC,CPTII,S$GLB, | Performed by: OTOLARYNGOLOGY

## 2022-02-24 PROCEDURE — 1101F PR PT FALLS ASSESS DOC 0-1 FALLS W/OUT INJ PAST YR: ICD-10-PCS | Mod: HCNC,CPTII,S$GLB, | Performed by: OTOLARYNGOLOGY

## 2022-02-24 PROCEDURE — 3288F FALL RISK ASSESSMENT DOCD: CPT | Mod: HCNC,CPTII,S$GLB, | Performed by: OTOLARYNGOLOGY

## 2022-02-24 PROCEDURE — 3044F HG A1C LEVEL LT 7.0%: CPT | Mod: HCNC,CPTII,S$GLB, | Performed by: OTOLARYNGOLOGY

## 2022-02-24 PROCEDURE — 99214 OFFICE O/P EST MOD 30 MIN: CPT | Mod: 25,HCNC,S$GLB, | Performed by: OTOLARYNGOLOGY

## 2022-02-24 PROCEDURE — 99214 PR OFFICE/OUTPT VISIT, EST, LEVL IV, 30-39 MIN: ICD-10-PCS | Mod: 25,HCNC,S$GLB, | Performed by: OTOLARYNGOLOGY

## 2022-02-24 PROCEDURE — 1159F PR MEDICATION LIST DOCUMENTED IN MEDICAL RECORD: ICD-10-PCS | Mod: HCNC,CPTII,S$GLB, | Performed by: OTOLARYNGOLOGY

## 2022-02-24 PROCEDURE — 3044F PR MOST RECENT HEMOGLOBIN A1C LEVEL <7.0%: ICD-10-PCS | Mod: HCNC,CPTII,S$GLB, | Performed by: OTOLARYNGOLOGY

## 2022-02-24 PROCEDURE — 1126F PR PAIN SEVERITY QUANTIFIED, NO PAIN PRESENT: ICD-10-PCS | Mod: HCNC,CPTII,S$GLB, | Performed by: OTOLARYNGOLOGY

## 2022-02-24 PROCEDURE — 4010F PR ACE/ARB THEARPY RXD/TAKEN: ICD-10-PCS | Mod: HCNC,CPTII,S$GLB, | Performed by: OTOLARYNGOLOGY

## 2022-02-24 RX ORDER — CEFDINIR 300 MG/1
300 CAPSULE ORAL 2 TIMES DAILY
Qty: 20 CAPSULE | Refills: 0 | Status: SHIPPED | OUTPATIENT
Start: 2022-02-24 | End: 2022-03-06

## 2022-02-24 NOTE — PROGRESS NOTES
Chief Complaint   Patient presents with    Follow-up     Feels some improvement since last visit   .    HPI:     Scooter Morrissey is a 71 y.o. male who is known to me for CRS, NSD, inferior turbinate hypertorphy last seen in 2020. He  presents for evaluation of a several month history of nasal congestion, post-nasal drip, and bloody rhinorrhea.  He feels that he has had right sided facial tenderness and swelling. He does feel that he has mild tenderness in the right cheek region.  He describes this as a 1-2/10 on pain scale. He does not have any pain on the left side.  He does use sinus rinses or nasal sprays. He stopped FLonase due to nose bleeds. He admits to rhinorrhea and postnasal drip. He has not had sinus or nasal surgery. There is no history of sinonasal trauma. He does work as a .     Interval HPI 2/24/2022:  Follow up visit. Reports he has had some improvement since last visit. He reports that he is having continued rhinorrhea and post-nasal drip. He has been on Augmentin as prescribed. He has been using nasal saline rinses. He stopped Flonase due to bleeding.       Past Medical History:   Diagnosis Date    Acute hypoxemic respiratory failure 1/12/2022    Anxiety 6/29/2016    Asthma with chronic obstructive pulmonary disease (COPD)     Benign essential hypertension 4/5/2016    Hyperlipidemia 4/13/2016    Interstitial pneumonitis 4/13/2016    Other specified anemias 3/13/2020     Social History     Socioeconomic History    Marital status:    Occupational History    Occupation: lawn service   Tobacco Use    Smoking status: Former Smoker     Types: Cigars    Smokeless tobacco: Former User     Quit date: 5/12/1996   Substance and Sexual Activity    Alcohol use: No    Drug use: No    Sexual activity: Yes     Partners: Female     Social Determinants of Health     Financial Resource Strain: Low Risk     Difficulty of Paying Living Expenses: Not hard at all   Food Insecurity: No Food  Insecurity    Worried About Running Out of Food in the Last Year: Never true    Ran Out of Food in the Last Year: Never true   Transportation Needs: No Transportation Needs    Lack of Transportation (Medical): No    Lack of Transportation (Non-Medical): No   Physical Activity: Sufficiently Active    Days of Exercise per Week: 6 days    Minutes of Exercise per Session: 90 min   Stress: No Stress Concern Present    Feeling of Stress : Only a little   Social Connections: Moderately Isolated    Frequency of Communication with Friends and Family: Three times a week    Frequency of Social Gatherings with Friends and Family: Twice a week    Attends [employer] Services: Never    Active Member of Clubs or Organizations: No    Attends Club or Organization Meetings: Never    Marital Status:    Housing Stability: Low Risk     Unable to Pay for Housing in the Last Year: No    Number of Places Lived in the Last Year: 1    Unstable Housing in the Last Year: No     Past Surgical History:   Procedure Laterality Date    ADENOIDECTOMY      COLONOSCOPY N/A 6/1/2016    Procedure: COLONOSCOPY;  Surgeon: Meme Mills MD;  Location: [facility];  Service: Endoscopy;  Laterality: N/A;    FINGER SURGERY      15 years ago    TONSILLECTOMY       Family History   Problem Relation Age of Onset    No Known Problems Mother     No Known Problems Father     No Known Problems Daughter            Review of Systems  General: negative for chills, fever or weight loss  Psychological: negative for mood changes or depression  Ophthalmic: negative for blurry vision, photophobia or eye pain  ENT: see HPI  Respiratory: no cough, shortness of breath, or wheezing  Cardiovascular: no chest pain or dyspnea on exertion  Gastrointestinal: no abdominal pain, change in bowel habits, or black/ bloody stools  Musculoskeletal: negative for gait disturbance or muscular weakness  Neurological: no syncope or seizures; no  ataxia  Dermatological: negative for puritis,  rash and jaundice  Hematologic/lymphatic: no easy bruising, no new lumps or bumps      Physical Exam:    Vitals:    02/24/22 1402   BP: 135/73   Pulse: 93       Constitutional: Well appearing / communicating without difficutly.  NAD.  Eyes: EOM I Bilaterally  Head/Face: Normocephalic.  Negative paranasal sinus pressure/tenderness.  Salivary glands WNL.  House Brackmann I Bilaterally.    Right Ear: Auricle normal appearance. External Auditory Canal within normal limits,TM w/o masses/lesions/perforations. TM mobility noted.   Left Ear: Auricle normal appearance. External Auditory Canal WNL,TM w/o masses/lesions/perforations. TM mobility noted.  Nose: Nasal septal deviation to the right. Inferior Turbinates 3+ bilaterally. No septal perforation. No masses/lesions. External nasal skin appears normal without masses/lesions.  Oral Cavity: Gingiva/lips within normal limits.  Dentition/gingiva healthy appearing. Mucus membranes moist. Floor of mouth soft, no masses palpated. Oral Tongue mobile. Hard Palate appears normal.    Oropharynx: Base of tongue appears normal. No masses/lesions noted. Tonsillar fossa/pharyngeal wall without lesions. Posterior oropharynx WNL.  Soft palate without masses. Midline uvula.   Neck/Lymphatic: No LAD I-VI bilaterally.  No thyromegaly.  No masses noted on exam.      See separate procedure note for nasal endoscopy.       Assessment:    ICD-10-CM ICD-9-CM    1. Other chronic sinusitis  J32.8 473.8 Nasal/sinus endoscopy   2. Nasal septal deviation  J34.2 470    3. Hypertrophy of inferior nasal turbinate  J34.3 478.0      The primary encounter diagnosis was Other chronic sinusitis. Diagnoses of Nasal septal deviation and Hypertrophy of inferior nasal turbinate were also pertinent to this visit.      Plan:  Orders Placed This Encounter   Procedures    Nasal/sinus endoscopy     Nasal saline rinses BID  Continue mupirocin ointment to bilateral nostrils  daily  Resume Flonase 2 sprays per nostril daily  Continue  xyzal 5 mg PO daily  Start omnicef 300 mg PO BID for 10 days   Obtain CT scan of the sinuses to assess intraluminal patency.   Follow up in 6 weeks with nasal endoscopy.     Clara Jennings MD

## 2022-02-24 NOTE — PROCEDURES
Nasal/sinus endoscopy    Date/Time: 2/24/2022 2:20 PM  Performed by: Clara Jennings MD  Authorized by: Clara Jennings MD          Consent Done?:  Yes (Verbal)  Anesthesia:     Local anesthetic:  Lidocaine 2% and Zheng-Synephrine 1/2%  Nose:     Procedure Performed:  Nasal Endoscopy  External:      No external nasal deformity  Intranasal:      Mucosa no polyps     Mucosa ulcers not present     No mucosa lesions present     Enlarged turbinates     Septum gross deformity  Nasopharynx:      Posterior choanae patent     Eustachian tube patent     Bilateral middle turbinate edema with narrowing of the middle meatus; + narrowing of sphenoethmoid recess

## 2022-03-08 ENCOUNTER — HOSPITAL ENCOUNTER (INPATIENT)
Facility: HOSPITAL | Age: 72
LOS: 6 days | Discharge: HOME | DRG: 189 | End: 2022-03-14
Attending: EMERGENCY MEDICINE | Admitting: HOSPITALIST
Payer: MEDICARE

## 2022-03-08 DIAGNOSIS — E78.2 MIXED HYPERLIPIDEMIA: ICD-10-CM

## 2022-03-08 DIAGNOSIS — J18.9 MULTIFOCAL PNEUMONIA: Primary | ICD-10-CM

## 2022-03-08 DIAGNOSIS — J45.51 SEVERE PERSISTENT ASTHMA WITH ACUTE EXACERBATION: ICD-10-CM

## 2022-03-08 DIAGNOSIS — J96.01 ACUTE RESPIRATORY FAILURE WITH HYPOXIA: ICD-10-CM

## 2022-03-08 DIAGNOSIS — R06.02 SHORTNESS OF BREATH: ICD-10-CM

## 2022-03-08 LAB
ALBUMIN SERPL BCP-MCNC: 2.7 G/DL (ref 3.5–5.2)
ALP SERPL-CCNC: 70 U/L (ref 55–135)
ALT SERPL W/O P-5'-P-CCNC: 18 U/L (ref 10–44)
ANION GAP SERPL CALC-SCNC: 12 MMOL/L (ref 8–16)
AST SERPL-CCNC: 14 U/L (ref 10–40)
BASOPHILS # BLD AUTO: 0.04 K/UL (ref 0–0.2)
BASOPHILS NFR BLD: 0.5 % (ref 0–1.9)
BILIRUB SERPL-MCNC: 0.4 MG/DL (ref 0.1–1)
BNP SERPL-MCNC: 16 PG/ML (ref 0–99)
BUN SERPL-MCNC: 15 MG/DL (ref 8–23)
CALCIUM SERPL-MCNC: 9.9 MG/DL (ref 8.7–10.5)
CHLORIDE SERPL-SCNC: 105 MMOL/L (ref 95–110)
CO2 SERPL-SCNC: 24 MMOL/L (ref 23–29)
CREAT SERPL-MCNC: 1.1 MG/DL (ref 0.5–1.4)
DIFFERENTIAL METHOD: ABNORMAL
EOSINOPHIL # BLD AUTO: 0.3 K/UL (ref 0–0.5)
EOSINOPHIL NFR BLD: 3.2 % (ref 0–8)
ERYTHROCYTE [DISTWIDTH] IN BLOOD BY AUTOMATED COUNT: 17 % (ref 11.5–14.5)
EST. GFR  (AFRICAN AMERICAN): >60 ML/MIN/1.73 M^2
EST. GFR  (NON AFRICAN AMERICAN): >60 ML/MIN/1.73 M^2
GLUCOSE SERPL-MCNC: 80 MG/DL (ref 70–110)
HCT VFR BLD AUTO: 36.7 % (ref 40–54)
HGB BLD-MCNC: 11.3 G/DL (ref 14–18)
IMM GRANULOCYTES # BLD AUTO: 0.03 K/UL (ref 0–0.04)
IMM GRANULOCYTES NFR BLD AUTO: 0.3 % (ref 0–0.5)
LYMPHOCYTES # BLD AUTO: 1.5 K/UL (ref 1–4.8)
LYMPHOCYTES NFR BLD: 17.7 % (ref 18–48)
MCH RBC QN AUTO: 29.8 PG (ref 27–31)
MCHC RBC AUTO-ENTMCNC: 30.8 G/DL (ref 32–36)
MCV RBC AUTO: 97 FL (ref 82–98)
MONOCYTES # BLD AUTO: 0.8 K/UL (ref 0.3–1)
MONOCYTES NFR BLD: 9.6 % (ref 4–15)
NEUTROPHILS # BLD AUTO: 6 K/UL (ref 1.8–7.7)
NEUTROPHILS NFR BLD: 68.7 % (ref 38–73)
NRBC BLD-RTO: 0 /100 WBC
PLATELET # BLD AUTO: 399 K/UL (ref 150–450)
PMV BLD AUTO: 10.7 FL (ref 9.2–12.9)
POTASSIUM SERPL-SCNC: 4.2 MMOL/L (ref 3.5–5.1)
PROT SERPL-MCNC: 9.2 G/DL (ref 6–8.4)
RBC # BLD AUTO: 3.79 M/UL (ref 4.6–6.2)
SODIUM SERPL-SCNC: 141 MMOL/L (ref 136–145)
TROPONIN I SERPL DL<=0.01 NG/ML-MCNC: <0.006 NG/ML (ref 0–0.03)
WBC # BLD AUTO: 8.66 K/UL (ref 3.9–12.7)

## 2022-03-08 PROCEDURE — 63700000 PHARM REV CODE 250 ALT 637 W/O HCPCS: Mod: HCNC | Performed by: STUDENT IN AN ORGANIZED HEALTH CARE EDUCATION/TRAINING PROGRAM

## 2022-03-08 PROCEDURE — 85025 COMPLETE CBC W/AUTO DIFF WBC: CPT | Mod: HCNC | Performed by: NURSE PRACTITIONER

## 2022-03-08 PROCEDURE — 94761 N-INVAS EAR/PLS OXIMETRY MLT: CPT | Mod: HCNC

## 2022-03-08 PROCEDURE — 99900035 HC TECH TIME PER 15 MIN (STAT): Mod: HCNC

## 2022-03-08 PROCEDURE — 99285 EMERGENCY DEPT VISIT HI MDM: CPT | Mod: 25,HCNC

## 2022-03-08 PROCEDURE — 93010 EKG 12-LEAD: ICD-10-PCS | Mod: HCNC,,, | Performed by: INTERNAL MEDICINE

## 2022-03-08 PROCEDURE — 11000001 HC ACUTE MED/SURG PRIVATE ROOM: Mod: HCNC

## 2022-03-08 PROCEDURE — 27000221 HC OXYGEN, UP TO 24 HOURS: Mod: HCNC

## 2022-03-08 PROCEDURE — 27000207 HC ISOLATION: Mod: HCNC

## 2022-03-08 PROCEDURE — 25000003 PHARM REV CODE 250: Mod: HCNC | Performed by: EMERGENCY MEDICINE

## 2022-03-08 PROCEDURE — 80053 COMPREHEN METABOLIC PANEL: CPT | Mod: HCNC | Performed by: NURSE PRACTITIONER

## 2022-03-08 PROCEDURE — 87205 SMEAR GRAM STAIN: CPT | Mod: HCNC | Performed by: STUDENT IN AN ORGANIZED HEALTH CARE EDUCATION/TRAINING PROGRAM

## 2022-03-08 PROCEDURE — 87040 BLOOD CULTURE FOR BACTERIA: CPT | Mod: 59,HCNC | Performed by: EMERGENCY MEDICINE

## 2022-03-08 PROCEDURE — 96374 THER/PROPH/DIAG INJ IV PUSH: CPT | Mod: HCNC

## 2022-03-08 PROCEDURE — 63600175 PHARM REV CODE 636 W HCPCS: Mod: HCNC | Performed by: EMERGENCY MEDICINE

## 2022-03-08 PROCEDURE — 93010 ELECTROCARDIOGRAM REPORT: CPT | Mod: HCNC,,, | Performed by: INTERNAL MEDICINE

## 2022-03-08 PROCEDURE — 93005 ELECTROCARDIOGRAM TRACING: CPT | Mod: HCNC

## 2022-03-08 PROCEDURE — 87077 CULTURE AEROBIC IDENTIFY: CPT | Mod: HCNC | Performed by: STUDENT IN AN ORGANIZED HEALTH CARE EDUCATION/TRAINING PROGRAM

## 2022-03-08 PROCEDURE — 83880 ASSAY OF NATRIURETIC PEPTIDE: CPT | Mod: HCNC | Performed by: NURSE PRACTITIONER

## 2022-03-08 PROCEDURE — 87186 SC STD MICRODIL/AGAR DIL: CPT | Mod: HCNC | Performed by: STUDENT IN AN ORGANIZED HEALTH CARE EDUCATION/TRAINING PROGRAM

## 2022-03-08 PROCEDURE — 25000003 PHARM REV CODE 250: Mod: HCNC | Performed by: STUDENT IN AN ORGANIZED HEALTH CARE EDUCATION/TRAINING PROGRAM

## 2022-03-08 PROCEDURE — 87070 CULTURE OTHR SPECIMN AEROBIC: CPT | Mod: HCNC | Performed by: STUDENT IN AN ORGANIZED HEALTH CARE EDUCATION/TRAINING PROGRAM

## 2022-03-08 PROCEDURE — 99223 1ST HOSP IP/OBS HIGH 75: CPT | Mod: AI,HCNC,GC, | Performed by: HOSPITALIST

## 2022-03-08 PROCEDURE — 94640 AIRWAY INHALATION TREATMENT: CPT | Mod: HCNC

## 2022-03-08 PROCEDURE — 99223 PR INITIAL HOSPITAL CARE,LEVL III: ICD-10-PCS | Mod: AI,HCNC,GC, | Performed by: HOSPITALIST

## 2022-03-08 PROCEDURE — 36600 WITHDRAWAL OF ARTERIAL BLOOD: CPT | Mod: HCNC

## 2022-03-08 PROCEDURE — 84484 ASSAY OF TROPONIN QUANT: CPT | Mod: HCNC | Performed by: NURSE PRACTITIONER

## 2022-03-08 PROCEDURE — 25000242 PHARM REV CODE 250 ALT 637 W/ HCPCS: Mod: HCNC | Performed by: NURSE PRACTITIONER

## 2022-03-08 PROCEDURE — 63600175 PHARM REV CODE 636 W HCPCS: Mod: HCNC | Performed by: STUDENT IN AN ORGANIZED HEALTH CARE EDUCATION/TRAINING PROGRAM

## 2022-03-08 PROCEDURE — 25000242 PHARM REV CODE 250 ALT 637 W/ HCPCS: Mod: HCNC | Performed by: STUDENT IN AN ORGANIZED HEALTH CARE EDUCATION/TRAINING PROGRAM

## 2022-03-08 PROCEDURE — 99285 PR EMERGENCY DEPT VISIT,LEVEL V: ICD-10-PCS | Mod: HCNC,,, | Performed by: EMERGENCY MEDICINE

## 2022-03-08 PROCEDURE — 99285 EMERGENCY DEPT VISIT HI MDM: CPT | Mod: HCNC,,, | Performed by: EMERGENCY MEDICINE

## 2022-03-08 RX ORDER — CHOLECALCIFEROL (VITAMIN D3) 25 MCG
1000 TABLET ORAL DAILY
Status: DISCONTINUED | OUTPATIENT
Start: 2022-03-09 | End: 2022-03-08

## 2022-03-08 RX ORDER — PREDNISONE 20 MG/1
40 TABLET ORAL DAILY
Status: COMPLETED | OUTPATIENT
Start: 2022-03-09 | End: 2022-03-13

## 2022-03-08 RX ORDER — FERROUS SULFATE, DRIED 160(50) MG
2 TABLET, EXTENDED RELEASE ORAL DAILY
Status: DISCONTINUED | OUTPATIENT
Start: 2022-03-09 | End: 2022-03-14 | Stop reason: HOSPADM

## 2022-03-08 RX ORDER — AZITHROMYCIN 250 MG/1
500 TABLET, FILM COATED ORAL ONCE
Status: COMPLETED | OUTPATIENT
Start: 2022-03-08 | End: 2022-03-08

## 2022-03-08 RX ORDER — AZITHROMYCIN 250 MG/1
250 TABLET, FILM COATED ORAL DAILY
Status: COMPLETED | OUTPATIENT
Start: 2022-03-09 | End: 2022-03-12

## 2022-03-08 RX ORDER — ENOXAPARIN SODIUM 100 MG/ML
40 INJECTION SUBCUTANEOUS EVERY 24 HOURS
Status: DISCONTINUED | OUTPATIENT
Start: 2022-03-08 | End: 2022-03-14 | Stop reason: HOSPADM

## 2022-03-08 RX ORDER — FLUTICASONE PROPIONATE 50 MCG
2 SPRAY, SUSPENSION (ML) NASAL DAILY
Status: DISCONTINUED | OUTPATIENT
Start: 2022-03-09 | End: 2022-03-10

## 2022-03-08 RX ORDER — IPRATROPIUM BROMIDE AND ALBUTEROL SULFATE 2.5; .5 MG/3ML; MG/3ML
3 SOLUTION RESPIRATORY (INHALATION)
Status: DISCONTINUED | OUTPATIENT
Start: 2022-03-09 | End: 2022-03-09

## 2022-03-08 RX ORDER — CETIRIZINE HYDROCHLORIDE 5 MG/1
5 TABLET ORAL NIGHTLY
Refills: 11 | Status: DISCONTINUED | OUTPATIENT
Start: 2022-03-08 | End: 2022-03-14 | Stop reason: HOSPADM

## 2022-03-08 RX ORDER — AMLODIPINE BESYLATE 10 MG/1
10 TABLET ORAL DAILY
Status: DISCONTINUED | OUTPATIENT
Start: 2022-03-09 | End: 2022-03-14 | Stop reason: HOSPADM

## 2022-03-08 RX ORDER — IPRATROPIUM BROMIDE AND ALBUTEROL SULFATE 2.5; .5 MG/3ML; MG/3ML
3 SOLUTION RESPIRATORY (INHALATION)
Status: COMPLETED | OUTPATIENT
Start: 2022-03-08 | End: 2022-03-08

## 2022-03-08 RX ORDER — ASCORBIC ACID 500 MG
500 TABLET ORAL 2 TIMES DAILY
Status: DISCONTINUED | OUTPATIENT
Start: 2022-03-08 | End: 2022-03-14 | Stop reason: HOSPADM

## 2022-03-08 RX ORDER — IBUPROFEN 200 MG
24 TABLET ORAL
Status: DISCONTINUED | OUTPATIENT
Start: 2022-03-08 | End: 2022-03-14 | Stop reason: HOSPADM

## 2022-03-08 RX ORDER — INSULIN ASPART 100 [IU]/ML
0-5 INJECTION, SOLUTION INTRAVENOUS; SUBCUTANEOUS
Status: DISCONTINUED | OUTPATIENT
Start: 2022-03-08 | End: 2022-03-14 | Stop reason: HOSPADM

## 2022-03-08 RX ORDER — FLUTICASONE FUROATE AND VILANTEROL 200; 25 UG/1; UG/1
1 POWDER RESPIRATORY (INHALATION) DAILY
Refills: 4 | Status: DISCONTINUED | OUTPATIENT
Start: 2022-03-09 | End: 2022-03-14 | Stop reason: HOSPADM

## 2022-03-08 RX ORDER — IPRATROPIUM BROMIDE AND ALBUTEROL SULFATE 2.5; .5 MG/3ML; MG/3ML
3 SOLUTION RESPIRATORY (INHALATION)
Status: DISCONTINUED | OUTPATIENT
Start: 2022-03-08 | End: 2022-03-08

## 2022-03-08 RX ORDER — LOSARTAN POTASSIUM 50 MG/1
100 TABLET ORAL DAILY
Status: DISCONTINUED | OUTPATIENT
Start: 2022-03-09 | End: 2022-03-14 | Stop reason: HOSPADM

## 2022-03-08 RX ORDER — METHYLPREDNISOLONE SOD SUCC 125 MG
80 VIAL (EA) INJECTION
Status: COMPLETED | OUTPATIENT
Start: 2022-03-08 | End: 2022-03-08

## 2022-03-08 RX ORDER — METOPROLOL SUCCINATE 100 MG/1
100 TABLET, EXTENDED RELEASE ORAL DAILY
Status: DISCONTINUED | OUTPATIENT
Start: 2022-03-09 | End: 2022-03-14 | Stop reason: HOSPADM

## 2022-03-08 RX ORDER — CEFEPIME HYDROCHLORIDE 1 G/50ML
2 INJECTION, SOLUTION INTRAVENOUS
Status: DISCONTINUED | OUTPATIENT
Start: 2022-03-09 | End: 2022-03-09

## 2022-03-08 RX ORDER — IBUPROFEN 200 MG
16 TABLET ORAL
Status: DISCONTINUED | OUTPATIENT
Start: 2022-03-08 | End: 2022-03-14 | Stop reason: HOSPADM

## 2022-03-08 RX ORDER — VANCOMYCIN HCL IN 5 % DEXTROSE 1G/250ML
15 PLASTIC BAG, INJECTION (ML) INTRAVENOUS
Status: DISCONTINUED | OUTPATIENT
Start: 2022-03-09 | End: 2022-03-09

## 2022-03-08 RX ORDER — CEFEPIME HYDROCHLORIDE 1 G/50ML
2 INJECTION, SOLUTION INTRAVENOUS
Status: DISCONTINUED | OUTPATIENT
Start: 2022-03-08 | End: 2022-03-08

## 2022-03-08 RX ORDER — HYDROXYZINE HYDROCHLORIDE 25 MG/1
25 TABLET, FILM COATED ORAL 3 TIMES DAILY PRN
Status: DISCONTINUED | OUTPATIENT
Start: 2022-03-08 | End: 2022-03-14 | Stop reason: HOSPADM

## 2022-03-08 RX ORDER — SODIUM CHLORIDE 0.9 % (FLUSH) 0.9 %
3 SYRINGE (ML) INJECTION
Status: DISCONTINUED | OUTPATIENT
Start: 2022-03-08 | End: 2022-03-14 | Stop reason: HOSPADM

## 2022-03-08 RX ORDER — GLUCAGON 1 MG
1 KIT INJECTION
Status: DISCONTINUED | OUTPATIENT
Start: 2022-03-08 | End: 2022-03-14 | Stop reason: HOSPADM

## 2022-03-08 RX ORDER — TALC
6 POWDER (GRAM) TOPICAL NIGHTLY PRN
Status: DISCONTINUED | OUTPATIENT
Start: 2022-03-08 | End: 2022-03-14 | Stop reason: HOSPADM

## 2022-03-08 RX ORDER — ATORVASTATIN CALCIUM 20 MG/1
40 TABLET, FILM COATED ORAL NIGHTLY
Status: DISCONTINUED | OUTPATIENT
Start: 2022-03-08 | End: 2022-03-14 | Stop reason: HOSPADM

## 2022-03-08 RX ORDER — IPRATROPIUM BROMIDE AND ALBUTEROL SULFATE 2.5; .5 MG/3ML; MG/3ML
SOLUTION RESPIRATORY (INHALATION)
Status: DISPENSED
Start: 2022-03-08 | End: 2022-03-09

## 2022-03-08 RX ORDER — ATORVASTATIN CALCIUM 10 MG/1
20 TABLET, FILM COATED ORAL NIGHTLY
Status: DISCONTINUED | OUTPATIENT
Start: 2022-03-08 | End: 2022-03-08

## 2022-03-08 RX ADMIN — IPRATROPIUM BROMIDE AND ALBUTEROL SULFATE 3 ML: 2.5; .5 SOLUTION RESPIRATORY (INHALATION) at 04:03

## 2022-03-08 RX ADMIN — Medication 500 MG: at 08:03

## 2022-03-08 RX ADMIN — ENOXAPARIN SODIUM 40 MG: 100 INJECTION SUBCUTANEOUS at 07:03

## 2022-03-08 RX ADMIN — AZITHROMYCIN MONOHYDRATE 500 MG: 250 TABLET ORAL at 07:03

## 2022-03-08 RX ADMIN — IPRATROPIUM BROMIDE AND ALBUTEROL SULFATE 3 ML: 2.5; .5 SOLUTION RESPIRATORY (INHALATION) at 07:03

## 2022-03-08 RX ADMIN — ATORVASTATIN CALCIUM 40 MG: 40 TABLET, FILM COATED ORAL at 08:03

## 2022-03-08 RX ADMIN — CETIRIZINE HYDROCHLORIDE 5 MG: 5 TABLET ORAL at 08:03

## 2022-03-08 RX ADMIN — METHYLPREDNISOLONE SODIUM SUCCINATE 80 MG: 125 INJECTION, POWDER, FOR SOLUTION INTRAMUSCULAR; INTRAVENOUS at 04:03

## 2022-03-08 RX ADMIN — VANCOMYCIN HYDROCHLORIDE 2000 MG: 10 INJECTION, POWDER, LYOPHILIZED, FOR SOLUTION INTRAVENOUS at 07:03

## 2022-03-08 NOTE — FIRST PROVIDER EVALUATION
Emergency Department TeleTriage Encounter Note      CHIEF COMPLAINT    Chief Complaint   Patient presents with    Shortness of Breath     X 9 days       VITAL SIGNS   Initial Vitals [03/08/22 1341]   BP Pulse Resp Temp SpO2   (!) 148/67 102 (!) 26 98.9 °F (37.2 °C) (!) 85 %      MAP       --            ALLERGIES    Review of patient's allergies indicates:  No Known Allergies    PROVIDER TRIAGE NOTE  This is a teletriage evaluation of a 71 y.o. male presenting to the ED complaining of SOB, especially with exertion, for 9 days. Denies fever and CP.  Pmhx of COPD.  States that he was recently prescribed oxygen but stopped using it a few days ago because his pulseox was stable on RA.     Initial orders will be placed and care will be transferred to an alternate provider when patient is roomed for a full evaluation. Any additional orders and the final disposition will be determined by that provider.           ORDERS  Labs Reviewed - No data to display    ED Orders (720h ago, onward)    Start Ordered     Status Ordering Provider    03/08/22 1530 03/08/22 1516  albuterol-ipratropium 2.5 mg-0.5 mg/3 mL nebulizer solution 3 mL  Every 5 min         Ordered CROW MENDES N.    03/08/22 1516 03/08/22 1516  Oxygen Continuous  Continuous         Ordered CROW MENDES N.    03/08/22 1515 03/08/22 1516  CBC Auto Differential  STAT         Ordered CROW MENDES N.    03/08/22 1515 03/08/22 1516  Comprehensive Metabolic Panel  STAT         Ordered CROW MENDES N.    03/08/22 1515 03/08/22 1516  Pulse Oximetry Continuous  Continuous         Ordered CROW MENDES N.    03/08/22 1515 03/08/22 1516  Cardiac Monitoring - Adult  Continuous         Ordered CROW MENDES N.    03/08/22 1515 03/08/22 1516  EKG 12-lead  Once         Ordered CROW MENDES N.    03/08/22 1515 03/08/22 1516  POCT COVID-19 Rapid Screening  Once         Ordered CROW MENDES N.    03/08/22  1515 03/08/22 1516  X-Ray Chest 1 View  1 time imaging         Ordered VAUGHN CROW N.    03/08/22 1515 03/08/22 1516  Troponin I  STAT         Ordered CROW MENDES N.    03/08/22 1515 03/08/22 1516  BNP  Once         Ordered CROW MENDES.            Virtual Visit Note: The provider triage portion of this emergency department evaluation and documentation was performed via [employer], a HIPAA-compliant telemedicine application, in concert with a tele-presenter in the room. A face to face patient evaluation with one of my colleagues will occur once the patient is placed in an emergency department room.      DISCLAIMER: This note was prepared with [employer] voice recognition transcription software. Garbled syntax, mangled pronouns, and other bizarre constructions may be attributed to that software system.

## 2022-03-08 NOTE — ED PROVIDER NOTES
Encounter Date: 3/8/2022       History     Chief Complaint   Patient presents with    Shortness of Breath     X 9 days     Scooter Morrissey is a 71 y.o. male who  has a past medical history of Acute hypoxemic respiratory failure (1/12/2022), Anxiety (6/29/2016), Asthma with chronic obstructive pulmonary disease (COPD), Benign essential hypertension (4/5/2016), Hyperlipidemia (4/13/2016), Interstitial pneumonitis (4/13/2016), and Other specified anemias (3/13/2020).    The patient presents to the ED due to SOB.   Patient reports symptoms started about a week ago. He has history of asthma, bronchitis, and recent COVID infection earlier this year.   He does not use home O2. He noticed worsening FARLEY during [place], and now states SOB at rest. He reports a mild cough that has improved since his COVID infection. Denies any fever, CP, palpitations, N/V/D, abdominal pain, leg pain/swelling. He is compliant with Symbicort and albuterol at home. Reports a remote history of smoking, no current tobacco use. No known sick contacts. No other complaints or concerns.         Review of patient's allergies indicates:  No Known Allergies  Past Medical History:   Diagnosis Date    Acute hypoxemic respiratory failure 1/12/2022    Anxiety 6/29/2016    Asthma with chronic obstructive pulmonary disease (COPD)     Benign essential hypertension 4/5/2016    Hyperlipidemia 4/13/2016    Interstitial pneumonitis 4/13/2016    Other specified anemias 3/13/2020     Past Surgical History:   Procedure Laterality Date    ADENOIDECTOMY      COLONOSCOPY N/A 6/1/2016    Procedure: COLONOSCOPY;  Surgeon: Meme Mills MD;  Location: [facility];  Service: Endoscopy;  Laterality: N/A;    FINGER SURGERY      15 years ago    TONSILLECTOMY       Family History   Problem Relation Age of Onset    No Known Problems Mother     No Known Problems Father     No Known Problems Daughter      Social History     Tobacco Use    Smoking status:  Former Smoker     Types: Cigars    Smokeless tobacco: Former User     Quit date: 5/12/1996   Substance Use Topics    Alcohol use: No    Drug use: No     Review of Systems   Constitutional: Negative for chills and fever.   HENT: Negative for sore throat.    Respiratory: Positive for cough and shortness of breath.    Cardiovascular: Negative for chest pain, palpitations and leg swelling.   Gastrointestinal: Negative for abdominal pain, constipation, diarrhea, nausea and vomiting.   Genitourinary: Negative for dysuria, frequency and urgency.   Musculoskeletal: Negative for back pain.   Skin: Negative for rash and wound.   Neurological: Negative for weakness.   Hematological: Does not bruise/bleed easily.   Psychiatric/Behavioral: Negative for agitation, behavioral problems and confusion.       Physical Exam     Initial Vitals [03/08/22 1341]   BP Pulse Resp Temp SpO2   (!) 148/67 102 (!) 26 98.9 °F (37.2 °C) (!) 85 %      MAP       --         Physical Exam    Nursing note and vitals reviewed.  Constitutional: He appears well-developed and well-nourished. He is not diaphoretic. No distress.   HENT:   Head: Normocephalic and atraumatic.   Mouth/Throat: Oropharynx is clear and moist.   Eyes: EOM are normal. Pupils are equal, round, and reactive to light.   Neck: No tracheal deviation present.   Cardiovascular: Normal rate, regular rhythm, normal heart sounds and intact distal pulses.   Pulmonary/Chest: No stridor. No respiratory distress. He has wheezes (scattered, expiratory).   Abdominal: Abdomen is soft. He exhibits no distension and no mass. There is no abdominal tenderness.   Musculoskeletal:         General: No edema. Normal range of motion.     Neurological: He is alert and oriented to person, place, and time. No cranial nerve deficit or sensory deficit.   Skin: Skin is warm and dry. Capillary refill takes less than 2 seconds. No rash noted.   Psychiatric: He has a normal mood and affect. His behavior is normal.  Thought content normal.         ED Course   Procedures  Labs Reviewed   CBC W/ AUTO DIFFERENTIAL - Abnormal; Notable for the following components:       Result Value    RBC 3.79 (*)     Hemoglobin 11.3 (*)     Hematocrit 36.7 (*)     MCHC 30.8 (*)     RDW 17.0 (*)     Lymph % 17.7 (*)     All other components within normal limits   COMPREHENSIVE METABOLIC PANEL - Abnormal; Notable for the following components:    Total Protein 9.2 (*)     Albumin 2.7 (*)     All other components within normal limits   CULTURE, BLOOD   CULTURE, BLOOD   TROPONIN I   B-TYPE NATRIURETIC PEPTIDE   SARS-COV-2 RDRP GENE     EKG Readings: (Independently Interpreted)   EKG:   Normal sinus rhythm, rate 76, no ST changes or ischemia, normal intervals.  Stable from January 2022.       Imaging Results          X-Ray Chest 1 View (Final result)  Result time 03/08/22 16:56:29    Final result by Jessica Cunningham MD (03/08/22 16:56:29)                 Impression:      Bilateral upper lobe airspace disease, to a similar degree as seen on prior chest radiographs with a different distribution.  This could be secondary to multifocal pneumonia versus pulmonary edema.      Electronically signed by: Jessica Cunningham  Date:    03/08/2022  Time:    16:56             Narrative:    EXAMINATION:  XR CHEST 1 VIEW    CLINICAL HISTORY:  shortness of breath;    TECHNIQUE:  Single frontal view of the chest was performed.    COMPARISON:  01/24/2022    FINDINGS:  Extensive bilateral upper lobe airspace disease is present that is new when compared to 01/24/2022.  Similar findings were seen in both lungs on 12/06/2021 and 01/12/2022 with a slightly different distribution.  The pulmonary vasculature is prominent and indistinct.  No pleural effusion.  Heart size is unchanged.                                 Medications   vancomycin 2 g in dextrose 5 % 500 mL IVPB (has no administration in time range)   piperacillin-tazobactam 4.5 g in sodium chloride 0.9% 100 mL IVPB  (ready to mix system) (has no administration in time range)   albuterol-ipratropium 2.5 mg-0.5 mg/3 mL nebulizer solution 3 mL (3 mLs Nebulization Given 3/8/22 1640)   methylPREDNISolone sodium succinate injection 80 mg (80 mg Intravenous Given 3/8/22 1630)     Medical Decision Making:   History:   Old Medical Records: I decided to obtain old medical records.  Old Records Summarized: records from clinic visits and other records.       <> Summary of Records: Recent admission in Jan 2022 for multifocal pneumonia due to COVID.  Initial Assessment:   72 yo M with asthma presents with SOB. Hypoxic to 85% on RA, improved to 90s on NC.  Mild scattered wheezing on exam.  Will obtain labs, CXR, treat with Nebs, steroids, reassess.  Differential Diagnosis:   Differential Diagnosis includes, but is not limited to:  PE, MI/ACS, pneumothorax, pericardial effusion/tamonade, pneumonia, lung abscess, pericarditis/myocarditis, pleural effusion, lung mass, CHF exacerbation, asthma exacerbation, COPD exacerbation, aspirated/ingested foreign body, airway obstruction, CO poisoning, anemia, metabolic derangement, allergy/atopy, influenza, viral URI, viral syndrome.    Clinical Tests:   Lab Tests: Ordered and Reviewed  Radiological Study: Reviewed and Ordered  Medical Tests: Ordered and Reviewed  ED Management:  EKG without ischemia or arrhythmia.  CXR reveals multifocal pneumonia.  Labs otherwise unremarkable.  Broad-spectrum antibiotics given due to recent COVID infection with hospital admission and history of asthma.  Patient remained stable on supplemental oxygen via nasal cannula.  Hospital Medicine contacted for admission and further management.    On re-evaluation, the patient's status has remained stable.  At this time, I believe the patient should be admitted to the hospital for further evaluation and management of multifocal pneumonia.   service was contacted and the case was discussed.   The consulting physician/team agrees  with plan and will admit under their service.   The patient and family were updated with test results, overall impression, and further plan of care. All questions were answered. The patient expressed understanding and agrees with the current plan.                        Clinical Impression:   Final diagnoses:  [R06.02] Shortness of breath  [J18.9] Multifocal pneumonia (Primary)  [J96.01] Acute respiratory failure with hypoxia  [J45.51] Severe persistent asthma with acute exacerbation          ED Disposition Condition    Admit               Salomón Hayes MD  03/08/22 5907

## 2022-03-09 ENCOUNTER — TELEPHONE (OUTPATIENT)
Dept: PULMONOLOGY | Facility: CLINIC | Age: 72
End: 2022-03-09
Payer: MEDICARE

## 2022-03-09 PROBLEM — N17.9 AKI (ACUTE KIDNEY INJURY): Status: ACTIVE | Noted: 2022-03-09

## 2022-03-09 PROBLEM — E11.9 TYPE 2 DIABETES MELLITUS: Status: ACTIVE | Noted: 2019-03-12

## 2022-03-09 LAB
ANION GAP SERPL CALC-SCNC: 12 MMOL/L (ref 8–16)
BASOPHILS # BLD AUTO: 0 K/UL (ref 0–0.2)
BASOPHILS NFR BLD: 0 % (ref 0–1.9)
BUN SERPL-MCNC: 22 MG/DL (ref 8–23)
CALCIUM SERPL-MCNC: 9.4 MG/DL (ref 8.7–10.5)
CHLORIDE SERPL-SCNC: 102 MMOL/L (ref 95–110)
CO2 SERPL-SCNC: 19 MMOL/L (ref 23–29)
CREAT SERPL-MCNC: 1.4 MG/DL (ref 0.5–1.4)
D DIMER PPP IA.FEU-MCNC: 0.88 MG/L FEU
DIFFERENTIAL METHOD: ABNORMAL
EOSINOPHIL # BLD AUTO: 0 K/UL (ref 0–0.5)
EOSINOPHIL NFR BLD: 0 % (ref 0–8)
ERYTHROCYTE [DISTWIDTH] IN BLOOD BY AUTOMATED COUNT: 16.7 % (ref 11.5–14.5)
EST. GFR  (AFRICAN AMERICAN): 57.6 ML/MIN/1.73 M^2
EST. GFR  (NON AFRICAN AMERICAN): 49.8 ML/MIN/1.73 M^2
FOLATE SERPL-MCNC: 15.2 NG/ML (ref 4–24)
GLUCOSE SERPL-MCNC: 174 MG/DL (ref 70–110)
HCT VFR BLD AUTO: 31.9 % (ref 40–54)
HCV AB SERPL QL IA: NEGATIVE
HGB BLD-MCNC: 9.7 G/DL (ref 14–18)
IMM GRANULOCYTES # BLD AUTO: 0.02 K/UL (ref 0–0.04)
IMM GRANULOCYTES NFR BLD AUTO: 0.2 % (ref 0–0.5)
LYMPHOCYTES # BLD AUTO: 1.3 K/UL (ref 1–4.8)
LYMPHOCYTES NFR BLD: 13.5 % (ref 18–48)
MAGNESIUM SERPL-MCNC: 2 MG/DL (ref 1.6–2.6)
MCH RBC QN AUTO: 29.2 PG (ref 27–31)
MCHC RBC AUTO-ENTMCNC: 30.4 G/DL (ref 32–36)
MCV RBC AUTO: 96 FL (ref 82–98)
MONOCYTES # BLD AUTO: 0.1 K/UL (ref 0.3–1)
MONOCYTES NFR BLD: 1.5 % (ref 4–15)
NEUTROPHILS # BLD AUTO: 7.9 K/UL (ref 1.8–7.7)
NEUTROPHILS NFR BLD: 84.8 % (ref 38–73)
NRBC BLD-RTO: 0 /100 WBC
PHOSPHATE SERPL-MCNC: 2.9 MG/DL (ref 2.7–4.5)
PLATELET # BLD AUTO: 363 K/UL (ref 150–450)
PMV BLD AUTO: 9.8 FL (ref 9.2–12.9)
POTASSIUM SERPL-SCNC: 4.7 MMOL/L (ref 3.5–5.1)
RBC # BLD AUTO: 3.32 M/UL (ref 4.6–6.2)
RETICS/RBC NFR AUTO: 2.6 % (ref 0.4–2)
SODIUM SERPL-SCNC: 133 MMOL/L (ref 136–145)
WBC # BLD AUTO: 9.35 K/UL (ref 3.9–12.7)

## 2022-03-09 PROCEDURE — 99232 PR SUBSEQUENT HOSPITAL CARE,LEVL II: ICD-10-PCS | Mod: HCNC,GC,, | Performed by: HOSPITALIST

## 2022-03-09 PROCEDURE — 82746 ASSAY OF FOLIC ACID SERUM: CPT | Mod: HCNC | Performed by: STUDENT IN AN ORGANIZED HEALTH CARE EDUCATION/TRAINING PROGRAM

## 2022-03-09 PROCEDURE — 84100 ASSAY OF PHOSPHORUS: CPT | Mod: HCNC | Performed by: STUDENT IN AN ORGANIZED HEALTH CARE EDUCATION/TRAINING PROGRAM

## 2022-03-09 PROCEDURE — 36415 COLL VENOUS BLD VENIPUNCTURE: CPT | Mod: HCNC | Performed by: STUDENT IN AN ORGANIZED HEALTH CARE EDUCATION/TRAINING PROGRAM

## 2022-03-09 PROCEDURE — 99232 SBSQ HOSP IP/OBS MODERATE 35: CPT | Mod: HCNC,GC,, | Performed by: HOSPITALIST

## 2022-03-09 PROCEDURE — 25000003 PHARM REV CODE 250: Mod: HCNC | Performed by: STUDENT IN AN ORGANIZED HEALTH CARE EDUCATION/TRAINING PROGRAM

## 2022-03-09 PROCEDURE — 27000207 HC ISOLATION: Mod: HCNC

## 2022-03-09 PROCEDURE — 94761 N-INVAS EAR/PLS OXIMETRY MLT: CPT | Mod: HCNC

## 2022-03-09 PROCEDURE — 83735 ASSAY OF MAGNESIUM: CPT | Mod: HCNC | Performed by: STUDENT IN AN ORGANIZED HEALTH CARE EDUCATION/TRAINING PROGRAM

## 2022-03-09 PROCEDURE — 85045 AUTOMATED RETICULOCYTE COUNT: CPT | Mod: HCNC | Performed by: STUDENT IN AN ORGANIZED HEALTH CARE EDUCATION/TRAINING PROGRAM

## 2022-03-09 PROCEDURE — 27000221 HC OXYGEN, UP TO 24 HOURS: Mod: HCNC

## 2022-03-09 PROCEDURE — 94640 AIRWAY INHALATION TREATMENT: CPT | Mod: HCNC

## 2022-03-09 PROCEDURE — 99900035 HC TECH TIME PER 15 MIN (STAT): Mod: HCNC

## 2022-03-09 PROCEDURE — 63600175 PHARM REV CODE 636 W HCPCS: Mod: HCNC | Performed by: HOSPITALIST

## 2022-03-09 PROCEDURE — 63700000 PHARM REV CODE 250 ALT 637 W/O HCPCS: Mod: HCNC | Performed by: STUDENT IN AN ORGANIZED HEALTH CARE EDUCATION/TRAINING PROGRAM

## 2022-03-09 PROCEDURE — 86803 HEPATITIS C AB TEST: CPT | Mod: HCNC | Performed by: STUDENT IN AN ORGANIZED HEALTH CARE EDUCATION/TRAINING PROGRAM

## 2022-03-09 PROCEDURE — 80048 BASIC METABOLIC PNL TOTAL CA: CPT | Mod: HCNC | Performed by: STUDENT IN AN ORGANIZED HEALTH CARE EDUCATION/TRAINING PROGRAM

## 2022-03-09 PROCEDURE — 11000001 HC ACUTE MED/SURG PRIVATE ROOM: Mod: HCNC

## 2022-03-09 PROCEDURE — 25000003 PHARM REV CODE 250: Mod: HCNC

## 2022-03-09 PROCEDURE — 63600175 PHARM REV CODE 636 W HCPCS: Mod: HCNC | Performed by: STUDENT IN AN ORGANIZED HEALTH CARE EDUCATION/TRAINING PROGRAM

## 2022-03-09 PROCEDURE — 25000242 PHARM REV CODE 250 ALT 637 W/ HCPCS: Mod: HCNC | Performed by: STUDENT IN AN ORGANIZED HEALTH CARE EDUCATION/TRAINING PROGRAM

## 2022-03-09 PROCEDURE — 85025 COMPLETE CBC W/AUTO DIFF WBC: CPT | Mod: HCNC | Performed by: STUDENT IN AN ORGANIZED HEALTH CARE EDUCATION/TRAINING PROGRAM

## 2022-03-09 PROCEDURE — 85379 FIBRIN DEGRADATION QUANT: CPT | Mod: HCNC | Performed by: STUDENT IN AN ORGANIZED HEALTH CARE EDUCATION/TRAINING PROGRAM

## 2022-03-09 RX ORDER — SODIUM,POTASSIUM PHOSPHATES 280-250MG
2 POWDER IN PACKET (EA) ORAL ONCE
Status: COMPLETED | OUTPATIENT
Start: 2022-03-09 | End: 2022-03-09

## 2022-03-09 RX ORDER — CEFEPIME HYDROCHLORIDE 1 G/50ML
2 INJECTION, SOLUTION INTRAVENOUS
Status: DISCONTINUED | OUTPATIENT
Start: 2022-03-09 | End: 2022-03-11

## 2022-03-09 RX ORDER — IPRATROPIUM BROMIDE AND ALBUTEROL SULFATE 2.5; .5 MG/3ML; MG/3ML
3 SOLUTION RESPIRATORY (INHALATION)
Status: DISCONTINUED | OUTPATIENT
Start: 2022-03-09 | End: 2022-03-14 | Stop reason: HOSPADM

## 2022-03-09 RX ADMIN — ENOXAPARIN SODIUM 40 MG: 100 INJECTION SUBCUTANEOUS at 04:03

## 2022-03-09 RX ADMIN — Medication 2 TABLET: at 08:03

## 2022-03-09 RX ADMIN — CEFEPIME HYDROCHLORIDE 2 G: 2 INJECTION, SOLUTION INTRAVENOUS at 01:03

## 2022-03-09 RX ADMIN — AMLODIPINE BESYLATE 10 MG: 10 TABLET ORAL at 08:03

## 2022-03-09 RX ADMIN — Medication 500 MG: at 08:03

## 2022-03-09 RX ADMIN — CETIRIZINE HYDROCHLORIDE 5 MG: 5 TABLET ORAL at 09:03

## 2022-03-09 RX ADMIN — IPRATROPIUM BROMIDE AND ALBUTEROL SULFATE 3 ML: 2.5; .5 SOLUTION RESPIRATORY (INHALATION) at 07:03

## 2022-03-09 RX ADMIN — LOSARTAN POTASSIUM 100 MG: 50 TABLET, FILM COATED ORAL at 08:03

## 2022-03-09 RX ADMIN — IPRATROPIUM BROMIDE AND ALBUTEROL SULFATE 3 ML: 2.5; .5 SOLUTION RESPIRATORY (INHALATION) at 08:03

## 2022-03-09 RX ADMIN — METOPROLOL SUCCINATE 100 MG: 100 TABLET, EXTENDED RELEASE ORAL at 08:03

## 2022-03-09 RX ADMIN — THERA TABS 1 TABLET: TAB at 08:03

## 2022-03-09 RX ADMIN — CEFEPIME HYDROCHLORIDE 2 G: 2 INJECTION, SOLUTION INTRAVENOUS at 10:03

## 2022-03-09 RX ADMIN — POTASSIUM & SODIUM PHOSPHATES POWDER PACK 280-160-250 MG 2 PACKET: 280-160-250 PACK at 08:03

## 2022-03-09 RX ADMIN — ATORVASTATIN CALCIUM 40 MG: 40 TABLET, FILM COATED ORAL at 09:03

## 2022-03-09 RX ADMIN — AZITHROMYCIN MONOHYDRATE 250 MG: 250 TABLET ORAL at 12:03

## 2022-03-09 RX ADMIN — PREDNISONE 40 MG: 20 TABLET ORAL at 08:03

## 2022-03-09 RX ADMIN — Medication 500 MG: at 09:03

## 2022-03-09 RX ADMIN — IPRATROPIUM BROMIDE AND ALBUTEROL SULFATE 3 ML: 2.5; .5 SOLUTION RESPIRATORY (INHALATION) at 01:03

## 2022-03-09 NOTE — ASSESSMENT & PLAN NOTE
Admit with hemoglobin 11.3 Baseline 11-12      Lab Results   Component Value Date    IRON 89 05/01/2021    TIBC 324 05/01/2021    FERRITIN 1,038 (H) 01/12/2022     No results found for: FOLATE  Lab Results   Component Value Date    CMINUKBD51 590 01/24/2022     No results found for: RETICCTPCT    Likely secondary to anemia of chronic disease     Plan:   - Daily CBC   - reticulocytes, folate pending  - Transfuse hgb <7

## 2022-03-09 NOTE — ASSESSMENT & PLAN NOTE
70 yo M with moderate COPD, chronic pansinusitis, asthma with COPD, recent COVID infection in January 2022 and before that Jan 2021,  HLD, Prediabetes presents for worsening FARLEY, SOB for one week. Associated symptoms of increasing cough and greenish sputum production.     Patient with Hypoxic Respiratory failure which is Acute.  he is not on home oxygen. Supplemental oxygen was provided and noted-  .   Signs/symptoms of respiratory failure include- tachypnea and increased work of breathing. Contributing diagnoses includes - COPD and Pneumonia Labs and images were reviewed. Patient Has not had a recent ABG. Will treat underlying causes and adjust management of respiratory failure as follows    Differential includes but not limited to: COPD exacerbation 2/2 to multifocal PNA vs environmental exposure (given hx of asthma and occupation as , though CBC differentials is not significant for eosinophilia ) vs post COVID pulmonary fibrosis (though less likely). PE is also possible but less likely as pt did have recent COVID infx, d-dimer at that time was 4.47, though pt is mobile, no current active cancer, no significant unilateral leg swelling. HF is less likely given euvolemic on exam, no prior hx of HF, and BNP is wnl.     Plan   - Satting 93% on 2 L NC   - S/p methypred in the ED  - Continue Prednisone 40mg PO daily to complete a 5 day course  - Given increased sputum production, SOB, will initiate abx. Hx of Pseudomonas in respiratory sputum in Jan 2022   - Continue Cefepime for now (given hx of pseudomonas PNA) and azithromycin for atypical coverage. Vancomycin d/c'd 3/9 in setting of NOEMI   - Will de-escalate once symptoms are improved, cultures return   - D-Dimer elevated, however WNL once adjusted for age. Will not pursue CTA at this time.   - Resp Cx with < 10 epi; many WBCs; many GNR; rare GPCs/yeast  - BCx with NGTD x1D  - Continue home inhaler: fluticasone-vilanterol 1 puff QD (symbicort is not in  formulary)   - Duonebs q4h while awake and prn  - Incentive Spirometry  - F/u ABG  - Continuous oxygen monitoring with supplement O2 for goal sat 88% - 92%   - Will need PFTs upon discharge and will try to coordinate close follow up with pulmonology  - Will need 6MWT upon discharge to evaluate needs of oxygen  - Will refer to pulmonary rehab outpatient

## 2022-03-09 NOTE — ASSESSMENT & PLAN NOTE
72 yo M with moderate COPD, chronic pansinusitis, asthma with COPD, recent COVID infection in January 2022 and before that Jan 2021,  HLD, Prediabetes presents for worsening FARLEY, SOB for one week. Associated symptoms of increasing cough and greenish sputum production.     Patient with Hypoxic Respiratory failure which is Acute.  he is not on home oxygen. Supplemental oxygen was provided and noted-  .   Signs/symptoms of respiratory failure include- tachypnea and increased work of breathing. Contributing diagnoses includes - COPD and Pneumonia Labs and images were reviewed. Patient Has not had a recent ABG. Will treat underlying causes and adjust management of respiratory failure as follows    Differential includes but not limited to: COPD exacerbation 2/2 to multifocal PNA vs environmental exposure (given hx of asthma and occupation as , though CBC differentials is not significant for eosinophilia ) vs post COVID pulmonary fibrosis (though less likely). HF is less likely given euvolemic on exam, no prior hx of HF, and BNP is wnl.     Plan   - Satting 93% on 2 L NC   - S/p methypred in the ED, will start Prednisone 40mg PO daily to complete a 5 day course  - Given increased sputum production, SOB, will initiate abx. Hx of Pseudomonas in respiratory sputum in Jan 2022   - Will Continue vancomycin and Cefepime for now (given hx of pseudomonas PNA); will add azithromycin for atypical coverage   - will consider to de-escalate once symptoms are improved, cultures return   - Resp Cx and BCx pending     - Continue home inhaler: fluticasone-vilanterol 1 puff QD (symbicort is not in formulary)   - Duonebs q4h while awake and prn with IS  - ABG ordered   - Continuous oxygen monitoring with supplement O2 for goal sat >88%   - Will need PFTs upon discharge and will try to coordinate close follow up with pulmonology  - Will need 6MWT upon discharge to evaluate for needs of oxygen  - Will refer to pulmonary rehab  outpatient     No results for input(s): PH, PCO2, PO2, HCO3, POCSATURATED, BE in the last 24 hours.

## 2022-03-09 NOTE — PROGRESS NOTES
Pharmacist Renal Dose Adjustment Note    Scooter Morrissey is a 71 y.o. male being treated with the medication cefepime     Patient Data:    Vital Signs (Most Recent):  Temp: 97.6 °F (36.4 °C) (03/09/22 1124)  Pulse: 71 (03/09/22 1124)  Resp: 20 (03/09/22 1124)  BP: 137/65 (03/09/22 1124)  SpO2: (!) 91 % (03/09/22 1124)   Vital Signs (72h Range):  Temp:  [96 °F (35.6 °C)-98.9 °F (37.2 °C)]   Pulse:  []   Resp:  [18-28]   BP: (117-148)/(60-74)   SpO2:  [85 %-100 %]      Recent Labs   Lab 03/08/22  1526 03/09/22  0425   CREATININE 1.1 1.4     Serum creatinine: 1.4 mg/dL 03/09/22 0425  Estimated creatinine clearance: 43.2 mL/min    Medication:cefepime 2 g Q 8 hr will be changed to cefepime 2 g Q 12 hr    Pharmacist's Name: Berta Navarro  Pharmacist's Extension: [phone]

## 2022-03-09 NOTE — ASSESSMENT & PLAN NOTE
Lipid profile on   Lab Results   Component Value Date    LDLCALC 105.6 01/24/2022    HDL 36 (L) 01/24/2022    TRIG 82 01/24/2022    CHOL 158 01/24/2022     The 10-year ASCVD risk score (Lily MARTINEZ Jr., et al., 2013) is: 21.6%    Values used to calculate the score:      Age: 71 years      Sex: Male      Is Non- : Yes      Diabetic: No      Tobacco smoker: No      Systolic Blood Pressure: 131 mmHg      Is BP treated: Yes      HDL Cholesterol: 36 mg/dL      Total Cholesterol: 158 mg/dL    - Currently taking - atorvastatin 20 mg at home; will increase to atorvastatin 40 mg  - Reports compliance with hyperlipidemia treatment as prescribed  - Denies adverse effects of medications

## 2022-03-09 NOTE — ASSESSMENT & PLAN NOTE
-Last A1c reviewed-   Lab Results   Component Value Date    HGBA1C 6.0 (H) 01/24/2022       Home Antihyperglycemic Regiment:  - None     Inpatient Antihyperglycemic Regiment:  Antihyperglycemics (From admission, onward)            Start     Stop Route Frequency Ordered    03/08/22 2031  insulin aspart U-100 pen 0-5 Units         -- SubQ Before meals & nightly PRN 03/08/22 1931        - Insulin regimen as above  - LDSSI with POCT accuchecks ACHS  - Diabetic nutritional counseling given   - POCT ACQHS    Blood Sugars (AccuCheck):  No results for input(s): POCTGLUCOSE in the last 72 hours.

## 2022-03-09 NOTE — ASSESSMENT & PLAN NOTE
Lipid profile on   Lab Results   Component Value Date    LDLCALC 105.6 01/24/2022    HDL 36 (L) 01/24/2022    TRIG 82 01/24/2022    CHOL 158 01/24/2022     The 10-year ASCVD risk score (Lily MARTINEZ Jr., et al., 2013) is: 12.8%    Values used to calculate the score:      Age: 71 years      Sex: Male      Is Non- : Yes      Diabetic: No      Tobacco smoker: No      Systolic Blood Pressure: 127 mmHg      Is BP treated: No      HDL Cholesterol: 36 mg/dL      Total Cholesterol: 158 mg/dL    - Currently taking - atorvastatin 20 mg at home; will increase to atorvastatin 40 mg  - Reports compliance with hyperlipidemia treatment as prescribed  - Denies adverse effects of medications

## 2022-03-09 NOTE — PLAN OF CARE
Problem: Adult Inpatient Plan of Care  Goal: Plan of Care Review  Outcome: Ongoing, Progressing  Goal: Patient-Specific Goal (Individualized)  Outcome: Ongoing, Progressing  Goal: Absence of Hospital-Acquired Illness or Injury  Outcome: Ongoing, Progressing  Goal: Optimal Comfort and Wellbeing  Outcome: Ongoing, Progressing  Goal: Readiness for Transition of Care  Outcome: Ongoing, Progressing     Problem: Adjustment to Illness COPD (Chronic Obstructive Pulmonary Disease)  Goal: Optimal Chronic Illness Coping  Outcome: Ongoing, Progressing     Problem: Functional Ability Impaired COPD (Chronic Obstructive Pulmonary Disease)  Goal: Optimal Level of Functional [place]  Outcome: Ongoing, Progressing     Problem: Infection COPD (Chronic Obstructive Pulmonary Disease)  Goal: Absence of Infection Signs and Symptoms  Outcome: Ongoing, Progressing     Problem: Oral Intake Inadequate COPD (Chronic Obstructive Pulmonary Disease)  Goal: Improved Nutrition Intake  Outcome: Ongoing, Progressing     Problem: Respiratory Compromise COPD (Chronic Obstructive Pulmonary Disease)  Goal: Effective Oxygenation and Ventilation  Outcome: Ongoing, Progressing     Problem: Fluid Imbalance (Pneumonia)  Goal: Fluid Balance  Outcome: Ongoing, Progressing     Problem: Infection (Pneumonia)  Goal: Resolution of Infection Signs and Symptoms  Outcome: Ongoing, Progressing     Problem: Respiratory Compromise (Pneumonia)  Goal: Effective Oxygenation and Ventilation  Outcome: Ongoing, Progressing

## 2022-03-09 NOTE — ASSESSMENT & PLAN NOTE
Admit with hemoglobin 11.3 Baseline 11-12      Lab Results   Component Value Date    IRON 89 05/01/2021    TIBC 324 05/01/2021    FERRITIN 1,038 (H) 01/12/2022     Lab Results   Component Value Date    FOLATE 15.2 03/09/2022     Lab Results   Component Value Date    SAKATMUG04 590 01/24/2022     Folate and B12 WNL  Reticulocytes c/w hypoproliferation  Likely secondary to anemia of chronic disease     Hg 9.7 today, significant drop since admission. Patient denies overt s/s of bleeding. T Bili WNL, low suspicion for hemolysis. Will CTM    Plan:   - Daily CBC   - Transfuse hgb <7

## 2022-03-09 NOTE — TELEPHONE ENCOUNTER
Spoke with patient nurse (Ms Luna) informed her that I have received her message. I advised patient nurse that we do not have any sooner available appointments at this time. Patient Nurse verbalized that she understand and states that she is going to reach out to different facilities in regards to getting patient a sooner appointment in Pulmonary. I verbalized to patient nurse that I understand.

## 2022-03-09 NOTE — ASSESSMENT & PLAN NOTE
Home regimen: amlodipine 10 mg QD, losartan 100 mg QD, toprol 100 mg QD     Plan:   - Resume home regimen starting tomorrow if BP is stable

## 2022-03-09 NOTE — PLAN OF CARE
Calls made to 3 different [facility] clinics in an attempt to get an after d/c Pulmonary appt sooner than his currently scheduled 3/21/22. No sooner appts available across the entire [facility] system for pulmonologists available at this time. But the 3 reps that I spoke with have sent urgent messages regarding this and I am expecting a return call with an answer.     @1635 CM never rec'd a return call with a sooner appt available. Escalated to CM supervisors.    CM will cont to f/u.     Cheryl Graham, RN Case Manager  [facility]  [phone]

## 2022-03-09 NOTE — PLAN OF CARE
Pt is AAOX4. POC reviewed with pt.   ABX infused. PT on cont. 2L. NC. No C/O pain or discomfort noted during shift. Airborne/ contact precaution maintained throughout shift. Will continue to monitor patient.

## 2022-03-09 NOTE — ASSESSMENT & PLAN NOTE
-Last A1c reviewed-   Lab Results   Component Value Date    HGBA1C 6.0 (H) 01/24/2022       Home Antihyperglycemic Regiment:  - None     Inpatient Antihyperglycemic Regiment:  Antihyperglycemics (From admission, onward)            Start     Stop Route Frequency Ordered    03/08/22 2031  insulin aspart U-100 pen 0-5 Units         -- SubQ Before meals & nightly PRN 03/08/22 1931        - Insulin regimen as above  - LDSSI with POCT accuchecks ACHS  - Diabetic nutritional counseling given     Blood Sugars (AccuCheck):  No results for input(s): POCTGLUCOSE in the last 72 hours.

## 2022-03-09 NOTE — SUBJECTIVE & OBJECTIVE
Past Medical History:   Diagnosis Date    Acute hypoxemic respiratory failure 1/12/2022    Anxiety 6/29/2016    Asthma with chronic obstructive pulmonary disease (COPD)     Benign essential hypertension 4/5/2016    Hyperlipidemia 4/13/2016    Interstitial pneumonitis 4/13/2016    Other specified anemias 3/13/2020       Past Surgical History:   Procedure Laterality Date    ADENOIDECTOMY      COLONOSCOPY N/A 6/1/2016    Procedure: COLONOSCOPY;  Surgeon: Meme Mills MD;  Location: [facility];  Service: Endoscopy;  Laterality: N/A;    FINGER SURGERY      15 years ago    TONSILLECTOMY         Review of patient's allergies indicates:  No Known Allergies    No current facility-administered medications on file prior to encounter.     Current Outpatient Medications on File Prior to Encounter   Medication Sig    albuterol (ACCUNEB) 1.25 mg/3 mL Nebu Take 3 mLs (1.25 mg total) by nebulization every 6 (six) hours as needed (shortness of breath or wheezing). Rescue    albuterol (VENTOLIN HFA) 90 mcg/actuation inhaler Inhale 2 puffs into the lungs every 4 (four) hours as needed for Wheezing or Shortness of Breath. Rescue    amLODIPine (NORVASC) 10 MG tablet TAKE 1 TABLET BY MOUTH EVERY DAY (Patient taking differently: Take 10 mg by mouth once daily.)    ascorbic acid, vitamin C, (VITAMIN C) 500 MG tablet Take 1 tablet (500 mg total) by mouth 2 (two) times daily.    atorvastatin (LIPITOR) 20 MG tablet Take 1 tablet (20 mg total) by mouth every evening.    budesonide-formoterol 160-4.5 mcg (SYMBICORT) 160-4.5 mcg/actuation HFAA Inhale 2 puffs into the lungs 2 (two) times daily. Controller    calcium-vitamin D3 (OS-SALLY 500 + D3) 500 mg-5 mcg (200 unit) per tablet Take 2 tablets by mouth once daily.    fluticasone propionate (FLONASE) 50 mcg/actuation nasal spray 2 sprays (100 mcg total) by Each Nostril route once daily.    fluticasone-salmeterol diskus inhaler 500-50 mcg Inhale 1 puff into the lungs 2 (two) times daily.  Controller    levocetirizine (XYZAL) 5 MG tablet Take 1 tablet (5 mg total) by mouth every evening.    losartan (COZAAR) 100 MG tablet TAKE 1 TABLET(100 MG) BY MOUTH EVERY DAY (Patient taking differently: Take 100 mg by mouth once daily.)    metoprolol succinate (TOPROL-XL) 100 MG 24 hr tablet TAKE 1 TABLET(100 MG) BY MOUTH EVERY DAY (Patient taking differently: Take 100 mg by mouth once daily.)    multivitamin with minerals tablet Take 1 tablet by mouth nightly.     pulse oximeter (PULSE OXIMETER) device by Apply Externally route 2 (two) times a day. Use twice daily at 8 AM and 3 PM and record the value in MyChart as directed.    pulse oximeter (PULSE OXIMETER) device by Apply Externally route 2 (two) times a day. Use twice daily at 8 AM and 3 PM and record the value in MyChart as directed.    vitamin D (VITAMIN D3) 1000 units Tab Take 1,000 Units by mouth once daily.     Family History       Problem Relation (Age of Onset)    No Known Problems Mother, Father, Daughter          Tobacco Use    Smoking status: Former Smoker     Types: Cigars    Smokeless tobacco: Former User     Quit date: 5/12/1996   Substance and Sexual Activity    Alcohol use: No    Drug use: No    Sexual activity: Yes     Partners: Female     Review of Systems   Constitutional:  Negative for appetite change, chills and fever.   HENT:  Negative for sore throat.    Respiratory:  Positive for cough and shortness of breath. Negative for wheezing.    Cardiovascular:  Negative for chest pain and palpitations.   Gastrointestinal:  Negative for abdominal distention, abdominal pain, constipation, diarrhea, nausea and vomiting.   Endocrine: Negative for polydipsia and polyuria.   Genitourinary:  Negative for dysuria, hematuria and urgency.   Musculoskeletal:  Negative for back pain and gait problem.   Neurological:  Negative for weakness and numbness.   Objective:     Vital Signs (Most Recent):  Temp: 98.9 °F (37.2 °C) (03/08/22 1341)  Pulse: 82 (03/08/22  2103)  Resp: 18 (03/08/22 2103)  BP: 135/74 (03/08/22 2103)  SpO2: 98 % (03/08/22 2103) Vital Signs (24h Range):  Temp:  [98.9 °F (37.2 °C)] 98.9 °F (37.2 °C)  Pulse:  [] 82  Resp:  [18-28] 18  SpO2:  [85 %-100 %] 98 %  BP: (117-148)/(60-74) 135/74     Weight: 74.8 kg (165 lb)  Body mass index is 26.63 kg/m².    Physical Exam  Constitutional:       General: He is not in acute distress.     Appearance: Normal appearance. He is not ill-appearing.   HENT:      Head: Normocephalic and atraumatic.      Nose: Nose normal.   Eyes:      General:         Right eye: No discharge.         Left eye: No discharge.      Conjunctiva/sclera: Conjunctivae normal.   Neck:      Comments: No JVD  Cardiovascular:      Rate and Rhythm: Normal rate and regular rhythm.      Heart sounds: No murmur heard.  Pulmonary:      Effort: Pulmonary effort is normal.      Breath sounds: Normal breath sounds. No wheezing or rales.      Comments: On 3 L NC   Abdominal:      General: Abdomen is flat. There is no distension.      Palpations: Abdomen is soft.      Tenderness: There is no abdominal tenderness.   Musculoskeletal:         General: No swelling. Normal range of motion.      Cervical back: Normal range of motion.      Right lower leg: No edema.      Left lower leg: No edema.   Skin:     General: Skin is warm and dry.      Capillary Refill: Capillary refill takes less than 2 seconds.   Neurological:      General: No focal deficit present.      Mental Status: He is alert and oriented to person, place, and time.   Psychiatric:         Mood and Affect: Mood normal.           Significant Labs: All pertinent labs within the past 24 hours have been reviewed.  Recent Lab Results         03/08/22  1526        Albumin 2.7       Alkaline Phosphatase 70       ALT 18       Anion Gap 12       AST 14       Baso # 0.04       Basophil % 0.5       BILIRUBIN TOTAL 0.4  Comment: For infants and newborns, interpretation of results should be based  on  gestational age, weight and in agreement with clinical  observations.    Premature Infant recommended reference ranges:  Up to 24 hours.............<8.0 mg/dL  Up to 48 hours............<12.0 mg/dL  3-5 days..................<15.0 mg/dL  6-29 days.................<15.0 mg/dL         BNP 16  Comment: Values of less than 100 pg/ml are consistent with non-CHF populations.       BUN 15       Calcium 9.9       Chloride 105       CO2 24       Creatinine 1.1       Differential Method Automated       eGFR if  >60.0       eGFR if non  >60.0  Comment: Calculation used to obtain the estimated glomerular filtration  rate (eGFR) is the CKD-EPI equation.          Eos # 0.3       Eosinophil % 3.2       Glucose 80       Gran # (ANC) 6.0       Gran % 68.7       Hematocrit 36.7       Hemoglobin 11.3       Immature Grans (Abs) 0.03  Comment: Mild elevation in immature granulocytes is non specific and   can be seen in a variety of conditions including stress response,   acute inflammation, trauma and pregnancy. Correlation with other   laboratory and clinical findings is essential.         Immature Granulocytes 0.3       Lymph # 1.5       Lymph % 17.7       MCH 29.8       MCHC 30.8       MCV 97       Mono # 0.8       Mono % 9.6       MPV 10.7       nRBC 0       Platelets 399       Potassium 4.2       PROTEIN TOTAL 9.2       RBC 3.79       RDW 17.0       Sodium 141       Troponin I <0.006  Comment: The reference interval for Troponin I represents the 99th percentile   cutoff   for our facility and is consistent with 3rd generation assay   performance.         WBC 8.66               Significant Imaging: I have reviewed all pertinent imaging results/findings within the past 24 hours.

## 2022-03-09 NOTE — TELEPHONE ENCOUNTER
----- Message from Estuardo Beach sent at 3/9/2022  8:03 AM CST -----  Contact: nurse  Pt needs to be seen sooner than scheduled date    Call back Cheryl @[phone]

## 2022-03-09 NOTE — HPI
Mr. Morrissey is a 72 yo M with moderate COPD, chronic pansinusitis, asthma with COPD, recent COVID infection in January 2022 and before that Jan 2021,  HLD, Prediabetes presents for worsening FARLEY, SOB for one week. Associated symptoms of increasing cough and greenish sputum production. Per pt, SOB and cough production do not associate food. Sputum production is mostly at night. Per pt, denies any fever, chest pain, chills, nausea, vomiting or diarrhea. Pt denies any sick contact. Per pt, he was sent home with Oxygen since his last admission for COVID PNA in January, he only uses it PRN and have been needing it more. He exerts adherence to his symbicort and albuterol at home. He has quit cigar smoking for about 9 years, and cigarettes since 1975. Prior to this pt was smoking about 2 packs per month.     Lives with wife, and works as a . Pt acknowledges that given hx of asthma, his occupations can exacerbate an attack. Have not been follow up with his pulmonologist Dr. Guerrier since 2017, though he does have an appointment on 3/21 with Dr. Margarito Sanchez, which he hope we could help facilitate to make the appointment sooner.     In the ED, pt was afebrile, , RR 26, /67, sat 85% on RA, 93% with 2 L NC. Labs significant for Hgb 11.3. No leukocytosis, normal creatinine. No significant electrolytes abnormality. BNP 18, Trop wnl. Blood Cultures obtained. CXR with bilaterl upper lobe airspace disease could be secondary to multifocal versus pulmonary edema. Pt was given albuterol, methylprenisolone, zosyn, and vancomycin. Pt is admitted to hospital medicine for further evaluation and management.

## 2022-03-09 NOTE — NURSING
Patient arrived to floor from Ed @ 6052. Patient is AAOX4. VS within normal limit. Pt on cont. Oxygent at 2L NC. Instructed patient to use call light for assistance. Bed in low position call light within reach, nurse will continue to monitor patient.

## 2022-03-09 NOTE — ASSESSMENT & PLAN NOTE
- encouraged to continue walking 30-40 mins/day at least 3-4 days /week  - emphasis placed on changing diet (for lowering cholesterol & for weight loss)

## 2022-03-09 NOTE — ASSESSMENT & PLAN NOTE
Home regimen: amlodipine 10 mg QD, losartan 100 mg QD, toprol 100 mg QD     Plan:   - home meds resumed. If NOEMI persists tomorrow despite increased hydration, will discontinue losartan.

## 2022-03-09 NOTE — PROGRESS NOTES
Pharmacokinetic Initial Assessment: IV Vancomycin    Assessment/Plan:  Patient received vancomycin 2000 mg IV x 1 in ED  Initiate vancomycin 1000 mg IV every 24 hours  Desired empiric serum trough concentration is 10 to 20 mcg/mL  Draw vancomycin trough level 60 min prior to third dose on 3/10 at approximately 19:00  Pharmacy will continue to follow and monitor vancomycin.      Please contact pharmacy at extension [phone] with any questions regarding this assessment.     Thank you for the consult,   Soni Duval       Patient brief summary:  Scooter Morrissey is a 71 y.o. male initiated on antimicrobial therapy with IV Vancomycin for treatment of suspected lower respiratory infection    Drug Allergies:   Review of patient's allergies indicates:  No Known Allergies    Actual Body Weight:   74.8 kg    Renal Function:   Estimated Creatinine Clearance: 54.8 mL/min (based on SCr of 1.1 mg/dL).     Dialysis Method (if applicable):  N/A    CBC (last 72 hours):  Recent Labs   Lab Result Units 03/08/22  1526   WBC K/uL 8.66   Hemoglobin g/dL 11.3*   Hematocrit % 36.7*   Platelets K/uL 399   Gran % % 68.7   Lymph % % 17.7*   Mono % % 9.6   Eosinophil % % 3.2   Basophil % % 0.5   Differential Method  Automated       Metabolic Panel (last 72 hours):  Recent Labs   Lab Result Units 03/08/22  1526   Sodium mmol/L 141   Potassium mmol/L 4.2   Chloride mmol/L 105   CO2 mmol/L 24   Glucose mg/dL 80   BUN mg/dL 15   Creatinine mg/dL 1.1   Albumin g/dL 2.7*   Total Bilirubin mg/dL 0.4   Alkaline Phosphatase U/L 70   AST U/L 14   ALT U/L 18       Drug levels (last 3 results):  No results for input(s): VANCOMYCINRA, VANCOMYCINPE, VANCOMYCINTR in the last 72 hours.    Microbiologic Results:  Microbiology Results (last 7 days)     Procedure Component Value Units Date/Time    Culture, Respiratory with Gram Stain [[id number]] Collected: 03/08/22 2152    Order Status: Sent Specimen: Respiratory from Sputum Updated: 03/08/22 2156    Blood Culture #1  **CANNOT BE ORDERED STAT** [[id number]] Collected: 03/08/22 1808    Order Status: Sent Specimen: Blood from Peripheral, Forearm, Right Updated: 03/08/22 1849    Blood Culture #2 **CANNOT BE ORDERED STAT** [[id number]] Collected: 03/08/22 1807    Order Status: Sent Specimen: Blood from Wrist, Left Updated: 03/08/22 1849

## 2022-03-09 NOTE — MEDICAL/APP STUDENT
Progress Note  Hospital Medicine      Admit Date: 3/8/2022    SUBJECTIVE:     Principle Problem:  Acute hypoxemic respiratory failure    HPI: Scooter Morrissey is a 71 y.o. male with a history of COPD, chronic pansinusitis, COVID x2 (01/2022, 01/2021), HLD, and pre-diabetes who presents with shortness of breath. Pt presented with a one week history of dyspnea. Pt states an increase in cough frequency and change in sputum (greenish, mostly at night). Pt has been increasing his use of PRN O2 at home, and is adherent to symbicort and albuterol use. Pt is negative for fever, chest pain, chills, nausea, vomiting, and has no recent sick contact.     Hospital Course: In the ED, patient was afebrile , RR 26, /67, O2 sats 85% on room air, and 93% on 2 L of nasal cannula. Patient had a hemoglobin of 11.3, no leukocytosis, normal creatinine, no significant electrolyte abnormality, BNP 18, troponin within normal limits. Patient was given albuterol, methylprednisolone, zosyn and vanc.    Interval History: No acute events overnight. Patient reported feeling overall better, feels anxious when his O2 sats is low (92% - was informed how that may be normal for him). Pt is afebrile and vital signs are stable.     Review of Systems  Constitutional: Negative for fever or chills.  HENT: Negative for ear pain and sore throat.    Respiratory: Positive for cough and shortness of breath. Negative for wheezing.    Cardiovascular: Negative for chest pain, palpitations, or leg swelling.  Gastrointestinal: Negative for nausea, vomiting, abdominal pain, or change in bowel habits  Genitourinary: Negative for hematuria or dysuria  Musculoskeletal: Negative for arthralgias or myalgias  Skin: Negative for rash or pruritis  Neurological: Negative for dizziness or tremors    Scheduled Meds:   albuterol-ipratropium  3 mL Nebulization Q6H WAKE    amLODIPine  10 mg Oral Daily    ascorbic acid (vitamin C)  500 mg Oral BID    atorvastatin  40 mg  Oral QHS    azithromycin  250 mg Oral Daily    calcium-vitamin D3  2 tablet Oral Daily    ceFEPime (MAXIPIME) IVPB  2 g Intravenous Q12H    cetirizine  5 mg Oral QHS    enoxaparin  40 mg Subcutaneous Daily    fluticasone furoate-vilanteroL  1 puff Inhalation Daily    fluticasone propionate  2 spray Each Nostril Daily    losartan  100 mg Oral Daily    metoprolol succinate  100 mg Oral Daily    multivitamin  1 tablet Oral Daily    predniSONE  40 mg Oral Daily     Continuous Infusions:  PRN Meds:.dextrose 10%, dextrose 10%, glucagon (human recombinant), glucose, glucose, hydrOXYzine HCL, insulin aspart U-100, melatonin, sodium chloride 0.9%     OBJECTIVE:     Vital Signs Range (Last 24H):  Temp:  [96 °F (35.6 °C)-98.1 °F (36.7 °C)]   Pulse:  [66-84]   Resp:  [18-28]   BP: (117-137)/(60-74)   SpO2:  [91 %-100 %]     I & O (Last 24H):No intake or output data in the 24 hours ending 03/09/22 1426    Physical Exam:  General: Alert and oriented x 3, no distress  HEENT: Conjunctivae/corneas clear, PERRL, oropharynx clear, mucous membranes are moist.  Pulmonary: Normal respiratory effort, positive for wheeze (bilateral, throughout)  Cardiovascular: Normal rate, regular rhythm, no murmur, pulses 2+ and symmetric.  Abdominal: Soft, nontender, bowel sounds present. No rebound guarding, distension, masses, or organomegaly.  Musculoskeletal: No swelling or tenderness.   Skin: Warm. Color, texture, turgor normal. No rashes or lesions. Cap refill <2 seconds.   Neuro: CN II-XII grossly intact, no focal numbness or weakness, normal strength and tone  Psychiatric: Normal mood and affect.      Laboratory Results:    Recent Labs   Lab 03/08/22  1526 03/09/22  0425   WBC 8.66 9.35   HGB 11.3* 9.7*   HCT 36.7* 31.9*    363     Recent Labs   Lab 03/08/22  1526 03/09/22  0425    133*   K 4.2 4.7    102   CO2 24 19*   BUN 15 22   CREATININE 1.1 1.4   GLU 80 174*   CALCIUM 9.9 9.4   MG  --  2.0   PHOS  --  2.9      Recent Labs   Lab 03/08/22  1526   ALKPHOS 70   ALT 18   AST 14   ALBUMIN 2.7*   PROT 9.2*   BILITOT 0.4      No results for input(s): POCTGLUCOSE in the last 168 hours.  No results for input(s): LACTATE in the last 72 hours.     Recent Labs     03/08/22  1526   TROPONINI <0.006     Hemoglobin A1C   Date Value Ref Range Status   01/24/2022 6.0 (H) 4.0 - 5.6 % Final     Comment:     ADA Screening Guidelines:  5.7-6.4%  Consistent with prediabetes  >or=6.5%  Consistent with diabetes    High levels of fetal hemoglobin interfere with the HbA1C  assay. Heterozygous hemoglobin variants (HbS, HgC, etc)do  not significantly interfere with this assay.   However, presence of multiple variants may affect accuracy.     05/01/2021 5.5 4.0 - 5.6 % Final     Comment:     ADA Screening Guidelines:  5.7-6.4%  Consistent with prediabetes  >or=6.5%  Consistent with diabetes    High levels of fetal hemoglobin interfere with the HbA1C  assay. Heterozygous hemoglobin variants (HbS, HgC, etc)do  not significantly interfere with this assay.   However, presence of multiple variants may affect accuracy.     03/14/2020 5.8 (H) 4.0 - 5.6 % Final     Comment:     ADA Screening Guidelines:  5.7-6.4%  Consistent with prediabetes  >or=6.5%  Consistent with diabetes  High levels of fetal hemoglobin interfere with the HbA1C  assay. Heterozygous hemoglobin variants (HbS, HgC, etc)do  not significantly interfere with this assay.   However, presence of multiple variants may affect accuracy.         No results for input(s): PT, INR, APTT in the last 24 hours.       Diagnostic Results:  Recent imaging reviewed    Microbiology:  Recent cultured reviewed     ASSESSMENT/PLAN:     Dyspnea  Possibly be due to COPD exacerbation, working up for URTI  --continue albuterol-ipratropium, methylprednisolone  --continue broad spectrum antibiotics (azythromycin, cefepime, discontinue vancomycin due to elevated creatinine 1.4 up from 1.1)  --respiratory therapist  appreciated for correct home use of inhalers  --blood culture collected 3/8 - NGTD  --sputum culture collected 3/8 <10 epithelial cells, no significant growth to date    Metabolic acidosis  --could possibly due to CO2 retention  --watch and monitor    Anemia  Patient is negative for hemoptysis, blood in urine/stool, colonoscopy 3 years ago with no abnormality. Last iron studies show anemia due to chronic disease.  --possibly new iron studies  --possibly T tiera/LFT study    Hyponatremia  Possibly due to dehydration  --patient encouraged to drink more water    VTE  Elevated d-dimer is normal when adjusted for age, no need to pursue for CTA  --enoxaparin 40 mg Q24H      Fanny Christensen, MS3  -[employer]

## 2022-03-09 NOTE — H&P
[facility] Emergency Dept  [facility] Medicine  History & Physical    Patient Name: Scooter Morrissey  MRN: [id number]  Patient Class: IP- Inpatient  Admission Date: 3/8/2022  Attending Physician: Danielle Billy*   Primary Care Provider: Katelynn Rojas MD         Patient information was obtained from patient, past medical records and ER records.     Subjective:     Principal Problem:Acute hypoxemic respiratory failure    Chief Complaint:   Chief Complaint   Patient presents with    Shortness of Breath     X 9 days        HPI: Mr. Morrissey is a 72 yo M with moderate COPD, chronic pansinusitis, asthma with COPD, recent COVID infection in January 2022 and before that Jan 2021,  HLD, Prediabetes presents for worsening FARLEY, SOB for one week. Associated symptoms of increasing cough and greenish sputum production. Per pt, SOB and cough production do not associate food. Sputum production is mostly at night. Per pt, denies any fever, chest pain, chills, nausea, vomiting or diarrhea. Pt denies any sick contact. Per pt, he was sent home with Oxygen since his last admission for COVID PNA in January, he only uses it PRN and have been needing it more. He exerts adherence to his symbicort and albuterol at home. He has quit cigar smoking for about 9 years, and cigarettes since 1975. Prior to this pt was smoking about 2 packs per month.     Lives with wife, and works as a . Pt acknowledges that given hx of asthma, his occupations can exacerbate an attack. Have not been follow up with his pulmonologist Dr. Guerrier since 2017, though he does have an appointment on 3/21 with Dr. Margarito Sanchez, which he hope we could help facilitate to make the appointment sooner.     In the ED, pt was afebrile, , RR 26, /67, sat 85% on RA, 93% with 2 L NC. Labs significant for Hgb 11.3. No leukocytosis, normal creatinine. No significant electrolytes abnormality. BNP 18, Trop wnl. Blood Cultures obtained. CXR with bilaterl upper lobe  airspace disease could be secondary to multifocal versus pulmonary edema. Pt was given albuterol, methylprenisolone, zosyn, and vancomycin. Pt is admitted to hospital medicine for further evaluation and management.       Past Medical History:   Diagnosis Date    Acute hypoxemic respiratory failure 1/12/2022    Anxiety 6/29/2016    Asthma with chronic obstructive pulmonary disease (COPD)     Benign essential hypertension 4/5/2016    Hyperlipidemia 4/13/2016    Interstitial pneumonitis 4/13/2016    Other specified anemias 3/13/2020       Past Surgical History:   Procedure Laterality Date    ADENOIDECTOMY      COLONOSCOPY N/A 6/1/2016    Procedure: COLONOSCOPY;  Surgeon: Meme Mills MD;  Location: [facility];  Service: Endoscopy;  Laterality: N/A;    FINGER SURGERY      15 years ago    TONSILLECTOMY         Review of patient's allergies indicates:  No Known Allergies    No current facility-administered medications on file prior to encounter.     Current Outpatient Medications on File Prior to Encounter   Medication Sig    albuterol (ACCUNEB) 1.25 mg/3 mL Nebu Take 3 mLs (1.25 mg total) by nebulization every 6 (six) hours as needed (shortness of breath or wheezing). Rescue    albuterol (VENTOLIN HFA) 90 mcg/actuation inhaler Inhale 2 puffs into the lungs every 4 (four) hours as needed for Wheezing or Shortness of Breath. Rescue    amLODIPine (NORVASC) 10 MG tablet TAKE 1 TABLET BY MOUTH EVERY DAY (Patient taking differently: Take 10 mg by mouth once daily.)    ascorbic acid, vitamin C, (VITAMIN C) 500 MG tablet Take 1 tablet (500 mg total) by mouth 2 (two) times daily.    atorvastatin (LIPITOR) 20 MG tablet Take 1 tablet (20 mg total) by mouth every evening.    budesonide-formoterol 160-4.5 mcg (SYMBICORT) 160-4.5 mcg/actuation HFAA Inhale 2 puffs into the lungs 2 (two) times daily. Controller    calcium-vitamin D3 (OS-SALLY 500 + D3) 500 mg-5 mcg (200 unit) per tablet Take 2 tablets by mouth  once daily.    fluticasone propionate (FLONASE) 50 mcg/actuation nasal spray 2 sprays (100 mcg total) by Each Nostril route once daily.    fluticasone-salmeterol diskus inhaler 500-50 mcg Inhale 1 puff into the lungs 2 (two) times daily. Controller    levocetirizine (XYZAL) 5 MG tablet Take 1 tablet (5 mg total) by mouth every evening.    losartan (COZAAR) 100 MG tablet TAKE 1 TABLET(100 MG) BY MOUTH EVERY DAY (Patient taking differently: Take 100 mg by mouth once daily.)    metoprolol succinate (TOPROL-XL) 100 MG 24 hr tablet TAKE 1 TABLET(100 MG) BY MOUTH EVERY DAY (Patient taking differently: Take 100 mg by mouth once daily.)    multivitamin with minerals tablet Take 1 tablet by mouth nightly.     pulse oximeter (PULSE OXIMETER) device by Apply Externally route 2 (two) times a day. Use twice daily at 8 AM and 3 PM and record the value in MyChart as directed.    pulse oximeter (PULSE OXIMETER) device by Apply Externally route 2 (two) times a day. Use twice daily at 8 AM and 3 PM and record the value in MyChart as directed.    vitamin D (VITAMIN D3) 1000 units Tab Take 1,000 Units by mouth once daily.     Family History       Problem Relation (Age of Onset)    No Known Problems Mother, Father, Daughter          Tobacco Use    Smoking status: Former Smoker     Types: Cigars    Smokeless tobacco: Former User     Quit date: 5/12/1996   Substance and Sexual Activity    Alcohol use: No    Drug use: No    Sexual activity: Yes     Partners: Female     Review of Systems   Constitutional:  Negative for appetite change, chills and fever.   HENT:  Negative for sore throat.    Respiratory:  Positive for cough and shortness of breath. Negative for wheezing.    Cardiovascular:  Negative for chest pain and palpitations.   Gastrointestinal:  Negative for abdominal distention, abdominal pain, constipation, diarrhea, nausea and vomiting.   Endocrine: Negative for polydipsia and polyuria.   Genitourinary:  Negative for  dysuria, hematuria and urgency.   Musculoskeletal:  Negative for back pain and gait problem.   Neurological:  Negative for weakness and numbness.   Objective:     Vital Signs (Most Recent):  Temp: 98.9 °F (37.2 °C) (03/08/22 1341)  Pulse: 82 (03/08/22 2103)  Resp: 18 (03/08/22 2103)  BP: 135/74 (03/08/22 2103)  SpO2: 98 % (03/08/22 2103) Vital Signs (24h Range):  Temp:  [98.9 °F (37.2 °C)] 98.9 °F (37.2 °C)  Pulse:  [] 82  Resp:  [18-28] 18  SpO2:  [85 %-100 %] 98 %  BP: (117-148)/(60-74) 135/74     Weight: 74.8 kg (165 lb)  Body mass index is 26.63 kg/m².    Physical Exam  Constitutional:       General: He is not in acute distress.     Appearance: Normal appearance. He is not ill-appearing.   HENT:      Head: Normocephalic and atraumatic.      Nose: Nose normal.   Eyes:      General:         Right eye: No discharge.         Left eye: No discharge.      Conjunctiva/sclera: Conjunctivae normal.   Neck:      Comments: No JVD  Cardiovascular:      Rate and Rhythm: Normal rate and regular rhythm.      Heart sounds: No murmur heard.  Pulmonary:      Effort: Pulmonary effort is normal.      Breath sounds: Normal breath sounds. No wheezing or rales.      Comments: On 3 L NC   Abdominal:      General: Abdomen is flat. There is no distension.      Palpations: Abdomen is soft.      Tenderness: There is no abdominal tenderness.   Musculoskeletal:         General: No swelling. Normal range of motion.      Cervical back: Normal range of motion.      Right lower leg: No edema.      Left lower leg: No edema.   Skin:     General: Skin is warm and dry.      Capillary Refill: Capillary refill takes less than 2 seconds.   Neurological:      General: No focal deficit present.      Mental Status: He is alert and oriented to person, place, and time.   Psychiatric:         Mood and Affect: Mood normal.           Significant Labs: All pertinent labs within the past 24 hours have been reviewed.  Recent Lab Results          03/08/22  1526        Albumin 2.7       Alkaline Phosphatase 70       ALT 18       Anion Gap 12       AST 14       Baso # 0.04       Basophil % 0.5       BILIRUBIN TOTAL 0.4  Comment: For infants and newborns, interpretation of results should be based  on gestational age, weight and in agreement with clinical  observations.    Premature Infant recommended reference ranges:  Up to 24 hours.............<8.0 mg/dL  Up to 48 hours............<12.0 mg/dL  3-5 days..................<15.0 mg/dL  6-29 days.................<15.0 mg/dL         BNP 16  Comment: Values of less than 100 pg/ml are consistent with non-CHF populations.       BUN 15       Calcium 9.9       Chloride 105       CO2 24       Creatinine 1.1       Differential Method Automated       eGFR if  >60.0       eGFR if non  >60.0  Comment: Calculation used to obtain the estimated glomerular filtration  rate (eGFR) is the CKD-EPI equation.          Eos # 0.3       Eosinophil % 3.2       Glucose 80       Gran # (ANC) 6.0       Gran % 68.7       Hematocrit 36.7       Hemoglobin 11.3       Immature Grans (Abs) 0.03  Comment: Mild elevation in immature granulocytes is non specific and   can be seen in a variety of conditions including stress response,   acute inflammation, trauma and pregnancy. Correlation with other   laboratory and clinical findings is essential.         Immature Granulocytes 0.3       Lymph # 1.5       Lymph % 17.7       MCH 29.8       MCHC 30.8       MCV 97       Mono # 0.8       Mono % 9.6       MPV 10.7       nRBC 0       Platelets 399       Potassium 4.2       PROTEIN TOTAL 9.2       RBC 3.79       RDW 17.0       Sodium 141       Troponin I <0.006  Comment: The reference interval for Troponin I represents the 99th percentile   cutoff   for our facility and is consistent with 3rd generation assay   performance.         WBC 8.66               Significant Imaging: I have reviewed all pertinent imaging  results/findings within the past 24 hours.      Assessment/Plan:     * Acute hypoxemic respiratory failure  70 yo M with moderate COPD, chronic pansinusitis, asthma with COPD, recent COVID infection in January 2022 and before that Jan 2021,  HLD, Prediabetes presents for worsening FARLEY, SOB for one week. Associated symptoms of increasing cough and greenish sputum production.     Patient with Hypoxic Respiratory failure which is Acute.  he is not on home oxygen. Supplemental oxygen was provided and noted-  .   Signs/symptoms of respiratory failure include- tachypnea and increased work of breathing. Contributing diagnoses includes - COPD and Pneumonia Labs and images were reviewed. Patient Has not had a recent ABG. Will treat underlying causes and adjust management of respiratory failure as follows    Differential includes but not limited to: COPD exacerbation 2/2 to multifocal PNA vs environmental exposure (given hx of asthma and occupation as , though CBC differentials is not significant for eosinophilia ) vs post COVID pulmonary fibrosis (though less likely). PE is also possible but less likely as pt did have recent COVID infx, d-dimer at that time was 4.47, though pt is mobile, no current active cancer, no significant unilateral leg swelling. HF is less likely given euvolemic on exam, no prior hx of HF, and BNP is wnl.     Plan   - Satting 93% on 2 L NC   - S/p methypred in the ED, will start Prednisone 40mg PO daily to complete a 5 day course  - Given increased sputum production, SOB, will initiate abx. Hx of Pseudomonas in respiratory sputum in Jan 2022   - Will Continue vancomycin and Cefepime for now (given hx of pseudomonas PNA); will add azithromycin for atypical coverage   - Will consider to de-escalate once symptoms are improved, cultures return   - Will obtain D-dimer with morning labs; if elevated and high than prior will consider further evaluation for VTE  - Resp Cx and BCx pending     -  Continue home inhaler: fluticasone-vilanterol 1 puff QD (symbicort is not in formulary)   - Duonebs q4h while awake and prn with IS  - ABG ordered   - Continuous oxygen monitoring with supplement O2 for goal sat >88%   - Will need PFTs upon discharge and will try to coordinate close follow up with pulmonology  - Will need 6MWT upon discharge to evaluate for needs of oxygen  - Will refer to pulmonary rehab outpatient     No results for input(s): PH, PCO2, PO2, HCO3, POCSATURATED, BE in the last 24 hours.        COPD exacerbation  See acute hypoxemic respiratory failure       Multifocal pneumonia  See acute hypoxemic respiratory failure       Non-seasonal allergic rhinitis        Personal history of COVID-19        Asthma with COPD  - See COPD exacerbation       Prediabetes  -Last A1c reviewed-   Lab Results   Component Value Date    HGBA1C 6.0 (H) 01/24/2022       Home Antihyperglycemic Regiment:  - None     Inpatient Antihyperglycemic Regiment:  Antihyperglycemics (From admission, onward)            Start     Stop Route Frequency Ordered    03/08/22 2031  insulin aspart U-100 pen 0-5 Units         -- SubQ Before meals & nightly PRN 03/08/22 1931        - Insulin regimen as above  - LDSSI with POCT accuchecks ACHS  - Diabetic nutritional counseling given     Blood Sugars (AccuCheck):  No results for input(s): POCTGLUCOSE in the last 72 hours.          Anemia  Admit with hemoglobin 11.3 Baseline 11-12      Lab Results   Component Value Date    IRON 89 05/01/2021    TIBC 324 05/01/2021    FERRITIN 1,038 (H) 01/12/2022     No results found for: FOLATE  Lab Results   Component Value Date    ILDNCPXM39 590 01/24/2022     No results found for: RETICCTPCT    Likely secondary to anemia of chronic disease     Plan:   - Daily CBC   - reticulocytes, folate pending  - Transfuse hgb <7         Chronic pansinusitis  Resume home cetirizine and fluticasone       Vitamin D deficiency  - Continue home vitamin D and vitamin supplement        Anxiety  Atarax Prn       H/O pleural empyema        Lung granuloma  Seen on chest CT dated 04/13/16      Hyperlipidemia  Lipid profile on   Lab Results   Component Value Date    LDLCALC 105.6 01/24/2022    HDL 36 (L) 01/24/2022    TRIG 82 01/24/2022    CHOL 158 01/24/2022     The 10-year ASCVD risk score (Lily MARTINEZ Jr., et al., 2013) is: 12.8%    Values used to calculate the score:      Age: 71 years      Sex: Male      Is Non- : Yes      Diabetic: No      Tobacco smoker: No      Systolic Blood Pressure: 127 mmHg      Is BP treated: No      HDL Cholesterol: 36 mg/dL      Total Cholesterol: 158 mg/dL    - Currently taking - atorvastatin 20 mg at home; will increase to atorvastatin 40 mg  - Reports compliance with hyperlipidemia treatment as prescribed  - Denies adverse effects of medications        Overweight (BMI 25.0-29.9)  - encouraged to continue walking 30-40 mins/day at least 3-4 days /week  - emphasis placed on changing diet (for lowering cholesterol & for weight loss)          Essential hypertension  Home regimen: amlodipine 10 mg QD, losartan 100 mg QD, toprol 100 mg QD     Plan:   - Resume home regimen starting tomorrow if BP is stable       VTE Risk Mitigation (From admission, onward)         Ordered     enoxaparin injection 40 mg  Daily         03/08/22 1924     IP VTE HIGH RISK PATIENT  Once         03/08/22 1924     Place sequential compression device  Until discontinued         03/08/22 1924                   Kalpana Gardner DO  Department of Hospital Medicine   [facility] Dept

## 2022-03-10 LAB
ANION GAP SERPL CALC-SCNC: 11 MMOL/L (ref 8–16)
BASOPHILS # BLD AUTO: 0.03 K/UL (ref 0–0.2)
BASOPHILS NFR BLD: 0.1 % (ref 0–1.9)
BUN SERPL-MCNC: 26 MG/DL (ref 8–23)
CALCIUM SERPL-MCNC: 9.5 MG/DL (ref 8.7–10.5)
CHLORIDE SERPL-SCNC: 107 MMOL/L (ref 95–110)
CO2 SERPL-SCNC: 20 MMOL/L (ref 23–29)
CREAT SERPL-MCNC: 1 MG/DL (ref 0.5–1.4)
DIFFERENTIAL METHOD: ABNORMAL
EOSINOPHIL # BLD AUTO: 0 K/UL (ref 0–0.5)
EOSINOPHIL NFR BLD: 0 % (ref 0–8)
ERYTHROCYTE [DISTWIDTH] IN BLOOD BY AUTOMATED COUNT: 17.1 % (ref 11.5–14.5)
EST. GFR  (AFRICAN AMERICAN): >60 ML/MIN/1.73 M^2
EST. GFR  (NON AFRICAN AMERICAN): >60 ML/MIN/1.73 M^2
GLUCOSE SERPL-MCNC: 111 MG/DL (ref 70–110)
HCT VFR BLD AUTO: 32.9 % (ref 40–54)
HGB BLD-MCNC: 10 G/DL (ref 14–18)
IMM GRANULOCYTES # BLD AUTO: 0.1 K/UL (ref 0–0.04)
IMM GRANULOCYTES NFR BLD AUTO: 0.5 % (ref 0–0.5)
LYMPHOCYTES # BLD AUTO: 2 K/UL (ref 1–4.8)
LYMPHOCYTES NFR BLD: 9.1 % (ref 18–48)
MAGNESIUM SERPL-MCNC: 2.1 MG/DL (ref 1.6–2.6)
MCH RBC QN AUTO: 30.1 PG (ref 27–31)
MCHC RBC AUTO-ENTMCNC: 30.4 G/DL (ref 32–36)
MCV RBC AUTO: 99 FL (ref 82–98)
MONOCYTES # BLD AUTO: 1.3 K/UL (ref 0.3–1)
MONOCYTES NFR BLD: 6 % (ref 4–15)
NEUTROPHILS # BLD AUTO: 18.5 K/UL (ref 1.8–7.7)
NEUTROPHILS NFR BLD: 84.3 % (ref 38–73)
NRBC BLD-RTO: 0 /100 WBC
PHOSPHATE SERPL-MCNC: 4 MG/DL (ref 2.7–4.5)
PLATELET # BLD AUTO: 428 K/UL (ref 150–450)
PMV BLD AUTO: 10 FL (ref 9.2–12.9)
POTASSIUM SERPL-SCNC: 4.8 MMOL/L (ref 3.5–5.1)
RBC # BLD AUTO: 3.32 M/UL (ref 4.6–6.2)
SODIUM SERPL-SCNC: 138 MMOL/L (ref 136–145)
WBC # BLD AUTO: 21.89 K/UL (ref 3.9–12.7)

## 2022-03-10 PROCEDURE — 36415 COLL VENOUS BLD VENIPUNCTURE: CPT | Mod: HCNC | Performed by: STUDENT IN AN ORGANIZED HEALTH CARE EDUCATION/TRAINING PROGRAM

## 2022-03-10 PROCEDURE — 99900035 HC TECH TIME PER 15 MIN (STAT): Mod: HCNC

## 2022-03-10 PROCEDURE — 99233 PR SUBSEQUENT HOSPITAL CARE,LEVL III: ICD-10-PCS | Mod: HCNC,GC,, | Performed by: HOSPITALIST

## 2022-03-10 PROCEDURE — 11000001 HC ACUTE MED/SURG PRIVATE ROOM: Mod: HCNC

## 2022-03-10 PROCEDURE — 83735 ASSAY OF MAGNESIUM: CPT | Mod: HCNC | Performed by: STUDENT IN AN ORGANIZED HEALTH CARE EDUCATION/TRAINING PROGRAM

## 2022-03-10 PROCEDURE — 94761 N-INVAS EAR/PLS OXIMETRY MLT: CPT | Mod: HCNC

## 2022-03-10 PROCEDURE — 85025 COMPLETE CBC W/AUTO DIFF WBC: CPT | Mod: HCNC | Performed by: STUDENT IN AN ORGANIZED HEALTH CARE EDUCATION/TRAINING PROGRAM

## 2022-03-10 PROCEDURE — 63700000 PHARM REV CODE 250 ALT 637 W/O HCPCS: Mod: HCNC | Performed by: STUDENT IN AN ORGANIZED HEALTH CARE EDUCATION/TRAINING PROGRAM

## 2022-03-10 PROCEDURE — 27000221 HC OXYGEN, UP TO 24 HOURS: Mod: HCNC

## 2022-03-10 PROCEDURE — 94640 AIRWAY INHALATION TREATMENT: CPT | Mod: HCNC

## 2022-03-10 PROCEDURE — 84100 ASSAY OF PHOSPHORUS: CPT | Mod: HCNC | Performed by: STUDENT IN AN ORGANIZED HEALTH CARE EDUCATION/TRAINING PROGRAM

## 2022-03-10 PROCEDURE — 63600175 PHARM REV CODE 636 W HCPCS: Mod: HCNC | Performed by: HOSPITALIST

## 2022-03-10 PROCEDURE — 63600175 PHARM REV CODE 636 W HCPCS: Mod: HCNC | Performed by: STUDENT IN AN ORGANIZED HEALTH CARE EDUCATION/TRAINING PROGRAM

## 2022-03-10 PROCEDURE — 25000242 PHARM REV CODE 250 ALT 637 W/ HCPCS: Mod: HCNC | Performed by: STUDENT IN AN ORGANIZED HEALTH CARE EDUCATION/TRAINING PROGRAM

## 2022-03-10 PROCEDURE — 80048 BASIC METABOLIC PNL TOTAL CA: CPT | Mod: HCNC | Performed by: STUDENT IN AN ORGANIZED HEALTH CARE EDUCATION/TRAINING PROGRAM

## 2022-03-10 PROCEDURE — 99233 SBSQ HOSP IP/OBS HIGH 50: CPT | Mod: HCNC,GC,, | Performed by: HOSPITALIST

## 2022-03-10 PROCEDURE — 25000003 PHARM REV CODE 250: Mod: HCNC | Performed by: STUDENT IN AN ORGANIZED HEALTH CARE EDUCATION/TRAINING PROGRAM

## 2022-03-10 RX ADMIN — PREDNISONE 40 MG: 20 TABLET ORAL at 09:03

## 2022-03-10 RX ADMIN — ENOXAPARIN SODIUM 40 MG: 100 INJECTION SUBCUTANEOUS at 05:03

## 2022-03-10 RX ADMIN — IPRATROPIUM BROMIDE AND ALBUTEROL SULFATE 3 ML: 2.5; .5 SOLUTION RESPIRATORY (INHALATION) at 03:03

## 2022-03-10 RX ADMIN — CEFEPIME HYDROCHLORIDE 2 G: 2 INJECTION, SOLUTION INTRAVENOUS at 10:03

## 2022-03-10 RX ADMIN — AMLODIPINE BESYLATE 10 MG: 10 TABLET ORAL at 09:03

## 2022-03-10 RX ADMIN — Medication 2 TABLET: at 10:03

## 2022-03-10 RX ADMIN — CETIRIZINE HYDROCHLORIDE 5 MG: 5 TABLET ORAL at 08:03

## 2022-03-10 RX ADMIN — ATORVASTATIN CALCIUM 40 MG: 40 TABLET, FILM COATED ORAL at 08:03

## 2022-03-10 RX ADMIN — AZITHROMYCIN MONOHYDRATE 250 MG: 250 TABLET ORAL at 09:03

## 2022-03-10 RX ADMIN — Medication 500 MG: at 09:03

## 2022-03-10 RX ADMIN — METOPROLOL SUCCINATE 100 MG: 100 TABLET, EXTENDED RELEASE ORAL at 09:03

## 2022-03-10 RX ADMIN — IPRATROPIUM BROMIDE AND ALBUTEROL SULFATE 3 ML: 2.5; .5 SOLUTION RESPIRATORY (INHALATION) at 08:03

## 2022-03-10 RX ADMIN — THERA TABS 1 TABLET: TAB at 09:03

## 2022-03-10 RX ADMIN — LOSARTAN POTASSIUM 100 MG: 50 TABLET, FILM COATED ORAL at 09:03

## 2022-03-10 RX ADMIN — Medication 500 MG: at 08:03

## 2022-03-10 NOTE — ASSESSMENT & PLAN NOTE
Lipid profile on   Lab Results   Component Value Date    LDLCALC 105.6 01/24/2022    HDL 36 (L) 01/24/2022    TRIG 82 01/24/2022    CHOL 158 01/24/2022     The 10-year ASCVD risk score (Lily MARTINEZ Jr., et al., 2013) is: 29.5%    Values used to calculate the score:      Age: 71 years      Sex: Male      Is Non- : Yes      Diabetic: Yes      Tobacco smoker: No      Systolic Blood Pressure: 113 mmHg      Is BP treated: Yes      HDL Cholesterol: 36 mg/dL      Total Cholesterol: 158 mg/dL    - Currently taking - atorvastatin 20 mg at home; will increase to atorvastatin 40 mg  - Reports compliance with hyperlipidemia treatment as prescribed  - Denies adverse effects of medications

## 2022-03-10 NOTE — HOSPITAL COURSE
Patient was admitted to Hospital Medicine for acute hypoxemic respiratory failure most likely secondary to COPD exacerbation in setting of PNA.  CXR with BL upper lobe airspace disease concerning for multifocal PNA. CT Chest showed evidence of BL bronchocentric consolidative opacities with bronchiectasis and mucoid impaction. He was treated with ABX, prednisone, duonebs and CPT. Initially on Cefepime/Azithro, however Vancomycin was added 3/11 once GPCs grew.     Respiratory culture ultimately grew MRSA and Pseudomonas. ID consulted to assist with discharge abx recs, recommended 7 days of Pseudomonal coverage with cefepime (to be completed during hospitalization, end date 3/14) and 7 days of MRSA coverage (vancomycin, can complete remaining course with doxycycline on discharge, last dose 3/17.)  Blood cultures remained with no growth. Respiratory status much improved overall, now requiring no oxygen supplementation. Course complicated by NOEMI that resolved with increased PO intake and leukocytosis that was likely exacerbated by prednisone use. He was discharged with follow up with his PCP and 3 days of doxycycline to finish the MRSA course.

## 2022-03-10 NOTE — PROGRESS NOTES
[facility] Medicine  Progress Note    Patient Name: Scooter Morrissey  MRN: [id number]  Patient Class: IP- Inpatient   Admission Date: 3/8/2022  Length of Stay: 2 days  Attending Physician: Fredrick James MD  Primary Care Provider: Katelynn Rojas MD        Subjective:     Principal Problem:Acute hypoxemic respiratory failure        HPI:  Mr. Morrissey is a 70 yo M with moderate COPD, chronic pansinusitis, asthma with COPD, recent COVID infection in January 2022 and before that Jan 2021,  HLD, Prediabetes presents for worsening FARLEY, SOB for one week. Associated symptoms of increasing cough and greenish sputum production. Per pt, SOB and cough production do not associate food. Sputum production is mostly at night. Per pt, denies any fever, chest pain, chills, nausea, vomiting or diarrhea. Pt denies any sick contact. Per pt, he was sent home with Oxygen since his last admission for COVID PNA in January, he only uses it PRN and have been needing it more. He exerts adherence to his symbicort and albuterol at home. He has quit cigar smoking for about 9 years, and cigarettes since 1975. Prior to this pt was smoking about 2 packs per month.     Lives with wife, and works as a . Pt acknowledges that given hx of asthma, his occupations can exacerbate an attack. Have not been follow up with his pulmonologist Dr. Guerrier since 2017, though he does have an appointment on 3/21 with Dr. Margarito Sanchez, which he hope we could help facilitate to make the appointment sooner.     In the ED, pt was afebrile, , RR 26, /67, sat 85% on RA, 93% with 2 L NC. Labs significant for Hgb 11.3. No leukocytosis, normal creatinine. No significant electrolytes abnormality. BNP 18, Trop wnl. Blood Cultures obtained. CXR with bilaterl upper lobe airspace disease could be secondary to multifocal versus pulmonary edema. Pt was given albuterol, methylprenisolone, zosyn, and vancomycin. Pt is admitted to hospital medicine for  further evaluation and management.       Overview/Hospital Course:  Patient admitted to Hospital Medicine for acute hypoxemic respiratory failure most likely secondary to COPD exacerbation vs post-Covid lung disease. Treated with Cefepime/Azithromycin, duonebs and prednisone. Respiratory status improving. Course complicated by NOEMI likely 2/2 poor PO intake. Leukocytosis likely secondary to prednisone       Interval History: No acute event overnight. Pt remains afebrile overnight with no other acute vital signs. Overall, improvement in terms of cough and wheezing. Currently on 1 LNC. Will obtain Chest CT to further evaluate the lungs parenchyma. NOEMI is improving. Flu shot ordered per pt request. Resp CX GNR, pending ID and susceptibility. Continue with Azithromycin and Cefepime for now.        Review of Systems   Constitutional:  Negative for appetite change, chills and fever.   HENT:  Negative for sore throat.    Respiratory:  Positive for cough and shortness of breath. Negative for wheezing.    Cardiovascular:  Negative for chest pain and palpitations.   Gastrointestinal:  Negative for abdominal distention, abdominal pain, constipation, diarrhea, nausea and vomiting.   Endocrine: Negative for polydipsia and polyuria.   Genitourinary:  Negative for dysuria, hematuria and urgency.   Musculoskeletal:  Negative for back pain and gait problem.   Neurological:  Negative for weakness and numbness.   Psychiatric/Behavioral:  The patient is nervous/anxious.    Objective:     Vital Signs (Most Recent):  Temp: 97.5 °F (36.4 °C) (03/10/22 1108)  Pulse: 61 (03/10/22 1110)  Resp: 18 (03/10/22 1108)  BP: (!) 113/55 (03/10/22 1108)  SpO2: (!) 93 % (03/10/22 1108)   Vital Signs (24h Range):  Temp:  [96.3 °F (35.7 °C)-97.5 °F (36.4 °C)] 97.5 °F (36.4 °C)  Pulse:  [52-81] 61  Resp:  [17-18] 18  SpO2:  [92 %-98 %] 93 %  BP: (113-131)/(55-68) 113/55     Weight: 74.8 kg (164 lb 15.9 oz)  Body mass index is 29.23 kg/m².  No intake or  output data in the 24 hours ending 03/10/22 9833   Physical Exam  Constitutional:       General: He is not in acute distress.     Appearance: Normal appearance. He is not ill-appearing.   HENT:      Head: Normocephalic and atraumatic.      Nose: Nose normal.   Eyes:      General:         Right eye: No discharge.         Left eye: No discharge.      Conjunctiva/sclera: Conjunctivae normal.   Neck:      Comments: No JVD  Cardiovascular:      Rate and Rhythm: Normal rate and regular rhythm.      Heart sounds: No murmur heard.  Pulmonary:      Effort: Pulmonary effort is normal.      Breath sounds: Normal breath sounds. No wheezing or rales.      Comments: On 2 L NC   Abdominal:      General: Abdomen is flat. There is no distension.      Palpations: Abdomen is soft.      Tenderness: There is no abdominal tenderness.   Musculoskeletal:         General: No swelling. Normal range of motion.      Cervical back: Normal range of motion.      Right lower leg: No edema.      Left lower leg: No edema.   Skin:     General: Skin is warm and dry.      Capillary Refill: Capillary refill takes less than 2 seconds.   Neurological:      General: No focal deficit present.      Mental Status: He is alert and oriented to person, place, and time.   Psychiatric:         Mood and Affect: Mood normal.       Significant Labs: All pertinent labs within the past 24 hours have been reviewed.    Significant Imaging: I have reviewed all pertinent imaging results/findings within the past 24 hours.      Assessment/Plan:      * Acute hypoxemic respiratory failure  72 yo M with moderate COPD, chronic pansinusitis, asthma with COPD, recent COVID infection in January 2022 and before that Jan 2021,  HLD, Prediabetes presents for worsening FARLEY, SOB for one week. Associated symptoms of increasing cough and greenish sputum production.     Patient with Hypoxic Respiratory failure which is Acute.  he is not on home oxygen. Supplemental oxygen was provided and  noted-  .   Signs/symptoms of respiratory failure include- tachypnea and increased work of breathing. Contributing diagnoses includes - COPD and Pneumonia Labs and images were reviewed. Patient Has not had a recent ABG. Will treat underlying causes and adjust management of respiratory failure as follows    Differential includes but not limited to: COPD exacerbation 2/2 to multifocal PNA vs environmental exposure (given hx of asthma and occupation as , though CBC differentials is not significant for eosinophilia ) vs post COVID pulmonary fibrosis (though less likely). PE is also possible but less likely as pt did have recent COVID infx, d-dimer at that time was 4.47, though pt is mobile, no current active cancer, no significant unilateral leg swelling. HF is less likely given euvolemic on exam, no prior hx of HF, and BNP is wnl.     Plan   - Satting 93% on 2 L NC   - S/p methypred in the ED  - Continue Prednisone 40mg PO daily to complete a 5 day course  - Given increased sputum production, SOB, will initiate abx. Hx of Pseudomonas in respiratory sputum in Jan 2022   - Continue Cefepime for now (given hx of pseudomonas PNA) and azithromycin for atypical coverage. Vancomycin d/c'd 3/9 in setting of NOEMI   - Will de-escalate once symptoms are improved, cultures return   - D-Dimer elevated, however WNL once adjusted for age. Will not pursue CTA at this time.   - Resp Cx with < 10 epi; many WBCs; many GNR; rare GPCs/yeast  - BCx with NGTD x1D  - Continue home inhaler: fluticasone-vilanterol 1 puff QD (symbicort is not in formulary)   - Duonebs q4h while awake and prn  - Incentive Spirometry  - F/u ABG  - Continuous oxygen monitoring with supplement O2 for goal sat 88% - 92%   - Will need PFTs upon discharge and will try to coordinate close follow up with pulmonology  - Will need 6MWT upon discharge to evaluate needs of oxygen  - Will refer to pulmonary rehab outpatient           COPD exacerbation  See acute  hypoxemic respiratory failure       Multifocal pneumonia  See acute hypoxemic respiratory failure       NOEMI (acute kidney injury)  NOEMI noted on today's labs, Cr bump to 1.4 from 1.1. Likely 2/2 dehydration as patient with poor PO intake. If no improvement on tomorrow's labs after patient was encouraged to increase hydration, need to consider other causes especially in setting of precipitous Hg drop.     - Improved/resolved  - Continue to encourage hydration       Non-seasonal allergic rhinitis        Personal history of COVID-19  Long term effects of Covid likely contributing to respiratory status.     Asthma with COPD  - See COPD exacerbation       Type 2 diabetes mellitus  -Last A1c reviewed-   Lab Results   Component Value Date    HGBA1C 6.0 (H) 01/24/2022       Home Antihyperglycemic Regiment:  - None     Inpatient Antihyperglycemic Regiment:  Antihyperglycemics (From admission, onward)            Start     Stop Route Frequency Ordered    03/08/22 2031  insulin aspart U-100 pen 0-5 Units         -- SubQ Before meals & nightly PRN 03/08/22 1931        - Insulin regimen as above  - LDSSI with POCT accuchecks ACHS  - Diabetic nutritional counseling given   - POCT ACQHS    Blood Sugars (AccuCheck):  No results for input(s): POCTGLUCOSE in the last 72 hours.          Anemia  Admit with hemoglobin 11.3 Baseline 11-12      Lab Results   Component Value Date    IRON 89 05/01/2021    TIBC 324 05/01/2021    FERRITIN 1,038 (H) 01/12/2022     Lab Results   Component Value Date    FOLATE 15.2 03/09/2022     Lab Results   Component Value Date    MXCLPTGQ53 590 01/24/2022     Folate and B12 WNL  Reticulocytes c/w hypoproliferation  Likely secondary to anemia of chronic disease     Stable     Plan:   - Daily CBC   - Transfuse hgb <7         Chronic pansinusitis  Resume home cetirizine and fluticasone       Vitamin D deficiency  - Continue home vitamin D and vitamin supplement       Anxiety  Atarax Prn       H/O pleural  empyema        Lung granuloma  Seen on chest CT dated 04/13/16  - Will obtain a repeat Chest CT today       Hyperlipidemia  Lipid profile on   Lab Results   Component Value Date    LDLCALC 105.6 01/24/2022    HDL 36 (L) 01/24/2022    TRIG 82 01/24/2022    CHOL 158 01/24/2022     The 10-year ASCVD risk score (Lily MARTINEZ Jr., et al., 2013) is: 29.5%    Values used to calculate the score:      Age: 71 years      Sex: Male      Is Non- : Yes      Diabetic: Yes      Tobacco smoker: No      Systolic Blood Pressure: 113 mmHg      Is BP treated: Yes      HDL Cholesterol: 36 mg/dL      Total Cholesterol: 158 mg/dL    - Currently taking - atorvastatin 20 mg at home; will increase to atorvastatin 40 mg  - Reports compliance with hyperlipidemia treatment as prescribed  - Denies adverse effects of medications        Overweight (BMI 25.0-29.9)  - encouraged to continue walking 30-40 mins/day at least 3-4 days /week  - emphasis placed on changing diet (for lowering cholesterol & for weight loss)          Essential hypertension  Home regimen: amlodipine 10 mg QD, losartan 100 mg QD, toprol 100 mg QD     Plan:   - home meds resumed        VTE Risk Mitigation (From admission, onward)         Ordered     enoxaparin injection 40 mg  Daily         03/08/22 1924     IP VTE HIGH RISK PATIENT  Once         03/08/22 1924     Place sequential compression device  Until discontinued         03/08/22 1924                Discharge Planning   KATE: 3/11/2022     Code Status: Full Code   Is the patient medically ready for discharge?: No    Reason for patient still in hospital (select all that apply): Patient trending condition, Laboratory test and Treatment                     Kalpana Gardner DO  Department of Hospital Medicine   [facility] - Med Surg

## 2022-03-10 NOTE — ASSESSMENT & PLAN NOTE
- encouraged to continue walking 30-40 mins/day at least 3-4 days /week  - emphasis placed on changing diet (for lowering cholesterol & for weight loss)         Problem: NICU 27-29 weeks: Week of life 2  Goal: Nutrition/Diet  Outcome: Progressing Towards Goal  Goal: Treatments/Interventions/Procedures  Outcome: Progressing Towards Goal  Goal: *Nutritional status within defined limits  Outcome: Progressing Towards Goal  Goal: *Oxygen saturation within defined limits  Outcome: Progressing Towards Goal    0700: SBAR received bedside from [address] [place] [address] RN    0930: Awake, alert crying. Rooting. Assessment complete. VSS. Attempted to PO with purple nipple. Infant with strong suck and very minimal drooling. PO fed well. Took 26mls in 20 min. Diapered. 1230: Care continues. VSS. Dressed in [employer]. Feeding on pump after placement checked. Diaper dry. 1530: Assessment unchanged. VSS. Awake and alert. Rooting. PO fed well with purple ultra slow flow nipple. Remaining gavaged via gravity. Diapered. Fob called and updated. 1830: Care continues. VSS. Awake and alert. Diapered. No cues this care time to PO feed. Feeding on pump 30 min. 1900: SBAR given bedside to PAMELLA Briseno RN bedside.

## 2022-03-10 NOTE — SUBJECTIVE & OBJECTIVE
Interval History: No acute event overnight. Pt remains afebrile overnight with no other acute vital signs. Overall, improvement in terms of cough and wheezing. Currently on 1 LNC. Will obtain Chest CT to further evaluate the lungs parenchyma. NOEMI is improving. Flu shot ordered per pt request. Resp CX GNR, pending ID and susceptibility. Continue with Azithromycin and Cefepime for now.        Review of Systems   Constitutional:  Negative for appetite change, chills and fever.   HENT:  Negative for sore throat.    Respiratory:  Positive for cough and shortness of breath. Negative for wheezing.    Cardiovascular:  Negative for chest pain and palpitations.   Gastrointestinal:  Negative for abdominal distention, abdominal pain, constipation, diarrhea, nausea and vomiting.   Endocrine: Negative for polydipsia and polyuria.   Genitourinary:  Negative for dysuria, hematuria and urgency.   Musculoskeletal:  Negative for back pain and gait problem.   Neurological:  Negative for weakness and numbness.   Psychiatric/Behavioral:  The patient is nervous/anxious.    Objective:     Vital Signs (Most Recent):  Temp: 97.5 °F (36.4 °C) (03/10/22 1108)  Pulse: 61 (03/10/22 1110)  Resp: 18 (03/10/22 1108)  BP: (!) 113/55 (03/10/22 1108)  SpO2: (!) 93 % (03/10/22 1108)   Vital Signs (24h Range):  Temp:  [96.3 °F (35.7 °C)-97.5 °F (36.4 °C)] 97.5 °F (36.4 °C)  Pulse:  [52-81] 61  Resp:  [17-18] 18  SpO2:  [92 %-98 %] 93 %  BP: (113-131)/(55-68) 113/55     Weight: 74.8 kg (164 lb 15.9 oz)  Body mass index is 29.23 kg/m².  No intake or output data in the 24 hours ending 03/10/22 1337   Physical Exam  Constitutional:       General: He is not in acute distress.     Appearance: Normal appearance. He is not ill-appearing.   HENT:      Head: Normocephalic and atraumatic.      Nose: Nose normal.   Eyes:      General:         Right eye: No discharge.         Left eye: No discharge.      Conjunctiva/sclera: Conjunctivae normal.   Neck:       Comments: No JVD  Cardiovascular:      Rate and Rhythm: Normal rate and regular rhythm.      Heart sounds: No murmur heard.  Pulmonary:      Effort: Pulmonary effort is normal.      Breath sounds: Normal breath sounds. No wheezing or rales.      Comments: On 2 L NC   Abdominal:      General: Abdomen is flat. There is no distension.      Palpations: Abdomen is soft.      Tenderness: There is no abdominal tenderness.   Musculoskeletal:         General: No swelling. Normal range of motion.      Cervical back: Normal range of motion.      Right lower leg: No edema.      Left lower leg: No edema.   Skin:     General: Skin is warm and dry.      Capillary Refill: Capillary refill takes less than 2 seconds.   Neurological:      General: No focal deficit present.      Mental Status: He is alert and oriented to person, place, and time.   Psychiatric:         Mood and Affect: Mood normal.       Significant Labs: All pertinent labs within the past 24 hours have been reviewed.    Significant Imaging: I have reviewed all pertinent imaging results/findings within the past 24 hours.

## 2022-03-10 NOTE — ASSESSMENT & PLAN NOTE
Admit with hemoglobin 11.3 Baseline 11-12      Lab Results   Component Value Date    IRON 89 05/01/2021    TIBC 324 05/01/2021    FERRITIN 1,038 (H) 01/12/2022     Lab Results   Component Value Date    FOLATE 15.2 03/09/2022     Lab Results   Component Value Date    FDINTQAJ25 590 01/24/2022     Folate and B12 WNL  Reticulocytes c/w hypoproliferation  Likely secondary to anemia of chronic disease     Stable     Plan:   - Daily CBC   - Transfuse hgb <7

## 2022-03-10 NOTE — ASSESSMENT & PLAN NOTE
Home regimen: amlodipine 10 mg QD, losartan 100 mg QD, toprol 100 mg QD     Plan:   - home meds resumed

## 2022-03-10 NOTE — RESPIRATORY THERAPY
RAPID RESPONSE RESPIRATORY CHART CHECK       Chart check completed.   Please call [phone] for further concerns or assistance.

## 2022-03-10 NOTE — MEDICAL/APP STUDENT
Progress Note  Hospital Medicine      Admit Date: 3/8/2022    SUBJECTIVE:     Principle Problem:  Acute hypoxemic respiratory failure    HPI: Scooter Morrissey is a 71 y.o. male with a history of COPD, chronic pansinusitis, COVID x2 (01/2022, 01/2021), HLD, and pre-diabetes who presents with shortness of breath. Pt presented with a one week history of dyspnea. Pt states an increase in cough frequency and change in sputum (greenish, mostly at night). Pt has been increasing his use of PRN O2 at home, and is adherent to symbicort and albuterol use. Pt is negative for fever, chest pain, chills, nausea, vomiting, and has no recent sick contact.      Hospital Course: In the ED, patient was afebrile , RR 26, /67, O2 sats 85% on room air, and 93% on 2 L of nasal cannula. Patient had a hemoglobin of 11.3, no leukocytosis, normal creatinine, no significant electrolyte abnormality, BNP 18, troponin within normal limits. Patient was given albuterol, methylprednisolone, zosyn and vanc.    Interval History: No acute events overnight. Pt reports improvements in his breathing but still not at baseline, which makes patient feel very anxious.      Review of Systems  Constitutional: Negative for fever or chills.  HENT: Negative for ear pain and sore throat.    Respiratory: Positive for shortness of breath. Negative for cough.  Cardiovascular: Negative for chest pain, palpitations, or leg swelling.  Gastrointestinal: Negative for nausea, vomiting, abdominal pain, or change in bowel habits  Genitourinary: Negative for hematuria or dysuria  Musculoskeletal: Negative for arthralgias or myalgias  Skin: Negative for rash or pruritis  Neurological: Negative for dizziness or tremors    Scheduled Meds:   albuterol-ipratropium  3 mL Nebulization Q6H WAKE    amLODIPine  10 mg Oral Daily    ascorbic acid (vitamin C)  500 mg Oral BID    atorvastatin  40 mg Oral QHS    azithromycin  250 mg Oral Daily    calcium-vitamin D3  2 tablet  Oral Daily    ceFEPime (MAXIPIME) IVPB  2 g Intravenous Q12H    cetirizine  5 mg Oral QHS    enoxaparin  40 mg Subcutaneous Daily    fluticasone furoate-vilanteroL  1 puff Inhalation Daily    losartan  100 mg Oral Daily    metoprolol succinate  100 mg Oral Daily    multivitamin  1 tablet Oral Daily    predniSONE  40 mg Oral Daily     Continuous Infusions:  PRN Meds:.dextrose 10%, dextrose 10%, glucagon (human recombinant), glucose, glucose, hydrOXYzine HCL, influenza, insulin aspart U-100, melatonin, sodium chloride 0.9%     OBJECTIVE:     Vital Signs Range (Last 24H):  Temp:  [96.3 °F (35.7 °C)-97.5 °F (36.4 °C)]   Pulse:  [52-81]   Resp:  [17-18]   BP: (113-131)/(55-68)   SpO2:  [92 %-98 %]     I & O (Last 24H):No intake or output data in the 24 hours ending 03/10/22 1529    Physical Exam:  General: Alert and oriented x 3, no distress  HEENT: Conjunctivae/corneas clear, PERRL, oropharynx clear, mucous membranes are moist.  Pulmonary: Normal respiratory effort, positive for wheeze (bilateral, intermittent, better than previous day)  Cardiovascular: Normal rate, regular rhythm, no murmur, pulses 2+ and symmetric.  Abdominal: Soft, nontender, bowel sounds present. No rebound guarding, distension, masses, or organomegaly.  Musculoskeletal: No swelling or tenderness.   Skin: Warm. Color, texture, turgor normal. No rashes or lesions. Cap refill <2 seconds.   Neuro: CN II-XII grossly intact, no focal numbness or weakness, normal strength and tone  Psychiatric: Normal mood and affect.    Laboratory Results:    Recent Labs   Lab 03/08/22  1526 03/09/22  0425 03/10/22  0310   WBC 8.66 9.35 21.89*   HGB 11.3* 9.7* 10.0*   HCT 36.7* 31.9* 32.9*    363 428     Recent Labs   Lab 03/08/22  1526 03/09/22  0425 03/10/22  0310    133* 138   K 4.2 4.7 4.8    102 107   CO2 24 19* 20*   BUN 15 22 26*   CREATININE 1.1 1.4 1.0   GLU 80 174* 111*   CALCIUM 9.9 9.4 9.5   MG  --  2.0 2.1   PHOS  --  2.9 4.0      Recent Labs   Lab 03/08/22  1526   ALKPHOS 70   ALT 18   AST 14   ALBUMIN 2.7*   PROT 9.2*   BILITOT 0.4      No results for input(s): POCTGLUCOSE in the last 168 hours.  No results for input(s): LACTATE in the last 72 hours.     Recent Labs     03/08/22  1526   TROPONINI <0.006     Hemoglobin A1C   Date Value Ref Range Status   01/24/2022 6.0 (H) 4.0 - 5.6 % Final     Comment:     ADA Screening Guidelines:  5.7-6.4%  Consistent with prediabetes  >or=6.5%  Consistent with diabetes    High levels of fetal hemoglobin interfere with the HbA1C  assay. Heterozygous hemoglobin variants (HbS, HgC, etc)do  not significantly interfere with this assay.   However, presence of multiple variants may affect accuracy.     05/01/2021 5.5 4.0 - 5.6 % Final     Comment:     ADA Screening Guidelines:  5.7-6.4%  Consistent with prediabetes  >or=6.5%  Consistent with diabetes    High levels of fetal hemoglobin interfere with the HbA1C  assay. Heterozygous hemoglobin variants (HbS, HgC, etc)do  not significantly interfere with this assay.   However, presence of multiple variants may affect accuracy.     03/14/2020 5.8 (H) 4.0 - 5.6 % Final     Comment:     ADA Screening Guidelines:  5.7-6.4%  Consistent with prediabetes  >or=6.5%  Consistent with diabetes  High levels of fetal hemoglobin interfere with the HbA1C  assay. Heterozygous hemoglobin variants (HbS, HgC, etc)do  not significantly interfere with this assay.   However, presence of multiple variants may affect accuracy.         No results for input(s): PT, INR, APTT in the last 24 hours.       Diagnostic Results:  Recent imaging reviewed    Microbiology:  Recent cultured reviewed     ASSESSMENT/PLAN:     Dyspnea  Possibly be due to COPD exacerbation, working up for URTI  --continue albuterol-ipratropium, methylprednisolone  --continue broad spectrum antibiotics (azythromycin, cefepime, discontinue vancomycin due to elevated creatinine 1.4 up from 1.1)  --respiratory therapist  appreciated for correct home use of inhalers  --blood culture collected 3/8 - NGTD  --sputum culture collected 3/8 <10 epithelial cells, no significant growth to date, will know today whether culture will be worked up further for gram (-) rods     Anemia  Patient is negative for hemoptysis, blood in urine/stool, colonoscopy 3 years ago with no abnormality. Last iron studies show anemia due to chronic disease. H/H has been stable.  --possibly new iron studies  --possibly T tiera/LFT study     Hyponatremia  Resolved, most likely due to dehydration, patient increased fluids intake PO  --patient encouraged to drink more water     VTE  Elevated d-dimer is normal when adjusted for age, no need to pursue for CTA  --enoxaparin 40 mg Q24H    Fanny Christensen, MS3  -[employer]

## 2022-03-10 NOTE — ASSESSMENT & PLAN NOTE
NOEMI noted on today's labs, Cr bump to 1.4 from 1.1. Likely 2/2 dehydration as patient with poor PO intake. If no improvement on tomorrow's labs after patient was encouraged to increase hydration, need to consider other causes especially in setting of precipitous Hg drop.     - Order urine lytes and UPCR tomorrow if no improvement  - PO intake encouraged  - renally dose meds  - avoid nephrotoxic agents  - Goal MAP > 65

## 2022-03-10 NOTE — SUBJECTIVE & OBJECTIVE
Interval History: NAEON. Afebrile. VSS. Patient reports feeling better although seems to have lots of anxiety regarding his oxygen saturations. Informed patient that goal for COPD is 88-92 and that he is well within normal limits. Vancomycin discontinued as labs showing NOEMI (Cr 1.4 today from 1.1 on arrival). PO intake encouraged.       Review of Systems   Constitutional:  Negative for appetite change, chills and fever.   HENT:  Negative for sore throat.    Respiratory:  Positive for cough and shortness of breath. Negative for wheezing.    Cardiovascular:  Negative for chest pain and palpitations.   Gastrointestinal:  Negative for abdominal distention, abdominal pain, constipation, diarrhea, nausea and vomiting.   Endocrine: Negative for polydipsia and polyuria.   Genitourinary:  Negative for dysuria, hematuria and urgency.   Musculoskeletal:  Negative for back pain and gait problem.   Neurological:  Negative for weakness and numbness.   Psychiatric/Behavioral:  The patient is nervous/anxious.    Objective:     Vital Signs (Most Recent):  Temp: 96.3 °F (35.7 °C) (03/09/22 2003)  Pulse: 65 (03/09/22 2003)  Resp: 18 (03/09/22 2003)  BP: 124/65 (03/09/22 2003)  SpO2: (!) 94 % (03/09/22 2003)   Vital Signs (24h Range):  Temp:  [96 °F (35.6 °C)-98.1 °F (36.7 °C)] 96.3 °F (35.7 °C)  Pulse:  [62-84] 65  Resp:  [18-20] 18  SpO2:  [91 %-99 %] 94 %  BP: (124-137)/(61-74) 124/65     Weight: 74.8 kg (164 lb 15.9 oz)  Body mass index is 29.23 kg/m².  No intake or output data in the 24 hours ending 03/09/22 2038   Physical Exam  Constitutional:       General: He is not in acute distress.     Appearance: Normal appearance. He is not ill-appearing.   HENT:      Head: Normocephalic and atraumatic.      Nose: Nose normal.   Eyes:      General:         Right eye: No discharge.         Left eye: No discharge.      Conjunctiva/sclera: Conjunctivae normal.   Neck:      Comments: No JVD  Cardiovascular:      Rate and Rhythm: Normal rate  and regular rhythm.      Heart sounds: No murmur heard.  Pulmonary:      Effort: Pulmonary effort is normal.      Breath sounds: Normal breath sounds. No wheezing or rales.      Comments: On 2 L NC   Abdominal:      General: Abdomen is flat. There is no distension.      Palpations: Abdomen is soft.      Tenderness: There is no abdominal tenderness.   Musculoskeletal:         General: No swelling. Normal range of motion.      Cervical back: Normal range of motion.      Right lower leg: No edema.      Left lower leg: No edema.   Skin:     General: Skin is warm and dry.      Capillary Refill: Capillary refill takes less than 2 seconds.   Neurological:      General: No focal deficit present.      Mental Status: He is alert and oriented to person, place, and time.   Psychiatric:         Mood and Affect: Mood normal.       Significant Labs: All pertinent labs within the past 24 hours have been reviewed.  CBC:   Recent Labs   Lab 03/08/22  1526 03/09/22  0425   WBC 8.66 9.35   HGB 11.3* 9.7*   HCT 36.7* 31.9*    363     CMP:   Recent Labs   Lab 03/08/22  1526 03/09/22  0425    133*   K 4.2 4.7    102   CO2 24 19*   GLU 80 174*   BUN 15 22   CREATININE 1.1 1.4   CALCIUM 9.9 9.4   PROT 9.2*  --    ALBUMIN 2.7*  --    BILITOT 0.4  --    ALKPHOS 70  --    AST 14  --    ALT 18  --    ANIONGAP 12 12   EGFRNONAA >60.0 49.8*       Significant Imaging: I have reviewed all pertinent imaging results/findings within the past 24 hours.

## 2022-03-10 NOTE — PLAN OF CARE
Pt to dc today, will follow Pulm clinic today to see if anything has opened on the schedule and be sure Pt is on the waiting list to be seen soon 3/21/22.

## 2022-03-10 NOTE — PLAN OF CARE
ABX infused. PT on cont. 2L. NC. No C/O pain or discomfort noted during shift. Airborne/ contact precaution maintained throughout shift. Will continue to monitor patient.

## 2022-03-10 NOTE — PROGRESS NOTES
[facility] Medicine  Progress Note    Patient Name: Scooter Morrissey  MRN: [id number]  Patient Class: IP- Inpatient   Admission Date: 3/8/2022  Length of Stay: 1 days  Attending Physician: Danielle Billy*  Primary Care Provider: Katelynn Rojas MD        Subjective:     Principal Problem:Acute hypoxemic respiratory failure        HPI:  Mr. Morrissey is a 70 yo M with moderate COPD, chronic pansinusitis, asthma with COPD, recent COVID infection in January 2022 and before that Jan 2021,  HLD, Prediabetes presents for worsening FARLEY, SOB for one week. Associated symptoms of increasing cough and greenish sputum production. Per pt, SOB and cough production do not associate food. Sputum production is mostly at night. Per pt, denies any fever, chest pain, chills, nausea, vomiting or diarrhea. Pt denies any sick contact. Per pt, he was sent home with Oxygen since his last admission for COVID PNA in January, he only uses it PRN and have been needing it more. He exerts adherence to his symbicort and albuterol at home. He has quit cigar smoking for about 9 years, and cigarettes since 1975. Prior to this pt was smoking about 2 packs per month.     Lives with wife, and works as a . Pt acknowledges that given hx of asthma, his occupations can exacerbate an attack. Have not been follow up with his pulmonologist Dr. Guerrier since 2017, though he does have an appointment on 3/21 with Dr. Margarito Sanchez, which he hope we could help facilitate to make the appointment sooner.     In the ED, pt was afebrile, , RR 26, /67, sat 85% on RA, 93% with 2 L NC. Labs significant for Hgb 11.3. No leukocytosis, normal creatinine. No significant electrolytes abnormality. BNP 18, Trop wnl. Blood Cultures obtained. CXR with bilaterl upper lobe airspace disease could be secondary to multifocal versus pulmonary edema. Pt was given albuterol, methylprenisolone, zosyn, and vancomycin. Pt is admitted to hospital medicine  for further evaluation and management.       Overview/Hospital Course:  Patient admitted to Hospital Medicine for acute hypoxemic respiratory failure most likely secondary to COPD exacerbation vs post-Covid lung disease. Treated with Cefepime/Azithromycin, duonebs and prednisone. Respiratory status improving. Course complicated by NOEMI likely 2/2 poor PO intake.      Interval History: NAEON. Afebrile. VSS. Patient reports feeling better although seems to have lots of anxiety regarding his oxygen saturations. Informed patient that goal for COPD is 88-92 and that he is well within normal limits. Vancomycin discontinued as labs showing NOEMI (Cr 1.4 today from 1.1 on arrival). PO intake encouraged.       Review of Systems   Constitutional:  Negative for appetite change, chills and fever.   HENT:  Negative for sore throat.    Respiratory:  Positive for cough and shortness of breath. Negative for wheezing.    Cardiovascular:  Negative for chest pain and palpitations.   Gastrointestinal:  Negative for abdominal distention, abdominal pain, constipation, diarrhea, nausea and vomiting.   Endocrine: Negative for polydipsia and polyuria.   Genitourinary:  Negative for dysuria, hematuria and urgency.   Musculoskeletal:  Negative for back pain and gait problem.   Neurological:  Negative for weakness and numbness.   Psychiatric/Behavioral:  The patient is nervous/anxious.    Objective:     Vital Signs (Most Recent):  Temp: 96.3 °F (35.7 °C) (03/09/22 2003)  Pulse: 65 (03/09/22 2003)  Resp: 18 (03/09/22 2003)  BP: 124/65 (03/09/22 2003)  SpO2: (!) 94 % (03/09/22 2003)   Vital Signs (24h Range):  Temp:  [96 °F (35.6 °C)-98.1 °F (36.7 °C)] 96.3 °F (35.7 °C)  Pulse:  [62-84] 65  Resp:  [18-20] 18  SpO2:  [91 %-99 %] 94 %  BP: (124-137)/(61-74) 124/65     Weight: 74.8 kg (164 lb 15.9 oz)  Body mass index is 29.23 kg/m².  No intake or output data in the 24 hours ending 03/09/22 2038   Physical Exam  Constitutional:       General: He is  not in acute distress.     Appearance: Normal appearance. He is not ill-appearing.   HENT:      Head: Normocephalic and atraumatic.      Nose: Nose normal.   Eyes:      General:         Right eye: No discharge.         Left eye: No discharge.      Conjunctiva/sclera: Conjunctivae normal.   Neck:      Comments: No JVD  Cardiovascular:      Rate and Rhythm: Normal rate and regular rhythm.      Heart sounds: No murmur heard.  Pulmonary:      Effort: Pulmonary effort is normal.      Breath sounds: Normal breath sounds. No wheezing or rales.      Comments: On 2 L NC   Abdominal:      General: Abdomen is flat. There is no distension.      Palpations: Abdomen is soft.      Tenderness: There is no abdominal tenderness.   Musculoskeletal:         General: No swelling. Normal range of motion.      Cervical back: Normal range of motion.      Right lower leg: No edema.      Left lower leg: No edema.   Skin:     General: Skin is warm and dry.      Capillary Refill: Capillary refill takes less than 2 seconds.   Neurological:      General: No focal deficit present.      Mental Status: He is alert and oriented to person, place, and time.   Psychiatric:         Mood and Affect: Mood normal.       Significant Labs: All pertinent labs within the past 24 hours have been reviewed.  CBC:   Recent Labs   Lab 03/08/22  1526 03/09/22  0425   WBC 8.66 9.35   HGB 11.3* 9.7*   HCT 36.7* 31.9*    363     CMP:   Recent Labs   Lab 03/08/22  1526 03/09/22  0425    133*   K 4.2 4.7    102   CO2 24 19*   GLU 80 174*   BUN 15 22   CREATININE 1.1 1.4   CALCIUM 9.9 9.4   PROT 9.2*  --    ALBUMIN 2.7*  --    BILITOT 0.4  --    ALKPHOS 70  --    AST 14  --    ALT 18  --    ANIONGAP 12 12   EGFRNONAA >60.0 49.8*       Significant Imaging: I have reviewed all pertinent imaging results/findings within the past 24 hours.      Assessment/Plan:      * Acute hypoxemic respiratory failure  72 yo M with moderate COPD, chronic pansinusitis,  asthma with COPD, recent COVID infection in January 2022 and before that Jan 2021,  HLD, Prediabetes presents for worsening FARLEY, SOB for one week. Associated symptoms of increasing cough and greenish sputum production.     Patient with Hypoxic Respiratory failure which is Acute.  he is not on home oxygen. Supplemental oxygen was provided and noted-  .   Signs/symptoms of respiratory failure include- tachypnea and increased work of breathing. Contributing diagnoses includes - COPD and Pneumonia Labs and images were reviewed. Patient Has not had a recent ABG. Will treat underlying causes and adjust management of respiratory failure as follows    Differential includes but not limited to: COPD exacerbation 2/2 to multifocal PNA vs environmental exposure (given hx of asthma and occupation as , though CBC differentials is not significant for eosinophilia ) vs post COVID pulmonary fibrosis (though less likely). PE is also possible but less likely as pt did have recent COVID infx, d-dimer at that time was 4.47, though pt is mobile, no current active cancer, no significant unilateral leg swelling. HF is less likely given euvolemic on exam, no prior hx of HF, and BNP is wnl.     Plan   - Satting 93% on 2 L NC   - S/p methypred in the ED  - Continue Prednisone 40mg PO daily to complete a 5 day course  - Given increased sputum production, SOB, will initiate abx. Hx of Pseudomonas in respiratory sputum in Jan 2022   - Continue Cefepime for now (given hx of pseudomonas PNA) and azithromycin for atypical coverage. Vancomycin d/c'd 3/9 in setting of NOEMI   - Will de-escalate once symptoms are improved, cultures return   - D-Dimer elevated, however WNL once adjusted for age. Will not pursue CTA at this time.   - Resp Cx with < 10 epi; many WBCs; many GNR; rare GPCs/yeast  - BCx with NGTD x1D  - Continue home inhaler: fluticasone-vilanterol 1 puff QD (symbicort is not in formulary)   - Duonebs q4h while awake and prn  -  Incentive Spirometry  - F/u ABG  - Continuous oxygen monitoring with supplement O2 for goal sat 88% - 92%   - Will need PFTs upon discharge and will try to coordinate close follow up with pulmonology  - Will need 6MWT upon discharge to evaluate needs of oxygen  - Will refer to pulmonary rehab outpatient           NOEMI (acute kidney injury)  NOEMI noted on today's labs, Cr bump to 1.4 from 1.1. Likely 2/2 dehydration as patient with poor PO intake. If no improvement on tomorrow's labs after patient was encouraged to increase hydration, need to consider other causes especially in setting of precipitous Hg drop.     - Order urine lytes and UPCR tomorrow if no improvement  - PO intake encouraged  - renally dose meds  - avoid nephrotoxic agents  - Goal MAP > 65      Multifocal pneumonia  See acute hypoxemic respiratory failure       Non-seasonal allergic rhinitis        Personal history of COVID-19  Long term effects of Covid likely contributing to respiratory status.     Asthma with COPD  - See COPD exacerbation       Type 2 diabetes mellitus  -Last A1c reviewed-   Lab Results   Component Value Date    HGBA1C 6.0 (H) 01/24/2022       Home Antihyperglycemic Regiment:  - None     Inpatient Antihyperglycemic Regiment:  Antihyperglycemics (From admission, onward)            Start     Stop Route Frequency Ordered    03/08/22 2031  insulin aspart U-100 pen 0-5 Units         -- SubQ Before meals & nightly PRN 03/08/22 1931        - Insulin regimen as above  - LDSSI with POCT accuchecks ACHS  - Diabetic nutritional counseling given   - POCT ACQ    Blood Sugars (AccuCheck):  No results for input(s): POCTGLUCOSE in the last 72 hours.          Anemia  Admit with hemoglobin 11.3 Baseline 11-12      Lab Results   Component Value Date    IRON 89 05/01/2021    TIBC 324 05/01/2021    FERRITIN 1,038 (H) 01/12/2022     Lab Results   Component Value Date    FOLATE 15.2 03/09/2022     Lab Results   Component Value Date    LEQPOGOX02 590  01/24/2022     Folate and B12 WNL  Reticulocytes c/w hypoproliferation  Likely secondary to anemia of chronic disease     Hg 9.7 today, significant drop since admission. Patient denies overt s/s of bleeding. T Bili WNL, low suspicion for hemolysis. Will CTM    Plan:   - Daily CBC   - Transfuse hgb <7         Chronic pansinusitis  Resume home cetirizine and fluticasone       Vitamin D deficiency  - Continue home vitamin D and vitamin supplement       COPD exacerbation  See acute hypoxemic respiratory failure       Anxiety  Atarax Prn       H/O pleural empyema        Lung granuloma  Seen on chest CT dated 04/13/16      Hyperlipidemia  Lipid profile on   Lab Results   Component Value Date    LDLCALC 105.6 01/24/2022    HDL 36 (L) 01/24/2022    TRIG 82 01/24/2022    CHOL 158 01/24/2022     The 10-year ASCVD risk score (Lily MARTINEZ Jr., et al., 2013) is: 12.8%    Values used to calculate the score:      Age: 71 years      Sex: Male      Is Non- : Yes      Diabetic: No      Tobacco smoker: No      Systolic Blood Pressure: 127 mmHg      Is BP treated: No      HDL Cholesterol: 36 mg/dL      Total Cholesterol: 158 mg/dL    - Currently taking - atorvastatin 20 mg at home; will increase to atorvastatin 40 mg  - Reports compliance with hyperlipidemia treatment as prescribed  - Denies adverse effects of medications        Overweight (BMI 25.0-29.9)  - encouraged to continue walking 30-40 mins/day at least 3-4 days /week  - emphasis placed on changing diet (for lowering cholesterol & for weight loss)          Essential hypertension  Home regimen: amlodipine 10 mg QD, losartan 100 mg QD, toprol 100 mg QD     Plan:   - home meds resumed. If NOEMI persists tomorrow despite increased hydration, will discontinue losartan.         VTE Risk Mitigation (From admission, onward)         Ordered     enoxaparin injection 40 mg  Daily         03/08/22 1924     IP VTE HIGH RISK PATIENT  Once         03/08/22 1924     Place  sequential compression device  Until discontinued         03/08/22 1924                Discharge Planning   KATE: 3/10/2022     Code Status: Full Code   Is the patient medically ready for discharge?: No    Reason for patient still in hospital (select all that apply): Treatment         Ly Waldrop MD  Department of Hospital Medicine   [facility]

## 2022-03-10 NOTE — ASSESSMENT & PLAN NOTE
NOEMI noted on today's labs, Cr bump to 1.4 from 1.1. Likely 2/2 dehydration as patient with poor PO intake. If no improvement on tomorrow's labs after patient was encouraged to increase hydration, need to consider other causes especially in setting of precipitous Hg drop.     - Improved/resolved  - Continue to encourage hydration

## 2022-03-11 LAB
ANION GAP SERPL CALC-SCNC: 10 MMOL/L (ref 8–16)
BASOPHILS # BLD AUTO: 0.02 K/UL (ref 0–0.2)
BASOPHILS NFR BLD: 0.1 % (ref 0–1.9)
BUN SERPL-MCNC: 21 MG/DL (ref 8–23)
CALCIUM SERPL-MCNC: 9.2 MG/DL (ref 8.7–10.5)
CHLORIDE SERPL-SCNC: 107 MMOL/L (ref 95–110)
CO2 SERPL-SCNC: 22 MMOL/L (ref 23–29)
CREAT SERPL-MCNC: 0.9 MG/DL (ref 0.5–1.4)
DIFFERENTIAL METHOD: ABNORMAL
EOSINOPHIL # BLD AUTO: 0 K/UL (ref 0–0.5)
EOSINOPHIL NFR BLD: 0.2 % (ref 0–8)
ERYTHROCYTE [DISTWIDTH] IN BLOOD BY AUTOMATED COUNT: 17.1 % (ref 11.5–14.5)
EST. GFR  (AFRICAN AMERICAN): >60 ML/MIN/1.73 M^2
EST. GFR  (NON AFRICAN AMERICAN): >60 ML/MIN/1.73 M^2
GLUCOSE SERPL-MCNC: 89 MG/DL (ref 70–110)
HCT VFR BLD AUTO: 33.3 % (ref 40–54)
HGB BLD-MCNC: 10 G/DL (ref 14–18)
IMM GRANULOCYTES # BLD AUTO: 0.19 K/UL (ref 0–0.04)
IMM GRANULOCYTES NFR BLD AUTO: 1.1 % (ref 0–0.5)
LYMPHOCYTES # BLD AUTO: 1.9 K/UL (ref 1–4.8)
LYMPHOCYTES NFR BLD: 10.5 % (ref 18–48)
MAGNESIUM SERPL-MCNC: 1.9 MG/DL (ref 1.6–2.6)
MCH RBC QN AUTO: 29.2 PG (ref 27–31)
MCHC RBC AUTO-ENTMCNC: 30 G/DL (ref 32–36)
MCV RBC AUTO: 97 FL (ref 82–98)
MONOCYTES # BLD AUTO: 1 K/UL (ref 0.3–1)
MONOCYTES NFR BLD: 5.4 % (ref 4–15)
NEUTROPHILS # BLD AUTO: 14.6 K/UL (ref 1.8–7.7)
NEUTROPHILS NFR BLD: 82.7 % (ref 38–73)
NRBC BLD-RTO: 0 /100 WBC
PHOSPHATE SERPL-MCNC: 3.6 MG/DL (ref 2.7–4.5)
PLATELET # BLD AUTO: 463 K/UL (ref 150–450)
PMV BLD AUTO: 10.1 FL (ref 9.2–12.9)
POTASSIUM SERPL-SCNC: 4.2 MMOL/L (ref 3.5–5.1)
RBC # BLD AUTO: 3.42 M/UL (ref 4.6–6.2)
SODIUM SERPL-SCNC: 139 MMOL/L (ref 136–145)
WBC # BLD AUTO: 17.66 K/UL (ref 3.9–12.7)

## 2022-03-11 PROCEDURE — 25000003 PHARM REV CODE 250: Mod: HCNC | Performed by: STUDENT IN AN ORGANIZED HEALTH CARE EDUCATION/TRAINING PROGRAM

## 2022-03-11 PROCEDURE — 94640 AIRWAY INHALATION TREATMENT: CPT | Mod: HCNC

## 2022-03-11 PROCEDURE — 94761 N-INVAS EAR/PLS OXIMETRY MLT: CPT | Mod: HCNC

## 2022-03-11 PROCEDURE — 84100 ASSAY OF PHOSPHORUS: CPT | Mod: HCNC | Performed by: STUDENT IN AN ORGANIZED HEALTH CARE EDUCATION/TRAINING PROGRAM

## 2022-03-11 PROCEDURE — 83735 ASSAY OF MAGNESIUM: CPT | Mod: HCNC | Performed by: STUDENT IN AN ORGANIZED HEALTH CARE EDUCATION/TRAINING PROGRAM

## 2022-03-11 PROCEDURE — 27000646 HC AEROBIKA DEVICE: Mod: HCNC

## 2022-03-11 PROCEDURE — 36415 COLL VENOUS BLD VENIPUNCTURE: CPT | Mod: HCNC | Performed by: STUDENT IN AN ORGANIZED HEALTH CARE EDUCATION/TRAINING PROGRAM

## 2022-03-11 PROCEDURE — 94664 DEMO&/EVAL PT USE INHALER: CPT | Mod: HCNC

## 2022-03-11 PROCEDURE — 85025 COMPLETE CBC W/AUTO DIFF WBC: CPT | Mod: HCNC | Performed by: STUDENT IN AN ORGANIZED HEALTH CARE EDUCATION/TRAINING PROGRAM

## 2022-03-11 PROCEDURE — 99232 PR SUBSEQUENT HOSPITAL CARE,LEVL II: ICD-10-PCS | Mod: HCNC,GC,, | Performed by: HOSPITALIST

## 2022-03-11 PROCEDURE — 63600175 PHARM REV CODE 636 W HCPCS: Mod: HCNC | Performed by: HOSPITALIST

## 2022-03-11 PROCEDURE — 99232 SBSQ HOSP IP/OBS MODERATE 35: CPT | Mod: HCNC,GC,, | Performed by: HOSPITALIST

## 2022-03-11 PROCEDURE — 99900035 HC TECH TIME PER 15 MIN (STAT): Mod: HCNC

## 2022-03-11 PROCEDURE — 63600175 PHARM REV CODE 636 W HCPCS: Mod: HCNC | Performed by: STUDENT IN AN ORGANIZED HEALTH CARE EDUCATION/TRAINING PROGRAM

## 2022-03-11 PROCEDURE — 80048 BASIC METABOLIC PNL TOTAL CA: CPT | Mod: HCNC | Performed by: STUDENT IN AN ORGANIZED HEALTH CARE EDUCATION/TRAINING PROGRAM

## 2022-03-11 PROCEDURE — 25000242 PHARM REV CODE 250 ALT 637 W/ HCPCS: Mod: HCNC | Performed by: STUDENT IN AN ORGANIZED HEALTH CARE EDUCATION/TRAINING PROGRAM

## 2022-03-11 PROCEDURE — 11000001 HC ACUTE MED/SURG PRIVATE ROOM: Mod: HCNC

## 2022-03-11 PROCEDURE — 63700000 PHARM REV CODE 250 ALT 637 W/O HCPCS: Mod: HCNC | Performed by: STUDENT IN AN ORGANIZED HEALTH CARE EDUCATION/TRAINING PROGRAM

## 2022-03-11 RX ORDER — CEFEPIME HYDROCHLORIDE 1 G/50ML
2 INJECTION, SOLUTION INTRAVENOUS
Status: DISCONTINUED | OUTPATIENT
Start: 2022-03-11 | End: 2022-03-14 | Stop reason: HOSPADM

## 2022-03-11 RX ORDER — VANCOMYCIN HCL IN 5 % DEXTROSE 1G/250ML
15 PLASTIC BAG, INJECTION (ML) INTRAVENOUS
Status: DISCONTINUED | OUTPATIENT
Start: 2022-03-11 | End: 2022-03-14

## 2022-03-11 RX ADMIN — Medication 500 MG: at 08:03

## 2022-03-11 RX ADMIN — IPRATROPIUM BROMIDE AND ALBUTEROL SULFATE 3 ML: 2.5; .5 SOLUTION RESPIRATORY (INHALATION) at 08:03

## 2022-03-11 RX ADMIN — PREDNISONE 40 MG: 20 TABLET ORAL at 08:03

## 2022-03-11 RX ADMIN — CETIRIZINE HYDROCHLORIDE 5 MG: 5 TABLET ORAL at 08:03

## 2022-03-11 RX ADMIN — Medication 2 TABLET: at 08:03

## 2022-03-11 RX ADMIN — AMLODIPINE BESYLATE 10 MG: 10 TABLET ORAL at 08:03

## 2022-03-11 RX ADMIN — VANCOMYCIN HYDROCHLORIDE 1000 MG: 1 INJECTION, POWDER, LYOPHILIZED, FOR SOLUTION INTRAVENOUS at 02:03

## 2022-03-11 RX ADMIN — CEFEPIME HYDROCHLORIDE 2 G: 2 INJECTION, SOLUTION INTRAVENOUS at 08:03

## 2022-03-11 RX ADMIN — CEFEPIME HYDROCHLORIDE 2 G: 2 INJECTION, SOLUTION INTRAVENOUS at 12:03

## 2022-03-11 RX ADMIN — AZITHROMYCIN MONOHYDRATE 250 MG: 250 TABLET ORAL at 08:03

## 2022-03-11 RX ADMIN — LOSARTAN POTASSIUM 100 MG: 50 TABLET, FILM COATED ORAL at 08:03

## 2022-03-11 RX ADMIN — THERA TABS 1 TABLET: TAB at 08:03

## 2022-03-11 RX ADMIN — ATORVASTATIN CALCIUM 40 MG: 40 TABLET, FILM COATED ORAL at 08:03

## 2022-03-11 RX ADMIN — IPRATROPIUM BROMIDE AND ALBUTEROL SULFATE 3 ML: 2.5; .5 SOLUTION RESPIRATORY (INHALATION) at 02:03

## 2022-03-11 RX ADMIN — ENOXAPARIN SODIUM 40 MG: 100 INJECTION SUBCUTANEOUS at 05:03

## 2022-03-11 RX ADMIN — FLUTICASONE FUROATE AND VILANTEROL TRIFENATATE 1 PUFF: 200; 25 POWDER RESPIRATORY (INHALATION) at 09:03

## 2022-03-11 RX ADMIN — METOPROLOL SUCCINATE 100 MG: 100 TABLET, EXTENDED RELEASE ORAL at 08:03

## 2022-03-11 NOTE — PLAN OF CARE
Patient in bed awake and denied any acute distress, call button and personal items within reach. Chest PT done by RT, pt tolerated.  Problem: Adult Inpatient Plan of Care  Goal: Plan of Care Review  Outcome: Ongoing, Progressing  Goal: Patient-Specific Goal (Individualized)  Outcome: Ongoing, Progressing  Goal: Absence of Hospital-Acquired Illness or Injury  Outcome: Ongoing, Progressing  Goal: Optimal Comfort and Wellbeing  Outcome: Ongoing, Progressing  Goal: Readiness for Transition of Care  Outcome: Ongoing, Progressing     Problem: Adjustment to Illness COPD (Chronic Obstructive Pulmonary Disease)  Goal: Optimal Chronic Illness Coping  Outcome: Ongoing, Progressing     Problem: Functional Ability Impaired COPD (Chronic Obstructive Pulmonary Disease)  Goal: Optimal Level of Functional [place]  Outcome: Ongoing, Progressing     Problem: Infection COPD (Chronic Obstructive Pulmonary Disease)  Goal: Absence of Infection Signs and Symptoms  Outcome: Ongoing, Progressing     Problem: Oral Intake Inadequate COPD (Chronic Obstructive Pulmonary Disease)  Goal: Improved Nutrition Intake  Outcome: Ongoing, Progressing     Problem: Respiratory Compromise COPD (Chronic Obstructive Pulmonary Disease)  Goal: Effective Oxygenation and Ventilation  Outcome: Ongoing, Progressing     Problem: Fluid Imbalance (Pneumonia)  Goal: Fluid Balance  Outcome: Ongoing, Progressing     Problem: Infection (Pneumonia)  Goal: Resolution of Infection Signs and Symptoms  Outcome: Ongoing, Progressing     Problem: Respiratory Compromise (Pneumonia)  Goal: Effective Oxygenation and Ventilation  Outcome: Ongoing, Progressing     Problem: Diabetes Comorbidity  Goal: Blood Glucose Level Within Targeted Range  Outcome: Ongoing, Progressing     Problem: Fluid and Electrolyte Imbalance (Acute Kidney Injury/Impairment)  Goal: Fluid and Electrolyte Balance  Outcome: Ongoing, Progressing     Problem: Oral Intake Inadequate (Acute Kidney  Injury/Impairment)  Goal: Optimal Nutrition Intake  Outcome: Ongoing, Progressing     Problem: Renal Function Impairment (Acute Kidney Injury/Impairment)  Goal: Effective Renal Function  Outcome: Ongoing, Progressing

## 2022-03-11 NOTE — NURSING
Per primary, pt not discharging home to day as culture might be indicative of MRSA, new PIV inserted, will administer IV abx. Pt notified of change in plan and was agreeable.

## 2022-03-11 NOTE — NURSING
During rounds earlier pt sitting up at the edge of the bed and denied any acute distress, call button and other personal items within reach. PIV noted to infiltrate, per primary MD patient top dc home today and no need to stick pt for a new PIV, MD to switch IV abx to oral, pt in agreement. Will continue to monitor

## 2022-03-11 NOTE — PLAN OF CARE
PT is AAOX4. Cont. 2L NC. No C/O pain  or discomfort noted during shift. Will continue to monitor patient.

## 2022-03-11 NOTE — PROGRESS NOTES
Pharmacist Renal Dose Adjustment Note    Scooter Morrissey is a 71 y.o. male being treated with the medication cefepime     Patient Data:    Vital Signs (Most Recent):  Temp: 96.6 °F (35.9 °C) (03/11/22 1119)  Pulse: 83 (03/11/22 1119)  Resp: 20 (03/11/22 1119)  BP: 135/61 (03/11/22 1119)  SpO2: (!) 92 % (03/11/22 1119)   Vital Signs (72h Range):  Temp:  [96 °F (35.6 °C)-98.9 °F (37.2 °C)]   Pulse:  []   Resp:  [17-28]   BP: (113-148)/(55-79)   SpO2:  [85 %-100 %]      Recent Labs   Lab 03/09/22  0425 03/10/22  0310 03/11/22  0346   CREATININE 1.4 1.0 0.9     Serum creatinine: 0.9 mg/dL 03/11/22 0346  Estimated creatinine clearance: 67.3 mL/min    Medication: cefepime 2 g Q 12 hr will be changed to cefepime 2 g Q 8 hr    Pharmacist's Name: Berta Navarro  Pharmacist's Extension: [phone]

## 2022-03-11 NOTE — PROGRESS NOTES
Pharmacokinetic Initial Assessment: IV Vancomycin    Assessment/Plan:    -Vancomycin 2000 mg x 1 was given on 3/8.  -Pharmacy consulted for PNA.  -Goal 15-20 mcg/mL.  -Given age, recent NOEMI - omit loading dose.  -Vancomycin 1000 mg IV Q24H (~ 16 mg/kg).  -Trough on 3/14 @ 11:00, or sooner if renal function changes.     Pharmacy will continue to follow and monitor vancomycin.      Please contact pharmacy at extension [phone] with any questions regarding this assessment.     Thank you for the consult,   Brett Cisneros       Patient brief summary:  Scooter Morrissey is a 71 y.o. male initiated on antimicrobial therapy with IV Vancomycin for treatment of suspected lower respiratory infection    Drug Allergies:   Review of patient's allergies indicates:  No Known Allergies    Actual Body Weight:   74.8 kg    Renal Function:   Estimated Creatinine Clearance: 67.3 mL/min (based on SCr of 0.9 mg/dL).,     Dialysis Method (if applicable):  N/A    CBC (last 72 hours):  Recent Labs   Lab Result Units 03/08/22  1526 03/09/22  0425 03/10/22  0310 03/11/22  0346   WBC K/uL 8.66 9.35 21.89* 17.66*   Hemoglobin g/dL 11.3* 9.7* 10.0* 10.0*   Hematocrit % 36.7* 31.9* 32.9* 33.3*   Platelets K/uL 399 363 428 463*   Gran % % 68.7 84.8* 84.3* 82.7*   Lymph % % 17.7* 13.5* 9.1* 10.5*   Mono % % 9.6 1.5* 6.0 5.4   Eosinophil % % 3.2 0.0 0.0 0.2   Basophil % % 0.5 0.0 0.1 0.1   Differential Method  Automated Automated Automated Automated       Metabolic Panel (last 72 hours):  Recent Labs   Lab Result Units 03/08/22  1526 03/09/22  0425 03/10/22  0310 03/11/22  0346   Sodium mmol/L 141 133* 138 139   Potassium mmol/L 4.2 4.7 4.8 4.2   Chloride mmol/L 105 102 107 107   CO2 mmol/L 24 19* 20* 22*   Glucose mg/dL 80 174* 111* 89   BUN mg/dL 15 22 26* 21   Creatinine mg/dL 1.1 1.4 1.0 0.9   Albumin g/dL 2.7*  --   --   --    Total Bilirubin mg/dL 0.4  --   --   --    Alkaline Phosphatase U/L 70  --   --   --    AST U/L 14  --   --   --    ALT U/L 18   --   --   --    Magnesium mg/dL  --  2.0 2.1 1.9   Phosphorus mg/dL  --  2.9 4.0 3.6       Drug levels (last 3 results):  No results for input(s): VANCOMYCINRA, VANCOMYCINPE, VANCOMYCINTR in the last 72 hours.    Microbiologic Results:  Microbiology Results (last 7 days)     Procedure Component Value Units Date/Time    Culture, Respiratory with Gram Stain [[id number]]  (Abnormal) Collected: 03/08/22 2152    Order Status: Completed Specimen: Respiratory from Sputum Updated: 03/11/22 1012     Respiratory Culture GRAM NEGATIVE LUIS  Moderate  Identification and susceptibility pending        STAPHYLOCOCCUS AUREUS  Rare  Susceptibility pending  Normal respiratory lane also present       Gram Stain (Respiratory) <10 epithelial cells per low power field.     Gram Stain (Respiratory) Many WBC's     Gram Stain (Respiratory) Many Gram negative rods     Gram Stain (Respiratory) Rare Gram positive cocci     Gram Stain (Respiratory) Rare yeast    Blood Culture #2 **CANNOT BE ORDERED STAT** [[id number]] Collected: 03/08/22 1807    Order Status: Completed Specimen: Blood from Wrist, Left Updated: 03/10/22 2012     Blood Culture, Routine No Growth to date      No Growth to date      No Growth to date    Blood Culture #1 **CANNOT BE ORDERED STAT** [[id number]] Collected: 03/08/22 1808    Order Status: Completed Specimen: Blood from Peripheral, Forearm, Right Updated: 03/10/22 2012     Blood Culture, Routine No Growth to date      No Growth to date      No Growth to date

## 2022-03-11 NOTE — PLAN OF CARE
Met with Pt in the room, informed of 02 need, Pt has tanks at his home but tanks are empty. Sw informed Pt to contact [facility] to be sure they can provide more tanks or have concentrator/regulator that can refill the tanks. Pt has transport by [facility] in the pm and Pulm appt is on his AVS. No further needs.

## 2022-03-11 NOTE — PLAN OF CARE
[facility] - Med Surg  Discharge Final Note    Primary Care Provider: Katelynn Rojas MD    Expected Discharge Date: 3/11/2022    Final Discharge Note (most recent)     Final Note - 03/11/22 1103        Final Note    Assessment Type Final Discharge Note     Anticipated Discharge Disposition Home or Self Care     What phone number can be called within the next 1-3 days to see how you are doing after discharge? [phone]     Hospital Resources/Appts/Education Provided Appointments scheduled and added to AVS;Post-Acute resouces added to AVS        Post-Acute Status    Post-Acute Authorization Other   [facility] DME needed for his o2 tanks    Other Status No Post-Acute Service Needs     Discharge Delays None known at this time                 Important Message from Medicare

## 2022-03-12 LAB
ANION GAP SERPL CALC-SCNC: 9 MMOL/L (ref 8–16)
BASOPHILS # BLD AUTO: 0.09 K/UL (ref 0–0.2)
BASOPHILS NFR BLD: 0.6 % (ref 0–1.9)
BUN SERPL-MCNC: 19 MG/DL (ref 8–23)
CALCIUM SERPL-MCNC: 9.4 MG/DL (ref 8.7–10.5)
CHLORIDE SERPL-SCNC: 106 MMOL/L (ref 95–110)
CO2 SERPL-SCNC: 23 MMOL/L (ref 23–29)
CREAT SERPL-MCNC: 0.9 MG/DL (ref 0.5–1.4)
DIFFERENTIAL METHOD: ABNORMAL
EOSINOPHIL # BLD AUTO: 0.1 K/UL (ref 0–0.5)
EOSINOPHIL NFR BLD: 0.4 % (ref 0–8)
ERYTHROCYTE [DISTWIDTH] IN BLOOD BY AUTOMATED COUNT: 17.6 % (ref 11.5–14.5)
EST. GFR  (AFRICAN AMERICAN): >60 ML/MIN/1.73 M^2
EST. GFR  (NON AFRICAN AMERICAN): >60 ML/MIN/1.73 M^2
GLUCOSE SERPL-MCNC: 95 MG/DL (ref 70–110)
HCT VFR BLD AUTO: 38.2 % (ref 40–54)
HGB BLD-MCNC: 10.9 G/DL (ref 14–18)
IMM GRANULOCYTES # BLD AUTO: 0.29 K/UL (ref 0–0.04)
IMM GRANULOCYTES NFR BLD AUTO: 1.9 % (ref 0–0.5)
LYMPHOCYTES # BLD AUTO: 2.3 K/UL (ref 1–4.8)
LYMPHOCYTES NFR BLD: 15.2 % (ref 18–48)
MAGNESIUM SERPL-MCNC: 1.7 MG/DL (ref 1.6–2.6)
MCH RBC QN AUTO: 29.5 PG (ref 27–31)
MCHC RBC AUTO-ENTMCNC: 28.5 G/DL (ref 32–36)
MCV RBC AUTO: 103 FL (ref 82–98)
MONOCYTES # BLD AUTO: 1.1 K/UL (ref 0.3–1)
MONOCYTES NFR BLD: 7.3 % (ref 4–15)
NEUTROPHILS # BLD AUTO: 11.3 K/UL (ref 1.8–7.7)
NEUTROPHILS NFR BLD: 74.6 % (ref 38–73)
NRBC BLD-RTO: 0 /100 WBC
PHOSPHATE SERPL-MCNC: 3 MG/DL (ref 2.7–4.5)
PLATELET # BLD AUTO: 410 K/UL (ref 150–450)
PMV BLD AUTO: 9.9 FL (ref 9.2–12.9)
POTASSIUM SERPL-SCNC: 4 MMOL/L (ref 3.5–5.1)
RBC # BLD AUTO: 3.7 M/UL (ref 4.6–6.2)
SODIUM SERPL-SCNC: 138 MMOL/L (ref 136–145)
WBC # BLD AUTO: 15.09 K/UL (ref 3.9–12.7)

## 2022-03-12 PROCEDURE — 63700000 PHARM REV CODE 250 ALT 637 W/O HCPCS: Mod: HCNC | Performed by: STUDENT IN AN ORGANIZED HEALTH CARE EDUCATION/TRAINING PROGRAM

## 2022-03-12 PROCEDURE — 63600175 PHARM REV CODE 636 W HCPCS: Mod: HCNC | Performed by: STUDENT IN AN ORGANIZED HEALTH CARE EDUCATION/TRAINING PROGRAM

## 2022-03-12 PROCEDURE — 11000001 HC ACUTE MED/SURG PRIVATE ROOM: Mod: HCNC

## 2022-03-12 PROCEDURE — 94640 AIRWAY INHALATION TREATMENT: CPT | Mod: HCNC

## 2022-03-12 PROCEDURE — 36415 COLL VENOUS BLD VENIPUNCTURE: CPT | Mod: HCNC | Performed by: STUDENT IN AN ORGANIZED HEALTH CARE EDUCATION/TRAINING PROGRAM

## 2022-03-12 PROCEDURE — 84100 ASSAY OF PHOSPHORUS: CPT | Mod: HCNC | Performed by: STUDENT IN AN ORGANIZED HEALTH CARE EDUCATION/TRAINING PROGRAM

## 2022-03-12 PROCEDURE — 25000003 PHARM REV CODE 250: Mod: HCNC | Performed by: STUDENT IN AN ORGANIZED HEALTH CARE EDUCATION/TRAINING PROGRAM

## 2022-03-12 PROCEDURE — 85025 COMPLETE CBC W/AUTO DIFF WBC: CPT | Mod: HCNC | Performed by: STUDENT IN AN ORGANIZED HEALTH CARE EDUCATION/TRAINING PROGRAM

## 2022-03-12 PROCEDURE — 99233 PR SUBSEQUENT HOSPITAL CARE,LEVL III: ICD-10-PCS | Mod: HCNC,GC,, | Performed by: HOSPITALIST

## 2022-03-12 PROCEDURE — 99223 PR INITIAL HOSPITAL CARE,LEVL III: ICD-10-PCS | Mod: HCNC,,, | Performed by: STUDENT IN AN ORGANIZED HEALTH CARE EDUCATION/TRAINING PROGRAM

## 2022-03-12 PROCEDURE — 25000242 PHARM REV CODE 250 ALT 637 W/ HCPCS: Mod: HCNC | Performed by: STUDENT IN AN ORGANIZED HEALTH CARE EDUCATION/TRAINING PROGRAM

## 2022-03-12 PROCEDURE — 99233 SBSQ HOSP IP/OBS HIGH 50: CPT | Mod: HCNC,GC,, | Performed by: HOSPITALIST

## 2022-03-12 PROCEDURE — 99223 1ST HOSP IP/OBS HIGH 75: CPT | Mod: HCNC,,, | Performed by: STUDENT IN AN ORGANIZED HEALTH CARE EDUCATION/TRAINING PROGRAM

## 2022-03-12 PROCEDURE — 94761 N-INVAS EAR/PLS OXIMETRY MLT: CPT | Mod: HCNC

## 2022-03-12 PROCEDURE — 99900035 HC TECH TIME PER 15 MIN (STAT): Mod: HCNC

## 2022-03-12 PROCEDURE — 27000646 HC AEROBIKA DEVICE: Mod: HCNC

## 2022-03-12 PROCEDURE — 94664 DEMO&/EVAL PT USE INHALER: CPT | Mod: HCNC

## 2022-03-12 PROCEDURE — 83735 ASSAY OF MAGNESIUM: CPT | Mod: HCNC | Performed by: STUDENT IN AN ORGANIZED HEALTH CARE EDUCATION/TRAINING PROGRAM

## 2022-03-12 PROCEDURE — 80048 BASIC METABOLIC PNL TOTAL CA: CPT | Mod: HCNC | Performed by: STUDENT IN AN ORGANIZED HEALTH CARE EDUCATION/TRAINING PROGRAM

## 2022-03-12 PROCEDURE — 63600175 PHARM REV CODE 636 W HCPCS: Mod: HCNC | Performed by: HOSPITALIST

## 2022-03-12 RX ORDER — DOXYCYCLINE HYCLATE 100 MG
100 TABLET ORAL EVERY 12 HOURS
Status: DISCONTINUED | OUTPATIENT
Start: 2022-03-12 | End: 2022-03-12

## 2022-03-12 RX ADMIN — CEFEPIME HYDROCHLORIDE 2 G: 2 INJECTION, SOLUTION INTRAVENOUS at 12:03

## 2022-03-12 RX ADMIN — VANCOMYCIN HYDROCHLORIDE 1000 MG: 1 INJECTION, POWDER, LYOPHILIZED, FOR SOLUTION INTRAVENOUS at 01:03

## 2022-03-12 RX ADMIN — PREDNISONE 40 MG: 20 TABLET ORAL at 09:03

## 2022-03-12 RX ADMIN — Medication 500 MG: at 09:03

## 2022-03-12 RX ADMIN — THERA TABS 1 TABLET: TAB at 12:03

## 2022-03-12 RX ADMIN — AZITHROMYCIN MONOHYDRATE 250 MG: 250 TABLET ORAL at 09:03

## 2022-03-12 RX ADMIN — ENOXAPARIN SODIUM 40 MG: 100 INJECTION SUBCUTANEOUS at 05:03

## 2022-03-12 RX ADMIN — CEFEPIME HYDROCHLORIDE 2 G: 2 INJECTION, SOLUTION INTRAVENOUS at 08:03

## 2022-03-12 RX ADMIN — IPRATROPIUM BROMIDE AND ALBUTEROL SULFATE 3 ML: 2.5; .5 SOLUTION RESPIRATORY (INHALATION) at 03:03

## 2022-03-12 RX ADMIN — Medication 2 TABLET: at 09:03

## 2022-03-12 RX ADMIN — CETIRIZINE HYDROCHLORIDE 5 MG: 5 TABLET ORAL at 08:03

## 2022-03-12 RX ADMIN — Medication 500 MG: at 08:03

## 2022-03-12 RX ADMIN — LOSARTAN POTASSIUM 100 MG: 50 TABLET, FILM COATED ORAL at 09:03

## 2022-03-12 RX ADMIN — AMLODIPINE BESYLATE 10 MG: 10 TABLET ORAL at 09:03

## 2022-03-12 RX ADMIN — FLUTICASONE FUROATE AND VILANTEROL TRIFENATATE 1 PUFF: 200; 25 POWDER RESPIRATORY (INHALATION) at 07:03

## 2022-03-12 RX ADMIN — CEFEPIME HYDROCHLORIDE 2 G: 2 INJECTION, SOLUTION INTRAVENOUS at 04:03

## 2022-03-12 RX ADMIN — IPRATROPIUM BROMIDE AND ALBUTEROL SULFATE 3 ML: 2.5; .5 SOLUTION RESPIRATORY (INHALATION) at 08:03

## 2022-03-12 RX ADMIN — ATORVASTATIN CALCIUM 40 MG: 40 TABLET, FILM COATED ORAL at 08:03

## 2022-03-12 RX ADMIN — IPRATROPIUM BROMIDE AND ALBUTEROL SULFATE 3 ML: 2.5; .5 SOLUTION RESPIRATORY (INHALATION) at 07:03

## 2022-03-12 RX ADMIN — METOPROLOL SUCCINATE 100 MG: 100 TABLET, EXTENDED RELEASE ORAL at 09:03

## 2022-03-12 NOTE — SUBJECTIVE & OBJECTIVE
Interval History: NAEON. Afebrile. VSS. Respiratory culture growing MRSA + GNR. ID consulted. Blood cx NGTD x4D. Leukocytosis downtrending. Patient doing well overall, no complaints. Continues to feel comfortable and saturate well on room air.     Review of Systems   Constitutional:  Negative for appetite change, chills and fever.   HENT:  Negative for sore throat.    Respiratory:  Negative for cough, shortness of breath and wheezing.    Cardiovascular:  Negative for chest pain and palpitations.   Gastrointestinal:  Negative for abdominal distention, abdominal pain, constipation, diarrhea, nausea and vomiting.   Endocrine: Negative for polydipsia and polyuria.   Genitourinary:  Negative for dysuria, hematuria and urgency.   Musculoskeletal:  Negative for back pain and gait problem.   Neurological:  Negative for weakness and numbness.   Psychiatric/Behavioral:  The patient is not nervous/anxious.    Objective:     Vital Signs (Most Recent):  Temp: 96.7 °F (35.9 °C) (03/12/22 1112)  Pulse: 76 (03/12/22 1112)  Resp: 16 (03/12/22 1112)  BP: 129/77 (03/12/22 1112)  SpO2: 95 % (03/12/22 1112) Vital Signs (24h Range):  Temp:  [96.7 °F (35.9 °C)-98 °F (36.7 °C)] 96.7 °F (35.9 °C)  Pulse:  [60-81] 76  Resp:  [15-20] 16  SpO2:  [92 %-95 %] 95 %  BP: (110-145)/(59-77) 129/77     Weight: 74.8 kg (164 lb 15.9 oz)  Body mass index is 29.23 kg/m².    Intake/Output Summary (Last 24 hours) at 3/12/2022 1218  Last data filed at 3/12/2022 0900  Gross per 24 hour   Intake 120 ml   Output --   Net 120 ml      Physical Exam  Constitutional:       General: He is not in acute distress.     Appearance: Normal appearance. He is not ill-appearing.   HENT:      Head: Normocephalic and atraumatic.      Nose: Nose normal.   Eyes:      General:         Right eye: No discharge.         Left eye: No discharge.      Conjunctiva/sclera: Conjunctivae normal.   Neck:      Comments: No JVD  Cardiovascular:      Rate and Rhythm: Normal rate and regular  rhythm.      Heart sounds: No murmur heard.  Pulmonary:      Effort: Pulmonary effort is normal.      Breath sounds: No wheezing or rales.      Comments: Comfortable on RA. No increased WOB  + Bibasilar crackles  Abdominal:      General: Abdomen is flat. There is no distension.      Palpations: Abdomen is soft.      Tenderness: There is no abdominal tenderness.   Musculoskeletal:         General: No swelling. Normal range of motion.      Cervical back: Normal range of motion.      Right lower leg: No edema.      Left lower leg: No edema.   Skin:     General: Skin is warm and dry.      Capillary Refill: Capillary refill takes less than 2 seconds.   Neurological:      General: No focal deficit present.      Mental Status: He is alert and oriented to person, place, and time.   Psychiatric:         Mood and Affect: Mood normal.       Significant Labs: All pertinent labs within the past 24 hours have been reviewed.  CBC:   Recent Labs   Lab 03/11/22  0346 03/12/22  0517   WBC 17.66* 15.09*   HGB 10.0* 10.9*   HCT 33.3* 38.2*   * 410     CMP:   Recent Labs   Lab 03/11/22  0346 03/12/22  0517    138   K 4.2 4.0    106   CO2 22* 23   GLU 89 95   BUN 21 19   CREATININE 0.9 0.9   CALCIUM 9.2 9.4   ANIONGAP 10 9   EGFRNONAA >60.0 >60.0       Significant Imaging: I have reviewed all pertinent imaging results/findings within the past 24 hours.

## 2022-03-12 NOTE — SUBJECTIVE & OBJECTIVE
Interval History: NAEON. Afebrile. Now on RA. Leukocytosis downtrending. Resp Cx growing GNR and Staph. Vancomycin added to abx regimen to cover for possible MRSA (patient has hx). Plan to consult ID tomorrow once speciation/susceptibilities result for assistance with discharge antibiotics. NOEMI resolved.     Review of Systems   Constitutional:  Negative for appetite change, chills and fever.   HENT:  Negative for sore throat.    Respiratory:  Positive for cough and shortness of breath. Negative for wheezing.    Cardiovascular:  Negative for chest pain and palpitations.   Gastrointestinal:  Negative for abdominal distention, abdominal pain, constipation, diarrhea, nausea and vomiting.   Endocrine: Negative for polydipsia and polyuria.   Genitourinary:  Negative for dysuria, hematuria and urgency.   Musculoskeletal:  Negative for back pain and gait problem.   Neurological:  Negative for weakness and numbness.   Psychiatric/Behavioral:  The patient is nervous/anxious.    Objective:     Vital Signs (Most Recent):  Temp: 97 °F (36.1 °C) (03/11/22 1614)  Pulse: 78 (03/11/22 1614)  Resp: 20 (03/11/22 1614)  BP: (!) 110/59 (03/11/22 1614)  SpO2: 95 % (03/11/22 1614)   Vital Signs (24h Range):  Temp:  [96.1 °F (35.6 °C)-97 °F (36.1 °C)] 97 °F (36.1 °C)  Pulse:  [66-83] 78  Resp:  [17-20] 20  SpO2:  [92 %-96 %] 95 %  BP: (110-135)/(57-79) 110/59     Weight: 74.8 kg (164 lb 15.9 oz)  Body mass index is 29.23 kg/m².  No intake or output data in the 24 hours ending 03/11/22 1850   Physical Exam  Constitutional:       General: He is not in acute distress.     Appearance: Normal appearance. He is not ill-appearing.   HENT:      Head: Normocephalic and atraumatic.      Nose: Nose normal.   Eyes:      General:         Right eye: No discharge.         Left eye: No discharge.      Conjunctiva/sclera: Conjunctivae normal.   Neck:      Comments: No JVD  Cardiovascular:      Rate and Rhythm: Normal rate and regular rhythm.      Heart  sounds: No murmur heard.  Pulmonary:      Effort: Pulmonary effort is normal.      Breath sounds: No wheezing or rales.      Comments: Comfortable on RA. No increased WOB  + Bibasilar crackles  Abdominal:      General: Abdomen is flat. There is no distension.      Palpations: Abdomen is soft.      Tenderness: There is no abdominal tenderness.   Musculoskeletal:         General: No swelling. Normal range of motion.      Cervical back: Normal range of motion.      Right lower leg: No edema.      Left lower leg: No edema.   Skin:     General: Skin is warm and dry.      Capillary Refill: Capillary refill takes less than 2 seconds.   Neurological:      General: No focal deficit present.      Mental Status: He is alert and oriented to person, place, and time.   Psychiatric:         Mood and Affect: Mood normal.       Significant Labs: All pertinent labs within the past 24 hours have been reviewed.  CBC:   Recent Labs   Lab 03/10/22  0310 03/11/22  0346   WBC 21.89* 17.66*   HGB 10.0* 10.0*   HCT 32.9* 33.3*    463*     CMP:   Recent Labs   Lab 03/10/22  0310 03/11/22  0346    139   K 4.8 4.2    107   CO2 20* 22*   * 89   BUN 26* 21   CREATININE 1.0 0.9   CALCIUM 9.5 9.2   ANIONGAP 11 10   EGFRNONAA >60.0 >60.0       Significant Imaging: I have reviewed all pertinent imaging results/findings within the past 24 hours.

## 2022-03-12 NOTE — PLAN OF CARE
IV ABX  infused,  no C/O  chest pain or SOB  during shift. Cont. 2L NC. Personal items within reach , bed in low position will continue to monitor.

## 2022-03-12 NOTE — ASSESSMENT & PLAN NOTE
70 yo M with moderate COPD, chronic pansinusitis, asthma with COPD, recent COVID infection in January 2022 and before that Jan 2021,  HLD, Prediabetes presents for worsening AFRLEY, SOB for one week. Associated symptoms of increasing cough and greenish sputum production.     Patient with Hypoxic Respiratory failure which is Acute.  he is not on home oxygen. Supplemental oxygen was provided and noted-  .   Signs/symptoms of respiratory failure include- tachypnea and increased work of breathing. Contributing diagnoses includes - COPD and Pneumonia Labs and images were reviewed. Patient Has not had a recent ABG. Will treat underlying causes and adjust management of respiratory failure as follows    Most likely secondary to COPD exacerbation 2/2 to multifocal PNA   DDx: environmental exposure (given hx of asthma and occupation as , though CBC differentials is not significant for eosinophilia ) vs post COVID pulmonary fibrosis (though less likely). PE is also possible but less likely as pt did have recent COVID infx, d-dimer at that time was 4.47, though pt is mobile, no current active cancer, no significant unilateral leg swelling. HF is less likely given euvolemic on exam, no prior hx of HF, and BNP is wnl.     CT Chest 3/10 showed BL bronchocentric consolidative opacities favoring infectious/inflammatory etiology. Also showed middle lobe, lingular, BL LL bronchiectasis with bronchial wall thickening and mucoid impaction that have become more more severe compared to prior imaging.     Plan   - Initially on 2L NC, now successfully weaned to RA.   - S/p methypred in the ED  - Continue Prednisone 40mg PO daily to complete a 5 day course  - Given increased sputum production, SOB, antibiotics initiated  - Cefepime (Hx of pseudomonas PNA)/Azithromycin (for atypical coverage)/Vancomycin added 3/11 to cover for MRSA  - Respiratory cultures growing GNR and Staph aureus. Awaiting speciation/susceptibilities.   - Consult  ID tomorrow to assist in decision making regarding discharge antibiotics.   - D-Dimer elevated, however WNL once adjusted for age. Will not pursue CTA at this time.   - BCx with NGTD x3D  - Continue home inhaler: fluticasone-vilanterol 1 puff QD (symbicort is not in formulary)   - Duonebs q4h while awake and prn  - Incentive Spirometry  - F/u ABG  - Continuous oxygen monitoring with supplement O2 for goal sat 88% - 92%   - Will need PFTs upon discharge and will try to coordinate close follow up with pulmonology  - Will refer to pulmonary rehab outpatient

## 2022-03-12 NOTE — PROGRESS NOTES
[facility] Medicine  Progress Note    Patient Name: Scooter Morrissey  MRN: [id number]  Patient Class: IP- Inpatient   Admission Date: 3/8/2022  Length of Stay: 4 days  Attending Physician: Fredrick James MD  Primary Care Provider: Katelynn Rojas MD        Subjective:     Principal Problem:Acute hypoxemic respiratory failure        HPI:  Mr. Morrissey is a 72 yo M with moderate COPD, chronic pansinusitis, asthma with COPD, recent COVID infection in January 2022 and before that Jan 2021,  HLD, Prediabetes presents for worsening FARLEY, SOB for one week. Associated symptoms of increasing cough and greenish sputum production. Per pt, SOB and cough production do not associate food. Sputum production is mostly at night. Per pt, denies any fever, chest pain, chills, nausea, vomiting or diarrhea. Pt denies any sick contact. Per pt, he was sent home with Oxygen since his last admission for COVID PNA in January, he only uses it PRN and have been needing it more. He exerts adherence to his symbicort and albuterol at home. He has quit cigar smoking for about 9 years, and cigarettes since 1975. Prior to this pt was smoking about 2 packs per month.     Lives with wife, and works as a . Pt acknowledges that given hx of asthma, his occupations can exacerbate an attack. Have not been follow up with his pulmonologist Dr. Guerrier since 2017, though he does have an appointment on 3/21 with Dr. Margarito Sanchez, which he hope we could help facilitate to make the appointment sooner.     In the ED, pt was afebrile, , RR 26, /67, sat 85% on RA, 93% with 2 L NC. Labs significant for Hgb 11.3. No leukocytosis, normal creatinine. No significant electrolytes abnormality. BNP 18, Trop wnl. Blood Cultures obtained. CXR with bilaterl upper lobe airspace disease could be secondary to multifocal versus pulmonary edema. Pt was given albuterol, methylprenisolone, zosyn, and vancomycin. Pt is admitted to hospital medicine for  further evaluation and management.       Overview/Hospital Course:  Patient admitted to Hospital Medicine for acute hypoxemic respiratory failure most likely secondary to COPD exacerbation in setting of PNA.  CXR with BL upper lobe airspace disease concerning for multifocal PNA. CT Chest obtained, showed evidence of BL bronchocentric consolidative opacities with bronchiectasis and mucoid impaction. Treated with ABX, prednisone, duonebs and CPT. Vancomycin added to initial Cefepime/Azithro. Respiratory culture growing MRSA and GNR. ID consulted to assist with discharge abx recs (concern that doxy + bactrim with poor lung penetration). Respiratory status much improved overall, now requiring no oxygen supplementation. Course complicated by NOEMI that resolved with increased PO intake. Leukocytosis likely exacerbated by prednisone use, downtrending.       Interval History: NAEON. Afebrile. VSS. Respiratory culture growing MRSA + GNR. ID consulted. Blood cx NGTD x4D. Leukocytosis downtrending. Patient doing well overall, no complaints. Continues to feel comfortable and saturate well on room air.     Review of Systems   Constitutional:  Negative for appetite change, chills and fever.   HENT:  Negative for sore throat.    Respiratory:  Negative for cough, shortness of breath and wheezing.    Cardiovascular:  Negative for chest pain and palpitations.   Gastrointestinal:  Negative for abdominal distention, abdominal pain, constipation, diarrhea, nausea and vomiting.   Endocrine: Negative for polydipsia and polyuria.   Genitourinary:  Negative for dysuria, hematuria and urgency.   Musculoskeletal:  Negative for back pain and gait problem.   Neurological:  Negative for weakness and numbness.   Psychiatric/Behavioral:  The patient is not nervous/anxious.    Objective:     Vital Signs (Most Recent):  Temp: 96.7 °F (35.9 °C) (03/12/22 1112)  Pulse: 76 (03/12/22 1112)  Resp: 16 (03/12/22 1112)  BP: 129/77 (03/12/22 1112)  SpO2: 95 %  (03/12/22 1112) Vital Signs (24h Range):  Temp:  [96.7 °F (35.9 °C)-98 °F (36.7 °C)] 96.7 °F (35.9 °C)  Pulse:  [60-81] 76  Resp:  [15-20] 16  SpO2:  [92 %-95 %] 95 %  BP: (110-145)/(59-77) 129/77     Weight: 74.8 kg (164 lb 15.9 oz)  Body mass index is 29.23 kg/m².    Intake/Output Summary (Last 24 hours) at 3/12/2022 1218  Last data filed at 3/12/2022 0900  Gross per 24 hour   Intake 120 ml   Output --   Net 120 ml      Physical Exam  Constitutional:       General: He is not in acute distress.     Appearance: Normal appearance. He is not ill-appearing.   HENT:      Head: Normocephalic and atraumatic.      Nose: Nose normal.   Eyes:      General:         Right eye: No discharge.         Left eye: No discharge.      Conjunctiva/sclera: Conjunctivae normal.   Neck:      Comments: No JVD  Cardiovascular:      Rate and Rhythm: Normal rate and regular rhythm.      Heart sounds: No murmur heard.  Pulmonary:      Effort: Pulmonary effort is normal.      Breath sounds: No wheezing or rales.      Comments: Comfortable on RA. No increased WOB  + Bibasilar crackles  Abdominal:      General: Abdomen is flat. There is no distension.      Palpations: Abdomen is soft.      Tenderness: There is no abdominal tenderness.   Musculoskeletal:         General: No swelling. Normal range of motion.      Cervical back: Normal range of motion.      Right lower leg: No edema.      Left lower leg: No edema.   Skin:     General: Skin is warm and dry.      Capillary Refill: Capillary refill takes less than 2 seconds.   Neurological:      General: No focal deficit present.      Mental Status: He is alert and oriented to person, place, and time.   Psychiatric:         Mood and Affect: Mood normal.       Significant Labs: All pertinent labs within the past 24 hours have been reviewed.  CBC:   Recent Labs   Lab 03/11/22  0346 03/12/22  0517   WBC 17.66* 15.09*   HGB 10.0* 10.9*   HCT 33.3* 38.2*   * 410     CMP:   Recent Labs   Lab  03/11/22  0346 03/12/22  0517    138   K 4.2 4.0    106   CO2 22* 23   GLU 89 95   BUN 21 19   CREATININE 0.9 0.9   CALCIUM 9.2 9.4   ANIONGAP 10 9   EGFRNONAA >60.0 >60.0       Significant Imaging: I have reviewed all pertinent imaging results/findings within the past 24 hours.      Assessment/Plan:      * Acute hypoxemic respiratory failure  72 yo M with moderate COPD, chronic pansinusitis, asthma with COPD, recent COVID infection in January 2022 and before that Jan 2021,  HLD, Prediabetes presents for worsening FARLEY, SOB for one week. Associated symptoms of increasing cough and greenish sputum production.     Patient with Hypoxic Respiratory failure which is Acute.  he is not on home oxygen. Supplemental oxygen was provided and noted-  .   Signs/symptoms of respiratory failure include- tachypnea and increased work of breathing. Contributing diagnoses includes - COPD and Pneumonia Labs and images were reviewed. Patient Has not had a recent ABG. Will treat underlying causes and adjust management of respiratory failure as follows    Most likely secondary to multifocal PNA + COPD exacerbation    DDx: environmental exposure (given hx of asthma and occupation as , though CBC differentials is not significant for eosinophilia) vs post COVID pulmonary fibrosis (though less likely). PE is also possible but less likely as pt did have recent COVID infx, d-dimer at that time was 4.47, though pt is mobile, no current active cancer, no significant unilateral leg swelling. HF is less likely given euvolemic on exam, no prior hx of HF, and BNP is wnl.     CT Chest 3/10 showed BL bronchocentric consolidative opacities favoring infectious/inflammatory etiology. Also showed middle lobe, lingular, BL LL bronchiectasis with bronchial wall thickening and mucoid impaction that have become more more severe compared to prior imaging.     Plan   - Initially on 2L NC, now successfully weaned to RA.   - S/p methypred in  the ED  - Continue Prednisone 40mg PO daily to complete a 5 day course  - Given increased sputum production, SOB, antibiotics initiated  - Cefepime (Hx of pseudomonas PNA)/Azithromycin (for atypical coverage)/Vancomycin added 3/11 to cover for MRSA  - Respiratory cultures growing GNR and MRSA  - ID consulted to assist in decision making regarding discharge antibiotics.   - D-Dimer elevated, however WNL once adjusted for age. Will not pursue CTA at this time.   - BCx with NGTD x4D  - Continue home inhaler: fluticasone-vilanterol 1 puff QD (symbicort is not in formulary)   - Duonebs q4h while awake and prn  - Incentive Spirometry  - Continuous oxygen monitoring with supplement O2 for goal sat 88% - 92%   - Will need PFTs upon discharge  - Close follow up with Pulmonology scheduled 3/21  - Will refer to pulmonary rehab outpatient       NOEMI (acute kidney injury)  NOEMI noted on today's labs, Cr bump to 1.4 from 1.1. Likely 2/2 dehydration as patient with poor PO intake.    - RESOLVED with increased PO intake  - Continue to encourage hydration       Multifocal pneumonia  See acute hypoxemic respiratory failure       Personal history of COVID-19  Long term effects of Covid likely contributing to respiratory status.       Asthma with COPD  - See COPD exacerbation       Type 2 diabetes mellitus  -Last A1c reviewed-   Lab Results   Component Value Date    HGBA1C 6.0 (H) 01/24/2022       Home Antihyperglycemic Regiment:  - None     Inpatient Antihyperglycemic Regiment:  Antihyperglycemics (From admission, onward)            Start     Stop Route Frequency Ordered    03/08/22 2031  insulin aspart U-100 pen 0-5 Units         -- SubQ Before meals & nightly PRN 03/08/22 1931        - Insulin regimen as above  - LDSSI with POCT accuchecks ACHS  - Diabetic nutritional counseling given   - POCT ACQHS    Blood Sugars (AccuCheck):  No results for input(s): POCTGLUCOSE in the last 72 hours.        Anemia  Admit with hemoglobin 11.3  Baseline 11-12      Lab Results   Component Value Date    IRON 89 05/01/2021    TIBC 324 05/01/2021    FERRITIN 1,038 (H) 01/12/2022     Lab Results   Component Value Date    FOLATE 15.2 03/09/2022     Lab Results   Component Value Date    LYKTCOWH11 590 01/24/2022     Folate and B12 WNL  Reticulocytes c/w hypoproliferation  Likely secondary to anemia of chronic disease     Stable     Plan:   - Daily CBC   - Transfuse hgb <7       Chronic pansinusitis  Resume home cetirizine and fluticasone       Vitamin D deficiency  - Continue home vitamin D and vitamin supplement       COPD exacerbation  See acute hypoxemic respiratory failure       Anxiety  Atarax Prn       Lung granuloma  Seen on chest CT dated 04/13/16  - Not seen on repeat Chest CT 3/10      Hyperlipidemia  Lipid profile on   Lab Results   Component Value Date    LDLCALC 105.6 01/24/2022    HDL 36 (L) 01/24/2022    TRIG 82 01/24/2022    CHOL 158 01/24/2022     The 10-year ASCVD risk score (Lily MARTINEZ Jr., et al., 2013) is: 29.5%    Values used to calculate the score:      Age: 71 years      Sex: Male      Is Non- : Yes      Diabetic: Yes      Tobacco smoker: No      Systolic Blood Pressure: 113 mmHg      Is BP treated: Yes      HDL Cholesterol: 36 mg/dL      Total Cholesterol: 158 mg/dL    - Currently taking - atorvastatin 20 mg at home; will increase to atorvastatin 40 mg  - Reports compliance with hyperlipidemia treatment as prescribed  - Denies adverse effects of medications        Overweight (BMI 25.0-29.9)  - encouraged to continue walking 30-40 mins/day at least 3-4 days /week  - emphasis placed on changing diet (for lowering cholesterol & for weight loss)          Essential hypertension  Home regimen: amlodipine 10 mg QD, losartan 100 mg QD, toprol 100 mg QD     Plan:   - home meds resumed      VTE Risk Mitigation (From admission, onward)         Ordered     enoxaparin injection 40 mg  Daily         03/08/22 1924     IP VTE HIGH  RISK PATIENT  Once         03/08/22 1924     Place sequential compression device  Until discontinued         03/08/22 1924                Discharge Planning   KATE: 3/12/2022     Code Status: Full Code   Is the patient medically ready for discharge?: No    Reason for patient still in hospital (select all that apply): Treatment      Discharge Delays: None known at this time    yL Waldrop MD  Department of Hospital Medicine   [facility]

## 2022-03-12 NOTE — PROGRESS NOTES
[facility] Medicine  Progress Note    Patient Name: Scooter Morrissey  MRN: [id number]  Patient Class: IP- Inpatient   Admission Date: 3/8/2022  Length of Stay: 3 days  Attending Physician: Fredrick James MD  Primary Care Provider: Katelynn Rojas MD        Subjective:     Principal Problem:Acute hypoxemic respiratory failure        HPI:  Mr. Morrissey is a 72 yo M with moderate COPD, chronic pansinusitis, asthma with COPD, recent COVID infection in January 2022 and before that Jan 2021,  HLD, Prediabetes presents for worsening FARLEY, SOB for one week. Associated symptoms of increasing cough and greenish sputum production. Per pt, SOB and cough production do not associate food. Sputum production is mostly at night. Per pt, denies any fever, chest pain, chills, nausea, vomiting or diarrhea. Pt denies any sick contact. Per pt, he was sent home with Oxygen since his last admission for COVID PNA in January, he only uses it PRN and have been needing it more. He exerts adherence to his symbicort and albuterol at home. He has quit cigar smoking for about 9 years, and cigarettes since 1975. Prior to this pt was smoking about 2 packs per month.     Lives with wife, and works as a . Pt acknowledges that given hx of asthma, his occupations can exacerbate an attack. Have not been follow up with his pulmonologist Dr. Guerrier since 2017, though he does have an appointment on 3/21 with Dr. Margarito Sanchez, which he hope we could help facilitate to make the appointment sooner.     In the ED, pt was afebrile, , RR 26, /67, sat 85% on RA, 93% with 2 L NC. Labs significant for Hgb 11.3. No leukocytosis, normal creatinine. No significant electrolytes abnormality. BNP 18, Trop wnl. Blood Cultures obtained. CXR with bilaterl upper lobe airspace disease could be secondary to multifocal versus pulmonary edema. Pt was given albuterol, methylprenisolone, zosyn, and vancomycin. Pt is admitted to hospital medicine for  further evaluation and management.       Overview/Hospital Course:  Patient admitted to Hospital Medicine for acute hypoxemic respiratory failure most likely secondary to COPD exacerbation in setting of PNA.  CXR with BL upper lobe airspace disease concerning for multifocal PNA. CT Chest obtained, showed evidence of BL bronchocentric consolidative opacities with bronchiectasis and mucoid impaction. Treated with ABX, prednisone, duonebs and CPT. Vancomycin added to initial Cefepime/Azithro once respiratory cultures grew GNR and Staph. Respiratory status much improved overall, now requiring no oxygen supplementation. Course complicated by NOEMI that resolved with increased PO intake. Leukocytosis likely exacerbated by prednisone use.         Interval History: NAEON. Afebrile. Now on RA. Leukocytosis downtrending. Resp Cx growing GNR and Staph. Vancomycin added to abx regimen to cover for possible MRSA (patient has hx). Plan to consult ID tomorrow once speciation/susceptibilities result for assistance with discharge antibiotics. NOEMI resolved.     Review of Systems   Constitutional:  Negative for appetite change, chills and fever.   HENT:  Negative for sore throat.    Respiratory:  Positive for cough and shortness of breath. Negative for wheezing.    Cardiovascular:  Negative for chest pain and palpitations.   Gastrointestinal:  Negative for abdominal distention, abdominal pain, constipation, diarrhea, nausea and vomiting.   Endocrine: Negative for polydipsia and polyuria.   Genitourinary:  Negative for dysuria, hematuria and urgency.   Musculoskeletal:  Negative for back pain and gait problem.   Neurological:  Negative for weakness and numbness.   Psychiatric/Behavioral:  The patient is nervous/anxious.    Objective:     Vital Signs (Most Recent):  Temp: 97 °F (36.1 °C) (03/11/22 1614)  Pulse: 78 (03/11/22 1614)  Resp: 20 (03/11/22 1614)  BP: (!) 110/59 (03/11/22 1614)  SpO2: 95 % (03/11/22 1614)   Vital Signs (24h  Range):  Temp:  [96.1 °F (35.6 °C)-97 °F (36.1 °C)] 97 °F (36.1 °C)  Pulse:  [66-83] 78  Resp:  [17-20] 20  SpO2:  [92 %-96 %] 95 %  BP: (110-135)/(57-79) 110/59     Weight: 74.8 kg (164 lb 15.9 oz)  Body mass index is 29.23 kg/m².  No intake or output data in the 24 hours ending 03/11/22 1850   Physical Exam  Constitutional:       General: He is not in acute distress.     Appearance: Normal appearance. He is not ill-appearing.   HENT:      Head: Normocephalic and atraumatic.      Nose: Nose normal.   Eyes:      General:         Right eye: No discharge.         Left eye: No discharge.      Conjunctiva/sclera: Conjunctivae normal.   Neck:      Comments: No JVD  Cardiovascular:      Rate and Rhythm: Normal rate and regular rhythm.      Heart sounds: No murmur heard.  Pulmonary:      Effort: Pulmonary effort is normal.      Breath sounds: No wheezing or rales.      Comments: Comfortable on RA. No increased WOB  + Bibasilar crackles  Abdominal:      General: Abdomen is flat. There is no distension.      Palpations: Abdomen is soft.      Tenderness: There is no abdominal tenderness.   Musculoskeletal:         General: No swelling. Normal range of motion.      Cervical back: Normal range of motion.      Right lower leg: No edema.      Left lower leg: No edema.   Skin:     General: Skin is warm and dry.      Capillary Refill: Capillary refill takes less than 2 seconds.   Neurological:      General: No focal deficit present.      Mental Status: He is alert and oriented to person, place, and time.   Psychiatric:         Mood and Affect: Mood normal.       Significant Labs: All pertinent labs within the past 24 hours have been reviewed.  CBC:   Recent Labs   Lab 03/10/22  0310 03/11/22  0346   WBC 21.89* 17.66*   HGB 10.0* 10.0*   HCT 32.9* 33.3*    463*     CMP:   Recent Labs   Lab 03/10/22  0310 03/11/22  0346    139   K 4.8 4.2    107   CO2 20* 22*   * 89   BUN 26* 21   CREATININE 1.0 0.9    CALCIUM 9.5 9.2   ANIONGAP 11 10   EGFRNONAA >60.0 >60.0       Significant Imaging: I have reviewed all pertinent imaging results/findings within the past 24 hours.      Assessment/Plan:      * Acute hypoxemic respiratory failure  72 yo M with moderate COPD, chronic pansinusitis, asthma with COPD, recent COVID infection in January 2022 and before that Jan 2021,  HLD, Prediabetes presents for worsening FARLEY, SOB for one week. Associated symptoms of increasing cough and greenish sputum production.     Patient with Hypoxic Respiratory failure which is Acute.  he is not on home oxygen. Supplemental oxygen was provided and noted-  .   Signs/symptoms of respiratory failure include- tachypnea and increased work of breathing. Contributing diagnoses includes - COPD and Pneumonia Labs and images were reviewed. Patient Has not had a recent ABG. Will treat underlying causes and adjust management of respiratory failure as follows    Most likely secondary to COPD exacerbation 2/2 to multifocal PNA   DDx: environmental exposure (given hx of asthma and occupation as , though CBC differentials is not significant for eosinophilia ) vs post COVID pulmonary fibrosis (though less likely). PE is also possible but less likely as pt did have recent COVID infx, d-dimer at that time was 4.47, though pt is mobile, no current active cancer, no significant unilateral leg swelling. HF is less likely given euvolemic on exam, no prior hx of HF, and BNP is wnl.     CT Chest 3/10 showed BL bronchocentric consolidative opacities favoring infectious/inflammatory etiology. Also showed middle lobe, lingular, BL LL bronchiectasis with bronchial wall thickening and mucoid impaction that have become more more severe compared to prior imaging.     Plan   - Initially on 2L NC, now successfully weaned to RA.   - S/p methypred in the ED  - Continue Prednisone 40mg PO daily to complete a 5 day course  - Given increased sputum production, SOB,  antibiotics initiated  - Cefepime (Hx of pseudomonas PNA)/Azithromycin (for atypical coverage)/Vancomycin added 3/11 to cover for MRSA  - Respiratory cultures growing GNR and Staph aureus. Awaiting speciation/susceptibilities.   - Consult ID tomorrow to assist in decision making regarding discharge antibiotics.   - D-Dimer elevated, however WNL once adjusted for age. Will not pursue CTA at this time.   - BCx with NGTD x3D  - Continue home inhaler: fluticasone-vilanterol 1 puff QD (symbicort is not in formulary)   - Duonebs q4h while awake and prn  - Incentive Spirometry  - F/u ABG  - Continuous oxygen monitoring with supplement O2 for goal sat 88% - 92%   - Will need PFTs upon discharge and will try to coordinate close follow up with pulmonology  - Will refer to pulmonary rehab outpatient           NOEMI (acute kidney injury)  NOEMI noted on today's labs, Cr bump to 1.4 from 1.1. Likely 2/2 dehydration as patient with poor PO intake.    - RESOLVED with increased PO intake  - Continue to encourage hydration       Multifocal pneumonia  See acute hypoxemic respiratory failure       Non-seasonal allergic rhinitis        Personal history of COVID-19  Long term effects of Covid likely contributing to respiratory status.     Asthma with COPD  - See COPD exacerbation       Type 2 diabetes mellitus  -Last A1c reviewed-   Lab Results   Component Value Date    HGBA1C 6.0 (H) 01/24/2022       Home Antihyperglycemic Regiment:  - None     Inpatient Antihyperglycemic Regiment:  Antihyperglycemics (From admission, onward)            Start     Stop Route Frequency Ordered    03/08/22 2031  insulin aspart U-100 pen 0-5 Units         -- SubQ Before meals & nightly PRN 03/08/22 1931        - Insulin regimen as above  - LDSSI with POCT accuchecks ACHS  - Diabetic nutritional counseling given   - POCT ACQHS    Blood Sugars (AccuCheck):  No results for input(s): POCTGLUCOSE in the last 72 hours.          Anemia  Admit with hemoglobin 11.3  Baseline 11-12      Lab Results   Component Value Date    IRON 89 05/01/2021    TIBC 324 05/01/2021    FERRITIN 1,038 (H) 01/12/2022     Lab Results   Component Value Date    FOLATE 15.2 03/09/2022     Lab Results   Component Value Date    FPTNJMKO86 590 01/24/2022     Folate and B12 WNL  Reticulocytes c/w hypoproliferation  Likely secondary to anemia of chronic disease     Stable     Plan:   - Daily CBC   - Transfuse hgb <7         Chronic pansinusitis  Resume home cetirizine and fluticasone       Vitamin D deficiency  - Continue home vitamin D and vitamin supplement       COPD exacerbation  See acute hypoxemic respiratory failure       Anxiety  Atarax Prn       H/O pleural empyema        Lung granuloma  Seen on chest CT dated 04/13/16  - Not seen on repeat Chest CT 3/10      Hyperlipidemia  Lipid profile on   Lab Results   Component Value Date    LDLCALC 105.6 01/24/2022    HDL 36 (L) 01/24/2022    TRIG 82 01/24/2022    CHOL 158 01/24/2022     The 10-year ASCVD risk score (Lily MARTINEZ Jr., et al., 2013) is: 29.5%    Values used to calculate the score:      Age: 71 years      Sex: Male      Is Non- : Yes      Diabetic: Yes      Tobacco smoker: No      Systolic Blood Pressure: 113 mmHg      Is BP treated: Yes      HDL Cholesterol: 36 mg/dL      Total Cholesterol: 158 mg/dL    - Currently taking - atorvastatin 20 mg at home; will increase to atorvastatin 40 mg  - Reports compliance with hyperlipidemia treatment as prescribed  - Denies adverse effects of medications        Overweight (BMI 25.0-29.9)  - encouraged to continue walking 30-40 mins/day at least 3-4 days /week  - emphasis placed on changing diet (for lowering cholesterol & for weight loss)          Essential hypertension  Home regimen: amlodipine 10 mg QD, losartan 100 mg QD, toprol 100 mg QD     Plan:   - home meds resumed        VTE Risk Mitigation (From admission, onward)         Ordered     enoxaparin injection 40 mg  Daily          03/08/22 1924     IP VTE HIGH RISK PATIENT  Once         03/08/22 1924     Place sequential compression device  Until discontinued         03/08/22 1924                Discharge Planning   KATE: 3/12/2022     Code Status: Full Code   Is the patient medically ready for discharge?: No    Reason for patient still in hospital (select all that apply): Patient trending condition and Treatment      Discharge Delays: None known at this time    Ly Waldrop MD  Department of Hospital Medicine   [facility]

## 2022-03-12 NOTE — ASSESSMENT & PLAN NOTE
NOEMI noted on today's labs, Cr bump to 1.4 from 1.1. Likely 2/2 dehydration as patient with poor PO intake.    - RESOLVED with increased PO intake  - Continue to encourage hydration

## 2022-03-12 NOTE — PLAN OF CARE
During rounds patient in bed awake and denied any acute distress, call button and other personal items within reach.     Problem: Adult Inpatient Plan of Care  Goal: Plan of Care Review  Outcome: Ongoing, Progressing  Goal: Patient-Specific Goal (Individualized)  Outcome: Ongoing, Progressing  Goal: Absence of Hospital-Acquired Illness or Injury  Outcome: Ongoing, Progressing  Goal: Optimal Comfort and Wellbeing  Outcome: Ongoing, Progressing  Goal: Readiness for Transition of Care  Outcome: Ongoing, Progressing     Problem: Adjustment to Illness COPD (Chronic Obstructive Pulmonary Disease)  Goal: Optimal Chronic Illness Coping  Outcome: Ongoing, Progressing     Problem: Functional Ability Impaired COPD (Chronic Obstructive Pulmonary Disease)  Goal: Optimal Level of Functional [place]  Outcome: Ongoing, Progressing     Problem: Infection COPD (Chronic Obstructive Pulmonary Disease)  Goal: Absence of Infection Signs and Symptoms  Outcome: Ongoing, Progressing     Problem: Oral Intake Inadequate COPD (Chronic Obstructive Pulmonary Disease)  Goal: Improved Nutrition Intake  Outcome: Ongoing, Progressing     Problem: Respiratory Compromise COPD (Chronic Obstructive Pulmonary Disease)  Goal: Effective Oxygenation and Ventilation  Outcome: Ongoing, Progressing     Problem: Fluid Imbalance (Pneumonia)  Goal: Fluid Balance  Outcome: Ongoing, Progressing     Problem: Infection (Pneumonia)  Goal: Resolution of Infection Signs and Symptoms  Outcome: Ongoing, Progressing     Problem: Respiratory Compromise (Pneumonia)  Goal: Effective Oxygenation and Ventilation  Outcome: Ongoing, Progressing     Problem: Diabetes Comorbidity  Goal: Blood Glucose Level Within Targeted Range  Outcome: Ongoing, Progressing     Problem: Fluid and Electrolyte Imbalance (Acute Kidney Injury/Impairment)  Goal: Fluid and Electrolyte Balance  Outcome: Ongoing, Progressing     Problem: Oral Intake Inadequate (Acute Kidney Injury/Impairment)  Goal:  Optimal Nutrition Intake  Outcome: Ongoing, Progressing     Problem: Renal Function Impairment (Acute Kidney Injury/Impairment)  Goal: Effective Renal Function  Outcome: Ongoing, Progressing

## 2022-03-13 LAB
ANION GAP SERPL CALC-SCNC: 11 MMOL/L (ref 8–16)
BACTERIA BLD CULT: NORMAL
BACTERIA BLD CULT: NORMAL
BACTERIA SPEC AEROBE CULT: ABNORMAL
BACTERIA SPEC AEROBE CULT: ABNORMAL
BASOPHILS # BLD AUTO: 0.05 K/UL (ref 0–0.2)
BASOPHILS NFR BLD: 0.3 % (ref 0–1.9)
BUN SERPL-MCNC: 21 MG/DL (ref 8–23)
CALCIUM SERPL-MCNC: 9.7 MG/DL (ref 8.7–10.5)
CHLORIDE SERPL-SCNC: 105 MMOL/L (ref 95–110)
CO2 SERPL-SCNC: 23 MMOL/L (ref 23–29)
CREAT SERPL-MCNC: 0.9 MG/DL (ref 0.5–1.4)
DIFFERENTIAL METHOD: ABNORMAL
EOSINOPHIL # BLD AUTO: 0.1 K/UL (ref 0–0.5)
EOSINOPHIL NFR BLD: 0.5 % (ref 0–8)
ERYTHROCYTE [DISTWIDTH] IN BLOOD BY AUTOMATED COUNT: 17.2 % (ref 11.5–14.5)
EST. GFR  (AFRICAN AMERICAN): >60 ML/MIN/1.73 M^2
EST. GFR  (NON AFRICAN AMERICAN): >60 ML/MIN/1.73 M^2
GLUCOSE SERPL-MCNC: 94 MG/DL (ref 70–110)
GRAM STN SPEC: ABNORMAL
HCT VFR BLD AUTO: 35 % (ref 40–54)
HGB BLD-MCNC: 10.5 G/DL (ref 14–18)
IMM GRANULOCYTES # BLD AUTO: 0.58 K/UL (ref 0–0.04)
IMM GRANULOCYTES NFR BLD AUTO: 3.5 % (ref 0–0.5)
LYMPHOCYTES # BLD AUTO: 2.7 K/UL (ref 1–4.8)
LYMPHOCYTES NFR BLD: 16.4 % (ref 18–48)
MAGNESIUM SERPL-MCNC: 1.8 MG/DL (ref 1.6–2.6)
MCH RBC QN AUTO: 29.3 PG (ref 27–31)
MCHC RBC AUTO-ENTMCNC: 30 G/DL (ref 32–36)
MCV RBC AUTO: 98 FL (ref 82–98)
MONOCYTES # BLD AUTO: 1.1 K/UL (ref 0.3–1)
MONOCYTES NFR BLD: 6.8 % (ref 4–15)
NEUTROPHILS # BLD AUTO: 12.2 K/UL (ref 1.8–7.7)
NEUTROPHILS NFR BLD: 72.5 % (ref 38–73)
NRBC BLD-RTO: 1 /100 WBC
PHOSPHATE SERPL-MCNC: 3.3 MG/DL (ref 2.7–4.5)
PLATELET # BLD AUTO: 472 K/UL (ref 150–450)
PMV BLD AUTO: 9.9 FL (ref 9.2–12.9)
POTASSIUM SERPL-SCNC: 4 MMOL/L (ref 3.5–5.1)
RBC # BLD AUTO: 3.58 M/UL (ref 4.6–6.2)
SODIUM SERPL-SCNC: 139 MMOL/L (ref 136–145)
WBC # BLD AUTO: 16.74 K/UL (ref 3.9–12.7)

## 2022-03-13 PROCEDURE — 25000003 PHARM REV CODE 250: Mod: HCNC | Performed by: STUDENT IN AN ORGANIZED HEALTH CARE EDUCATION/TRAINING PROGRAM

## 2022-03-13 PROCEDURE — 99232 SBSQ HOSP IP/OBS MODERATE 35: CPT | Mod: HCNC,GC,, | Performed by: HOSPITALIST

## 2022-03-13 PROCEDURE — 94640 AIRWAY INHALATION TREATMENT: CPT | Mod: HCNC

## 2022-03-13 PROCEDURE — 83735 ASSAY OF MAGNESIUM: CPT | Mod: HCNC | Performed by: STUDENT IN AN ORGANIZED HEALTH CARE EDUCATION/TRAINING PROGRAM

## 2022-03-13 PROCEDURE — 27000221 HC OXYGEN, UP TO 24 HOURS: Mod: HCNC

## 2022-03-13 PROCEDURE — 63600175 PHARM REV CODE 636 W HCPCS: Mod: HCNC | Performed by: HOSPITALIST

## 2022-03-13 PROCEDURE — 25000242 PHARM REV CODE 250 ALT 637 W/ HCPCS: Mod: HCNC | Performed by: STUDENT IN AN ORGANIZED HEALTH CARE EDUCATION/TRAINING PROGRAM

## 2022-03-13 PROCEDURE — 94761 N-INVAS EAR/PLS OXIMETRY MLT: CPT | Mod: HCNC

## 2022-03-13 PROCEDURE — 80048 BASIC METABOLIC PNL TOTAL CA: CPT | Mod: HCNC | Performed by: STUDENT IN AN ORGANIZED HEALTH CARE EDUCATION/TRAINING PROGRAM

## 2022-03-13 PROCEDURE — 36415 COLL VENOUS BLD VENIPUNCTURE: CPT | Mod: HCNC | Performed by: STUDENT IN AN ORGANIZED HEALTH CARE EDUCATION/TRAINING PROGRAM

## 2022-03-13 PROCEDURE — 85025 COMPLETE CBC W/AUTO DIFF WBC: CPT | Mod: HCNC | Performed by: STUDENT IN AN ORGANIZED HEALTH CARE EDUCATION/TRAINING PROGRAM

## 2022-03-13 PROCEDURE — 99233 SBSQ HOSP IP/OBS HIGH 50: CPT | Mod: HCNC,,, | Performed by: PHYSICIAN ASSISTANT

## 2022-03-13 PROCEDURE — 63600175 PHARM REV CODE 636 W HCPCS: Mod: HCNC | Performed by: STUDENT IN AN ORGANIZED HEALTH CARE EDUCATION/TRAINING PROGRAM

## 2022-03-13 PROCEDURE — 99232 PR SUBSEQUENT HOSPITAL CARE,LEVL II: ICD-10-PCS | Mod: HCNC,GC,, | Performed by: HOSPITALIST

## 2022-03-13 PROCEDURE — 11000001 HC ACUTE MED/SURG PRIVATE ROOM: Mod: HCNC

## 2022-03-13 PROCEDURE — 99233 PR SUBSEQUENT HOSPITAL CARE,LEVL III: ICD-10-PCS | Mod: HCNC,,, | Performed by: PHYSICIAN ASSISTANT

## 2022-03-13 PROCEDURE — 84100 ASSAY OF PHOSPHORUS: CPT | Mod: HCNC | Performed by: STUDENT IN AN ORGANIZED HEALTH CARE EDUCATION/TRAINING PROGRAM

## 2022-03-13 RX ADMIN — Medication 500 MG: at 10:03

## 2022-03-13 RX ADMIN — ATORVASTATIN CALCIUM 40 MG: 40 TABLET, FILM COATED ORAL at 10:03

## 2022-03-13 RX ADMIN — CEFEPIME HYDROCHLORIDE 2 G: 2 INJECTION, SOLUTION INTRAVENOUS at 01:03

## 2022-03-13 RX ADMIN — METOPROLOL SUCCINATE 100 MG: 100 TABLET, EXTENDED RELEASE ORAL at 08:03

## 2022-03-13 RX ADMIN — PREDNISONE 40 MG: 20 TABLET ORAL at 08:03

## 2022-03-13 RX ADMIN — FLUTICASONE FUROATE AND VILANTEROL TRIFENATATE 1 PUFF: 200; 25 POWDER RESPIRATORY (INHALATION) at 09:03

## 2022-03-13 RX ADMIN — AMLODIPINE BESYLATE 10 MG: 10 TABLET ORAL at 08:03

## 2022-03-13 RX ADMIN — THERA TABS 1 TABLET: TAB at 08:03

## 2022-03-13 RX ADMIN — CEFEPIME HYDROCHLORIDE 2 G: 2 INJECTION, SOLUTION INTRAVENOUS at 10:03

## 2022-03-13 RX ADMIN — CEFEPIME HYDROCHLORIDE 2 G: 2 INJECTION, SOLUTION INTRAVENOUS at 04:03

## 2022-03-13 RX ADMIN — LOSARTAN POTASSIUM 100 MG: 50 TABLET, FILM COATED ORAL at 08:03

## 2022-03-13 RX ADMIN — CETIRIZINE HYDROCHLORIDE 5 MG: 5 TABLET ORAL at 10:03

## 2022-03-13 RX ADMIN — IPRATROPIUM BROMIDE AND ALBUTEROL SULFATE 3 ML: 2.5; .5 SOLUTION RESPIRATORY (INHALATION) at 04:03

## 2022-03-13 RX ADMIN — Medication 2 TABLET: at 08:03

## 2022-03-13 RX ADMIN — VANCOMYCIN HYDROCHLORIDE 1000 MG: 1 INJECTION, POWDER, LYOPHILIZED, FOR SOLUTION INTRAVENOUS at 02:03

## 2022-03-13 RX ADMIN — IPRATROPIUM BROMIDE AND ALBUTEROL SULFATE 3 ML: 2.5; .5 SOLUTION RESPIRATORY (INHALATION) at 07:03

## 2022-03-13 RX ADMIN — ENOXAPARIN SODIUM 40 MG: 100 INJECTION SUBCUTANEOUS at 05:03

## 2022-03-13 RX ADMIN — Medication 500 MG: at 08:03

## 2022-03-13 NOTE — ASSESSMENT & PLAN NOTE
70 yo male with underlying COPD/emphysema and COVID x 2 pw increased SOB/FARLEY/decreased O2 sats and CT chest cf PNA vs COPD exacerbation vs both with sputum CX with MRSA and GNR pending sensitivities. Had pseudomonas on sputum cx Jan 2022. On  vanc and cefepime and steroids (suspect cause of leukocytosis). Blood cultures NGTD.  Clinically improved and stable with O2 sats 92-95% RA.    Plan:  1. Continue vanc and cefepime  2. FU GNR in sputum  3. Contact precautions given MRSA  4. Seen with ID staff - will follow  5. Hopefully home on po abx.

## 2022-03-13 NOTE — SUBJECTIVE & OBJECTIVE
Interval History:  No AEON.  Afebrile and WBC WNL  RESP CX - MRSA and pseudomonas alcilagenes  Feels much improved - minimal sputum  The patient denies any recent fever, chills, or sweats.      Review of Systems   Constitutional:  Negative for chills, diaphoresis and fever.   Respiratory:  Positive for shortness of breath (improved).    Cardiovascular:  Negative for chest pain.   Gastrointestinal:  Negative for abdominal pain, diarrhea, nausea and vomiting.   Genitourinary:  Negative for dysuria and hematuria.   Objective:     Vital Signs (Most Recent):  Temp: 97.3 °F (36.3 °C) (03/13/22 1216)  Pulse: 84 (03/13/22 1216)  Resp: 18 (03/13/22 1216)  BP: 128/66 (03/13/22 1216)  SpO2: (!) 93 % (03/13/22 1216)   Vital Signs (24h Range):  Temp:  [96.2 °F (35.7 °C)-97.3 °F (36.3 °C)] 97.3 °F (36.3 °C)  Pulse:  [63-84] 84  Resp:  [16-18] 18  SpO2:  [93 %-96 %] 93 %  BP: (128-142)/(65-75) 128/66     Weight: 74.8 kg (164 lb 15.9 oz)  Body mass index is 29.23 kg/m².    Estimated Creatinine Clearance: 67.3 mL/min (based on SCr of 0.9 mg/dL).    Physical Exam  Constitutional:       General: He is not in acute distress.     Appearance: He is well-developed. He is not diaphoretic.   HENT:      Head: Normocephalic and atraumatic.   Cardiovascular:      Rate and Rhythm: Normal rate and regular rhythm.      Heart sounds: Normal heart sounds. No murmur heard.    No friction rub. No gallop.   Pulmonary:      Effort: Pulmonary effort is normal. No respiratory distress.      Breath sounds: Examination of the left-middle field reveals rales. Examination of the right-lower field reveals rales. Examination of the left-lower field reveals rales. Rales (fine) present. No decreased breath sounds, wheezing or rhonchi.   Abdominal:      General: Bowel sounds are normal. There is no distension.      Palpations: Abdomen is soft. There is no mass.      Tenderness: There is no abdominal tenderness. There is no guarding or rebound.   Skin:      General: Skin is warm and dry.   Neurological:      Mental Status: He is alert and oriented to person, place, and time.   Psychiatric:         Behavior: Behavior normal.       Significant Labs: Blood Culture:   Recent Labs   Lab 12/06/21  1350 12/06/21  1446 03/08/22  1807 03/08/22  1808   LABBLOO No growth after 5 days. No growth after 5 days. No Growth to date  No Growth to date  No Growth to date  No Growth to date  No Growth to date No Growth to date  No Growth to date  No Growth to date  No Growth to date  No Growth to date     CBC:   Recent Labs   Lab 03/12/22  0517 03/13/22  0522   WBC 15.09* 16.74*   HGB 10.9* 10.5*   HCT 38.2* 35.0*    472*     CMP:   Recent Labs   Lab 03/12/22  0517 03/13/22  0522    139   K 4.0 4.0    105   CO2 23 23   GLU 95 94   BUN 19 21   CREATININE 0.9 0.9   CALCIUM 9.4 9.7   ANIONGAP 9 11   EGFRNONAA >60.0 >60.0     Respiratory Culture:   Recent Labs   Lab 01/13/22  2200 03/08/22  2152   GSRESP <10 epithelial cells per low power field.  Few WBC's  Few Gram positive cocci  Few Gram negative rods <10 epithelial cells per low power field.  Many WBC's  Many Gram negative rods  Rare Gram positive cocci  Rare yeast   RESPIRATORYC No S aureus isolated.  PSEUDOMONAS AERUGINOSA  Moderate  Normal respiratory lane also present  * PSEUDOMONAS ALCALIGENES/PSEUDOALCALIGENES  Moderate  *  METHICILLIN RESISTANT STAPHYLOCOCCUS AUREUS  Rare  Normal respiratory lane also present  *     All pertinent labs within the past 24 hours have been reviewed.    Significant Imaging: I have reviewed all pertinent imaging results/findings within the past 24 hours.  CT Chest Without Contrast [[id number]] Resulted: 03/10/22 1340   Order Status: Completed Updated: 03/10/22 1342   Narrative:     EXAMINATION:   CT CHEST WITHOUT CONTRAST     CLINICAL HISTORY:   Pneumonia, unresolved;COPD history;     TECHNIQUE:   Low dose axial images, sagittal and coronal reformations were obtained  from the thoracic inlet to the lung bases. Contrast was not administered.     COMPARISON:   Prior     FINDINGS:   No intravenous contrast was administered for this examination.  Therefore, it may have diminished sensitivity for detection of certain abnormalities.     Thyroid gland: Within normal limits.     Trachea: Within normal limits.     Esophagus: Small hiatal hernia.     Cardiovascular: Normal heart size.No pericardial effusion.Diminished attenuation of the blood pool suggest lower than normal hematocrit levels.  There is mild calcific disease of the aorta and coronary arteries.     Lymph nodes: None abnormally enlarged.     Lungs/pleura/airways: Bilateral, apical bronchus centric mass-like areas of consolidation.  Elsewhere, there are bilateral patchy ground-glass opacities predominantly in the upper to mid lung zones as well as scattered areas of tree-in-bud nodular opacities.     There is right middle lobe, lingular, bilateral lower lobe bronchiectasis with bronchial wall thickening, mucoid impaction, and left greater than right basilar small airways disease.  There is a focal consolidative opacity in the right lower lobe posterior segment.     The pulmonary attenuation is mosaic, consistent with air trapping.     Upper abdomen: A few hepatic low-density lesions, the largest in the right posterior segment up to 1.4 cm, favor cysts versus hemangiomas.  There are few bilateral ovoid low-density lesions, too small to characterize, but likely to represent cysts.  Stable 1.8 cm left adrenal nodule consistent with adenoma.     Bones: Unremarkable for stated age.     Other: N/A    Impression:       1.  Bilateral broncho-centric consolidative opacities.  Although they have a masslike appearance, in the presence of other ground-glass opacities, medium and small airways disease favors infectious/inflammatory etiology.     2.  There is a background of middle lobe, lingular, and bilateral lower lobe bronchiectasis with  bronchial wall thickening and mucoid impaction.  These are also present on the prior examination, but they appear to be increased pole in both severity and extent.     3.  No intraparenchymal abscess or empyema identified.       Electronically signed by: Sylvia Sneed   Date: 03/10/2022   Time: 13:40   X-Ray Chest 1 View [[id number]] Resulted: 03/08/22 1656   Order Status: Completed Updated: 03/08/22 1658   Narrative:     EXAMINATION:   XR CHEST 1 VIEW     CLINICAL HISTORY:   shortness of breath;     TECHNIQUE:   Single frontal view of the chest was performed.     COMPARISON:   01/24/2022     FINDINGS:   Extensive bilateral upper lobe airspace disease is present that is new when compared to 01/24/2022.  Similar findings were seen in both lungs on 12/06/2021 and 01/12/2022 with a slightly different distribution.  The pulmonary vasculature is prominent and indistinct.  No pleural effusion.  Heart size is unchanged.    Impression:       Bilateral upper lobe airspace disease, to a similar degree as seen on prior chest radiographs with a different distribution.  This could be secondary to multifocal pneumonia versus pulmonary edema.       Electronically signed by: Jessica Cunningham   Date: 03/08/2022   Time: 16:56       Imaging History    2022  Date Procedure Name Status Accession Number Location   03/10/22 11:55 AM CT Chest Without Contrast Final [id number] [facility]   03/08/22 04:40 PM X-Ray Chest 1 View Final [id number] [facility]

## 2022-03-13 NOTE — CONSULTS
[facility]  Infectious Disease  Consult Note    Patient Name: Scooter Morrissey  MRN: [id number]  Admission Date: 3/8/2022  Hospital Length of Stay: 4 days  Attending Physician: Fredrick James MD  Primary Care Provider: Katelynn Rojas MD     Isolation Status: No active isolations    Patient information was obtained from patient and ER records.      Inpatient consult to Infectious Diseases  Consult performed by: SHELLY Morgan Jr.  Consult ordered by: Ly Waldrop MD        Assessment/Plan:     COPD exacerbation  72 yo male with underlying COPD/emphysema and COVID x 2 pw increased SOB/FARLEY/decreased O2 sats and CT chest cf PNA vs COPD exacerbation vs both with sputum CX with MRSA and GNR pending sensitivities. Had pseudomonas on sputum cx Jan 2022. On  vanc and cefepime and steroids (suspect cause of leukocytosis). Blood cultures NGTD.  Clinically improved and stable with O2 sats 92-95% RA.    Plan:  1. Continue vanc and cefepime  2. FU GNR in sputum  3. Contact precautions given MRSA  4. Seen with ID staff - will follow  5. Hopefully home on po abx.        Thank you for your consult. I will follow-up with patient. Please contact us if you have any additional questions.    SHELLY Alfredo  Infectious Disease  [facility] Surg    Subjective:     Principal Problem: Acute hypoxemic respiratory failure    HPI: Mr. Morrissey is a 72 yo M with moderate COPD, chronic pansinusitis, asthma with COPD, recent COVID infection in January 2022 and before that Jan 2021,  HLD, Prediabetes presents for worsening FARLEY, SOB for one week. Associated symptoms of increasing cough and greenish sputum production which waxes and wanes. Per pt, he was sent home with Oxygen since his last admission for COVID PNA in January, he only uses it PRN and have been needing it more recently. He reports using his usual pulmonary meds but with decreasing benefit.  He has pulse ox at home and O2 sats in upper 80s which prompted him  to come in.      Patient admitted and CT chest showed:  1.Bilateral broncho-centric consolidative opacities.  Although they have a masslike appearance, in the presence of other ground-glass opacities, medium and small airways disease favors infectious/inflammatory etiology.   2.  There is a background of middle lobe, lingular, and bilateral lower lobe bronchiectasis with bronchial wall thickening and mucoid impaction.  These are also present on the prior examination, but they appear to be increased pole in both severity and extent.   3.  No intraparenchymal abscess or empyema identified.     He has been treated with Vanc/Zosyn but change to azithro/cefepime when Cr increased.  He has been afebrile and blood cultures NGTD.  He has a prior Hx of pseudomonas on sputum Cx in Jan '22.  Sputum Cx oin this admit now showing MRSA and GNR - was started on vanc 3/11.  He feels almost 50% improved.  O2 sats have improved  to 92-95 % on room air.  The patient denies any recent fever, chills, or sweats.  ID consulted given sputum cxs.        Past Medical History:   Diagnosis Date    Acute hypoxemic respiratory failure 1/12/2022    NOEMI (acute kidney injury) 3/9/2022    Anxiety 6/29/2016    Asthma with chronic obstructive pulmonary disease (COPD)     Benign essential hypertension 4/5/2016    Hyperlipidemia 4/13/2016    Interstitial pneumonitis 4/13/2016    Other specified anemias 3/13/2020       Past Surgical History:   Procedure Laterality Date    ADENOIDECTOMY      COLONOSCOPY N/A 6/1/2016    Procedure: COLONOSCOPY;  Surgeon: Meme Mills MD;  Location: [facility];  Service: Endoscopy;  Laterality: N/A;    FINGER SURGERY      15 years ago    TONSILLECTOMY         Review of patient's allergies indicates:  No Known Allergies    Medications:  Medications Prior to Admission   Medication Sig    albuterol (ACCUNEB) 1.25 mg/3 mL Nebu Take 3 mLs (1.25 mg total) by nebulization every 6 (six) hours as needed  (shortness of breath or wheezing). Rescue    albuterol (VENTOLIN HFA) 90 mcg/actuation inhaler Inhale 2 puffs into the lungs every 4 (four) hours as needed for Wheezing or Shortness of Breath. Rescue    amLODIPine (NORVASC) 10 MG tablet TAKE 1 TABLET BY MOUTH EVERY DAY (Patient taking differently: Take 10 mg by mouth once daily.)    ascorbic acid, vitamin C, (VITAMIN C) 500 MG tablet Take 1 tablet (500 mg total) by mouth 2 (two) times daily.    atorvastatin (LIPITOR) 20 MG tablet Take 1 tablet (20 mg total) by mouth every evening.    budesonide-formoterol 160-4.5 mcg (SYMBICORT) 160-4.5 mcg/actuation HFAA Inhale 2 puffs into the lungs 2 (two) times daily. Controller    calcium-vitamin D3 (OS-SALLY 500 + D3) 500 mg-5 mcg (200 unit) per tablet Take 2 tablets by mouth once daily.    fluticasone propionate (FLONASE) 50 mcg/actuation nasal spray 2 sprays (100 mcg total) by Each Nostril route once daily.    fluticasone-salmeterol diskus inhaler 500-50 mcg Inhale 1 puff into the lungs 2 (two) times daily. Controller    levocetirizine (XYZAL) 5 MG tablet Take 1 tablet (5 mg total) by mouth every evening.    losartan (COZAAR) 100 MG tablet TAKE 1 TABLET(100 MG) BY MOUTH EVERY DAY (Patient taking differently: Take 100 mg by mouth once daily.)    metoprolol succinate (TOPROL-XL) 100 MG 24 hr tablet TAKE 1 TABLET(100 MG) BY MOUTH EVERY DAY (Patient taking differently: Take 100 mg by mouth once daily.)    multivitamin with minerals tablet Take 1 tablet by mouth nightly.     pulse oximeter (PULSE OXIMETER) device by Apply Externally route 2 (two) times a day. Use twice daily at 8 AM and 3 PM and record the value in MyChart as directed.    pulse oximeter (PULSE OXIMETER) device by Apply Externally route 2 (two) times a day. Use twice daily at 8 AM and 3 PM and record the value in MyChart as directed.    vitamin D (VITAMIN D3) 1000 units Tab Take 1,000 Units by mouth once daily.     Antibiotics (From admission, onward)  "               Start     Stop Route Frequency Ordered    03/11/22 2032  cefepime in dextrose 5 % IVPB 2 g         -- IV Every 8 hours (non-standard times) 03/11/22 1313    03/11/22 1200  vancomycin in dextrose 5 % 1 gram/250 mL IVPB 1,000 mg         -- IV Every 24 hours (non-standard times) 03/11/22 1047    03/11/22 1143  vancomycin - pharmacy to dose  (vancomycin IVPB)        "And" Linked Group Details    -- IV pharmacy to manage frequency 03/11/22 1043          Antifungals (From admission, onward)                None          Antivirals (From admission, onward)      None             Immunization History   Administered Date(s) Administered    COVID-19, MRNA, LN-S, PF (MODERNA FULL 0.5 ML DOSE) 08/03/2021, 09/28/2021    Pneumococcal Conjugate - 13 Valent 04/10/2015    Pneumococcal Polysaccharide - 23 Valent 11/29/2017    Tdap 04/05/2016       Family History       Problem Relation (Age of Onset)    No Known Problems Mother, Father, Daughter          Social History     Socioeconomic History    Marital status:    Occupational History    Occupation: lawn service   Tobacco Use    Smoking status: Former Smoker     Types: Cigars    Smokeless tobacco: Former User     Quit date: 5/12/1996   Substance and Sexual Activity    Alcohol use: No    Drug use: No    Sexual activity: Yes     Partners: Female     Social Determinants of Health     Financial Resource Strain: Low Risk     Difficulty of Paying Living Expenses: Not hard at all   Food Insecurity: No Food Insecurity    Worried About Running Out of Food in the Last Year: Never true    Ran Out of Food in the Last Year: Never true   Transportation Needs: No Transportation Needs    Lack of Transportation (Medical): No    Lack of Transportation (Non-Medical): No   Physical Activity: Sufficiently Active    Days of Exercise per Week: 6 days    Minutes of Exercise per Session: 90 min   Stress: No Stress Concern Present    Feeling of Stress : Only a little "   Social Connections: Moderately Isolated    Frequency of Communication with Friends and Family: Three times a week    Frequency of Social Gatherings with Friends and Family: Twice a week    Attends [employer] Services: Never    Active Member of Clubs or Organizations: No    Attends Club or Organization Meetings: Never    Marital Status:    Housing Stability: Low Risk     Unable to Pay for Housing in the Last Year: No    Number of Places Lived in the Last Year: 1    Unstable Housing in the Last Year: No     Review of Systems   Constitutional:  Negative for appetite change, chills, diaphoresis, fatigue, fever and unexpected weight change.   HENT:  Negative for congestion, ear pain, sore throat and tinnitus.    Eyes:  Negative for pain, redness and visual disturbance.   Respiratory:  Positive for shortness of breath. Negative for cough and wheezing.    Cardiovascular:  Negative for chest pain, palpitations and leg swelling.   Gastrointestinal:  Negative for abdominal pain, constipation, diarrhea, nausea, rectal pain and vomiting.   Endocrine: Negative for cold intolerance and heat intolerance.   Genitourinary:  Negative for dysuria, flank pain, frequency, hematuria and urgency.   Musculoskeletal:  Negative for arthralgias, back pain, myalgias and neck pain.   Skin:  Negative for rash.   Allergic/Immunologic: Negative for immunocompromised state.   Neurological:  Negative for dizziness, light-headedness, numbness and headaches.   Hematological:  Negative for adenopathy. Does not bruise/bleed easily.   Psychiatric/Behavioral:  Negative for confusion and sleep disturbance. The patient is not nervous/anxious.    Objective:     Vital Signs (Most Recent):  Temp: 97.3 °F (36.3 °C) (03/12/22 1530)  Pulse: 72 (03/12/22 1530)  Resp: 16 (03/12/22 1530)  BP: 139/65 (03/12/22 1530)  SpO2: 95 % (03/12/22 1530) Vital Signs (24h Range):  Temp:  [96.7 °F (35.9 °C)-98 °F (36.7 °C)] 97.3 °F (36.3 °C)  Pulse:  [60-78]  72  Resp:  [15-18] 16  SpO2:  [92 %-96 %] 95 %  BP: (129-145)/(63-77) 139/65     Weight: 74.8 kg (164 lb 15.9 oz)  Body mass index is 29.23 kg/m².    Estimated Creatinine Clearance: 67.3 mL/min (based on SCr of 0.9 mg/dL).    Physical Exam  Constitutional:       General: He is not in acute distress.     Appearance: He is well-developed. He is not diaphoretic.   HENT:      Head: Normocephalic and atraumatic.   Cardiovascular:      Rate and Rhythm: Normal rate and regular rhythm.      Heart sounds: Normal heart sounds. No murmur heard.    No friction rub. No gallop.   Pulmonary:      Effort: Pulmonary effort is normal. No respiratory distress.      Breath sounds: Examination of the left-middle field reveals rales. Examination of the right-lower field reveals rales. Examination of the left-lower field reveals rales. Rales (fine) present. No decreased breath sounds, wheezing or rhonchi.   Abdominal:      General: Bowel sounds are normal. There is no distension.      Palpations: Abdomen is soft. There is no mass.      Tenderness: There is no abdominal tenderness. There is no guarding or rebound.   Skin:     General: Skin is warm and dry.   Neurological:      Mental Status: He is alert and oriented to person, place, and time.   Psychiatric:         Behavior: Behavior normal.       Significant Labs: Blood Culture:   Recent Labs   Lab 12/06/21  1350 12/06/21  1446 03/08/22  1807 03/08/22  1808   LABBLOO No growth after 5 days. No growth after 5 days. No Growth to date  No Growth to date  No Growth to date  No Growth to date No Growth to date  No Growth to date  No Growth to date  No Growth to date     CBC:   Recent Labs   Lab 03/11/22  0346 03/12/22  0517   WBC 17.66* 15.09*   HGB 10.0* 10.9*   HCT 33.3* 38.2*   * 410     CMP:   Recent Labs   Lab 03/11/22  0346 03/12/22  0517    138   K 4.2 4.0    106   CO2 22* 23   GLU 89 95   BUN 21 19   CREATININE 0.9 0.9   CALCIUM 9.2 9.4   ANIONGAP 10 9    EGFRNONAA >60.0 >60.0     Respiratory Culture:   Recent Labs   Lab 01/13/22  2200 03/08/22  2152   GSRESP <10 epithelial cells per low power field.  Few WBC's  Few Gram positive cocci  Few Gram negative rods <10 epithelial cells per low power field.  Many WBC's  Many Gram negative rods  Rare Gram positive cocci  Rare yeast   RESPIRATORYC No S aureus isolated.  PSEUDOMONAS AERUGINOSA  Moderate  Normal respiratory lane also present  * GRAM NEGATIVE LUIS  Moderate  Identification and susceptibility pending  *  METHICILLIN RESISTANT STAPHYLOCOCCUS AUREUS  Rare  Normal respiratory lane also present  *     All pertinent labs within the past 24 hours have been reviewed.    Significant Imaging: I have reviewed all pertinent imaging results/findings within the past 24 hours.  CT Chest Without Contrast [[id number]] Resulted: 03/10/22 1340   Order Status: Completed Updated: 03/10/22 1342   Narrative:     EXAMINATION:   CT CHEST WITHOUT CONTRAST     CLINICAL HISTORY:   Pneumonia, unresolved;COPD history;     TECHNIQUE:   Low dose axial images, sagittal and coronal reformations were obtained from the thoracic inlet to the lung bases. Contrast was not administered.     COMPARISON:   Prior     FINDINGS:   No intravenous contrast was administered for this examination.  Therefore, it may have diminished sensitivity for detection of certain abnormalities.     Thyroid gland: Within normal limits.     Trachea: Within normal limits.     Esophagus: Small hiatal hernia.     Cardiovascular: Normal heart size.No pericardial effusion.Diminished attenuation of the blood pool suggest lower than normal hematocrit levels.  There is mild calcific disease of the aorta and coronary arteries.     Lymph nodes: None abnormally enlarged.     Lungs/pleura/airways: Bilateral, apical bronchus centric mass-like areas of consolidation.  Elsewhere, there are bilateral patchy ground-glass opacities predominantly in the upper to mid lung zones as  well as scattered areas of tree-in-bud nodular opacities.     There is right middle lobe, lingular, bilateral lower lobe bronchiectasis with bronchial wall thickening, mucoid impaction, and left greater than right basilar small airways disease.  There is a focal consolidative opacity in the right lower lobe posterior segment.     The pulmonary attenuation is mosaic, consistent with air trapping.     Upper abdomen: A few hepatic low-density lesions, the largest in the right posterior segment up to 1.4 cm, favor cysts versus hemangiomas.  There are few bilateral ovoid low-density lesions, too small to characterize, but likely to represent cysts.  Stable 1.8 cm left adrenal nodule consistent with adenoma.     Bones: Unremarkable for stated age.     Other: N/A    Impression:       1.  Bilateral broncho-centric consolidative opacities.  Although they have a masslike appearance, in the presence of other ground-glass opacities, medium and small airways disease favors infectious/inflammatory etiology.     2.  There is a background of middle lobe, lingular, and bilateral lower lobe bronchiectasis with bronchial wall thickening and mucoid impaction.  These are also present on the prior examination, but they appear to be increased pole in both severity and extent.     3.  No intraparenchymal abscess or empyema identified.       Electronically signed by: Sylvia Sneed   Date: 03/10/2022   Time: 13:40   X-Ray Chest 1 View [[id number]] Resulted: 03/08/22 1656   Order Status: Completed Updated: 03/08/22 1658   Narrative:     EXAMINATION:   XR CHEST 1 VIEW     CLINICAL HISTORY:   shortness of breath;     TECHNIQUE:   Single frontal view of the chest was performed.     COMPARISON:   01/24/2022     FINDINGS:   Extensive bilateral upper lobe airspace disease is present that is new when compared to 01/24/2022.  Similar findings were seen in both lungs on 12/06/2021 and 01/12/2022 with a slightly different distribution.  The pulmonary  vasculature is prominent and indistinct.  No pleural effusion.  Heart size is unchanged.    Impression:       Bilateral upper lobe airspace disease, to a similar degree as seen on prior chest radiographs with a different distribution.  This could be secondary to multifocal pneumonia versus pulmonary edema.       Electronically signed by: Jessica Cunningham   Date: 03/08/2022   Time: 16:56       Imaging History    2022  Date Procedure Name Status Accession Number Location   03/10/22 11:55 AM CT Chest Without Contrast Final [id number] [facility]   03/08/22 04:40 PM X-Ray Chest 1 View Final [id number] [facility]

## 2022-03-13 NOTE — ASSESSMENT & PLAN NOTE
72 yo M with moderate COPD, chronic pansinusitis, asthma with COPD, recent COVID infection in January 2022 and before that Jan 2021,  HLD, Prediabetes presents for worsening FARLEY, SOB for one week. Associated symptoms of increasing cough and greenish sputum production.     Most likely secondary to multifocal PNA + COPD exacerbation    DDx: environmental exposure (given hx of asthma and occupation as , though CBC differentials is not significant for eosinophilia) vs post COVID pulmonary fibrosis (though less likely). PE is also possible but less likely as pt did have recent COVID infx, d-dimer at that time was 4.47, though pt is mobile, no current active cancer, no significant unilateral leg swelling. HF is less likely given euvolemic on exam, no prior hx of HF, and BNP is wnl.     CT Chest 3/10 showed BL bronchocentric consolidative opacities favoring infectious/inflammatory etiology. Also showed middle lobe, lingular, BL LL bronchiectasis with bronchial wall thickening and mucoid impaction that have become more more severe compared to prior imaging. Initially on 2L NC, now successfully weaned to RA. S/p methypred in the ED. Completed 5 day course of Prednisone 40mg PO. BCx with NGTD x5D      Plan   - Given increased sputum production, SOB, antibiotics initiated. Cefepime (Hx of pseudomonas PNA)/Azithromycin (for atypical coverage). Vancomycin added 3/11 to cover for MRSA  - Respiratory cultures growing Pseudomonas and MRSA   - ID consulted to assist in decision making regarding discharge antibiotics    - Once 7 days (zosyn+)cefepime complete, patient can be discharged with Doxy to complete 7 day course (last dose 3/17)    - Continue home inhaler: fluticasone-vilanterol 1 puff QD (symbicort is not in formulary)   - Duonebs q4h while awake and prn  - Incentive Spirometry  - Continuous oxygen monitoring with supplement O2 for goal sat 88% - 92%   - Will need PFTs upon discharge  - Close follow up with  Pulmonology scheduled 3/21  - Will refer to pulmonary rehab outpatient

## 2022-03-13 NOTE — PLAN OF CARE
No  changes noted from previous shift. ABX infused. Personal items within reach bed in low position will continue to monitor.

## 2022-03-13 NOTE — HPI
Mr. Morrissey is a 72 yo M with moderate COPD, chronic pansinusitis, asthma with COPD, recent COVID infection in January 2022 and before that Jan 2021,  HLD, Prediabetes presents for worsening FARLEY, SOB for one week. Associated symptoms of increasing cough and greenish sputum production which waxes and wanes. Per pt, he was sent home with Oxygen since his last admission for COVID PNA in January, he only uses it PRN and have been needing it more recently. He reports using his usual pulmonary meds but with decreasing benefit.  He has pulse ox at home and O2 sats in upper 80s which prompted him to come in.      Patient admitted and CT chest showed:  1.Bilateral broncho-centric consolidative opacities.  Although they have a masslike appearance, in the presence of other ground-glass opacities, medium and small airways disease favors infectious/inflammatory etiology.   2.  There is a background of middle lobe, lingular, and bilateral lower lobe bronchiectasis with bronchial wall thickening and mucoid impaction.  These are also present on the prior examination, but they appear to be increased pole in both severity and extent.   3.  No intraparenchymal abscess or empyema identified.     He has been treated with Vanc/Zosyn but change to azithro/cefepime when Cr increased.  He has been afebrile and blood cultures NGTD.  He has a prior Hx of pseudomonas on sputum Cx in Jan '22.  Sputum Cx oin this admit now showing MRSA and GNR - was started on vanc 3/11.  He feels almost 50% improved.  O2 sats have improved  to 92-95 % on room air.  The patient denies any recent fever, chills, or sweats.  ID consulted given sputum cxs.

## 2022-03-13 NOTE — ASSESSMENT & PLAN NOTE
-Last A1c reviewed-   Lab Results   Component Value Date    HGBA1C 6.0 (H) 01/24/2022       Home Antihyperglycemic Regiment:  - None     Inpatient Antihyperglycemic Regiment:  Antihyperglycemics (From admission, onward)            Start     Stop Route Frequency Ordered    03/08/22 2031  insulin aspart U-100 pen 0-5 Units         -- SubQ Before meals & nightly PRN 03/08/22 1931        - Insulin regimen as above  - LDSSI with POCT accuchecks ACHS  - Diabetic nutritional counseling given   - POCT ACQHS  - May need Metformin on discharge vs trial of lifestyle changes first.

## 2022-03-13 NOTE — PROGRESS NOTES
[facility]  Infectious Disease  Progress Note    Patient Name: Scooter Morrissey  MRN: [id number]  Admission Date: 3/8/2022  Length of Stay: 5 days  Attending Physician: Fredrick James MD  Primary Care Provider: Katelynn Rojas MD    Isolation Status: No active isolations  Assessment/Plan:      COPD exacerbation  70 yo male with underlying COPD/emphysema and COVID x 2 pw increased SOB/FARLEY/decreased O2 sats and CT chest cf PNA vs COPD exacerbation vs both with sputum CX with MRSA and GNR pending sensitivities. Had pseudomonas on sputum cx Jan 2022. On  vanc and cefepime and steroids (suspect cause of leukocytosis). Blood cultures NGTD.  Clinically improved and stable with O2 sats 93-96% RA.   Resp CX with MRSA and pseudomonas alcaligenes - cipro resistant (levaquin sensitive but not reliable given cipro resitant)    Plan:  1. Continue vanc (on day 3) and cefepime (on day 6) for now - rec complete 7d of cefepime and 7d of vanc as inpt and doxy on dc  2. Contact precautions given MRSA  3. Discussed with ID staff - will follow  4. Plan communicated to primary team        Anticipated Disposition: tbd    Thank you for your consult. I will follow-up with patient. Please contact us if you have any additional questions.    SHELLY Alfredo  Infectious Disease  [facility]    Subjective:     Principal Problem:Acute hypoxemic respiratory failure    HPI: Mr. Morrissey is a 70 yo M with moderate COPD, chronic pansinusitis, asthma with COPD, recent COVID infection in January 2022 and before that Jan 2021,  HLD, Prediabetes presents for worsening FARLEY, SOB for one week. Associated symptoms of increasing cough and greenish sputum production which waxes and wanes. Per pt, he was sent home with Oxygen since his last admission for COVID PNA in January, he only uses it PRN and have been needing it more recently. He reports using his usual pulmonary meds but with decreasing benefit.  He has pulse ox at home and O2 sats  in upper 80s which prompted him to come in.      Patient admitted and CT chest showed:  1.Bilateral broncho-centric consolidative opacities.  Although they have a masslike appearance, in the presence of other ground-glass opacities, medium and small airways disease favors infectious/inflammatory etiology.   2.  There is a background of middle lobe, lingular, and bilateral lower lobe bronchiectasis with bronchial wall thickening and mucoid impaction.  These are also present on the prior examination, but they appear to be increased pole in both severity and extent.   3.  No intraparenchymal abscess or empyema identified.     He has been treated with Vanc/Zosyn but change to azithro/cefepime when Cr increased.  He has been afebrile and blood cultures NGTD.  He has a prior Hx of pseudomonas on sputum Cx in Jan '22.  Sputum Cx oin this admit now showing MRSA and GNR - was started on vanc 3/11.  He feels almost 50% improved.  O2 sats have improved  to 92-95 % on room air.  The patient denies any recent fever, chills, or sweats.  ID consulted given sputum cxs.      Interval History:  No AEON.  Afebrile and WBC WNL  RESP CX - MRSA and pseudomonas alcilagenes  Feels much improved - minimal sputum  The patient denies any recent fever, chills, or sweats.      Review of Systems   Constitutional:  Negative for chills, diaphoresis and fever.   Respiratory:  Positive for shortness of breath (improved).    Cardiovascular:  Negative for chest pain.   Gastrointestinal:  Negative for abdominal pain, diarrhea, nausea and vomiting.   Genitourinary:  Negative for dysuria and hematuria.   Objective:     Vital Signs (Most Recent):  Temp: 97.3 °F (36.3 °C) (03/13/22 1216)  Pulse: 84 (03/13/22 1216)  Resp: 18 (03/13/22 1216)  BP: 128/66 (03/13/22 1216)  SpO2: (!) 93 % (03/13/22 1216)   Vital Signs (24h Range):  Temp:  [96.2 °F (35.7 °C)-97.3 °F (36.3 °C)] 97.3 °F (36.3 °C)  Pulse:  [63-84] 84  Resp:  [16-18] 18  SpO2:  [93 %-96 %] 93 %  BP:  (128-142)/(65-75) 128/66     Weight: 74.8 kg (164 lb 15.9 oz)  Body mass index is 29.23 kg/m².    Estimated Creatinine Clearance: 67.3 mL/min (based on SCr of 0.9 mg/dL).    Physical Exam  Constitutional:       General: He is not in acute distress.     Appearance: He is well-developed. He is not diaphoretic.   HENT:      Head: Normocephalic and atraumatic.   Cardiovascular:      Rate and Rhythm: Normal rate and regular rhythm.      Heart sounds: Normal heart sounds. No murmur heard.    No friction rub. No gallop.   Pulmonary:      Effort: Pulmonary effort is normal. No respiratory distress.      Breath sounds: Examination of the left-middle field reveals rales. Examination of the right-lower field reveals rales. Examination of the left-lower field reveals rales. Rales (fine) present. No decreased breath sounds, wheezing or rhonchi.   Abdominal:      General: Bowel sounds are normal. There is no distension.      Palpations: Abdomen is soft. There is no mass.      Tenderness: There is no abdominal tenderness. There is no guarding or rebound.   Skin:     General: Skin is warm and dry.   Neurological:      Mental Status: He is alert and oriented to person, place, and time.   Psychiatric:         Behavior: Behavior normal.       Significant Labs: Blood Culture:   Recent Labs   Lab 12/06/21  1350 12/06/21  1446 03/08/22  1807 03/08/22  1808   LABBLOO No growth after 5 days. No growth after 5 days. No Growth to date  No Growth to date  No Growth to date  No Growth to date  No Growth to date No Growth to date  No Growth to date  No Growth to date  No Growth to date  No Growth to date     CBC:   Recent Labs   Lab 03/12/22 0517 03/13/22 0522   WBC 15.09* 16.74*   HGB 10.9* 10.5*   HCT 38.2* 35.0*    472*     CMP:   Recent Labs   Lab 03/12/22 0517 03/13/22 0522    139   K 4.0 4.0    105   CO2 23 23   GLU 95 94   BUN 19 21   CREATININE 0.9 0.9   CALCIUM 9.4 9.7   ANIONGAP 9 11   EGFRNONAA >60.0  >60.0     Respiratory Culture:   Recent Labs   Lab 01/13/22  2200 03/08/22  2152   GSRESP <10 epithelial cells per low power field.  Few WBC's  Few Gram positive cocci  Few Gram negative rods <10 epithelial cells per low power field.  Many WBC's  Many Gram negative rods  Rare Gram positive cocci  Rare yeast   RESPIRATORYC No S aureus isolated.  PSEUDOMONAS AERUGINOSA  Moderate  Normal respiratory lane also present  * PSEUDOMONAS ALCALIGENES/PSEUDOALCALIGENES  Moderate  *  METHICILLIN RESISTANT STAPHYLOCOCCUS AUREUS  Rare  Normal respiratory lane also present  *     All pertinent labs within the past 24 hours have been reviewed.    Significant Imaging: I have reviewed all pertinent imaging results/findings within the past 24 hours.  CT Chest Without Contrast [[id number]] Resulted: 03/10/22 1340   Order Status: Completed Updated: 03/10/22 1342   Narrative:     EXAMINATION:   CT CHEST WITHOUT CONTRAST     CLINICAL HISTORY:   Pneumonia, unresolved;COPD history;     TECHNIQUE:   Low dose axial images, sagittal and coronal reformations were obtained from the thoracic inlet to the lung bases. Contrast was not administered.     COMPARISON:   Prior     FINDINGS:   No intravenous contrast was administered for this examination.  Therefore, it may have diminished sensitivity for detection of certain abnormalities.     Thyroid gland: Within normal limits.     Trachea: Within normal limits.     Esophagus: Small hiatal hernia.     Cardiovascular: Normal heart size.No pericardial effusion.Diminished attenuation of the blood pool suggest lower than normal hematocrit levels.  There is mild calcific disease of the aorta and coronary arteries.     Lymph nodes: None abnormally enlarged.     Lungs/pleura/airways: Bilateral, apical bronchus centric mass-like areas of consolidation.  Elsewhere, there are bilateral patchy ground-glass opacities predominantly in the upper to mid lung zones as well as scattered areas of tree-in-bud  nodular opacities.     There is right middle lobe, lingular, bilateral lower lobe bronchiectasis with bronchial wall thickening, mucoid impaction, and left greater than right basilar small airways disease.  There is a focal consolidative opacity in the right lower lobe posterior segment.     The pulmonary attenuation is mosaic, consistent with air trapping.     Upper abdomen: A few hepatic low-density lesions, the largest in the right posterior segment up to 1.4 cm, favor cysts versus hemangiomas.  There are few bilateral ovoid low-density lesions, too small to characterize, but likely to represent cysts.  Stable 1.8 cm left adrenal nodule consistent with adenoma.     Bones: Unremarkable for stated age.     Other: N/A    Impression:       1.  Bilateral broncho-centric consolidative opacities.  Although they have a masslike appearance, in the presence of other ground-glass opacities, medium and small airways disease favors infectious/inflammatory etiology.     2.  There is a background of middle lobe, lingular, and bilateral lower lobe bronchiectasis with bronchial wall thickening and mucoid impaction.  These are also present on the prior examination, but they appear to be increased pole in both severity and extent.     3.  No intraparenchymal abscess or empyema identified.       Electronically signed by: Sylvia Sneed   Date: 03/10/2022   Time: 13:40   X-Ray Chest 1 View [[id number]] Resulted: 03/08/22 1656   Order Status: Completed Updated: 03/08/22 1658   Narrative:     EXAMINATION:   XR CHEST 1 VIEW     CLINICAL HISTORY:   shortness of breath;     TECHNIQUE:   Single frontal view of the chest was performed.     COMPARISON:   01/24/2022     FINDINGS:   Extensive bilateral upper lobe airspace disease is present that is new when compared to 01/24/2022.  Similar findings were seen in both lungs on 12/06/2021 and 01/12/2022 with a slightly different distribution.  The pulmonary vasculature is prominent and indistinct.   No pleural effusion.  Heart size is unchanged.    Impression:       Bilateral upper lobe airspace disease, to a similar degree as seen on prior chest radiographs with a different distribution.  This could be secondary to multifocal pneumonia versus pulmonary edema.       Electronically signed by: Jessica Cunningham   Date: 03/08/2022   Time: 16:56       Imaging History    2022  Date Procedure Name Status Accession Number Location   03/10/22 11:55 AM CT Chest Without Contrast Final [id number] [facility]   03/08/22 04:40 PM X-Ray Chest 1 View Final [id number] [facility]

## 2022-03-13 NOTE — ASSESSMENT & PLAN NOTE
72 yo M with moderate COPD, chronic pansinusitis, asthma with COPD, recent COVID infection in January 2022 and before that Jan 2021,  HLD, Prediabetes presents for worsening FARLEY, SOB for one week. Associated symptoms of increasing cough and greenish sputum production.     Patient with Hypoxic Respiratory failure which is Acute.  he is not on home oxygen. Supplemental oxygen was provided and noted-  .   Signs/symptoms of respiratory failure include- tachypnea and increased work of breathing. Contributing diagnoses includes - COPD and Pneumonia Labs and images were reviewed. Patient Has not had a recent ABG. Will treat underlying causes and adjust management of respiratory failure as follows    Most likely secondary to multifocal PNA + COPD exacerbation    DDx: environmental exposure (given hx of asthma and occupation as , though CBC differentials is not significant for eosinophilia) vs post COVID pulmonary fibrosis (though less likely). PE is also possible but less likely as pt did have recent COVID infx, d-dimer at that time was 4.47, though pt is mobile, no current active cancer, no significant unilateral leg swelling. HF is less likely given euvolemic on exam, no prior hx of HF, and BNP is wnl.     CT Chest 3/10 showed BL bronchocentric consolidative opacities favoring infectious/inflammatory etiology. Also showed middle lobe, lingular, BL LL bronchiectasis with bronchial wall thickening and mucoid impaction that have become more more severe compared to prior imaging.     Plan   - Initially on 2L NC, now successfully weaned to RA.   - S/p methypred in the ED  - Completed 5 day course of Prednisone 40mg PO  - Given increased sputum production, SOB, antibiotics initiated. Cefepime (Hx of pseudomonas PNA)/Azithromycin (for atypical coverage). Vancomycin added 3/11 to cover for MRSA  - Respiratory cultures growing Pseudomonas and MRSA  - ID consulted to assist in decision making regarding discharge  antibiotics - Once 5 days cefepime complete, patient can be discharged with Doxy to complete 7 day course (last dose 3/17)  - D-Dimer elevated, however WNL once adjusted for age. Will not pursue CTA at this time.   - BCx with NGTD x5D  - Continue home inhaler: fluticasone-vilanterol 1 puff QD (symbicort is not in formulary)   - Duonebs q4h while awake and prn  - Incentive Spirometry  - Continuous oxygen monitoring with supplement O2 for goal sat 88% - 92%   - Will need PFTs upon discharge  - Close follow up with Pulmonology scheduled 3/21  - Will refer to pulmonary rehab outpatient

## 2022-03-13 NOTE — SUBJECTIVE & OBJECTIVE
Interval History: NAEON. Afebrile. VSS. Resp cx growing MRSA and cipro resistant pseudomonas. As such ID recommends keeping patient here today for last day of cefepime and discharge tomorrow on Doxy to complete 7 days (last dose 3/17). Patient continues to feel well/no complaints. Saturating well on room air.     Review of Systems   Constitutional:  Negative for appetite change, chills and fever.   HENT:  Negative for sore throat.    Respiratory:  Negative for cough, shortness of breath and wheezing.    Cardiovascular:  Negative for chest pain and palpitations.   Gastrointestinal:  Negative for abdominal distention, abdominal pain, constipation, diarrhea, nausea and vomiting.   Endocrine: Negative for polydipsia and polyuria.   Genitourinary:  Negative for dysuria, hematuria and urgency.   Musculoskeletal:  Negative for back pain and gait problem.   Neurological:  Negative for weakness and numbness.   Psychiatric/Behavioral:  The patient is not nervous/anxious.    Objective:     Vital Signs (Most Recent):  Temp: 96.6 °F (35.9 °C) (03/13/22 0759)  Pulse: 81 (03/13/22 0759)  Resp: 18 (03/13/22 0759)  BP: 137/75 (03/13/22 0759)  SpO2: 95 % (03/13/22 0849) Vital Signs (24h Range):  Temp:  [96.2 °F (35.7 °C)-97.3 °F (36.3 °C)] 96.6 °F (35.9 °C)  Pulse:  [63-81] 81  Resp:  [16-18] 18  SpO2:  [94 %-96 %] 95 %  BP: (131-142)/(65-75) 137/75     Weight: 74.8 kg (164 lb 15.9 oz)  Body mass index is 29.23 kg/m².    Intake/Output Summary (Last 24 hours) at 3/13/2022 1209  Last data filed at 3/12/2022 1300  Gross per 24 hour   Intake 222 ml   Output 250 ml   Net -28 ml      Physical Exam  Constitutional:       General: He is not in acute distress.     Appearance: Normal appearance. He is not ill-appearing.   HENT:      Head: Normocephalic and atraumatic.      Nose: Nose normal.   Eyes:      General:         Right eye: No discharge.         Left eye: No discharge.      Conjunctiva/sclera: Conjunctivae normal.   Neck:       Comments: No JVD  Cardiovascular:      Rate and Rhythm: Normal rate and regular rhythm.      Heart sounds: No murmur heard.  Pulmonary:      Effort: Pulmonary effort is normal.      Breath sounds: No wheezing or rales.      Comments: Comfortable on RA. No increased WOB  + Bibasilar crackles  Abdominal:      General: Abdomen is flat. There is no distension.      Palpations: Abdomen is soft.      Tenderness: There is no abdominal tenderness.   Musculoskeletal:         General: No swelling. Normal range of motion.      Cervical back: Normal range of motion.      Right lower leg: No edema.      Left lower leg: No edema.   Skin:     General: Skin is warm and dry.      Capillary Refill: Capillary refill takes less than 2 seconds.   Neurological:      General: No focal deficit present.      Mental Status: He is alert and oriented to person, place, and time.   Psychiatric:         Mood and Affect: Mood normal.       Significant Labs: All pertinent labs within the past 24 hours have been reviewed.  CBC:   Recent Labs   Lab 03/12/22  0517 03/13/22  0522   WBC 15.09* 16.74*   HGB 10.9* 10.5*   HCT 38.2* 35.0*    472*     CMP:   Recent Labs   Lab 03/12/22  0517 03/13/22  0522    139   K 4.0 4.0    105   CO2 23 23   GLU 95 94   BUN 19 21   CREATININE 0.9 0.9   CALCIUM 9.4 9.7   ANIONGAP 9 11   EGFRNONAA >60.0 >60.0       Significant Imaging: I have reviewed all pertinent imaging results/findings within the past 24 hours.

## 2022-03-13 NOTE — PLAN OF CARE
Patient sitting up in bed and denied any acute distress, call button and other personal items within reach.  Problem: Adult Inpatient Plan of Care  Goal: Plan of Care Review  Outcome: Ongoing, Progressing  Goal: Patient-Specific Goal (Individualized)  Outcome: Ongoing, Progressing  Goal: Absence of Hospital-Acquired Illness or Injury  Outcome: Ongoing, Progressing  Goal: Optimal Comfort and Wellbeing  Outcome: Ongoing, Progressing  Goal: Readiness for Transition of Care  Outcome: Ongoing, Progressing     Problem: Adjustment to Illness COPD (Chronic Obstructive Pulmonary Disease)  Goal: Optimal Chronic Illness Coping  Outcome: Ongoing, Progressing     Problem: Functional Ability Impaired COPD (Chronic Obstructive Pulmonary Disease)  Goal: Optimal Level of Functional [place]  Outcome: Ongoing, Progressing     Problem: Infection COPD (Chronic Obstructive Pulmonary Disease)  Goal: Absence of Infection Signs and Symptoms  Outcome: Ongoing, Progressing     Problem: Oral Intake Inadequate COPD (Chronic Obstructive Pulmonary Disease)  Goal: Improved Nutrition Intake  Outcome: Ongoing, Progressing     Problem: Respiratory Compromise COPD (Chronic Obstructive Pulmonary Disease)  Goal: Effective Oxygenation and Ventilation  Outcome: Ongoing, Progressing     Problem: Fluid Imbalance (Pneumonia)  Goal: Fluid Balance  Outcome: Ongoing, Progressing     Problem: Infection (Pneumonia)  Goal: Resolution of Infection Signs and Symptoms  Outcome: Ongoing, Progressing     Problem: Respiratory Compromise (Pneumonia)  Goal: Effective Oxygenation and Ventilation  Outcome: Ongoing, Progressing     Problem: Diabetes Comorbidity  Goal: Blood Glucose Level Within Targeted Range  Outcome: Ongoing, Progressing     Problem: Fluid and Electrolyte Imbalance (Acute Kidney Injury/Impairment)  Goal: Fluid and Electrolyte Balance  Outcome: Ongoing, Progressing     Problem: Oral Intake Inadequate (Acute Kidney Injury/Impairment)  Goal: Optimal  Nutrition Intake  Outcome: Ongoing, Progressing     Problem: Renal Function Impairment (Acute Kidney Injury/Impairment)  Goal: Effective Renal Function  Outcome: Ongoing, Progressing

## 2022-03-13 NOTE — PROGRESS NOTES
[facility] Medicine  Progress Note    Patient Name: Scooter Morrissey  MRN: [id number]  Patient Class: IP- Inpatient   Admission Date: 3/8/2022  Length of Stay: 5 days  Attending Physician: Fredrick James MD  Primary Care Provider: Katelynn Rojas MD        Subjective:     Principal Problem:Acute hypoxemic respiratory failure        HPI:  Mr. Morrissey is a 72 yo M with moderate COPD, chronic pansinusitis, asthma with COPD, recent COVID infection in January 2022 and before that Jan 2021,  HLD, Prediabetes presents for worsening FARLEY, SOB for one week. Associated symptoms of increasing cough and greenish sputum production. Per pt, SOB and cough production do not associate food. Sputum production is mostly at night. Per pt, denies any fever, chest pain, chills, nausea, vomiting or diarrhea. Pt denies any sick contact. Per pt, he was sent home with Oxygen since his last admission for COVID PNA in January, he only uses it PRN and have been needing it more. He exerts adherence to his symbicort and albuterol at home. He has quit cigar smoking for about 9 years, and cigarettes since 1975. Prior to this pt was smoking about 2 packs per month.     Lives with wife, and works as a . Pt acknowledges that given hx of asthma, his occupations can exacerbate an attack. Have not been follow up with his pulmonologist Dr. Guerrier since 2017, though he does have an appointment on 3/21 with Dr. Margarito Sanchez, which he hope we could help facilitate to make the appointment sooner.     In the ED, pt was afebrile, , RR 26, /67, sat 85% on RA, 93% with 2 L NC. Labs significant for Hgb 11.3. No leukocytosis, normal creatinine. No significant electrolytes abnormality. BNP 18, Trop wnl. Blood Cultures obtained. CXR with bilaterl upper lobe airspace disease could be secondary to multifocal versus pulmonary edema. Pt was given albuterol, methylprenisolone, zosyn, and vancomycin. Pt is admitted to hospital medicine for  further evaluation and management.       Overview/Hospital Course:  Patient admitted to Hospital Medicine for acute hypoxemic respiratory failure most likely secondary to COPD exacerbation in setting of PNA.  CXR with BL upper lobe airspace disease concerning for multifocal PNA. CT Chest obtained, showed evidence of BL bronchocentric consolidative opacities with bronchiectasis and mucoid impaction. Treated with ABX, prednisone, duonebs and CPT. Initially on Cefepime/Azithro, however Vancomycin was added 3/11 once GPCs grew. Respiratory culture ultimately grew MRSA and Pseudomonas. ID consulted to assist with discharge abx recs, recommended 7 days of Pseudomonal coverage (to be completed during hospitalization, today day 6) and 7 days of MRSA coverage (today day 3 vancomycin, can complete remaining course with doxycycline on discharge, last dose 3/17. Blood cultures remained with no growth. Respiratory status much improved overall, now requiring no oxygen supplementation. Course complicated by NOEMI that resolved with increased PO intake and leukocytosis that was likely exacerbated by prednisone use.      Interval History: NAEON. Afebrile. VSS. Resp cx growing MRSA and cipro resistant pseudomonas. As such ID recommends keeping patient here today for last day of cefepime and discharge tomorrow on Doxy to complete 7 days (last dose 3/17). Patient continues to feel well/no complaints. Saturating well on room air.     Review of Systems   Constitutional:  Negative for appetite change, chills and fever.   HENT:  Negative for sore throat.    Respiratory:  Negative for cough, shortness of breath and wheezing.    Cardiovascular:  Negative for chest pain and palpitations.   Gastrointestinal:  Negative for abdominal distention, abdominal pain, constipation, diarrhea, nausea and vomiting.   Endocrine: Negative for polydipsia and polyuria.   Genitourinary:  Negative for dysuria, hematuria and urgency.   Musculoskeletal:  Negative  for back pain and gait problem.   Neurological:  Negative for weakness and numbness.   Psychiatric/Behavioral:  The patient is not nervous/anxious.    Objective:     Vital Signs (Most Recent):  Temp: 96.6 °F (35.9 °C) (03/13/22 0759)  Pulse: 81 (03/13/22 0759)  Resp: 18 (03/13/22 0759)  BP: 137/75 (03/13/22 0759)  SpO2: 95 % (03/13/22 0849) Vital Signs (24h Range):  Temp:  [96.2 °F (35.7 °C)-97.3 °F (36.3 °C)] 96.6 °F (35.9 °C)  Pulse:  [63-81] 81  Resp:  [16-18] 18  SpO2:  [94 %-96 %] 95 %  BP: (131-142)/(65-75) 137/75     Weight: 74.8 kg (164 lb 15.9 oz)  Body mass index is 29.23 kg/m².    Intake/Output Summary (Last 24 hours) at 3/13/2022 1209  Last data filed at 3/12/2022 1300  Gross per 24 hour   Intake 222 ml   Output 250 ml   Net -28 ml      Physical Exam  Constitutional:       General: He is not in acute distress.     Appearance: Normal appearance. He is not ill-appearing.   HENT:      Head: Normocephalic and atraumatic.      Nose: Nose normal.   Eyes:      General:         Right eye: No discharge.         Left eye: No discharge.      Conjunctiva/sclera: Conjunctivae normal.   Neck:      Comments: No JVD  Cardiovascular:      Rate and Rhythm: Normal rate and regular rhythm.      Heart sounds: No murmur heard.  Pulmonary:      Effort: Pulmonary effort is normal.      Breath sounds: No wheezing or rales.      Comments: Comfortable on RA. No increased WOB  + Bibasilar crackles  Abdominal:      General: Abdomen is flat. There is no distension.      Palpations: Abdomen is soft.      Tenderness: There is no abdominal tenderness.   Musculoskeletal:         General: No swelling. Normal range of motion.      Cervical back: Normal range of motion.      Right lower leg: No edema.      Left lower leg: No edema.   Skin:     General: Skin is warm and dry.      Capillary Refill: Capillary refill takes less than 2 seconds.   Neurological:      General: No focal deficit present.      Mental Status: He is alert and oriented  to person, place, and time.   Psychiatric:         Mood and Affect: Mood normal.       Significant Labs: All pertinent labs within the past 24 hours have been reviewed.  CBC:   Recent Labs   Lab 03/12/22 0517 03/13/22 0522   WBC 15.09* 16.74*   HGB 10.9* 10.5*   HCT 38.2* 35.0*    472*     CMP:   Recent Labs   Lab 03/12/22 0517 03/13/22 0522    139   K 4.0 4.0    105   CO2 23 23   GLU 95 94   BUN 19 21   CREATININE 0.9 0.9   CALCIUM 9.4 9.7   ANIONGAP 9 11   EGFRNONAA >60.0 >60.0       Significant Imaging: I have reviewed all pertinent imaging results/findings within the past 24 hours.      Assessment/Plan:      * Acute hypoxemic respiratory failure  72 yo M with moderate COPD, chronic pansinusitis, asthma with COPD, recent COVID infection in January 2022 and before that Jan 2021,  HLD, Prediabetes presents for worsening FARLEY, SOB for one week. Associated symptoms of increasing cough and greenish sputum production.     Patient with Hypoxic Respiratory failure which is Acute.  he is not on home oxygen. Supplemental oxygen was provided and noted-  .   Signs/symptoms of respiratory failure include- tachypnea and increased work of breathing. Contributing diagnoses includes - COPD and Pneumonia Labs and images were reviewed. Patient Has not had a recent ABG. Will treat underlying causes and adjust management of respiratory failure as follows    Most likely secondary to multifocal PNA + COPD exacerbation    DDx: environmental exposure (given hx of asthma and occupation as , though CBC differentials is not significant for eosinophilia) vs post COVID pulmonary fibrosis (though less likely). PE is also possible but less likely as pt did have recent COVID infx, d-dimer at that time was 4.47, though pt is mobile, no current active cancer, no significant unilateral leg swelling. HF is less likely given euvolemic on exam, no prior hx of HF, and BNP is wnl.     CT Chest 3/10 showed BL  bronchocentric consolidative opacities favoring infectious/inflammatory etiology. Also showed middle lobe, lingular, BL LL bronchiectasis with bronchial wall thickening and mucoid impaction that have become more more severe compared to prior imaging.     Plan   - Initially on 2L NC, now successfully weaned to RA.   - S/p methypred in the ED  - Completed 5 day course of Prednisone 40mg PO  - Given increased sputum production, SOB, antibiotics initiated. Cefepime (Hx of pseudomonas PNA)/Azithromycin (for atypical coverage). Vancomycin added 3/11 to cover for MRSA  - Respiratory cultures growing Pseudomonas and MRSA  - ID consulted to assist in decision making regarding discharge antibiotics - Once 7 days (zosyn+)cefepime complete, patient can be discharged with Doxy to complete 7 day course (last dose 3/17)  - D-Dimer elevated, however WNL once adjusted for age. Will not pursue CTA at this time.   - BCx with NGTD x5D  - Continue home inhaler: fluticasone-vilanterol 1 puff QD (symbicort is not in formulary)   - Duonebs q4h while awake and prn  - Incentive Spirometry  - Continuous oxygen monitoring with supplement O2 for goal sat 88% - 92%   - Will need PFTs upon discharge  - Close follow up with Pulmonology scheduled 3/21  - Will refer to pulmonary rehab outpatient     NOEMI (acute kidney injury)  NOEMI noted on today's labs, Cr bump to 1.4 from 1.1. Likely 2/2 dehydration as patient with poor PO intake.    - RESOLVED with increased PO intake  - Continue to encourage hydration       Multifocal pneumonia  See acute hypoxemic respiratory failure       Personal history of COVID-19  Long term effects of Covid likely contributing to respiratory status.     Asthma with COPD  - See COPD exacerbation       Type 2 diabetes mellitus  -Last A1c reviewed-   Lab Results   Component Value Date    HGBA1C 6.0 (H) 01/24/2022       Home Antihyperglycemic Regiment:  - None     Inpatient Antihyperglycemic Regiment:  Antihyperglycemics (From  admission, onward)            Start     Stop Route Frequency Ordered    03/08/22 2031  insulin aspart U-100 pen 0-5 Units         -- SubQ Before meals & nightly PRN 03/08/22 1931        - Insulin regimen as above  - LDSSI with POCT accuchecks ACHS  - Diabetic nutritional counseling given   - POCT ACQHS  - May need Metformin on discharge vs trial of lifestyle changes first.         Anemia  Admit with hemoglobin 11.3 Baseline 11-12      Lab Results   Component Value Date    IRON 89 05/01/2021    TIBC 324 05/01/2021    FERRITIN 1,038 (H) 01/12/2022     Lab Results   Component Value Date    FOLATE 15.2 03/09/2022     Lab Results   Component Value Date    [id number] 590 01/24/2022     Folate and B12 WNL  Reticulocytes c/w hypoproliferation  Likely secondary to anemia of chronic disease     Stable     Plan:   - Daily CBC   - Transfuse hgb <7         Chronic pansinusitis  Resume home cetirizine and fluticasone       Vitamin D deficiency  - Continue home vitamin D and vitamin supplement       COPD exacerbation  See acute hypoxemic respiratory failure       Anxiety  Atarax Prn       H/O pleural empyema        Lung granuloma  Seen on chest CT dated 04/13/16  - Not seen on repeat Chest CT 3/10      Hyperlipidemia  Lipid profile on   Lab Results   Component Value Date    LDLCALC 105.6 01/24/2022    HDL 36 (L) 01/24/2022    TRIG 82 01/24/2022    CHOL 158 01/24/2022     The 10-year ASCVD risk score (Lily MARTINEZ Jr., et al., 2013) is: 29.5%    Values used to calculate the score:      Age: 71 years      Sex: Male      Is Non- : Yes      Diabetic: Yes      Tobacco smoker: No      Systolic Blood Pressure: 113 mmHg      Is BP treated: Yes      HDL Cholesterol: 36 mg/dL      Total Cholesterol: 158 mg/dL    - Currently taking - atorvastatin 20 mg at home; will increase to atorvastatin 40 mg  - Reports compliance with hyperlipidemia treatment as prescribed  - Denies adverse effects of medications        Overweight  (BMI 25.0-29.9)  - encouraged to continue walking 30-40 mins/day at least 3-4 days /week  - emphasis placed on changing diet (for lowering cholesterol & for weight loss)          Essential hypertension  Home regimen: amlodipine 10 mg QD, losartan 100 mg QD, toprol 100 mg QD     Plan:   - home meds resumed        VTE Risk Mitigation (From admission, onward)         Ordered     enoxaparin injection 40 mg  Daily         03/08/22 1924     IP VTE HIGH RISK PATIENT  Once         03/08/22 1924     Place sequential compression device  Until discontinued         03/08/22 1924                Discharge Planning   KATE: 3/14/2022     Code Status: Full Code   Is the patient medically ready for discharge?: No    Reason for patient still in hospital (select all that apply): Treatment      Discharge Delays: None known at this time      Ly Waldrop MD  Department of Hospital Medicine   [facility]

## 2022-03-13 NOTE — ASSESSMENT & PLAN NOTE
70 yo male with underlying COPD/emphysema and COVID x 2 pw increased SOB/FARLEY/decreased O2 sats and CT chest cf PNA vs COPD exacerbation vs both with sputum CX with MRSA and GNR pending sensitivities. Had pseudomonas on sputum cx Jan 2022. On  vanc and cefepime and steroids (suspect cause of leukocytosis). Blood cultures NGTD.  Clinically improved and stable with O2 sats 93-96% RA.   Resp CX with MRSA and pseudomonas alcaligenes - cipro resistant (levaquin sensitive but not reliable given cipro resitant)    Plan:  1. Continue vanc (on day 3) and cefepime (on day 6) for now - rec complete 7d of cefepime and 7d of vanc as inpt and doxy on dc  2. Contact precautions given MRSA  3. Discussed with ID staff - will follow  4. Plan communicated to primary team

## 2022-03-14 VITALS
HEART RATE: 86 BPM | TEMPERATURE: 97 F | BODY MASS INDEX: 29.23 KG/M2 | RESPIRATION RATE: 18 BRPM | DIASTOLIC BLOOD PRESSURE: 66 MMHG | WEIGHT: 165 LBS | HEIGHT: 63 IN | SYSTOLIC BLOOD PRESSURE: 125 MMHG | OXYGEN SATURATION: 93 %

## 2022-03-14 PROBLEM — E55.9 VITAMIN D DEFICIENCY: Status: RESOLVED | Noted: 2017-05-12 | Resolved: 2022-03-14

## 2022-03-14 PROBLEM — J96.01 ACUTE HYPOXEMIC RESPIRATORY FAILURE: Status: RESOLVED | Noted: 2022-01-12 | Resolved: 2022-03-14

## 2022-03-14 PROBLEM — J30.89 NON-SEASONAL ALLERGIC RHINITIS: Status: RESOLVED | Noted: 2021-04-30 | Resolved: 2022-03-14

## 2022-03-14 PROBLEM — J32.4 CHRONIC PANSINUSITIS: Chronic | Status: RESOLVED | Noted: 2019-04-12 | Resolved: 2022-03-14

## 2022-03-14 LAB
ANION GAP SERPL CALC-SCNC: 8 MMOL/L (ref 8–16)
BASOPHILS # BLD AUTO: 0.07 K/UL (ref 0–0.2)
BASOPHILS NFR BLD: 0.4 % (ref 0–1.9)
BUN SERPL-MCNC: 26 MG/DL (ref 8–23)
CALCIUM SERPL-MCNC: 9 MG/DL (ref 8.7–10.5)
CHLORIDE SERPL-SCNC: 107 MMOL/L (ref 95–110)
CO2 SERPL-SCNC: 23 MMOL/L (ref 23–29)
CREAT SERPL-MCNC: 1 MG/DL (ref 0.5–1.4)
DIFFERENTIAL METHOD: ABNORMAL
EOSINOPHIL # BLD AUTO: 0.1 K/UL (ref 0–0.5)
EOSINOPHIL NFR BLD: 0.8 % (ref 0–8)
ERYTHROCYTE [DISTWIDTH] IN BLOOD BY AUTOMATED COUNT: 17.5 % (ref 11.5–14.5)
EST. GFR  (AFRICAN AMERICAN): >60 ML/MIN/1.73 M^2
EST. GFR  (NON AFRICAN AMERICAN): >60 ML/MIN/1.73 M^2
GLUCOSE SERPL-MCNC: 88 MG/DL (ref 70–110)
HCT VFR BLD AUTO: 35 % (ref 40–54)
HGB BLD-MCNC: 10.4 G/DL (ref 14–18)
IMM GRANULOCYTES # BLD AUTO: 0.77 K/UL (ref 0–0.04)
IMM GRANULOCYTES NFR BLD AUTO: 4.5 % (ref 0–0.5)
LYMPHOCYTES # BLD AUTO: 2.5 K/UL (ref 1–4.8)
LYMPHOCYTES NFR BLD: 14.9 % (ref 18–48)
MAGNESIUM SERPL-MCNC: 1.8 MG/DL (ref 1.6–2.6)
MCH RBC QN AUTO: 29.5 PG (ref 27–31)
MCHC RBC AUTO-ENTMCNC: 29.7 G/DL (ref 32–36)
MCV RBC AUTO: 99 FL (ref 82–98)
MONOCYTES # BLD AUTO: 1.4 K/UL (ref 0.3–1)
MONOCYTES NFR BLD: 7.9 % (ref 4–15)
NEUTROPHILS # BLD AUTO: 12.2 K/UL (ref 1.8–7.7)
NEUTROPHILS NFR BLD: 71.5 % (ref 38–73)
NRBC BLD-RTO: 1 /100 WBC
PHOSPHATE SERPL-MCNC: 3.6 MG/DL (ref 2.7–4.5)
PLATELET # BLD AUTO: 454 K/UL (ref 150–450)
PMV BLD AUTO: 9.9 FL (ref 9.2–12.9)
POTASSIUM SERPL-SCNC: 3.8 MMOL/L (ref 3.5–5.1)
RBC # BLD AUTO: 3.52 M/UL (ref 4.6–6.2)
SODIUM SERPL-SCNC: 138 MMOL/L (ref 136–145)
VANCOMYCIN TROUGH SERPL-MCNC: 7.9 UG/ML (ref 10–22)
WBC # BLD AUTO: 17.05 K/UL (ref 3.9–12.7)

## 2022-03-14 PROCEDURE — 99233 PR SUBSEQUENT HOSPITAL CARE,LEVL III: ICD-10-PCS | Mod: HCNC,,, | Performed by: PHYSICIAN ASSISTANT

## 2022-03-14 PROCEDURE — 85025 COMPLETE CBC W/AUTO DIFF WBC: CPT | Mod: HCNC | Performed by: STUDENT IN AN ORGANIZED HEALTH CARE EDUCATION/TRAINING PROGRAM

## 2022-03-14 PROCEDURE — 1111F PR DISCHARGE MEDS RECONCILED W/ CURRENT OUTPATIENT MED LIST: ICD-10-PCS | Mod: HCNC,CPTII,GC, | Performed by: HOSPITALIST

## 2022-03-14 PROCEDURE — 25000242 PHARM REV CODE 250 ALT 637 W/ HCPCS: Mod: HCNC | Performed by: STUDENT IN AN ORGANIZED HEALTH CARE EDUCATION/TRAINING PROGRAM

## 2022-03-14 PROCEDURE — 63600175 PHARM REV CODE 636 W HCPCS: Mod: HCNC | Performed by: STUDENT IN AN ORGANIZED HEALTH CARE EDUCATION/TRAINING PROGRAM

## 2022-03-14 PROCEDURE — 36415 COLL VENOUS BLD VENIPUNCTURE: CPT | Mod: HCNC | Performed by: STUDENT IN AN ORGANIZED HEALTH CARE EDUCATION/TRAINING PROGRAM

## 2022-03-14 PROCEDURE — 94761 N-INVAS EAR/PLS OXIMETRY MLT: CPT | Mod: HCNC,ER

## 2022-03-14 PROCEDURE — 80202 ASSAY OF VANCOMYCIN: CPT | Mod: HCNC | Performed by: HOSPITALIST

## 2022-03-14 PROCEDURE — 99239 PR HOSPITAL DISCHARGE DAY,>30 MIN: ICD-10-PCS | Mod: HCNC,GC,, | Performed by: HOSPITALIST

## 2022-03-14 PROCEDURE — 84100 ASSAY OF PHOSPHORUS: CPT | Mod: HCNC | Performed by: STUDENT IN AN ORGANIZED HEALTH CARE EDUCATION/TRAINING PROGRAM

## 2022-03-14 PROCEDURE — 1111F DSCHRG MED/CURRENT MED MERGE: CPT | Mod: HCNC,CPTII,GC, | Performed by: HOSPITALIST

## 2022-03-14 PROCEDURE — 83735 ASSAY OF MAGNESIUM: CPT | Mod: HCNC | Performed by: STUDENT IN AN ORGANIZED HEALTH CARE EDUCATION/TRAINING PROGRAM

## 2022-03-14 PROCEDURE — 99233 SBSQ HOSP IP/OBS HIGH 50: CPT | Mod: HCNC,,, | Performed by: PHYSICIAN ASSISTANT

## 2022-03-14 PROCEDURE — 99239 HOSP IP/OBS DSCHRG MGMT >30: CPT | Mod: HCNC,GC,, | Performed by: HOSPITALIST

## 2022-03-14 PROCEDURE — 94640 AIRWAY INHALATION TREATMENT: CPT | Mod: HCNC

## 2022-03-14 PROCEDURE — 94640 AIRWAY INHALATION TREATMENT: CPT | Mod: HCNC,ER

## 2022-03-14 PROCEDURE — 80048 BASIC METABOLIC PNL TOTAL CA: CPT | Mod: HCNC | Performed by: STUDENT IN AN ORGANIZED HEALTH CARE EDUCATION/TRAINING PROGRAM

## 2022-03-14 PROCEDURE — 36415 COLL VENOUS BLD VENIPUNCTURE: CPT | Mod: HCNC | Performed by: HOSPITALIST

## 2022-03-14 PROCEDURE — 25000003 PHARM REV CODE 250: Mod: HCNC | Performed by: STUDENT IN AN ORGANIZED HEALTH CARE EDUCATION/TRAINING PROGRAM

## 2022-03-14 PROCEDURE — 63600175 PHARM REV CODE 636 W HCPCS: Mod: HCNC | Performed by: HOSPITALIST

## 2022-03-14 RX ORDER — VANCOMYCIN HCL IN 5 % DEXTROSE 1G/250ML
15 PLASTIC BAG, INJECTION (ML) INTRAVENOUS
Status: COMPLETED | OUTPATIENT
Start: 2022-03-14 | End: 2022-03-14

## 2022-03-14 RX ORDER — MUPIROCIN 20 MG/G
OINTMENT TOPICAL DAILY
Status: DISCONTINUED | OUTPATIENT
Start: 2022-03-14 | End: 2022-03-14 | Stop reason: HOSPADM

## 2022-03-14 RX ORDER — DOXYCYCLINE HYCLATE 100 MG
100 TABLET ORAL 2 TIMES DAILY
Status: DISCONTINUED | OUTPATIENT
Start: 2022-03-15 | End: 2022-03-14 | Stop reason: HOSPADM

## 2022-03-14 RX ORDER — DOXYCYCLINE HYCLATE 100 MG
100 TABLET ORAL 2 TIMES DAILY
Qty: 6 TABLET | Refills: 0 | Status: SHIPPED | OUTPATIENT
Start: 2022-03-15 | End: 2022-03-18

## 2022-03-14 RX ORDER — ATORVASTATIN CALCIUM 40 MG/1
40 TABLET, FILM COATED ORAL NIGHTLY
Qty: 90 TABLET | Refills: 3
Start: 2022-03-14 | End: 2022-06-10 | Stop reason: SDUPTHER

## 2022-03-14 RX ADMIN — Medication 500 MG: at 08:03

## 2022-03-14 RX ADMIN — VANCOMYCIN HYDROCHLORIDE 500 MG: 500 INJECTION, POWDER, LYOPHILIZED, FOR SOLUTION INTRAVENOUS at 04:03

## 2022-03-14 RX ADMIN — CETIRIZINE HYDROCHLORIDE 5 MG: 5 TABLET ORAL at 08:03

## 2022-03-14 RX ADMIN — CEFEPIME HYDROCHLORIDE 2 G: 2 INJECTION, SOLUTION INTRAVENOUS at 07:03

## 2022-03-14 RX ADMIN — CEFEPIME HYDROCHLORIDE 2 G: 2 INJECTION, SOLUTION INTRAVENOUS at 03:03

## 2022-03-14 RX ADMIN — AMLODIPINE BESYLATE 10 MG: 10 TABLET ORAL at 08:03

## 2022-03-14 RX ADMIN — VANCOMYCIN HYDROCHLORIDE 1000 MG: 1 INJECTION, POWDER, LYOPHILIZED, FOR SOLUTION INTRAVENOUS at 01:03

## 2022-03-14 RX ADMIN — CEFEPIME HYDROCHLORIDE 2 G: 2 INJECTION, SOLUTION INTRAVENOUS at 12:03

## 2022-03-14 RX ADMIN — THERA TABS 1 TABLET: TAB at 08:03

## 2022-03-14 RX ADMIN — IPRATROPIUM BROMIDE AND ALBUTEROL SULFATE 3 ML: 2.5; .5 SOLUTION RESPIRATORY (INHALATION) at 02:03

## 2022-03-14 RX ADMIN — Medication 2 TABLET: at 08:03

## 2022-03-14 RX ADMIN — METOPROLOL SUCCINATE 100 MG: 100 TABLET, EXTENDED RELEASE ORAL at 08:03

## 2022-03-14 RX ADMIN — ATORVASTATIN CALCIUM 40 MG: 40 TABLET, FILM COATED ORAL at 08:03

## 2022-03-14 RX ADMIN — IPRATROPIUM BROMIDE AND ALBUTEROL SULFATE 3 ML: 2.5; .5 SOLUTION RESPIRATORY (INHALATION) at 08:03

## 2022-03-14 RX ADMIN — LOSARTAN POTASSIUM 100 MG: 50 TABLET, FILM COATED ORAL at 08:03

## 2022-03-14 NOTE — PLAN OF CARE
Pt stable overnight, no acute events or changes. Pt pleasant and involved with care.     Problem: Adult Inpatient Plan of Care  Goal: Plan of Care Review  Outcome: Ongoing, Progressing  Goal: Patient-Specific Goal (Individualized)  Outcome: Ongoing, Progressing  Goal: Absence of Hospital-Acquired Illness or Injury  Outcome: Ongoing, Progressing  Goal: Optimal Comfort and Wellbeing  Outcome: Ongoing, Progressing  Goal: Readiness for Transition of Care  Outcome: Ongoing, Progressing     Problem: Adjustment to Illness COPD (Chronic Obstructive Pulmonary Disease)  Goal: Optimal Chronic Illness Coping  Outcome: Ongoing, Progressing     Problem: Functional Ability Impaired COPD (Chronic Obstructive Pulmonary Disease)  Goal: Optimal Level of Functional [place]  Outcome: Ongoing, Progressing     Problem: Infection COPD (Chronic Obstructive Pulmonary Disease)  Goal: Absence of Infection Signs and Symptoms  Outcome: Ongoing, Progressing     Problem: Oral Intake Inadequate COPD (Chronic Obstructive Pulmonary Disease)  Goal: Improved Nutrition Intake  Outcome: Ongoing, Progressing     Problem: Respiratory Compromise COPD (Chronic Obstructive Pulmonary Disease)  Goal: Effective Oxygenation and Ventilation  Outcome: Ongoing, Progressing     Problem: Fluid Imbalance (Pneumonia)  Goal: Fluid Balance  Outcome: Ongoing, Progressing     Problem: Infection (Pneumonia)  Goal: Resolution of Infection Signs and Symptoms  Outcome: Ongoing, Progressing     Problem: Respiratory Compromise (Pneumonia)  Goal: Effective Oxygenation and Ventilation  Outcome: Ongoing, Progressing     Problem: Diabetes Comorbidity  Goal: Blood Glucose Level Within Targeted Range  Outcome: Ongoing, Progressing     Problem: Fluid and Electrolyte Imbalance (Acute Kidney Injury/Impairment)  Goal: Fluid and Electrolyte Balance  Outcome: Ongoing, Progressing     Problem: Oral Intake Inadequate (Acute Kidney Injury/Impairment)  Goal: Optimal Nutrition  Intake  Outcome: Ongoing, Progressing     Problem: Renal Function Impairment (Acute Kidney Injury/Impairment)  Goal: Effective Renal Function  Outcome: Ongoing, Progressing

## 2022-03-14 NOTE — RESPIRATORY THERAPY
RAPID RESPONSE RESPIRATORY CHART CHECK       Chart check completed,    instructed to call [phone] for further concerns or assistance.

## 2022-03-14 NOTE — ASSESSMENT & PLAN NOTE
Lipid profile on   Lab Results   Component Value Date    LDLCALC 105.6 01/24/2022    HDL 36 (L) 01/24/2022    TRIG 82 01/24/2022    CHOL 158 01/24/2022     The 10-year ASCVD risk score (Lily MARTINEZ Jr., et al., 2013) is: 43.9%    Values used to calculate the score:      Age: 71 years      Sex: Male      Is Non- : Yes      Diabetic: Yes      Tobacco smoker: No      Systolic Blood Pressure: 147 mmHg      Is BP treated: Yes      HDL Cholesterol: 36 mg/dL      Total Cholesterol: 158 mg/dL    - Increase to Atorvastatin 40 mg, patient can continue with current supply of atorvastatin 20 mg and increases the dose to 2 tablets every day until supply is finished.     - Reports compliance with hyperlipidemia treatment as prescribed  - Denies adverse effects of medications

## 2022-03-14 NOTE — PLAN OF CARE
Noam sent infusion referral to [facility] for long term abx at [place]. Pt accepted by [facility], will follow with [place] and teaching.

## 2022-03-14 NOTE — ASSESSMENT & PLAN NOTE
Lipid profile on   Lab Results   Component Value Date    LDLCALC 105.6 01/24/2022    HDL 36 (L) 01/24/2022    TRIG 82 01/24/2022    CHOL 158 01/24/2022     The 10-year ASCVD risk score (Lily MARTINEZ Jr., et al., 2013) is: 43.9%    Values used to calculate the score:      Age: 71 years      Sex: Male      Is Non- : Yes      Diabetic: Yes      Tobacco smoker: No      Systolic Blood Pressure: 147 mmHg      Is BP treated: Yes      HDL Cholesterol: 36 mg/dL      Total Cholesterol: 158 mg/dL    - Currently taking - atorvastatin 20 mg at home; will increase to atorvastatin 40 mg  - Reports compliance with hyperlipidemia treatment as prescribed  - Denies adverse effects of medications

## 2022-03-14 NOTE — ASSESSMENT & PLAN NOTE
70 yo M with moderate COPD, chronic pansinusitis, asthma with COPD, recent COVID infection in January 2022 and before that Jan 2021,  HLD, Prediabetes presents for worsening FARLEY, SOB for one week. Associated symptoms of increasing cough and greenish sputum production. Most likely secondary to multifocal PNA + COPD exacerbation    CT Chest 3/10 showed BL bronchocentric consolidative opacities favoring infectious/inflammatory etiology. Also showed middle lobe, lingular, BL LL bronchiectasis with bronchial wall thickening and mucoid impaction that have become more more severe compared to prior imaging. Initially on 2L NC, now successfully weaned to RA. S/p methypred in the ED. Completed 5 day course of Prednisone 40mg PO. BCx with NGTD x5D. Given increased sputum production, SOB, antibiotics initiated. Cefepime (Hx of pseudomonas PNA)/Azithromycin (for atypical coverage). Vancomycin added 3/11 to cover for MRSA    Plan   - Respiratory cultures growing Pseudomonas and MRSA   - ID consulted to assist in decision making regarding discharge antibiotics    - 7 days (zosyn+)cefepime complete, patient can be discharged with Doxy to complete 7 day course for MRSA (last dose 3/17)    - Continue doxycyline outpatient (End date: 3/17)  - Close follow up with PCP and Pulmonology scheduled 3/21  - Will refer to pulmonary rehab outpatient

## 2022-03-14 NOTE — PLAN OF CARE
Pt to dc home today with no needs. Has appt with Pulm and [facility] E number to get his o2 tanks filled.

## 2022-03-14 NOTE — ASSESSMENT & PLAN NOTE
Admit with hemoglobin 11.3 Baseline 11-12      Lab Results   Component Value Date    IRON 89 05/01/2021    TIBC 324 05/01/2021    FERRITIN 1,038 (H) 01/12/2022     Lab Results   Component Value Date    FOLATE 15.2 03/09/2022     Lab Results   Component Value Date    [id number] 590 01/24/2022     Folate and B12 WNL  Reticulocytes c/w hypoproliferation  Likely secondary to anemia of chronic disease     Stable     Plan:   - Daily CBC   - Transfuse hgb <7

## 2022-03-14 NOTE — DISCHARGE SUMMARY
[facility]  Discharge Summary      Patient Name: Scooter Morrissey  MRN: [id number]  Patient Class: IP- Inpatient  Admission Date: 3/8/2022  Hospital Length of Stay: 6 days  Discharge Date and Time:  03/14/2022 2:03 PM  Attending Physician: Fredrick James MD   Discharging Provider: Moy Valero MD  Primary Care Provider: Katelynn Rojas MD  Hospital Medicine Team: [facility]OSP MED 3 Moy Valero MD    HPI:   Mr. Morrissey is a 72 yo M with moderate COPD, chronic pansinusitis, asthma with COPD, recent COVID infection in January 2022 and before that Jan 2021,  HLD, Prediabetes presents for worsening FARLEY, SOB for one week. Associated symptoms of increasing cough and greenish sputum production. Per pt, SOB and cough production do not associate food. Sputum production is mostly at night. Per pt, denies any fever, chest pain, chills, nausea, vomiting or diarrhea. Pt denies any sick contact. Per pt, he was sent home with Oxygen since his last admission for COVID PNA in January, he only uses it PRN and have been needing it more. He exerts adherence to his symbicort and albuterol at home. He has quit cigar smoking for about 9 years, and cigarettes since 1975. Prior to this pt was smoking about 2 packs per month.     Lives with wife, and works as a . Pt acknowledges that given hx of asthma, his occupations can exacerbate an attack. Have not been follow up with his pulmonologist Dr. Guerrier since 2017, though he does have an appointment on 3/21 with Dr. Margarito Sanchez, which he hope we could help facilitate to make the appointment sooner.     In the ED, pt was afebrile, , RR 26, /67, sat 85% on RA, 93% with 2 L NC. Labs significant for Hgb 11.3. No leukocytosis, normal creatinine. No significant electrolytes abnormality. BNP 18, Trop wnl. Blood Cultures obtained. CXR with bilaterl upper lobe airspace disease could be secondary to multifocal versus pulmonary edema. Pt was given albuterol,  "methylprenisolone, zosyn, and vancomycin. Pt is admitted to hospital medicine for further evaluation and management.       * No surgery found *      Hospital Course:   Patient was admitted to Hospital Medicine for acute hypoxemic respiratory failure most likely secondary to COPD exacerbation in setting of PNA.  CXR with BL upper lobe airspace disease concerning for multifocal PNA. CT Chest showed evidence of BL bronchocentric consolidative opacities with bronchiectasis and mucoid impaction. He was treated with ABX, prednisone, duonebs and CPT. Initially on Cefepime/Azithro, however Vancomycin was added 3/11 once GPCs grew.     Respiratory culture ultimately grew MRSA and Pseudomonas. ID consulted to assist with discharge abx recs, recommended 7 days of Pseudomonal coverage with cefepime (to be completed during hospitalization, end date 3/14) and 7 days of MRSA coverage (vancomycin, can complete remaining course with doxycycline on discharge, last dose 3/17.)  Blood cultures remained with no growth. Respiratory status much improved overall, now requiring no oxygen supplementation. Course complicated by NOEMI that resolved with increased PO intake and leukocytosis that was likely exacerbated by prednisone use. He was discharged with follow up with his PCP and 3 days of doxycycline to finish the MRSA course.       Goals of Care Treatment Preferences:  Code Status: Full Code    BP (!) 147/81 (BP Location: Right arm, Patient Position: Sitting)   Pulse 80   Temp 98.4 °F (36.9 °C) (Oral)   Resp 16   Ht 5' 3" (1.6 m)   Wt 74.8 kg (164 lb 15.9 oz)   SpO2 95%   BMI 29.23 kg/m²       Physical Exam  Constitutional:       General: He is not in acute distress.     Appearance: Normal appearance. He is not ill-appearing.   HENT:      Head: Normocephalic and atraumatic.      Nose: Nose normal.   Eyes:      General:         Right eye: No discharge.         Left eye: No discharge.      Conjunctiva/sclera: Conjunctivae normal. " "  Neck:      Comments: No JVD  Cardiovascular:      Rate and Rhythm: Normal rate and regular rhythm.      Heart sounds: No murmur heard.  Pulmonary:      Effort: Pulmonary effort is normal.      Breath sounds: No wheezing or rales.      Comments: Comfortable on RA. No increased WOB  Abdominal:      General: Abdomen is flat. There is no distension.      Palpations: Abdomen is soft.      Tenderness: There is no abdominal tenderness.   Musculoskeletal:         General: No swelling. Normal range of motion.      Cervical back: Normal range of motion.      Right lower leg: No edema.      Left lower leg: No edema.   Skin:     General: Skin is warm and dry.      Capillary Refill: Capillary refill takes less than 2 seconds.   Neurological:      General: No focal deficit present.      Mental Status: He is alert and oriented to person, place, and time.   Psychiatric:         Mood and Affect: Mood normal.        Consults:   Consults (From admission, onward)        Status Ordering Provider     Inpatient consult to Infectious Diseases  Once        Provider:  (Not yet assigned)    Completed HECTOR MATHIS     Pharmacy to dose Vancomycin consult  Once        Provider:  (Not yet assigned)   "And" Linked Group Details    Acknowledged HECTOR MATHIS          * Multifocal pneumonia  72 yo M with moderate COPD, chronic pansinusitis, asthma with COPD, recent COVID infection in January 2022 and before that Jan 2021,  HLD, Prediabetes presents for worsening FARLEY, SOB for one week. Associated symptoms of increasing cough and greenish sputum production. Most likely secondary to multifocal PNA + COPD exacerbation    CT Chest 3/10 showed BL bronchocentric consolidative opacities favoring infectious/inflammatory etiology. Also showed middle lobe, lingular, BL LL bronchiectasis with bronchial wall thickening and mucoid impaction that have become more more severe compared to prior imaging. Initially on 2L NC, now successfully weaned to RA. S/p " methypred in the ED. Completed 5 day course of Prednisone 40mg PO. BCx with NGTD x5D. Given increased sputum production, SOB, antibiotics initiated. Cefepime (Hx of pseudomonas PNA)/Azithromycin (for atypical coverage). Vancomycin added 3/11 to cover for MRSA    Plan   - Respiratory cultures growing Pseudomonas and MRSA   - ID consulted to assist in decision making regarding discharge antibiotics    - 7 days (zosyn+)cefepime complete, patient can be discharged with Doxy to complete 7 day course for MRSA (last dose 3/17)    - Continue doxycyline outpatient (End date: 3/17)  - Close follow up with PCP and Pulmonology scheduled 3/21  - Will refer to pulmonary rehab outpatient       Hyperlipidemia  Lipid profile on   Lab Results   Component Value Date    LDLCALC 105.6 01/24/2022    HDL 36 (L) 01/24/2022    TRIG 82 01/24/2022    CHOL 158 01/24/2022     The 10-year ASCVD risk score (Lily MARTINEZ Jr., et al., 2013) is: 43.9%    Values used to calculate the score:      Age: 71 years      Sex: Male      Is Non- : Yes      Diabetic: Yes      Tobacco smoker: No      Systolic Blood Pressure: 147 mmHg      Is BP treated: Yes      HDL Cholesterol: 36 mg/dL      Total Cholesterol: 158 mg/dL    - Increase to Atorvastatin 40 mg, patient can continue with current supply of atorvastatin 20 mg and increases the dose to 2 tablets every day until supply is finished.     - Reports compliance with hyperlipidemia treatment as prescribed  - Denies adverse effects of medications        NOEMI (acute kidney injury)  NOEMI noted on today's labs, Cr bump to 1.4 from 1.1. Likely 2/2 dehydration as patient with poor PO intake.  - RESOLVED with increased PO intake  - Continue to encourage hydration         Final Active Diagnoses:    Diagnosis Date Noted POA    PRINCIPAL PROBLEM:  Multifocal pneumonia [J18.9] 12/06/2021 Yes    Hyperlipidemia [E78.5] 04/13/2016 Yes     Chronic    NOEMI (acute kidney injury) [N17.9] 03/09/2022 No     Personal history of COVID-19 [Z86.16] 04/30/2021 Yes    Anemia [D64.9] 03/13/2020 Yes    Type 2 diabetes mellitus [E11.9] 03/12/2019 Yes    Anxiety [F41.9] 06/29/2016 Yes    Essential hypertension [I10] 04/05/2016 Yes     Chronic    Overweight (BMI 25.0-29.9) [E66.3] 04/05/2016 Yes      Problems Resolved During this Admission:    Diagnosis Date Noted Date Resolved POA    Acute hypoxemic respiratory failure [J96.01] 01/12/2022 03/14/2022 Yes    Non-seasonal allergic rhinitis [J30.89] 04/30/2021 03/14/2022 Yes    Asthma with COPD [J44.9]  03/14/2022 Yes    Chronic pansinusitis [J32.4] 04/12/2019 03/14/2022 Yes     Chronic    Vitamin D deficiency [E55.9] 05/12/2017 03/14/2022 Yes    COPD exacerbation [J44.1]  03/14/2022 Yes    Lung granuloma [J84.10] 04/13/2016 03/14/2022 Yes       Discharged Condition: fair    Disposition: Home or Self Care    Follow Up:   Follow-up Information     [facility] Follow up today.    Specialty: DME Provider  Contact information:  [address] [id number] [phone]             Margarito Sanchez MD Follow up in 10 day(s).    Specialties: Pulmonary Disease, Critical Care Medicine, Sleep Medicine  Why: 15:00pm  Contact information:  [address] [id number] [phone]             [facility] Follow up today.    Contact information:  [address] [facility] [id number] [phone]                       Patient Instructions:      Diet Adult Regular     Notify your health care provider if you experience any of the following:  temperature >100.4     Activity as tolerated       Significant Diagnostic Studies: Labs:   CMP   Recent Labs   Lab 03/13/22  0522 03/14/22  0302    138   K 4.0 3.8    107   CO2 23 23   GLU 94 88   BUN 21 26*   CREATININE 0.9 1.0   CALCIUM 9.7 9.0   ANIONGAP 11 8   ESTGFRAFRICA >60.0 >60.0   EGFRNONAA >60.0 >60.0    and CBC   Recent Labs   Lab 03/13/22  9928  03/14/22  0302   WBC 16.74* 17.05*   HGB 10.5* 10.4*   HCT 35.0* 35.0*   * 454*     Microbiology:   Blood Culture   Lab Results   Component Value Date    LABBLOO No growth after 5 days. 03/08/2022    and Sputum Culture   Lab Results   Component Value Date    GSRESP <10 epithelial cells per low power field. 03/08/2022    GSRESP Many WBC's 03/08/2022    GSRESP Many Gram negative rods 03/08/2022    GSRESP Rare Gram positive cocci 03/08/2022    GSRESP Rare yeast 03/08/2022    RESPIRATORYC (A) 03/08/2022     PSEUDOMONAS ALCALIGENES/PSEUDOALCALIGENES  Moderate      RESPIRATORYC (A) 03/08/2022     METHICILLIN RESISTANT STAPHYLOCOCCUS AUREUS  Rare  Normal respiratory lane also present         Pending Diagnostic Studies:     None         Medications:  Reconciled Home Medications:      Medication List      START taking these medications    doxycycline 100 MG tablet  Commonly known as: VIBRA-TABS  Take 1 tablet (100 mg total) by mouth 2 (two) times a day. for 3 days  Start taking on: March 15, 2022        CHANGE how you take these medications    atorvastatin 40 MG tablet  Commonly known as: LIPITOR  Take 1 tablet (40 mg total) by mouth every evening.  What changed:   · medication strength  · how much to take     pulse oximeter device  Commonly known as: pulse oximeter  by Apply Externally route 2 (two) times a day. Use twice daily at 8 AM and 3 PM and record the value in [employer] as directed.  What changed: Another medication with the same name was removed. Continue taking this medication, and follow the directions you see here.        CONTINUE taking these medications    * albuterol 1.25 mg/3 mL Nebu  Commonly known as: ACCUNEB  Take 3 mLs (1.25 mg total) by nebulization every 6 (six) hours as needed (shortness of breath or wheezing). Rescue     * albuterol 90 mcg/actuation inhaler  Commonly known as: VENTOLIN HFA  Inhale 2 puffs into the lungs every 4 (four) hours as needed for Wheezing or Shortness of Breath.  Rescue     amLODIPine 10 MG tablet  Commonly known as: NORVASC  TAKE 1 TABLET BY MOUTH EVERY DAY     ascorbic acid (vitamin C) 500 MG tablet  Commonly known as: VITAMIN C  Take 1 tablet (500 mg total) by mouth 2 (two) times daily.     budesonide-formoterol 160-4.5 mcg 160-4.5 mcg/actuation Hfaa  Commonly known as: SYMBICORT  Inhale 2 puffs into the lungs 2 (two) times daily. Controller     fluticasone propionate 50 mcg/actuation nasal spray  Commonly known as: FLONASE  2 sprays (100 mcg total) by Each Nostril route once daily.     fluticasone-salmeterol 500-50 mcg/dose 500-50 mcg/dose Dsdv diskus inhaler  Commonly known as: ADVAIR  Inhale 1 puff into the lungs 2 (two) times daily. Controller     levocetirizine 5 MG tablet  Commonly known as: XYZAL  Take 1 tablet (5 mg total) by mouth every evening.     losartan 100 MG tablet  Commonly known as: COZAAR  TAKE 1 TABLET(100 MG) BY MOUTH EVERY DAY     metoprolol succinate 100 MG 24 hr tablet  Commonly known as: TOPROL-XL  TAKE 1 TABLET(100 MG) BY MOUTH EVERY DAY     multivitamin with minerals tablet  Take 1 tablet by mouth nightly.     OYSCO 500/D 500 mg-5 mcg (200 unit) per tablet  Generic drug: calcium-vitamin D3  Take 2 tablets by mouth once daily.     vitamin D 1000 units Tab  Commonly known as: VITAMIN D3  Take 1,000 Units by mouth once daily.         * This list has 2 medication(s) that are the same as other medications prescribed for you. Read the directions carefully, and ask your doctor or other care provider to review them with you.                Indwelling Lines/Drains at time of discharge:   Lines/Drains/Airways     None                 Time spent on the discharge of patient: 35 minutes         Moy Valero MD   PGY1  Department of Hospital Medicine  [facility]

## 2022-03-14 NOTE — PROGRESS NOTES
Pharmacokinetic Assessment Follow Up: IV Vancomycin    Vancomycin serum concentration assessment(s):  The trough level was drawn incorrectly but can be used to guide therapy at this time. Trough level drawn 2 hrs early (last dose given on 3/13 @ 14:00)    Vancomycin Regimen Plan:  Plan to discharge home tomorrow on oral doxycycline 100 mg BID   Will increase dose to 1500 mg x 1 dose today   Vancomycin trough goal 15-10  Pharmacy will sign off     Drug levels (last 3 results):  Recent Labs   Lab Result Units 03/14/22  1118   Vancomycin-Trough ug/mL 7.9*       Pharmacy will continue to follow and monitor vancomycin.    Please contact pharmacy at extension [phone] for questions regarding this assessment.    Thank you for the consult,   Berta Navarro       Patient brief summary:  Scooter Morrissey is a 71 y.o. male initiated on antimicrobial therapy with IV Vancomycin for treatment of MRSA PNA     Drug Allergies:   Review of patient's allergies indicates:  No Known Allergies    Renal Function:   Estimated Creatinine Clearance: 60.5 mL/min (based on SCr of 1 mg/dL).,     CBC (last 72 hours):  Recent Labs   Lab Result Units 03/12/22  0517 03/13/22 0522 03/14/22  0302   WBC K/uL 15.09* 16.74* 17.05*   Hemoglobin g/dL 10.9* 10.5* 10.4*   Hematocrit % 38.2* 35.0* 35.0*   Platelets K/uL 410 472* 454*   Gran % % 74.6* 72.5 71.5   Lymph % % 15.2* 16.4* 14.9*   Mono % % 7.3 6.8 7.9   Eosinophil % % 0.4 0.5 0.8   Basophil % % 0.6 0.3 0.4   Differential Method  Automated Automated Automated       Metabolic Panel (last 72 hours):  Recent Labs   Lab Result Units 03/12/22  0517 03/13/22  0522 03/14/22  0302   Sodium mmol/L 138 139 138   Potassium mmol/L 4.0 4.0 3.8   Chloride mmol/L 106 105 107   CO2 mmol/L 23 23 23   Glucose mg/dL 95 94 88   BUN mg/dL 19 21 26*   Creatinine mg/dL 0.9 0.9 1.0   Magnesium mg/dL 1.7 1.8 1.8   Phosphorus mg/dL 3.0 3.3 3.6       Vancomycin Administrations:  vancomycin given in the last 96 hours                    vancomycin in dextrose 5 % 1 gram/250 mL IVPB 1,000 mg (mg) 1,000 mg New Bag 03/14/22 1311    vancomycin in dextrose 5 % 1 gram/250 mL IVPB 1,000 mg (mg) 1,000 mg New Bag 03/13/22 1400     1,000 mg New Bag 03/12/22 1358     1,000 mg New Bag 03/11/22 1440                Microbiologic Results:  Microbiology Results (last 7 days)     Procedure Component Value Units Date/Time    Blood Culture #1 **CANNOT BE ORDERED STAT** [[id number]] Collected: 03/08/22 1808    Order Status: Completed Specimen: Blood from Peripheral, Forearm, Right Updated: 03/13/22 2012     Blood Culture, Routine No growth after 5 days.    Blood Culture #2 **CANNOT BE ORDERED STAT** [[id number]] Collected: 03/08/22 1807    Order Status: Completed Specimen: Blood from Wrist, Left Updated: 03/13/22 2012     Blood Culture, Routine No growth after 5 days.    Culture, Respiratory with Gram Stain [[id number]]  (Abnormal)  (Susceptibility) Collected: 03/08/22 2152    Order Status: Completed Specimen: Respiratory from Sputum Updated: 03/13/22 1038     Respiratory Culture PSEUDOMONAS ALCALIGENES/PSEUDOALCALIGENES  Moderate        METHICILLIN RESISTANT STAPHYLOCOCCUS AUREUS  Rare  Normal respiratory lane also present       Gram Stain (Respiratory) <10 epithelial cells per low power field.     Gram Stain (Respiratory) Many WBC's     Gram Stain (Respiratory) Many Gram negative rods     Gram Stain (Respiratory) Rare Gram positive cocci     Gram Stain (Respiratory) Rare yeast

## 2022-03-14 NOTE — SUBJECTIVE & OBJECTIVE
Interval History: NAEON. Afebrile. VSS. Resp cx growing MRSA and cipro resistant pseudomonas. As such ID recommends keeping patient here today for last day of cefepime and discharge tomorrow on Doxy to complete 7 days (last dose 3/17). Patient continues to feel well/no complaints. Saturating well on room air.     Review of Systems   Constitutional:  Negative for appetite change, chills and fever.   HENT:  Negative for sore throat.    Respiratory:  Negative for cough, shortness of breath and wheezing.    Cardiovascular:  Negative for chest pain and palpitations.   Gastrointestinal:  Negative for abdominal distention, abdominal pain, constipation, diarrhea, nausea and vomiting.   Endocrine: Negative for polydipsia and polyuria.   Genitourinary:  Negative for dysuria, hematuria and urgency.   Musculoskeletal:  Negative for back pain and gait problem.   Neurological:  Negative for weakness and numbness.   Psychiatric/Behavioral:  The patient is not nervous/anxious.    Objective:     Vital Signs (Most Recent):  Temp: 98.4 °F (36.9 °C) (03/14/22 1135)  Pulse: 80 (03/14/22 1135)  Resp: 16 (03/14/22 1135)  BP: (!) 147/81 (03/14/22 1135)  SpO2: 95 % (03/14/22 1135) Vital Signs (24h Range):  Temp:  [96.4 °F (35.8 °C)-98.4 °F (36.9 °C)] 98.4 °F (36.9 °C)  Pulse:  [62-85] 80  Resp:  [16-19] 16  SpO2:  [93 %-96 %] 95 %  BP: (127-157)/(66-81) 147/81     Weight: 74.8 kg (164 lb 15.9 oz)  Body mass index is 29.23 kg/m².  No intake or output data in the 24 hours ending 03/14/22 1205     Physical Exam  Constitutional:       General: He is not in acute distress.     Appearance: Normal appearance. He is not ill-appearing.   HENT:      Head: Normocephalic and atraumatic.      Nose: Nose normal.   Eyes:      General:         Right eye: No discharge.         Left eye: No discharge.      Conjunctiva/sclera: Conjunctivae normal.   Neck:      Comments: No JVD  Cardiovascular:      Rate and Rhythm: Normal rate and regular rhythm.      Heart  sounds: No murmur heard.  Pulmonary:      Effort: Pulmonary effort is normal.      Breath sounds: No wheezing or rales.      Comments: Comfortable on RA. No increased WOB  + Bibasilar crackles  Abdominal:      General: Abdomen is flat. There is no distension.      Palpations: Abdomen is soft.      Tenderness: There is no abdominal tenderness.   Musculoskeletal:         General: No swelling. Normal range of motion.      Cervical back: Normal range of motion.      Right lower leg: No edema.      Left lower leg: No edema.   Skin:     General: Skin is warm and dry.      Capillary Refill: Capillary refill takes less than 2 seconds.   Neurological:      General: No focal deficit present.      Mental Status: He is alert and oriented to person, place, and time.   Psychiatric:         Mood and Affect: Mood normal.       Significant Labs: All pertinent labs within the past 24 hours have been reviewed.  CBC:   Recent Labs   Lab 03/13/22  0522 03/14/22  0302   WBC 16.74* 17.05*   HGB 10.5* 10.4*   HCT 35.0* 35.0*   * 454*       CMP:   Recent Labs   Lab 03/13/22  0522 03/14/22  0302    138   K 4.0 3.8    107   CO2 23 23   GLU 94 88   BUN 21 26*   CREATININE 0.9 1.0   CALCIUM 9.7 9.0   ANIONGAP 11 8   EGFRNONAA >60.0 >60.0         Significant Imaging: I have reviewed all pertinent imaging results/findings within the past 24 hours.

## 2022-03-14 NOTE — PLAN OF CARE
Patient sitting on the edge of the bed and denied any acute distress, call button and other personal items within reach. Per primary ID okayed for patient to dc home tonight after last dose of IV cefepime.    Problem: Adult Inpatient Plan of Care  Goal: Plan of Care Review  Outcome: Ongoing, Progressing  Goal: Patient-Specific Goal (Individualized)  Outcome: Ongoing, Progressing  Goal: Absence of Hospital-Acquired Illness or Injury  Outcome: Ongoing, Progressing  Goal: Optimal Comfort and Wellbeing  Outcome: Ongoing, Progressing  Goal: Readiness for Transition of Care  Outcome: Ongoing, Progressing     Problem: Adjustment to Illness COPD (Chronic Obstructive Pulmonary Disease)  Goal: Optimal Chronic Illness Coping  Outcome: Ongoing, Progressing     Problem: Functional Ability Impaired COPD (Chronic Obstructive Pulmonary Disease)  Goal: Optimal Level of Functional [place]  Outcome: Ongoing, Progressing     Problem: Infection COPD (Chronic Obstructive Pulmonary Disease)  Goal: Absence of Infection Signs and Symptoms  Outcome: Ongoing, Progressing     Problem: Oral Intake Inadequate COPD (Chronic Obstructive Pulmonary Disease)  Goal: Improved Nutrition Intake  Outcome: Ongoing, Progressing     Problem: Respiratory Compromise COPD (Chronic Obstructive Pulmonary Disease)  Goal: Effective Oxygenation and Ventilation  Outcome: Ongoing, Progressing     Problem: Fluid Imbalance (Pneumonia)  Goal: Fluid Balance  Outcome: Ongoing, Progressing     Problem: Infection (Pneumonia)  Goal: Resolution of Infection Signs and Symptoms  Outcome: Ongoing, Progressing     Problem: Respiratory Compromise (Pneumonia)  Goal: Effective Oxygenation and Ventilation  Outcome: Ongoing, Progressing     Problem: Diabetes Comorbidity  Goal: Blood Glucose Level Within Targeted Range  Outcome: Ongoing, Progressing     Problem: Fluid and Electrolyte Imbalance (Acute Kidney Injury/Impairment)  Goal: Fluid and Electrolyte Balance  Outcome: Ongoing,  Progressing     Problem: Oral Intake Inadequate (Acute Kidney Injury/Impairment)  Goal: Optimal Nutrition Intake  Outcome: Ongoing, Progressing     Problem: Renal Function Impairment (Acute Kidney Injury/Impairment)  Goal: Effective Renal Function  Outcome: Ongoing, Progressing     Problem: Infection  Goal: Absence of Infection Signs and Symptoms  Outcome: Ongoing, Progressing

## 2022-03-14 NOTE — DISCHARGE INSTRUCTIONS
Patient can continue to take his supply of Atorvastatin 20mg and increase his dose to 2 tablets (40mg) every day until his supply runs out.

## 2022-03-14 NOTE — PROGRESS NOTES
[facility] Medicine  Progress Note    Patient Name: Scooter Morrissey  MRN: [id number]  Patient Class: IP- Inpatient   Admission Date: 3/8/2022  Length of Stay: 6 days  Attending Physician: Fredrick James MD  Primary Care Provider: Katelynn Rojas MD        Subjective:     Principal Problem:Acute hypoxemic respiratory failure        HPI:  Mr. Morrissey is a 72 yo M with moderate COPD, chronic pansinusitis, asthma with COPD, recent COVID infection in January 2022 and before that Jan 2021,  HLD, Prediabetes presents for worsening FARLEY, SOB for one week. Associated symptoms of increasing cough and greenish sputum production. Per pt, SOB and cough production do not associate food. Sputum production is mostly at night. Per pt, denies any fever, chest pain, chills, nausea, vomiting or diarrhea. Pt denies any sick contact. Per pt, he was sent home with Oxygen since his last admission for COVID PNA in January, he only uses it PRN and have been needing it more. He exerts adherence to his symbicort and albuterol at home. He has quit cigar smoking for about 9 years, and cigarettes since 1975. Prior to this pt was smoking about 2 packs per month.     Lives with wife, and works as a . Pt acknowledges that given hx of asthma, his occupations can exacerbate an attack. Have not been follow up with his pulmonologist Dr. Guerrier since 2017, though he does have an appointment on 3/21 with Dr. Margarito Sanchez, which he hope we could help facilitate to make the appointment sooner.     In the ED, pt was afebrile, , RR 26, /67, sat 85% on RA, 93% with 2 L NC. Labs significant for Hgb 11.3. No leukocytosis, normal creatinine. No significant electrolytes abnormality. BNP 18, Trop wnl. Blood Cultures obtained. CXR with bilaterl upper lobe airspace disease could be secondary to multifocal versus pulmonary edema. Pt was given albuterol, methylprenisolone, zosyn, and vancomycin. Pt is admitted to hospital medicine for  further evaluation and management.       Overview/Hospital Course:  Patient admitted to Hospital Medicine for acute hypoxemic respiratory failure most likely secondary to COPD exacerbation in setting of PNA.  CXR with BL upper lobe airspace disease concerning for multifocal PNA. CT Chest obtained, showed evidence of BL bronchocentric consolidative opacities with bronchiectasis and mucoid impaction. Treated with ABX, prednisone, duonebs and CPT. Initially on Cefepime/Azithro, however Vancomycin was added 3/11 once GPCs grew.     Respiratory culture ultimately grew MRSA and Pseudomonas. ID consulted to assist with discharge abx recs, recommended 7 days of Pseudomonal coverage with cefepime (to be completed during hospitalization, end date 3/15) and 7 days of MRSA coverage (vancomycin, can complete remaining course with doxycycline on discharge, last dose 3/17.)  Blood cultures remained with no growth. Respiratory status much improved overall, now requiring no oxygen supplementation. Course complicated by NOEMI that resolved with increased PO intake and leukocytosis that was likely exacerbated by prednisone use.      Interval History: NAEON. Afebrile. VSS. Resp cx growing MRSA and cipro resistant pseudomonas. As such ID recommends keeping patient here today for last day of cefepime and discharge tomorrow on Doxy to complete 7 days (last dose 3/17). Patient continues to feel well/no complaints. Saturating well on room air.     Review of Systems   Constitutional:  Negative for appetite change, chills and fever.   HENT:  Negative for sore throat.    Respiratory:  Negative for cough, shortness of breath and wheezing.    Cardiovascular:  Negative for chest pain and palpitations.   Gastrointestinal:  Negative for abdominal distention, abdominal pain, constipation, diarrhea, nausea and vomiting.   Endocrine: Negative for polydipsia and polyuria.   Genitourinary:  Negative for dysuria, hematuria and urgency.   Musculoskeletal:   Negative for back pain and gait problem.   Neurological:  Negative for weakness and numbness.   Psychiatric/Behavioral:  The patient is not nervous/anxious.    Objective:     Vital Signs (Most Recent):  Temp: 98.4 °F (36.9 °C) (03/14/22 1135)  Pulse: 80 (03/14/22 1135)  Resp: 16 (03/14/22 1135)  BP: (!) 147/81 (03/14/22 1135)  SpO2: 95 % (03/14/22 1135) Vital Signs (24h Range):  Temp:  [96.4 °F (35.8 °C)-98.4 °F (36.9 °C)] 98.4 °F (36.9 °C)  Pulse:  [62-85] 80  Resp:  [16-19] 16  SpO2:  [93 %-96 %] 95 %  BP: (127-157)/(66-81) 147/81     Weight: 74.8 kg (164 lb 15.9 oz)  Body mass index is 29.23 kg/m².  No intake or output data in the 24 hours ending 03/14/22 1205     Physical Exam  Constitutional:       General: He is not in acute distress.     Appearance: Normal appearance. He is not ill-appearing.   HENT:      Head: Normocephalic and atraumatic.      Nose: Nose normal.   Eyes:      General:         Right eye: No discharge.         Left eye: No discharge.      Conjunctiva/sclera: Conjunctivae normal.   Neck:      Comments: No JVD  Cardiovascular:      Rate and Rhythm: Normal rate and regular rhythm.      Heart sounds: No murmur heard.  Pulmonary:      Effort: Pulmonary effort is normal.      Breath sounds: No wheezing or rales.      Comments: Comfortable on RA. No increased WOB  + Bibasilar crackles  Abdominal:      General: Abdomen is flat. There is no distension.      Palpations: Abdomen is soft.      Tenderness: There is no abdominal tenderness.   Musculoskeletal:         General: No swelling. Normal range of motion.      Cervical back: Normal range of motion.      Right lower leg: No edema.      Left lower leg: No edema.   Skin:     General: Skin is warm and dry.      Capillary Refill: Capillary refill takes less than 2 seconds.   Neurological:      General: No focal deficit present.      Mental Status: He is alert and oriented to person, place, and time.   Psychiatric:         Mood and Affect: Mood normal.        Significant Labs: All pertinent labs within the past 24 hours have been reviewed.  CBC:   Recent Labs   Lab 03/13/22 0522 03/14/22  0302   WBC 16.74* 17.05*   HGB 10.5* 10.4*   HCT 35.0* 35.0*   * 454*       CMP:   Recent Labs   Lab 03/13/22 0522 03/14/22  0302    138   K 4.0 3.8    107   CO2 23 23   GLU 94 88   BUN 21 26*   CREATININE 0.9 1.0   CALCIUM 9.7 9.0   ANIONGAP 11 8   EGFRNONAA >60.0 >60.0         Significant Imaging: I have reviewed all pertinent imaging results/findings within the past 24 hours.      Assessment/Plan:      * Acute hypoxemic respiratory failure  72 yo M with moderate COPD, chronic pansinusitis, asthma with COPD, recent COVID infection in January 2022 and before that Jan 2021,  HLD, Prediabetes presents for worsening FARLEY, SOB for one week. Associated symptoms of increasing cough and greenish sputum production.     Most likely secondary to multifocal PNA + COPD exacerbation    DDx: environmental exposure (given hx of asthma and occupation as , though CBC differentials is not significant for eosinophilia) vs post COVID pulmonary fibrosis (though less likely). PE is also possible but less likely as pt did have recent COVID infx, d-dimer at that time was 4.47, though pt is mobile, no current active cancer, no significant unilateral leg swelling. HF is less likely given euvolemic on exam, no prior hx of HF, and BNP is wnl.     CT Chest 3/10 showed BL bronchocentric consolidative opacities favoring infectious/inflammatory etiology. Also showed middle lobe, lingular, BL LL bronchiectasis with bronchial wall thickening and mucoid impaction that have become more more severe compared to prior imaging. Initially on 2L NC, now successfully weaned to RA. S/p methypred in the ED. Completed 5 day course of Prednisone 40mg PO. BCx with NGTD x5D      Plan   - Given increased sputum production, SOB, antibiotics initiated. Cefepime (Hx of pseudomonas PNA)/Azithromycin  (for atypical coverage). Vancomycin added 3/11 to cover for MRSA  - Respiratory cultures growing Pseudomonas and MRSA   - ID consulted to assist in decision making regarding discharge antibiotics    - Once 7 days (zosyn+)cefepime complete, patient can be discharged with Doxy to complete 7 day course (last dose 3/17)    - Continue home inhaler: fluticasone-vilanterol 1 puff QD (symbicort is not in formulary)   - Duonebs q4h while awake and prn  - Incentive Spirometry  - Continuous oxygen monitoring with supplement O2 for goal sat 88% - 92%   - Will need PFTs upon discharge  - Close follow up with Pulmonology scheduled 3/21  - Will refer to pulmonary rehab outpatient     NOEMI (acute kidney injury)  NOEMI noted on today's labs, Cr bump to 1.4 from 1.1. Likely 2/2 dehydration as patient with poor PO intake.    - RESOLVED with increased PO intake  - Continue to encourage hydration       Multifocal pneumonia  See acute hypoxemic respiratory failure       Personal history of COVID-19  Long term effects of Covid likely contributing to respiratory status.     Type 2 diabetes mellitus  -Last A1c reviewed-   Lab Results   Component Value Date    HGBA1C 6.0 (H) 01/24/2022       Home Antihyperglycemic Regiment:  - None     Inpatient Antihyperglycemic Regiment:  Antihyperglycemics (From admission, onward)            Start     Stop Route Frequency Ordered    03/08/22 2031  insulin aspart U-100 pen 0-5 Units         -- SubQ Before meals & nightly PRN 03/08/22 1931        - Insulin regimen as above  - LDSSI with POCT accuchecks ACHS  - Diabetic nutritional counseling given   - POCT ACQHS  - May need Metformin on discharge vs trial of lifestyle changes first.         Anemia  Admit with hemoglobin 11.3 Baseline 11-12      Lab Results   Component Value Date    IRON 89 05/01/2021    TIBC 324 05/01/2021    FERRITIN 1,038 (H) 01/12/2022     Lab Results   Component Value Date    FOLATE 15.2 03/09/2022     Lab Results   Component Value Date     OQEYSORX04 590 01/24/2022     Folate and B12 WNL  Reticulocytes c/w hypoproliferation  Likely secondary to anemia of chronic disease     Stable     Plan:   - Daily CBC   - Transfuse hgb <7         Anxiety  Atarax Prn       H/O pleural empyema        Hyperlipidemia  Lipid profile on   Lab Results   Component Value Date    LDLCALC 105.6 01/24/2022    HDL 36 (L) 01/24/2022    TRIG 82 01/24/2022    CHOL 158 01/24/2022     The 10-year ASCVD risk score (Lily MARTINEZ JrEligio, et al., 2013) is: 43.9%    Values used to calculate the score:      Age: 71 years      Sex: Male      Is Non- : Yes      Diabetic: Yes      Tobacco smoker: No      Systolic Blood Pressure: 147 mmHg      Is BP treated: Yes      HDL Cholesterol: 36 mg/dL      Total Cholesterol: 158 mg/dL    - Currently taking - atorvastatin 20 mg at home; will increase to atorvastatin 40 mg  - Reports compliance with hyperlipidemia treatment as prescribed  - Denies adverse effects of medications        Overweight (BMI 25.0-29.9)  - encouraged to continue walking 30-40 mins/day at least 3-4 days /week  - emphasis placed on changing diet (for lowering cholesterol & for weight loss)          Essential hypertension  Home regimen: amlodipine 10 mg QD, losartan 100 mg QD, toprol 100 mg QD     Plan:   - home meds resumed        VTE Risk Mitigation (From admission, onward)         Ordered     enoxaparin injection 40 mg  Daily         03/08/22 1924     IP VTE HIGH RISK PATIENT  Once         03/08/22 1924     Place sequential compression device  Until discontinued         03/08/22 1924                Discharge Planning   KATE: 3/14/2022     Code Status: Full Code   Is the patient medically ready for discharge?: No    Reason for patient still in hospital (select all that apply): Treatment      Discharge Delays: None known at this time        Moy Valero MD   PGY1  Department of Hospital Medicine   [facility]

## 2022-03-14 NOTE — PLAN OF CARE
[facility]  Discharge Final Note    Primary Care Provider: Katelynn Rojas MD    Expected Discharge Date: 3/14/2022    Final Discharge Note (most recent)     Final Note - 03/14/22 1352        Final Note    Assessment Type Final Discharge Note     Anticipated Discharge Disposition Home or Self Care     What phone number can be called within the next 1-3 days to see how you are doing after discharge? [phone]     Hospital Resources/Appts/Education Provided Appointments scheduled and added to AVS        Post-Acute Status    Other Status No Post-Acute Service Needs     Discharge Delays None known at this time                 Important Message from Medicare  Important Message from Medicare regarding Discharge Appeal Rights: Explained to patient/caregiver, Given to patient/caregiver, Signed/date by patient/caregiver     Date IMM was signed: 03/11/22  Time IMM was signed: 0935    Contact Info     [facility]   Specialty: DME Provider    [address] [address]   Phone: [phone]       Next Steps: Follow up today    Margarito Sanchez MD   Specialty: Pulmonary Disease, Critical Care Medicine, Sleep Medicine    [address] [address]   Phone: [phone]       Next Steps: Follow up in 10 day(s)    Instructions: 15:00pm    [facility]    [address] [id number]   Phone: [phone]       Next Steps: Follow up today

## 2022-03-14 NOTE — ASSESSMENT & PLAN NOTE
71 year old male with COPD and history of COVID x 2 presented to the ED 3/8 for worsening SOB, FARLEY and hypoxia. CT chest concerning for PNA vs COPD exacerbation.  Sputum culture is positive for  MRSA and pseudomonas (R cipro). Patient has been on IV Vancomycin and Cefepime along with prednisone 40 mg daily. He also completed 5 days of Azithromycin.  He is afebrile. Reports symptomatic improvement and appears clinically improved. Off oxygen and stable with O2 sats 93-96% RA.     Recommendations  · Continue IV Cefepime x 7 days total for pseudomonas (end date is today)  · Continue IV Vancomycin. At discharge, transition to doxycyline 100 mg PO BID for a total antibiotic duration of 7 days for MRSA.  · Follow up with pulmonology as an outpatient  · Discussed antibiotic plan with hospital medicine team. ID will sign off.

## 2022-03-14 NOTE — SUBJECTIVE & OBJECTIVE
Interval History:  NAEON. Patient seen standing up. He is upset breakfast was delayed. He is doing well without requiring oxygen. Reports breathing, SOB improved. Tolerating antibiotics. Minimal sputum.The patient denies any recent fever, chills, or sweats.      Review of Systems   Constitutional:  Negative for chills, diaphoresis and fever.   Respiratory:  Positive for shortness of breath (improved).    Cardiovascular:  Negative for chest pain.   Gastrointestinal:  Negative for abdominal pain, diarrhea, nausea and vomiting.   Genitourinary:  Negative for dysuria and hematuria.   Objective:     Vital Signs (Most Recent):  Temp: 98.4 °F (36.9 °C) (03/14/22 1135)  Pulse: 80 (03/14/22 1135)  Resp: 16 (03/14/22 1135)  BP: (!) 147/81 (03/14/22 1135)  SpO2: 95 % (03/14/22 1135)   Vital Signs (24h Range):  Temp:  [96.4 °F (35.8 °C)-98.4 °F (36.9 °C)] 98.4 °F (36.9 °C)  Pulse:  [62-85] 80  Resp:  [16-19] 16  SpO2:  [93 %-96 %] 95 %  BP: (127-157)/(69-81) 147/81     Weight: 74.8 kg (164 lb 15.9 oz)  Body mass index is 29.23 kg/m².    Estimated Creatinine Clearance: 60.5 mL/min (based on SCr of 1 mg/dL).    Physical Exam  Constitutional:       General: He is not in acute distress.     Appearance: He is well-developed. He is not ill-appearing, toxic-appearing or diaphoretic.   HENT:      Head: Normocephalic and atraumatic.      Nose: Nose normal.   Eyes:      General: No scleral icterus.     Conjunctiva/sclera: Conjunctivae normal.   Cardiovascular:      Rate and Rhythm: Normal rate and regular rhythm.   Pulmonary:      Effort: Pulmonary effort is normal. No respiratory distress.      Breath sounds: Rales (fine) present. No decreased breath sounds or wheezing.   Abdominal:      General: There is no distension.      Palpations: Abdomen is soft.      Tenderness: There is no abdominal tenderness. There is no guarding.   Skin:     General: Skin is warm and dry.      Findings: No rash.   Neurological:      Mental Status: He is  alert and oriented to person, place, and time.   Psychiatric:         Mood and Affect: Mood normal.         Behavior: Behavior normal.         Thought Content: Thought content normal.         Judgment: Judgment normal.       Significant Labs: Blood Culture:   Recent Labs   Lab 12/06/21  1350 12/06/21  1446 03/08/22  1807 03/08/22  1808   LABBLOO No growth after 5 days. No growth after 5 days. No growth after 5 days. No growth after 5 days.       CBC:   Recent Labs   Lab 03/13/22  0522 03/14/22  0302   WBC 16.74* 17.05*   HGB 10.5* 10.4*   HCT 35.0* 35.0*   * 454*       CMP:   Recent Labs   Lab 03/13/22  0522 03/14/22  0302    138   K 4.0 3.8    107   CO2 23 23   GLU 94 88   BUN 21 26*   CREATININE 0.9 1.0   CALCIUM 9.7 9.0   ANIONGAP 11 8   EGFRNONAA >60.0 >60.0       Respiratory Culture:   Recent Labs   Lab 01/13/22  2200 03/08/22  2152   GSRESP <10 epithelial cells per low power field.  Few WBC's  Few Gram positive cocci  Few Gram negative rods <10 epithelial cells per low power field.  Many WBC's  Many Gram negative rods  Rare Gram positive cocci  Rare yeast   RESPIRATORYC No S aureus isolated.  PSEUDOMONAS AERUGINOSA  Moderate  Normal respiratory lane also present  * PSEUDOMONAS ALCALIGENES/PSEUDOALCALIGENES  Moderate  *  METHICILLIN RESISTANT STAPHYLOCOCCUS AUREUS  Rare  Normal respiratory lane also present  *       All pertinent labs within the past 24 hours have been reviewed.    Significant Imaging: I have reviewed all pertinent imaging results/findings within the past 24 hours.  CT Chest Without Contrast [[id number]] Resulted: 03/10/22 1340   Order Status: Completed Updated: 03/10/22 1342   Narrative:     EXAMINATION:   CT CHEST WITHOUT CONTRAST     CLINICAL HISTORY:   Pneumonia, unresolved;COPD history;     TECHNIQUE:   Low dose axial images, sagittal and coronal reformations were obtained from the thoracic inlet to the lung bases. Contrast was not administered.     COMPARISON:    Prior     FINDINGS:   No intravenous contrast was administered for this examination.  Therefore, it may have diminished sensitivity for detection of certain abnormalities.     Thyroid gland: Within normal limits.     Trachea: Within normal limits.     Esophagus: Small hiatal hernia.     Cardiovascular: Normal heart size.No pericardial effusion.Diminished attenuation of the blood pool suggest lower than normal hematocrit levels.  There is mild calcific disease of the aorta and coronary arteries.     Lymph nodes: None abnormally enlarged.     Lungs/pleura/airways: Bilateral, apical bronchus centric mass-like areas of consolidation.  Elsewhere, there are bilateral patchy ground-glass opacities predominantly in the upper to mid lung zones as well as scattered areas of tree-in-bud nodular opacities.     There is right middle lobe, lingular, bilateral lower lobe bronchiectasis with bronchial wall thickening, mucoid impaction, and left greater than right basilar small airways disease.  There is a focal consolidative opacity in the right lower lobe posterior segment.     The pulmonary attenuation is mosaic, consistent with air trapping.     Upper abdomen: A few hepatic low-density lesions, the largest in the right posterior segment up to 1.4 cm, favor cysts versus hemangiomas.  There are few bilateral ovoid low-density lesions, too small to characterize, but likely to represent cysts.  Stable 1.8 cm left adrenal nodule consistent with adenoma.     Bones: Unremarkable for stated age.     Other: N/A    Impression:       1.  Bilateral broncho-centric consolidative opacities.  Although they have a masslike appearance, in the presence of other ground-glass opacities, medium and small airways disease favors infectious/inflammatory etiology.     2.  There is a background of middle lobe, lingular, and bilateral lower lobe bronchiectasis with bronchial wall thickening and mucoid impaction.  These are also present on the prior  examination, but they appear to be increased pole in both severity and extent.     3.  No intraparenchymal abscess or empyema identified.       Electronically signed by: Sylvia Sneed   Date: 03/10/2022   Time: 13:40   X-Ray Chest 1 View [[id number]] Resulted: 03/08/22 1656   Order Status: Completed Updated: 03/08/22 1658   Narrative:     EXAMINATION:   XR CHEST 1 VIEW     CLINICAL HISTORY:   shortness of breath;     TECHNIQUE:   Single frontal view of the chest was performed.     COMPARISON:   01/24/2022     FINDINGS:   Extensive bilateral upper lobe airspace disease is present that is new when compared to 01/24/2022.  Similar findings were seen in both lungs on 12/06/2021 and 01/12/2022 with a slightly different distribution.  The pulmonary vasculature is prominent and indistinct.  No pleural effusion.  Heart size is unchanged.    Impression:       Bilateral upper lobe airspace disease, to a similar degree as seen on prior chest radiographs with a different distribution.  This could be secondary to multifocal pneumonia versus pulmonary edema.       Electronically signed by: Jessica Cunningham   Date: 03/08/2022   Time: 16:56       Imaging History    2022  Date Procedure Name Status Accession Number Location   03/10/22 11:55 AM CT Chest Without Contrast Final [id number] [facility]   03/08/22 04:40 PM X-Ray Chest 1 View Final [id number] [facility]

## 2022-03-14 NOTE — PROGRESS NOTES
[facility] - Med Surg  Infectious Disease  Progress Note    Patient Name: Scooter Morrissey  MRN: [id number]  Admission Date: 3/8/2022  Length of Stay: 6 days  Attending Physician: Fredrick James MD  Primary Care Provider: Katelynn Rojas MD    Isolation Status: Contact  Assessment/Plan:      * Multifocal pneumonia     71 year old male with COPD and history of COVID x 2 presented to the ED 3/8 for worsening SOB, FARLEY and hypoxia. CT chest concerning for PNA vs COPD exacerbation.  Sputum culture is positive for  MRSA and pseudomonas (R cipro). Patient has been on IV Vancomycin and Cefepime along with prednisone 40 mg daily. He also completed 5 days of Azithromycin.  He is afebrile. Reports symptomatic improvement and appears clinically improved. Off oxygen and stable with O2 sats 93-96% RA.     Recommendations  · Continue IV Cefepime x 7 days total for pseudomonas (end date is today)  · Continue IV Vancomycin. At discharge, transition to doxycyline 100 mg PO BID for a total antibiotic duration of 7 days for MRSA.  · Follow up with pulmonology as an outpatient  · Discussed antibiotic plan with hospital medicine team. ID will sign off.        Please call  or secure chat for any questions. Thank you.  Mare Worthington PA-C  ID BRENDA Spectra: [id number]    Subjective:     Principal Problem:Multifocal pneumonia    HPI: Mr. Morrissey is a 72 yo M with moderate COPD, chronic pansinusitis, asthma with COPD, recent COVID infection in January 2022 and before that Jan 2021,  HLD, Prediabetes presents for worsening FARLEY, SOB for one week. Associated symptoms of increasing cough and greenish sputum production which waxes and wanes. Per pt, he was sent home with Oxygen since his last admission for COVID PNA in January, he only uses it PRN and have been needing it more recently. He reports using his usual pulmonary meds but with decreasing benefit.  He has pulse ox at home and O2 sats in upper 80s which prompted him to come in.      Patient  admitted and CT chest showed:  1.Bilateral broncho-centric consolidative opacities.  Although they have a masslike appearance, in the presence of other ground-glass opacities, medium and small airways disease favors infectious/inflammatory etiology.   2.  There is a background of middle lobe, lingular, and bilateral lower lobe bronchiectasis with bronchial wall thickening and mucoid impaction.  These are also present on the prior examination, but they appear to be increased pole in both severity and extent.   3.  No intraparenchymal abscess or empyema identified.     He has been treated with Vanc/Zosyn but change to azithro/cefepime when Cr increased.  He has been afebrile and blood cultures NGTD.  He has a prior Hx of pseudomonas on sputum Cx in Jan '22.  Sputum Cx oin this admit now showing MRSA and GNR - was started on vanc 3/11.  He feels almost 50% improved.  O2 sats have improved  to 92-95 % on room air.  The patient denies any recent fever, chills, or sweats.  ID consulted given sputum cxs.      Interval History:  NAEON. Patient seen standing up. He is upset breakfast was delayed. He is doing well without requiring oxygen. Reports breathing, SOB improved. Tolerating antibiotics. Minimal sputum.The patient denies any recent fever, chills, or sweats.      Review of Systems   Constitutional:  Negative for chills, diaphoresis and fever.   Respiratory:  Positive for shortness of breath (improved).    Cardiovascular:  Negative for chest pain.   Gastrointestinal:  Negative for abdominal pain, diarrhea, nausea and vomiting.   Genitourinary:  Negative for dysuria and hematuria.   Objective:     Vital Signs (Most Recent):  Temp: 98.4 °F (36.9 °C) (03/14/22 1135)  Pulse: 80 (03/14/22 1135)  Resp: 16 (03/14/22 1135)  BP: (!) 147/81 (03/14/22 1135)  SpO2: 95 % (03/14/22 1135)   Vital Signs (24h Range):  Temp:  [96.4 °F (35.8 °C)-98.4 °F (36.9 °C)] 98.4 °F (36.9 °C)  Pulse:  [62-85] 80  Resp:  [16-19] 16  SpO2:  [93 %-96  %] 95 %  BP: (127-157)/(69-81) 147/81     Weight: 74.8 kg (164 lb 15.9 oz)  Body mass index is 29.23 kg/m².    Estimated Creatinine Clearance: 60.5 mL/min (based on SCr of 1 mg/dL).    Physical Exam  Constitutional:       General: He is not in acute distress.     Appearance: He is well-developed. He is not ill-appearing, toxic-appearing or diaphoretic.   HENT:      Head: Normocephalic and atraumatic.      Nose: Nose normal.   Eyes:      General: No scleral icterus.     Conjunctiva/sclera: Conjunctivae normal.   Cardiovascular:      Rate and Rhythm: Normal rate and regular rhythm.   Pulmonary:      Effort: Pulmonary effort is normal. No respiratory distress.      Breath sounds: Rales (fine) present. No decreased breath sounds or wheezing.   Abdominal:      General: There is no distension.      Palpations: Abdomen is soft.      Tenderness: There is no abdominal tenderness. There is no guarding.   Skin:     General: Skin is warm and dry.      Findings: No rash.   Neurological:      Mental Status: He is alert and oriented to person, place, and time.   Psychiatric:         Mood and Affect: Mood normal.         Behavior: Behavior normal.         Thought Content: Thought content normal.         Judgment: Judgment normal.       Significant Labs: Blood Culture:   Recent Labs   Lab 12/06/21  1350 12/06/21  1446 03/08/22  1807 03/08/22  1808   LABBLOO No growth after 5 days. No growth after 5 days. No growth after 5 days. No growth after 5 days.       CBC:   Recent Labs   Lab 03/13/22  0522 03/14/22  0302   WBC 16.74* 17.05*   HGB 10.5* 10.4*   HCT 35.0* 35.0*   * 454*       CMP:   Recent Labs   Lab 03/13/22  0522 03/14/22  0302    138   K 4.0 3.8    107   CO2 23 23   GLU 94 88   BUN 21 26*   CREATININE 0.9 1.0   CALCIUM 9.7 9.0   ANIONGAP 11 8   EGFRNONAA >60.0 >60.0       Respiratory Culture:   Recent Labs   Lab 01/13/22  2200 03/08/22  2152   GSRESP <10 epithelial cells per low power field.  Few WBC's   Few Gram positive cocci  Few Gram negative rods <10 epithelial cells per low power field.  Many WBC's  Many Gram negative rods  Rare Gram positive cocci  Rare yeast   RESPIRATORYC No S aureus isolated.  PSEUDOMONAS AERUGINOSA  Moderate  Normal respiratory lane also present  * PSEUDOMONAS ALCALIGENES/PSEUDOALCALIGENES  Moderate  *  METHICILLIN RESISTANT STAPHYLOCOCCUS AUREUS  Rare  Normal respiratory lane also present  *       All pertinent labs within the past 24 hours have been reviewed.    Significant Imaging: I have reviewed all pertinent imaging results/findings within the past 24 hours.  CT Chest Without Contrast [[id number]] Resulted: 03/10/22 1340   Order Status: Completed Updated: 03/10/22 1342   Narrative:     EXAMINATION:   CT CHEST WITHOUT CONTRAST     CLINICAL HISTORY:   Pneumonia, unresolved;COPD history;     TECHNIQUE:   Low dose axial images, sagittal and coronal reformations were obtained from the thoracic inlet to the lung bases. Contrast was not administered.     COMPARISON:   Prior     FINDINGS:   No intravenous contrast was administered for this examination.  Therefore, it may have diminished sensitivity for detection of certain abnormalities.     Thyroid gland: Within normal limits.     Trachea: Within normal limits.     Esophagus: Small hiatal hernia.     Cardiovascular: Normal heart size.No pericardial effusion.Diminished attenuation of the blood pool suggest lower than normal hematocrit levels.  There is mild calcific disease of the aorta and coronary arteries.     Lymph nodes: None abnormally enlarged.     Lungs/pleura/airways: Bilateral, apical bronchus centric mass-like areas of consolidation.  Elsewhere, there are bilateral patchy ground-glass opacities predominantly in the upper to mid lung zones as well as scattered areas of tree-in-bud nodular opacities.     There is right middle lobe, lingular, bilateral lower lobe bronchiectasis with bronchial wall thickening, mucoid  impaction, and left greater than right basilar small airways disease.  There is a focal consolidative opacity in the right lower lobe posterior segment.     The pulmonary attenuation is mosaic, consistent with air trapping.     Upper abdomen: A few hepatic low-density lesions, the largest in the right posterior segment up to 1.4 cm, favor cysts versus hemangiomas.  There are few bilateral ovoid low-density lesions, too small to characterize, but likely to represent cysts.  Stable 1.8 cm left adrenal nodule consistent with adenoma.     Bones: Unremarkable for stated age.     Other: N/A    Impression:       1.  Bilateral broncho-centric consolidative opacities.  Although they have a masslike appearance, in the presence of other ground-glass opacities, medium and small airways disease favors infectious/inflammatory etiology.     2.  There is a background of middle lobe, lingular, and bilateral lower lobe bronchiectasis with bronchial wall thickening and mucoid impaction.  These are also present on the prior examination, but they appear to be increased pole in both severity and extent.     3.  No intraparenchymal abscess or empyema identified.       Electronically signed by: Sylvia Sneed   Date: 03/10/2022   Time: 13:40   X-Ray Chest 1 View [[id number]] Resulted: 03/08/22 1656   Order Status: Completed Updated: 03/08/22 1658   Narrative:     EXAMINATION:   XR CHEST 1 VIEW     CLINICAL HISTORY:   shortness of breath;     TECHNIQUE:   Single frontal view of the chest was performed.     COMPARISON:   01/24/2022     FINDINGS:   Extensive bilateral upper lobe airspace disease is present that is new when compared to 01/24/2022.  Similar findings were seen in both lungs on 12/06/2021 and 01/12/2022 with a slightly different distribution.  The pulmonary vasculature is prominent and indistinct.  No pleural effusion.  Heart size is unchanged.    Impression:       Bilateral upper lobe airspace disease, to a similar degree as seen  on prior chest radiographs with a different distribution.  This could be secondary to multifocal pneumonia versus pulmonary edema.       Electronically signed by: Jessica Cunningham   Date: 03/08/2022   Time: 16:56       Imaging History    2022  Date Procedure Name Status Accession Number Location   03/10/22 11:55 AM CT Chest Without Contrast Final [id number] [facility]   03/08/22 04:40 PM X-Ray Chest 1 View Final [id number] [facility]

## 2022-03-15 NOTE — NURSING
Last dose of cefepime given. Discharge instructions given and discussed. Pt left floor via wheelchair with transport, belongings in hand.

## 2022-03-16 ENCOUNTER — PATIENT OUTREACH (OUTPATIENT)
Dept: ADMINISTRATIVE | Facility: CLINIC | Age: 72
End: 2022-03-16
Payer: MEDICARE

## 2022-03-16 DIAGNOSIS — E11.9 TYPE 2 DIABETES MELLITUS WITHOUT COMPLICATION: ICD-10-CM

## 2022-03-16 NOTE — PROGRESS NOTES
C3 nurse attempted to contact Scooter Morrissey for a TCC post hospital discharge follow up call. The patient is unable to conduct the call @ this time. The patient requested a callback.    The patient does not have a scheduled HOSFU appointment within 7-14 days post hospital discharge date 3/14/2022. Message sent to Physician staff to assist with HOSFU appointment scheduling.

## 2022-03-17 ENCOUNTER — PATIENT OUTREACH (OUTPATIENT)
Dept: ADMINISTRATIVE | Facility: OTHER | Age: 72
End: 2022-03-17
Payer: MEDICARE

## 2022-03-17 ENCOUNTER — TELEPHONE (OUTPATIENT)
Dept: FAMILY MEDICINE | Facility: CLINIC | Age: 72
End: 2022-03-17
Payer: MEDICARE

## 2022-03-17 DIAGNOSIS — E11.9 TYPE 2 DIABETES MELLITUS WITHOUT COMPLICATION, WITH LONG-TERM CURRENT USE OF INSULIN: Primary | ICD-10-CM

## 2022-03-17 DIAGNOSIS — Z79.4 TYPE 2 DIABETES MELLITUS WITHOUT COMPLICATION, WITH LONG-TERM CURRENT USE OF INSULIN: Primary | ICD-10-CM

## 2022-03-17 NOTE — PROGRESS NOTES
C3 nurse attempted to contact Scooter Morrissey  for a TCC post hospital discharge follow up call. No answer. Left voicemail with callback information. The patient does not have a scheduled HOSFU appointment. Message sent to PCP staff for assistance with scheduling visit with patient.

## 2022-03-17 NOTE — TELEPHONE ENCOUNTER
Called patient to see if he had his diabetic eye screening done this year or in 2021. Patient did not answer. A message was left.

## 2022-03-18 ENCOUNTER — TELEPHONE (OUTPATIENT)
Dept: PULMONOLOGY | Facility: CLINIC | Age: 72
End: 2022-03-18
Payer: MEDICARE

## 2022-03-18 NOTE — TELEPHONE ENCOUNTER
Left message on patient voicemail, informing him that I'm contacting him in regards to scheduling his Pulmonary Function Test. I also advised patient that if he wants to schedule appointment or if he has nay questions or concerns,, he may contact the office. Office number has been provided.

## 2022-03-18 NOTE — PROGRESS NOTES
Health Maintenance Due   Topic Date Due    Diabetes Urine Screening  Never done    Foot Exam  Never done    Eye Exam  Never done    Shingles Vaccine (1 of 2) Never done    Influenza Vaccine (1) Never done    COVID-19 Vaccine (3 - Booster for Moderna series) 02/28/2022     Updates were requested from care everywhere.  Chart was reviewed for overdue Proactive [facility] Encounters (ZI) topics (CRS, Breast Cancer Screening, Eye exam)  Health Maintenance has been updated.  LINKS immunization registry triggered.  Immunizations were reconciled.  Diabetic Eye Screening ordered.

## 2022-03-21 ENCOUNTER — OFFICE VISIT (OUTPATIENT)
Dept: PULMONOLOGY | Facility: CLINIC | Age: 72
End: 2022-03-21
Payer: MEDICARE

## 2022-03-21 ENCOUNTER — PATIENT MESSAGE (OUTPATIENT)
Dept: ADMINISTRATIVE | Facility: HOSPITAL | Age: 72
End: 2022-03-21
Payer: MEDICARE

## 2022-03-21 VITALS
BODY MASS INDEX: 30.59 KG/M2 | HEIGHT: 63 IN | HEART RATE: 90 BPM | DIASTOLIC BLOOD PRESSURE: 62 MMHG | WEIGHT: 172.63 LBS | OXYGEN SATURATION: 95 % | SYSTOLIC BLOOD PRESSURE: 140 MMHG

## 2022-03-21 DIAGNOSIS — J47.9 BRONCHIECTASIS WITHOUT COMPLICATION: ICD-10-CM

## 2022-03-21 DIAGNOSIS — J44.89 ASTHMA WITH COPD: ICD-10-CM

## 2022-03-21 DIAGNOSIS — J18.9 MULTIFOCAL PNEUMONIA: Primary | ICD-10-CM

## 2022-03-21 PROCEDURE — 4010F PR ACE/ARB THEARPY RXD/TAKEN: ICD-10-PCS | Mod: CPTII,S$GLB,, | Performed by: INTERNAL MEDICINE

## 2022-03-21 PROCEDURE — 99999 PR PBB SHADOW E&M-EST. PATIENT-LVL IV: CPT | Mod: PBBFAC,,, | Performed by: INTERNAL MEDICINE

## 2022-03-21 PROCEDURE — 3078F PR MOST RECENT DIASTOLIC BLOOD PRESSURE < 80 MM HG: ICD-10-PCS | Mod: CPTII,S$GLB,, | Performed by: INTERNAL MEDICINE

## 2022-03-21 PROCEDURE — 99204 OFFICE O/P NEW MOD 45 MIN: CPT | Mod: S$GLB,,, | Performed by: INTERNAL MEDICINE

## 2022-03-21 PROCEDURE — 1111F PR DISCHARGE MEDS RECONCILED W/ CURRENT OUTPATIENT MED LIST: ICD-10-PCS | Mod: CPTII,S$GLB,, | Performed by: INTERNAL MEDICINE

## 2022-03-21 PROCEDURE — 1101F PR PT FALLS ASSESS DOC 0-1 FALLS W/OUT INJ PAST YR: ICD-10-PCS | Mod: CPTII,S$GLB,, | Performed by: INTERNAL MEDICINE

## 2022-03-21 PROCEDURE — 1111F DSCHRG MED/CURRENT MED MERGE: CPT | Mod: CPTII,S$GLB,, | Performed by: INTERNAL MEDICINE

## 2022-03-21 PROCEDURE — 3044F PR MOST RECENT HEMOGLOBIN A1C LEVEL <7.0%: ICD-10-PCS | Mod: CPTII,S$GLB,, | Performed by: INTERNAL MEDICINE

## 2022-03-21 PROCEDURE — 3008F PR BODY MASS INDEX (BMI) DOCUMENTED: ICD-10-PCS | Mod: CPTII,S$GLB,, | Performed by: INTERNAL MEDICINE

## 2022-03-21 PROCEDURE — 99204 PR OFFICE/OUTPT VISIT, NEW, LEVL IV, 45-59 MIN: ICD-10-PCS | Mod: S$GLB,,, | Performed by: INTERNAL MEDICINE

## 2022-03-21 PROCEDURE — 3288F PR FALLS RISK ASSESSMENT DOCUMENTED: ICD-10-PCS | Mod: CPTII,S$GLB,, | Performed by: INTERNAL MEDICINE

## 2022-03-21 PROCEDURE — 3078F DIAST BP <80 MM HG: CPT | Mod: CPTII,S$GLB,, | Performed by: INTERNAL MEDICINE

## 2022-03-21 PROCEDURE — 1159F PR MEDICATION LIST DOCUMENTED IN MEDICAL RECORD: ICD-10-PCS | Mod: CPTII,S$GLB,, | Performed by: INTERNAL MEDICINE

## 2022-03-21 PROCEDURE — 3288F FALL RISK ASSESSMENT DOCD: CPT | Mod: CPTII,S$GLB,, | Performed by: INTERNAL MEDICINE

## 2022-03-21 PROCEDURE — 1126F AMNT PAIN NOTED NONE PRSNT: CPT | Mod: CPTII,S$GLB,, | Performed by: INTERNAL MEDICINE

## 2022-03-21 PROCEDURE — 4010F ACE/ARB THERAPY RXD/TAKEN: CPT | Mod: CPTII,S$GLB,, | Performed by: INTERNAL MEDICINE

## 2022-03-21 PROCEDURE — 3044F HG A1C LEVEL LT 7.0%: CPT | Mod: CPTII,S$GLB,, | Performed by: INTERNAL MEDICINE

## 2022-03-21 PROCEDURE — 3077F PR MOST RECENT SYSTOLIC BLOOD PRESSURE >= 140 MM HG: ICD-10-PCS | Mod: CPTII,S$GLB,, | Performed by: INTERNAL MEDICINE

## 2022-03-21 PROCEDURE — 1126F PR PAIN SEVERITY QUANTIFIED, NO PAIN PRESENT: ICD-10-PCS | Mod: CPTII,S$GLB,, | Performed by: INTERNAL MEDICINE

## 2022-03-21 PROCEDURE — 99999 PR PBB SHADOW E&M-EST. PATIENT-LVL IV: ICD-10-PCS | Mod: PBBFAC,,, | Performed by: INTERNAL MEDICINE

## 2022-03-21 PROCEDURE — 3008F BODY MASS INDEX DOCD: CPT | Mod: CPTII,S$GLB,, | Performed by: INTERNAL MEDICINE

## 2022-03-21 PROCEDURE — 1101F PT FALLS ASSESS-DOCD LE1/YR: CPT | Mod: CPTII,S$GLB,, | Performed by: INTERNAL MEDICINE

## 2022-03-21 PROCEDURE — 99499 UNLISTED E&M SERVICE: CPT | Mod: HCNC,S$GLB,, | Performed by: INTERNAL MEDICINE

## 2022-03-21 PROCEDURE — 3077F SYST BP >= 140 MM HG: CPT | Mod: CPTII,S$GLB,, | Performed by: INTERNAL MEDICINE

## 2022-03-21 PROCEDURE — 1159F MED LIST DOCD IN RCRD: CPT | Mod: CPTII,S$GLB,, | Performed by: INTERNAL MEDICINE

## 2022-03-21 PROCEDURE — 99499 RISK ADDL DX/OHS AUDIT: ICD-10-PCS | Mod: HCNC,S$GLB,, | Performed by: INTERNAL MEDICINE

## 2022-03-21 RX ORDER — IPRATROPIUM BROMIDE AND ALBUTEROL SULFATE 2.5; .5 MG/3ML; MG/3ML
3 SOLUTION RESPIRATORY (INHALATION) EVERY 6 HOURS PRN
Qty: 30 EACH | Refills: 5 | Status: SHIPPED | OUTPATIENT
Start: 2022-03-21 | End: 2023-11-06 | Stop reason: ALTCHOICE
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
## 2022-03-21 NOTE — PATIENT INSTRUCTIONS
Use Spiriva (Tiotropium) 2 puffs daily in the morning - New inhaler  Use Symbicort 2 puffs in the morning and 2 puffs in the evening - you have this  Use Duoneb solution twice to four times a day if you feel congested - New   Use Aerobika 15 reps for at least 3 times a day  CAT scan chest end of April 2022  Follow up in clinic in May 2022

## 2022-03-28 ENCOUNTER — HOSPITAL ENCOUNTER (OUTPATIENT)
Dept: RADIOLOGY | Facility: HOSPITAL | Age: 72
Discharge: HOME | End: 2022-03-28
Attending: OTOLARYNGOLOGY
Payer: MEDICARE

## 2022-03-28 DIAGNOSIS — J32.8 OTHER CHRONIC SINUSITIS: ICD-10-CM

## 2022-03-28 PROCEDURE — 70486 CT MAXILLOFACIAL W/O DYE: CPT | Mod: 26,,, | Performed by: RADIOLOGY

## 2022-03-28 PROCEDURE — 70486 CT MAXILLOFACIAL W/O DYE: CPT | Mod: TC

## 2022-03-28 PROCEDURE — 70486 CT MEDTRONIC SINUSES WITHOUT: ICD-10-PCS | Mod: 26,,, | Performed by: RADIOLOGY

## 2022-04-21 ENCOUNTER — PATIENT OUTREACH (OUTPATIENT)
Dept: ADMINISTRATIVE | Facility: OTHER | Age: 72
End: 2022-04-21
Payer: MEDICARE

## 2022-04-21 NOTE — PROGRESS NOTES
Health Maintenance Due   Topic Date Due    Diabetes Urine Screening  Never done    Foot Exam  Never done    Eye Exam  Never done    Shingles Vaccine (1 of 2) Never done    Influenza Vaccine (1) Never done    COVID-19 Vaccine (3 - Booster for Moderna series) 02/28/2022     Updates were requested from care everywhere.  Chart was reviewed for overdue Proactive [facility] Encounters ([facility]I) topics (CRS, Breast Cancer Screening, Eye exam)  Health Maintenance has been updated.  LINKS immunization registry triggered.  Immunizations were reconciled.

## 2022-04-22 ENCOUNTER — PES CALL (OUTPATIENT)
Dept: ADMINISTRATIVE | Facility: CLINIC | Age: 72
End: 2022-04-22
Payer: MEDICARE

## 2022-04-25 ENCOUNTER — OFFICE VISIT (OUTPATIENT)
Dept: OTOLARYNGOLOGY | Facility: CLINIC | Age: 72
End: 2022-04-25
Payer: MEDICARE

## 2022-04-25 VITALS
WEIGHT: 173.06 LBS | BODY MASS INDEX: 30.66 KG/M2 | DIASTOLIC BLOOD PRESSURE: 84 MMHG | HEART RATE: 80 BPM | SYSTOLIC BLOOD PRESSURE: 138 MMHG

## 2022-04-25 DIAGNOSIS — J32.8 OTHER CHRONIC SINUSITIS: Primary | Chronic | ICD-10-CM

## 2022-04-25 DIAGNOSIS — J34.2 NASAL SEPTAL DEVIATION: Chronic | ICD-10-CM

## 2022-04-25 DIAGNOSIS — J34.3 HYPERTROPHY OF INFERIOR NASAL TURBINATE: Chronic | ICD-10-CM

## 2022-04-25 PROCEDURE — 1101F PR PT FALLS ASSESS DOC 0-1 FALLS W/OUT INJ PAST YR: ICD-10-PCS | Mod: CPTII,S$GLB,, | Performed by: OTOLARYNGOLOGY

## 2022-04-25 PROCEDURE — 87077 CULTURE AEROBIC IDENTIFY: CPT | Performed by: OTOLARYNGOLOGY

## 2022-04-25 PROCEDURE — 99999 PR PBB SHADOW E&M-EST. PATIENT-LVL III: CPT | Mod: PBBFAC,,, | Performed by: OTOLARYNGOLOGY

## 2022-04-25 PROCEDURE — 1160F RVW MEDS BY RX/DR IN RCRD: CPT | Mod: CPTII,S$GLB,, | Performed by: OTOLARYNGOLOGY

## 2022-04-25 PROCEDURE — 1126F PR PAIN SEVERITY QUANTIFIED, NO PAIN PRESENT: ICD-10-PCS | Mod: CPTII,S$GLB,, | Performed by: OTOLARYNGOLOGY

## 2022-04-25 PROCEDURE — 99214 OFFICE O/P EST MOD 30 MIN: CPT | Mod: 25,S$GLB,, | Performed by: OTOLARYNGOLOGY

## 2022-04-25 PROCEDURE — 1159F PR MEDICATION LIST DOCUMENTED IN MEDICAL RECORD: ICD-10-PCS | Mod: CPTII,S$GLB,, | Performed by: OTOLARYNGOLOGY

## 2022-04-25 PROCEDURE — 3288F FALL RISK ASSESSMENT DOCD: CPT | Mod: CPTII,S$GLB,, | Performed by: OTOLARYNGOLOGY

## 2022-04-25 PROCEDURE — 99999 PR PBB SHADOW E&M-EST. PATIENT-LVL III: ICD-10-PCS | Mod: PBBFAC,,, | Performed by: OTOLARYNGOLOGY

## 2022-04-25 PROCEDURE — 1160F PR REVIEW ALL MEDS BY PRESCRIBER/CLIN PHARMACIST DOCUMENTED: ICD-10-PCS | Mod: CPTII,S$GLB,, | Performed by: OTOLARYNGOLOGY

## 2022-04-25 PROCEDURE — 1126F AMNT PAIN NOTED NONE PRSNT: CPT | Mod: CPTII,S$GLB,, | Performed by: OTOLARYNGOLOGY

## 2022-04-25 PROCEDURE — 4010F PR ACE/ARB THEARPY RXD/TAKEN: ICD-10-PCS | Mod: CPTII,S$GLB,, | Performed by: OTOLARYNGOLOGY

## 2022-04-25 PROCEDURE — 87186 SC STD MICRODIL/AGAR DIL: CPT | Performed by: OTOLARYNGOLOGY

## 2022-04-25 PROCEDURE — 31231 NASAL ENDOSCOPY DX: CPT | Mod: S$GLB,,, | Performed by: OTOLARYNGOLOGY

## 2022-04-25 PROCEDURE — 4010F ACE/ARB THERAPY RXD/TAKEN: CPT | Mod: CPTII,S$GLB,, | Performed by: OTOLARYNGOLOGY

## 2022-04-25 PROCEDURE — 3008F BODY MASS INDEX DOCD: CPT | Mod: CPTII,S$GLB,, | Performed by: OTOLARYNGOLOGY

## 2022-04-25 PROCEDURE — 87070 CULTURE OTHR SPECIMN AEROBIC: CPT | Performed by: OTOLARYNGOLOGY

## 2022-04-25 PROCEDURE — 3288F PR FALLS RISK ASSESSMENT DOCUMENTED: ICD-10-PCS | Mod: CPTII,S$GLB,, | Performed by: OTOLARYNGOLOGY

## 2022-04-25 PROCEDURE — 3079F PR MOST RECENT DIASTOLIC BLOOD PRESSURE 80-89 MM HG: ICD-10-PCS | Mod: CPTII,S$GLB,, | Performed by: OTOLARYNGOLOGY

## 2022-04-25 PROCEDURE — 99214 PR OFFICE/OUTPT VISIT, EST, LEVL IV, 30-39 MIN: ICD-10-PCS | Mod: 25,S$GLB,, | Performed by: OTOLARYNGOLOGY

## 2022-04-25 PROCEDURE — 3075F SYST BP GE 130 - 139MM HG: CPT | Mod: CPTII,S$GLB,, | Performed by: OTOLARYNGOLOGY

## 2022-04-25 PROCEDURE — 3044F PR MOST RECENT HEMOGLOBIN A1C LEVEL <7.0%: ICD-10-PCS | Mod: CPTII,S$GLB,, | Performed by: OTOLARYNGOLOGY

## 2022-04-25 PROCEDURE — 3075F PR MOST RECENT SYSTOLIC BLOOD PRESS GE 130-139MM HG: ICD-10-PCS | Mod: CPTII,S$GLB,, | Performed by: OTOLARYNGOLOGY

## 2022-04-25 PROCEDURE — 3044F HG A1C LEVEL LT 7.0%: CPT | Mod: CPTII,S$GLB,, | Performed by: OTOLARYNGOLOGY

## 2022-04-25 PROCEDURE — 31231 PR NASAL ENDOSCOPY, DX: ICD-10-PCS | Mod: S$GLB,,, | Performed by: OTOLARYNGOLOGY

## 2022-04-25 PROCEDURE — 1101F PT FALLS ASSESS-DOCD LE1/YR: CPT | Mod: CPTII,S$GLB,, | Performed by: OTOLARYNGOLOGY

## 2022-04-25 PROCEDURE — 1159F MED LIST DOCD IN RCRD: CPT | Mod: CPTII,S$GLB,, | Performed by: OTOLARYNGOLOGY

## 2022-04-25 PROCEDURE — 3008F PR BODY MASS INDEX (BMI) DOCUMENTED: ICD-10-PCS | Mod: CPTII,S$GLB,, | Performed by: OTOLARYNGOLOGY

## 2022-04-25 PROCEDURE — 3079F DIAST BP 80-89 MM HG: CPT | Mod: CPTII,S$GLB,, | Performed by: OTOLARYNGOLOGY

## 2022-04-25 RX ORDER — DOXYCYCLINE HYCLATE 100 MG
100 TABLET ORAL 2 TIMES DAILY
Qty: 28 TABLET | Refills: 0 | Status: ON HOLD | OUTPATIENT
Start: 2022-04-25 | End: 2022-06-30 | Stop reason: HOSPADM

## 2022-04-25 RX ORDER — LEVOFLOXACIN 500 MG/1
500 TABLET, FILM COATED ORAL DAILY
Qty: 21 TABLET | Refills: 0 | Status: SHIPPED | OUTPATIENT
Start: 2022-04-25 | End: 2022-05-16

## 2022-04-25 NOTE — PROGRESS NOTES
Chief Complaint   Patient presents with    Sinus Problem     Stinky smell in his nose    .    HPI:     Scooter Morrissey is a 71 y.o. male who is known to me for CRS, NSD, inferior turbinate hypertorphy last seen in 2020. He  presents for evaluation of a several month history of nasal congestion, post-nasal drip, and bloody rhinorrhea.  He feels that he has had right sided facial tenderness and swelling. He does feel that he has mild tenderness in the right cheek region.  He describes this as a 1-2/10 on pain scale. He does not have any pain on the left side.  He does use sinus rinses or nasal sprays. He stopped FLonase due to nose bleeds. He admits to rhinorrhea and postnasal drip. He has not had sinus or nasal surgery. There is no history of sinonasal trauma. He does work as a .     Interval HPI 4/25/2022:  Follow up visit. Reports he has had hospitalization for pneumonia since last visit. He is following with his pulmonolotist Dr. Sanchez. He was on IV Cefepime and doxycycline in the hospital. CT sinus around the time of hospitalization showed pansinusitis. He reports that he is having continued rhinorrhea and post-nasal drip. He has had facial pain/pressure and productive cough.He has been using nasal saline rinses and Flonase.        Past Medical History:   Diagnosis Date    Acute hypoxemic respiratory failure 1/12/2022    NOEMI (acute kidney injury) 3/9/2022    Anxiety 6/29/2016    Asthma with chronic obstructive pulmonary disease (COPD)     Benign essential hypertension 4/5/2016    Hyperlipidemia 4/13/2016    Interstitial pneumonitis 4/13/2016    Other specified anemias 3/13/2020     Social History     Socioeconomic History    Marital status:    Occupational History    Occupation: lawn service   Tobacco Use    Smoking status: Former Smoker     Types: Cigars    Smokeless tobacco: Former User     Quit date: 5/12/1996   Substance and Sexual Activity    Alcohol use: No    Drug use: No     Sexual activity: Yes     Partners: Female     Social Determinants of Health     Financial Resource Strain: Low Risk     Difficulty of Paying Living Expenses: Not hard at all   Food Insecurity: No Food Insecurity    Worried About Running Out of Food in the Last Year: Never true    Ran Out of Food in the Last Year: Never true   Transportation Needs: No Transportation Needs    Lack of Transportation (Medical): No    Lack of Transportation (Non-Medical): No   Physical Activity: Sufficiently Active    Days of Exercise per Week: 6 days    Minutes of Exercise per Session: 90 min   Stress: No Stress Concern Present    Feeling of Stress : Only a little   Social Connections: Moderately Isolated    Frequency of Communication with Friends and Family: Three times a week    Frequency of Social Gatherings with Friends and Family: Twice a week    Attends Gnosticist Services: Never    Active Member of Clubs or Organizations: No    Attends Club or Organization Meetings: Never    Marital Status:    Housing Stability: Low Risk     Unable to Pay for Housing in the Last Year: No    Number of Places Lived in the Last Year: 1    Unstable Housing in the Last Year: No     Past Surgical History:   Procedure Laterality Date    ADENOIDECTOMY      COLONOSCOPY N/A 6/1/2016    Procedure: COLONOSCOPY;  Surgeon: Meme Mills MD;  Location: [facility];  Service: Endoscopy;  Laterality: N/A;    FINGER SURGERY      15 years ago    TONSILLECTOMY       Family History   Problem Relation Age of Onset    No Known Problems Mother     No Known Problems Father     No Known Problems Daughter            Review of Systems  General: negative for chills, fever or weight loss  Psychological: negative for mood changes or depression  Ophthalmic: negative for blurry vision, photophobia or eye pain  ENT: see HPI  Respiratory: no cough, shortness of breath, or wheezing  Cardiovascular: no chest pain or dyspnea on  exertion  Gastrointestinal: no abdominal pain, change in bowel habits, or black/ bloody stools  Musculoskeletal: negative for gait disturbance or muscular weakness  Neurological: no syncope or seizures; no ataxia  Dermatological: negative for puritis,  rash and jaundice  Hematologic/lymphatic: no easy bruising, no new lumps or bumps      Physical Exam:    Vitals:    04/25/22 1440   BP: 138/84   Pulse: 80       Constitutional: Well appearing / communicating without difficutly.  NAD.  Eyes: EOM I Bilaterally  Head/Face: Normocephalic.  Negative paranasal sinus pressure/tenderness.  Salivary glands WNL.  House Brackmann I Bilaterally.    Right Ear: Auricle normal appearance. External Auditory Canal within normal limits,TM w/o masses/lesions/perforations. TM mobility noted.   Left Ear: Auricle normal appearance. External Auditory Canal WNL,TM w/o masses/lesions/perforations. TM mobility noted.  Nose: Nasal septal deviation to the right. Inferior Turbinates 3+ bilaterally. No septal perforation. No masses/lesions. External nasal skin appears normal without masses/lesions.  Oral Cavity: Gingiva/lips within normal limits.  Dentition/gingiva healthy appearing. Mucus membranes moist. Floor of mouth soft, no masses palpated. Oral Tongue mobile. Hard Palate appears normal.    Oropharynx: Base of tongue appears normal. No masses/lesions noted. Tonsillar fossa/pharyngeal wall without lesions. Posterior oropharynx WNL.  Soft palate without masses. Midline uvula.   Neck/Lymphatic: No LAD I-VI bilaterally.  No thyromegaly.  No masses noted on exam.      See separate procedure note for nasal endoscopy.     CT sinus 3/28/2022:     FINDINGS:  There is mild mucosal thickening of the inferior frontal sinuses.  Patchy mucosal thickening with scattered partial aerated secretion of the bilateral ethmoid sinuses and sphenoid sinuses.  Left greater than right circumferential mucosal thickening of the maxillary sinuses containing some mixed  attenuation dependent debris as well as partial aerated secretion on the left.  Nasal septal deviation to the right with small right projecting nasal spur.  Opacification of bilateral ostiomeatal units.  No agnieszka bullosa.  Bilateral lamina papyracea and ethmoid roofs appear intact.  Partial fluid-filled bilateral mastoid air cells.  Partial opacification of the left mesotympanum.  Bilateral globes appear unremarkable.  Limited intracranial assessment demonstrates nothing unusual.    Assessment:    ICD-10-CM ICD-9-CM    1. Other chronic sinusitis  J32.8 473.8 Aerobic culture      CT Medtronic Sinuses without   2. Nasal septal deviation  J34.2 470    3. Hypertrophy of inferior nasal turbinate  J34.3 478.0      The primary encounter diagnosis was Other chronic sinusitis. Diagnoses of Nasal septal deviation and Hypertrophy of inferior nasal turbinate were also pertinent to this visit.      Plan:  Orders Placed This Encounter   Procedures    Aerobic culture    CT Medtronic Sinuses without     Continue Nasal saline rinses BID  Continue  Flonase 2 sprays per nostril daily  Continue  xyzal 5 mg PO daily  Start doxycycline 100 mg PO BID for 14 days  Start Levaquin 500 mg PO daily for 21 days  Obtain Post treatment CT sinus in 4 weeks  Follow up in 4 weeks with nasal endoscopy; may need ESS if not improved with medical management.     Clara Jennings MD

## 2022-04-25 NOTE — PROCEDURES
Nasal/sinus endoscopy    Date/Time: 4/25/2022 2:40 PM  Performed by: Clara Jennings MD  Authorized by: Clara Jennings MD              Consent Done?:  Yes (Verbal)  Anesthesia:     Local anesthetic:  Lidocaine 2% and Zheng-Synephrine 1/2%  Nose:     Procedure Performed:  Nasal Endoscopy  External:      No external nasal deformity  Intranasal:      Mucosa no polyps     Mucosa ulcers not present     No mucosa lesions present     Enlarged turbinates     Septum gross deformity  Nasopharynx:      Posterior choanae patent     Eustachian tube patent     Bilateral middle turbinate edema with narrowing of the middle meatus; + narrowing of sphenoethmoid recess + purulent drainage in bilateral middle meatus. Culture taken from left middle meatus.

## 2022-04-28 LAB — BACTERIA SPEC AEROBE CULT: ABNORMAL

## 2022-05-16 ENCOUNTER — HOSPITAL ENCOUNTER (OUTPATIENT)
Dept: RADIOLOGY | Facility: HOSPITAL | Age: 72
Discharge: HOME | End: 2022-05-16
Attending: OTOLARYNGOLOGY
Payer: MEDICARE

## 2022-05-16 DIAGNOSIS — J32.8 OTHER CHRONIC SINUSITIS: ICD-10-CM

## 2022-05-16 PROCEDURE — 70486 CT MAXILLOFACIAL W/O DYE: CPT | Mod: TC

## 2022-05-16 PROCEDURE — 70486 CT MEDTRONIC SINUSES WITHOUT: ICD-10-PCS | Mod: 26,,, | Performed by: RADIOLOGY

## 2022-05-16 PROCEDURE — 70486 CT MAXILLOFACIAL W/O DYE: CPT | Mod: 26,,, | Performed by: RADIOLOGY

## 2022-05-24 ENCOUNTER — OFFICE VISIT (OUTPATIENT)
Dept: OTOLARYNGOLOGY | Facility: CLINIC | Age: 72
End: 2022-05-24
Payer: MEDICARE

## 2022-05-24 VITALS
WEIGHT: 171.44 LBS | HEART RATE: 77 BPM | BODY MASS INDEX: 30.38 KG/M2 | DIASTOLIC BLOOD PRESSURE: 82 MMHG | SYSTOLIC BLOOD PRESSURE: 146 MMHG | HEIGHT: 63 IN

## 2022-05-24 DIAGNOSIS — J34.2 NASAL SEPTAL DEVIATION: ICD-10-CM

## 2022-05-24 DIAGNOSIS — J32.8 OTHER CHRONIC SINUSITIS: Primary | ICD-10-CM

## 2022-05-24 DIAGNOSIS — J34.3 HYPERTROPHY OF INFERIOR NASAL TURBINATE: ICD-10-CM

## 2022-05-24 PROCEDURE — 3288F FALL RISK ASSESSMENT DOCD: CPT | Mod: CPTII,S$GLB,, | Performed by: OTOLARYNGOLOGY

## 2022-05-24 PROCEDURE — 99999 PR PBB SHADOW E&M-EST. PATIENT-LVL IV: ICD-10-PCS | Mod: PBBFAC,,, | Performed by: OTOLARYNGOLOGY

## 2022-05-24 PROCEDURE — 4010F PR ACE/ARB THEARPY RXD/TAKEN: ICD-10-PCS | Mod: CPTII,S$GLB,, | Performed by: OTOLARYNGOLOGY

## 2022-05-24 PROCEDURE — 1126F PR PAIN SEVERITY QUANTIFIED, NO PAIN PRESENT: ICD-10-PCS | Mod: CPTII,S$GLB,, | Performed by: OTOLARYNGOLOGY

## 2022-05-24 PROCEDURE — 1101F PR PT FALLS ASSESS DOC 0-1 FALLS W/OUT INJ PAST YR: ICD-10-PCS | Mod: CPTII,S$GLB,, | Performed by: OTOLARYNGOLOGY

## 2022-05-24 PROCEDURE — 3044F HG A1C LEVEL LT 7.0%: CPT | Mod: CPTII,S$GLB,, | Performed by: OTOLARYNGOLOGY

## 2022-05-24 PROCEDURE — 99214 PR OFFICE/OUTPT VISIT, EST, LEVL IV, 30-39 MIN: ICD-10-PCS | Mod: 25,S$GLB,, | Performed by: OTOLARYNGOLOGY

## 2022-05-24 PROCEDURE — 1126F AMNT PAIN NOTED NONE PRSNT: CPT | Mod: CPTII,S$GLB,, | Performed by: OTOLARYNGOLOGY

## 2022-05-24 PROCEDURE — 3079F DIAST BP 80-89 MM HG: CPT | Mod: CPTII,S$GLB,, | Performed by: OTOLARYNGOLOGY

## 2022-05-24 PROCEDURE — 3288F PR FALLS RISK ASSESSMENT DOCUMENTED: ICD-10-PCS | Mod: CPTII,S$GLB,, | Performed by: OTOLARYNGOLOGY

## 2022-05-24 PROCEDURE — 3077F SYST BP >= 140 MM HG: CPT | Mod: CPTII,S$GLB,, | Performed by: OTOLARYNGOLOGY

## 2022-05-24 PROCEDURE — 31231 PR NASAL ENDOSCOPY, DX: ICD-10-PCS | Mod: S$GLB,,, | Performed by: OTOLARYNGOLOGY

## 2022-05-24 PROCEDURE — 3079F PR MOST RECENT DIASTOLIC BLOOD PRESSURE 80-89 MM HG: ICD-10-PCS | Mod: CPTII,S$GLB,, | Performed by: OTOLARYNGOLOGY

## 2022-05-24 PROCEDURE — 3008F BODY MASS INDEX DOCD: CPT | Mod: CPTII,S$GLB,, | Performed by: OTOLARYNGOLOGY

## 2022-05-24 PROCEDURE — 4010F ACE/ARB THERAPY RXD/TAKEN: CPT | Mod: CPTII,S$GLB,, | Performed by: OTOLARYNGOLOGY

## 2022-05-24 PROCEDURE — 99999 PR PBB SHADOW E&M-EST. PATIENT-LVL IV: CPT | Mod: PBBFAC,,, | Performed by: OTOLARYNGOLOGY

## 2022-05-24 PROCEDURE — 1160F RVW MEDS BY RX/DR IN RCRD: CPT | Mod: CPTII,S$GLB,, | Performed by: OTOLARYNGOLOGY

## 2022-05-24 PROCEDURE — 3077F PR MOST RECENT SYSTOLIC BLOOD PRESSURE >= 140 MM HG: ICD-10-PCS | Mod: CPTII,S$GLB,, | Performed by: OTOLARYNGOLOGY

## 2022-05-24 PROCEDURE — 99214 OFFICE O/P EST MOD 30 MIN: CPT | Mod: 25,S$GLB,, | Performed by: OTOLARYNGOLOGY

## 2022-05-24 PROCEDURE — 3044F PR MOST RECENT HEMOGLOBIN A1C LEVEL <7.0%: ICD-10-PCS | Mod: CPTII,S$GLB,, | Performed by: OTOLARYNGOLOGY

## 2022-05-24 PROCEDURE — 3008F PR BODY MASS INDEX (BMI) DOCUMENTED: ICD-10-PCS | Mod: CPTII,S$GLB,, | Performed by: OTOLARYNGOLOGY

## 2022-05-24 PROCEDURE — 1159F MED LIST DOCD IN RCRD: CPT | Mod: CPTII,S$GLB,, | Performed by: OTOLARYNGOLOGY

## 2022-05-24 PROCEDURE — 1101F PT FALLS ASSESS-DOCD LE1/YR: CPT | Mod: CPTII,S$GLB,, | Performed by: OTOLARYNGOLOGY

## 2022-05-24 PROCEDURE — 1159F PR MEDICATION LIST DOCUMENTED IN MEDICAL RECORD: ICD-10-PCS | Mod: CPTII,S$GLB,, | Performed by: OTOLARYNGOLOGY

## 2022-05-24 PROCEDURE — 31231 NASAL ENDOSCOPY DX: CPT | Mod: S$GLB,,, | Performed by: OTOLARYNGOLOGY

## 2022-05-24 PROCEDURE — 1160F PR REVIEW ALL MEDS BY PRESCRIBER/CLIN PHARMACIST DOCUMENTED: ICD-10-PCS | Mod: CPTII,S$GLB,, | Performed by: OTOLARYNGOLOGY

## 2022-05-24 NOTE — PROCEDURES
Nasal/sinus endoscopy    Date/Time: 5/24/2022 3:00 PM  Performed by: Clara Jennings MD  Authorized by: Clara Jennings MD              Consent Done?:  Yes (Verbal)  Anesthesia:     Local anesthetic:  Lidocaine 2% and Zheng-Synephrine 1/2%  Nose:     Procedure Performed:  Nasal Endoscopy  External:      No external nasal deformity  Intranasal:      Mucosa no polyps     Mucosa ulcers not present     No mucosa lesions present     Enlarged turbinates     Septum gross deformity  Nasopharynx:      Posterior choanae patent     Eustachian tube patent     Bilateral middle turbinate edema with narrowing of the middle meatus; + narrowing of sphenoethmoid recess + purulent drainage in right middle meatus

## 2022-05-25 DIAGNOSIS — J32.8 OTHER CHRONIC SINUSITIS: Primary | ICD-10-CM

## 2022-05-31 ENCOUNTER — PATIENT MESSAGE (OUTPATIENT)
Dept: ADMINISTRATIVE | Facility: HOSPITAL | Age: 72
End: 2022-05-31
Payer: MEDICARE

## 2022-05-31 ENCOUNTER — TELEPHONE (OUTPATIENT)
Dept: OTOLARYNGOLOGY | Facility: CLINIC | Age: 72
End: 2022-05-31
Payer: MEDICARE

## 2022-05-31 NOTE — TELEPHONE ENCOUNTER
Returned call to patients insurance and spoke with Andrew in which I was told that they were needing more information from case management department. Provided him with phone number to contact our pre service department and was thanked for calling. Surgery reference #[id number]  ----- Message from Sia Nelson sent at 5/31/2022  9:48 AM CDT -----  Contact: [phone]/Anshu with [facility]  Who Called: Anshu Oh  Regarding: outpatient surgery request   Would the patient rather a call back or a response via [facility]? Call back  Best Call Back Number: [phone]  Additional Information: N/a

## 2022-06-10 ENCOUNTER — TELEPHONE (OUTPATIENT)
Dept: OTOLARYNGOLOGY | Facility: CLINIC | Age: 72
End: 2022-06-10
Payer: MEDICARE

## 2022-06-10 NOTE — TELEPHONE ENCOUNTER
----- Message from Debra Durand sent at 6/10/2022  4:19 PM CDT -----  Type:  RX Refill Request    Who Called: pt   Refill or New Rx:refill  RX Name and Strength:atorvastatin (LIPITOR) 40 MG tablet  How is the patient currently taking it? (ex. 1XDay):1  Is this a 30 day or 90 day RX:90  Preferred Pharmacy with phone number: [id number] - [place], [place] - [address] [facility] [address]  Local or Mail Order:local  Ordering Provider:nash  Would the patient rather a call back or a response via [facility]? Call   Best Call Back Number:[phone]  Additional Information:     Pt stated he has been out for months  Pt stated pharmacy reached out to office and no one has respond

## 2022-06-10 NOTE — TELEPHONE ENCOUNTER
Returned patient call, regarding the Rx refill, informed patient that a message was sent to Dr Simmons.        Advise patient to give some time for response as Dr. Simmons is not in office this afternoon. Patient verbalized understanding and thanked me for calling.        ----- Message from Debra Durand sent at 6/10/2022  4:16 PM CDT -----  Type:  RX Refill Request    Who Called: pt   Refill or New Rx:refill  RX Name and Strength: fluticasone propionate (FLONASE) 50 mcg/actuation nasal spray  How is the patient currently taking it? (ex. 1XDay):  Is this a 30 day or 90 day RX:  Preferred Pharmacy with phone number: [id number] - [place], [place] - [address] AT [address]  Local or Mail Order:local   Ordering Provider:austyn   Would the patient rather a call back or a response via [facility]? Call   Best Call Back Number:[phone]  Additional Information:     Pt has been out for 2 weeks  Pt stated pharmacy reached out and no one has respond

## 2022-06-10 NOTE — TELEPHONE ENCOUNTER
No new care gaps identified.  [facility] Embedded Care Gaps. Reference number: [id number]. 6/10/2022   4:25:14 PM CDT

## 2022-06-12 RX ORDER — ATORVASTATIN CALCIUM 40 MG/1
40 TABLET, FILM COATED ORAL NIGHTLY
Qty: 90 TABLET | Refills: 3 | Status: SHIPPED | OUTPATIENT
Start: 2022-06-12 | End: 2023-06-19 | Stop reason: SDUPTHER

## 2022-06-13 ENCOUNTER — TELEPHONE (OUTPATIENT)
Dept: OTOLARYNGOLOGY | Facility: CLINIC | Age: 72
End: 2022-06-13
Payer: MEDICARE

## 2022-06-13 RX ORDER — FLUTICASONE PROPIONATE 50 MCG
2 SPRAY, SUSPENSION (ML) NASAL DAILY
Qty: 9.9 ML | Refills: 11 | Status: SHIPPED | OUTPATIENT
Start: 2022-06-13 | End: 2022-06-14 | Stop reason: SDUPTHER

## 2022-06-13 NOTE — TELEPHONE ENCOUNTER
----- Message from Herminia Rosales MA sent at 6/10/2022  4:31 PM CDT -----  Regarding: Rx  Please advise patient requesting a refill for fluticasone propionate (FLONASE) 50 mcg/actuation nasal spray.

## 2022-06-14 ENCOUNTER — TELEPHONE (OUTPATIENT)
Dept: FAMILY MEDICINE | Facility: CLINIC | Age: 72
End: 2022-06-14
Payer: MEDICARE

## 2022-06-14 DIAGNOSIS — E11.9 TYPE 2 DIABETES MELLITUS WITHOUT COMPLICATION, UNSPECIFIED WHETHER LONG TERM INSULIN USE: ICD-10-CM

## 2022-06-14 DIAGNOSIS — I70.0 AORTIC ATHEROSCLEROSIS: Primary | ICD-10-CM

## 2022-06-14 DIAGNOSIS — Z79.899 MEDICATION MANAGEMENT: ICD-10-CM

## 2022-06-14 DIAGNOSIS — Z01.810 PREOP CARDIOVASCULAR EXAM: ICD-10-CM

## 2022-06-14 RX ORDER — FLUTICASONE PROPIONATE 50 MCG
2 SPRAY, SUSPENSION (ML) NASAL DAILY
Qty: 9.9 ML | Refills: 11 | Status: SHIPPED | OUTPATIENT
Start: 2022-06-14 | End: 2023-02-10 | Stop reason: SDUPTHER

## 2022-06-14 NOTE — TELEPHONE ENCOUNTER
Please schedule him in the 10 AM slot on June 27th. Needs to have the attached labs and EKG done the week prior for review as part of preop clearance appointment.     I will try to clear him from a pulmonary standpoint as well if possible, thanks

## 2022-06-14 NOTE — TELEPHONE ENCOUNTER
----- Message from Neftaly Del Angel LPN sent at 6/13/2022  9:33 AM CDT -----  Regarding: pulmonary clearance  Good morning,   This patient is scheduled for a full sinus surgery with Dr. Simmons on 6/30. I have reached out to his pulmonologist already because he will need clearance prior to surgery but he does not have any availabilities until August. Would Dr. Rojas be able to see him? He needs clearance because he had pneumonia back in April. Please let me know.     Thanks,   Neftaly Del Angel

## 2022-06-14 NOTE — TELEPHONE ENCOUNTER
Called patient to let him know that Dr. Rojas will do the pre op clearance. And to let him know that Dr. Rojas want him to do the EKG and fasting labs one week prior to his appointment with her.  Patient was scheduled for everything and patient verbalized understanding.

## 2022-06-14 NOTE — TELEPHONE ENCOUNTER
No new care gaps identified.  [facility] Embedded Care Gaps. Reference number: [id number]. 6/14/2022   3:23:12 PM CDT

## 2022-06-16 DIAGNOSIS — J44.89 ASTHMA WITH COPD: ICD-10-CM

## 2022-06-16 RX ORDER — BUDESONIDE AND FORMOTEROL FUMARATE DIHYDRATE 160; 4.5 UG/1; UG/1
AEROSOL RESPIRATORY (INHALATION)
Qty: 30.6 G | Refills: 4 | Status: SHIPPED | OUTPATIENT
Start: 2022-06-16 | End: 2023-07-17 | Stop reason: SDUPTHER

## 2022-06-17 NOTE — TELEPHONE ENCOUNTER
Refill Routing Note   Medication(s) are not appropriate for processing by [facility] for the following reason(s):      - Patient has been seen in the ED/Hospital since the last PCP visit    [facility] action(s):  Defer          Medication reconciliation completed: No     Appointments  past 12m or future 3m with PCP    Date Provider   Last Visit   12/13/2021 Katelynn Rojas MD   Next Visit   6/27/2022 Katelynn Rojas MD   ED visits in past 90 days: 0        Note composed:9:06 PM 06/16/2022

## 2022-06-17 NOTE — TELEPHONE ENCOUNTER
No new care gaps identified.  [facility] Embedded Care Gaps. Reference number: [id number]. 6/16/2022   7:50:24 PM CDT

## 2022-06-20 ENCOUNTER — CLINICAL SUPPORT (OUTPATIENT)
Dept: LAB | Facility: HOSPITAL | Age: 72
End: 2022-06-20
Attending: OTOLARYNGOLOGY
Payer: MEDICARE

## 2022-06-20 ENCOUNTER — HOSPITAL ENCOUNTER (OUTPATIENT)
Dept: PREADMISSION TESTING | Facility: HOSPITAL | Age: 72
Discharge: HOME | End: 2022-06-20
Attending: OTOLARYNGOLOGY
Payer: MEDICARE

## 2022-06-20 ENCOUNTER — OFFICE VISIT (OUTPATIENT)
Dept: OTOLARYNGOLOGY | Facility: CLINIC | Age: 72
End: 2022-06-20
Payer: MEDICARE

## 2022-06-20 ENCOUNTER — ANESTHESIA EVENT (OUTPATIENT)
Dept: SURGERY | Facility: HOSPITAL | Age: 72
End: 2022-06-20
Payer: MEDICARE

## 2022-06-20 VITALS
SYSTOLIC BLOOD PRESSURE: 104 MMHG | OXYGEN SATURATION: 99 % | HEIGHT: 63 IN | BODY MASS INDEX: 30.86 KG/M2 | WEIGHT: 174.19 LBS | DIASTOLIC BLOOD PRESSURE: 71 MMHG | HEART RATE: 77 BPM | TEMPERATURE: 98 F | RESPIRATION RATE: 16 BRPM

## 2022-06-20 VITALS
SYSTOLIC BLOOD PRESSURE: 117 MMHG | HEART RATE: 79 BPM | DIASTOLIC BLOOD PRESSURE: 70 MMHG | BODY MASS INDEX: 30.6 KG/M2 | WEIGHT: 172.75 LBS

## 2022-06-20 DIAGNOSIS — J31.0 CHRONIC RHINITIS: Chronic | ICD-10-CM

## 2022-06-20 DIAGNOSIS — Z79.899 MEDICATION MANAGEMENT: ICD-10-CM

## 2022-06-20 DIAGNOSIS — J34.3 HYPERTROPHY OF INFERIOR NASAL TURBINATE: ICD-10-CM

## 2022-06-20 DIAGNOSIS — I70.0 AORTIC ATHEROSCLEROSIS: ICD-10-CM

## 2022-06-20 DIAGNOSIS — J34.2 NASAL SEPTAL DEVIATION: ICD-10-CM

## 2022-06-20 DIAGNOSIS — E11.9 TYPE 2 DIABETES MELLITUS WITHOUT COMPLICATION, UNSPECIFIED WHETHER LONG TERM INSULIN USE: ICD-10-CM

## 2022-06-20 DIAGNOSIS — Z01.810 PREOP CARDIOVASCULAR EXAM: ICD-10-CM

## 2022-06-20 DIAGNOSIS — J32.8 OTHER CHRONIC SINUSITIS: Primary | Chronic | ICD-10-CM

## 2022-06-20 PROCEDURE — 3078F DIAST BP <80 MM HG: CPT | Mod: CPTII,S$GLB,, | Performed by: OTOLARYNGOLOGY

## 2022-06-20 PROCEDURE — 3008F PR BODY MASS INDEX (BMI) DOCUMENTED: ICD-10-PCS | Mod: CPTII,S$GLB,, | Performed by: OTOLARYNGOLOGY

## 2022-06-20 PROCEDURE — 1101F PT FALLS ASSESS-DOCD LE1/YR: CPT | Mod: CPTII,S$GLB,, | Performed by: OTOLARYNGOLOGY

## 2022-06-20 PROCEDURE — 4010F PR ACE/ARB THEARPY RXD/TAKEN: ICD-10-PCS | Mod: CPTII,S$GLB,, | Performed by: OTOLARYNGOLOGY

## 2022-06-20 PROCEDURE — 99999 PR PBB SHADOW E&M-EST. PATIENT-LVL III: ICD-10-PCS | Mod: PBBFAC,,, | Performed by: OTOLARYNGOLOGY

## 2022-06-20 PROCEDURE — 99999 PR PBB SHADOW E&M-EST. PATIENT-LVL III: CPT | Mod: PBBFAC,,, | Performed by: OTOLARYNGOLOGY

## 2022-06-20 PROCEDURE — 4010F ACE/ARB THERAPY RXD/TAKEN: CPT | Mod: CPTII,S$GLB,, | Performed by: OTOLARYNGOLOGY

## 2022-06-20 PROCEDURE — 3008F BODY MASS INDEX DOCD: CPT | Mod: CPTII,S$GLB,, | Performed by: OTOLARYNGOLOGY

## 2022-06-20 PROCEDURE — 1160F PR REVIEW ALL MEDS BY PRESCRIBER/CLIN PHARMACIST DOCUMENTED: ICD-10-PCS | Mod: CPTII,S$GLB,, | Performed by: OTOLARYNGOLOGY

## 2022-06-20 PROCEDURE — 1126F PR PAIN SEVERITY QUANTIFIED, NO PAIN PRESENT: ICD-10-PCS | Mod: CPTII,S$GLB,, | Performed by: OTOLARYNGOLOGY

## 2022-06-20 PROCEDURE — 1159F MED LIST DOCD IN RCRD: CPT | Mod: CPTII,S$GLB,, | Performed by: OTOLARYNGOLOGY

## 2022-06-20 PROCEDURE — 3288F FALL RISK ASSESSMENT DOCD: CPT | Mod: CPTII,S$GLB,, | Performed by: OTOLARYNGOLOGY

## 2022-06-20 PROCEDURE — 3044F PR MOST RECENT HEMOGLOBIN A1C LEVEL <7.0%: ICD-10-PCS | Mod: CPTII,S$GLB,, | Performed by: OTOLARYNGOLOGY

## 2022-06-20 PROCEDURE — 1101F PR PT FALLS ASSESS DOC 0-1 FALLS W/OUT INJ PAST YR: ICD-10-PCS | Mod: CPTII,S$GLB,, | Performed by: OTOLARYNGOLOGY

## 2022-06-20 PROCEDURE — 1159F PR MEDICATION LIST DOCUMENTED IN MEDICAL RECORD: ICD-10-PCS | Mod: CPTII,S$GLB,, | Performed by: OTOLARYNGOLOGY

## 2022-06-20 PROCEDURE — 99214 PR OFFICE/OUTPT VISIT, EST, LEVL IV, 30-39 MIN: ICD-10-PCS | Mod: S$GLB,,, | Performed by: OTOLARYNGOLOGY

## 2022-06-20 PROCEDURE — 3074F SYST BP LT 130 MM HG: CPT | Mod: CPTII,S$GLB,, | Performed by: OTOLARYNGOLOGY

## 2022-06-20 PROCEDURE — 3074F PR MOST RECENT SYSTOLIC BLOOD PRESSURE < 130 MM HG: ICD-10-PCS | Mod: CPTII,S$GLB,, | Performed by: OTOLARYNGOLOGY

## 2022-06-20 PROCEDURE — 1126F AMNT PAIN NOTED NONE PRSNT: CPT | Mod: CPTII,S$GLB,, | Performed by: OTOLARYNGOLOGY

## 2022-06-20 PROCEDURE — 3288F PR FALLS RISK ASSESSMENT DOCUMENTED: ICD-10-PCS | Mod: CPTII,S$GLB,, | Performed by: OTOLARYNGOLOGY

## 2022-06-20 PROCEDURE — 3078F PR MOST RECENT DIASTOLIC BLOOD PRESSURE < 80 MM HG: ICD-10-PCS | Mod: CPTII,S$GLB,, | Performed by: OTOLARYNGOLOGY

## 2022-06-20 PROCEDURE — 3044F HG A1C LEVEL LT 7.0%: CPT | Mod: CPTII,S$GLB,, | Performed by: OTOLARYNGOLOGY

## 2022-06-20 PROCEDURE — 99214 OFFICE O/P EST MOD 30 MIN: CPT | Mod: S$GLB,,, | Performed by: OTOLARYNGOLOGY

## 2022-06-20 PROCEDURE — 1160F RVW MEDS BY RX/DR IN RCRD: CPT | Mod: CPTII,S$GLB,, | Performed by: OTOLARYNGOLOGY

## 2022-06-20 RX ORDER — SODIUM CHLORIDE, SODIUM LACTATE, POTASSIUM CHLORIDE, CALCIUM CHLORIDE 600; 310; 30; 20 MG/100ML; MG/100ML; MG/100ML; MG/100ML
INJECTION, SOLUTION INTRAVENOUS CONTINUOUS
Status: CANCELLED | OUTPATIENT
Start: 2022-06-20

## 2022-06-20 RX ORDER — IPRATROPIUM BROMIDE 0.5 MG/2.5ML
0.5 SOLUTION RESPIRATORY (INHALATION) ONCE
Status: CANCELLED | OUTPATIENT
Start: 2022-06-30

## 2022-06-20 RX ORDER — LIDOCAINE HYDROCHLORIDE 10 MG/ML
1 INJECTION, SOLUTION EPIDURAL; INFILTRATION; INTRACAUDAL; PERINEURAL ONCE
Status: CANCELLED | OUTPATIENT
Start: 2022-06-20 | End: 2022-06-20

## 2022-06-20 NOTE — PRE-PROCEDURE INSTRUCTIONS
arranging a ride home    Allergies, medical, surgical, family and psychosocial histories reviewed with patient. Periop plan of care reviewed. Preop instructions given, including medications to take and to hold. Hibiclens soap and instructions on use given. Time allotted for questions to be addressed.  Patient verbalized understanding.]

## 2022-06-20 NOTE — ANESTHESIA PREPROCEDURE EVALUATION
"                                                                                                             06/20/2022  Scooter Morrissey is a 71 y.o., male scheduled for ETHMOIDECTOMY (Bilateral )       MAXILLARY ANTROSTOMY (Bilateral )       SEPTOPLASTY, NOSE (N/A Nose)       FESS, USING COMPUTER-ASSISTED NAVIGATION (Bilateral Face)       CAUTERIZATION, MUCOSA, NASAL TURBINATE (Bilateral Nose)       SPHENOIDECTOMY (Bilateral Face)       SINUSOTOMY, FRONTAL SINUS, OBLITERATIVE (Bilateral Face) 6/30/22.    Per family medicine Dr. Rojas, "    Past Medical History:   Diagnosis Date    Acute hypoxemic respiratory failure 1/12/2022    NOEMI (acute kidney injury) 3/9/2022    Anxiety 6/29/2016    Asthma with chronic obstructive pulmonary disease (COPD)     Benign essential hypertension 4/5/2016    Hyperlipidemia 4/13/2016    Interstitial pneumonitis 4/13/2016    Other specified anemias 3/13/2020    Type 2 diabetes mellitus     Type 2 diabetes mellitus      Past Surgical History:   Procedure Laterality Date    ADENOIDECTOMY      COLONOSCOPY N/A 6/1/2016    Procedure: COLONOSCOPY;  Surgeon: Meme Mills MD;  Location: [facility];  Service: Endoscopy;  Laterality: N/A;    FINGER SURGERY      15 years ago    TONSILLECTOMY         Pre-op Assessment    I have reviewed the Patient Summary Reports.     I have reviewed the Nursing Notes.    I have reviewed the Medications.     Review of Systems  Anesthesia Hx:  No problems with previous Anesthesia Denies Hx of Anesthetic complications  History of prior surgery of interest to airway management or planning:  Denies Personal Hx of Anesthesia complications.   Social:  Non-Smoker, No Alcohol Use    Hematology/Oncology:         -- Anemia:   Cardiovascular:   Exercise tolerance: good Hypertension, well controlled  Denies Angina. hyperlipidemia    Pulmonary:   COPD, mild Asthma mild    Renal/:   Chronic Renal Disease    Hepatic/GI:   Denies GERD. Liver Disease,  "   Musculoskeletal:  Musculoskeletal Normal    Neurological:   Denies TIA. Denies CVA. Neuromuscular Disease,  Denies Seizures.    Endocrine:   Diabetes, type 2  Obesity / BMI > 30  Psych:  Psychiatric Normal           Physical Exam  General: Well nourished and Cooperative    Airway:  Mallampati: III   Mouth Opening: Normal  TM Distance: Normal  Tongue: Normal  Neck ROM: Normal ROM    Dental:  Dentures, Partial Dentures    Chest/Lungs:  Clear to auscultation, Normal Respiratory Rate  expiratory wheezes  Intermittent dry cough  Heart:  Rate: Normal  Rhythm: Regular Rhythm  Sounds: Normal    EKG 3/9/22  Normal sinus rhythm   Left axis deviation   Moderate voltage criteria for LVH, may be normal variant   Abnormal ECG   When compared with ECG of 12-JAN-2022 12:12,   No significant change was found   Confirmed by Irina LIM, Sergio ([id number]) on 3/9/2022 5:25:39 PM       Anesthesia Plan  Type of Anesthesia, risks & benefits discussed:    Anesthesia Type: Gen ETT  Intra-op Monitoring Plan: Standard ASA Monitors  Post Op Pain Control Plan: multimodal analgesia  Induction:  IV  ASA Score: 3  Anesthesia Plan Notes: Anesthesia consent to be signed prior to surgery 6/30/22      Ready For Surgery From Anesthesia Perspective.     .

## 2022-06-20 NOTE — DISCHARGE INSTRUCTIONS
Your surgery is scheduled for 6/27/22.    Please report to Hospital Front Lobby on the 1st Floor at 0530 a.m.    THIS TIME IS SUBJECT TO CHANGE.  YOU WILL RECEIVE A PHONE CALL THE DAY BEFORE SURGERY BY 3:30 PM TO CONFIRM YOUR TIME OF ARRIVAL.  IF YOU HAVE NOT RECEIVED A PHONE CALL BY 3:30 PM THE DAY BEFORE YOUR SURGERY PLEASE CALL [phone]     INSTRUCTIONS IMPORTANT!!!  ¨ Do not eat or drink after 12 midnight-including water, candy, gum, & mints. OK to brush teeth.      ____  Proceed to [facility] on *** for additional testing.        ____  Do not wear makeup, including mascara.  ____  No powder, lotions or creams to surgical area.  ____  Please remove all jewelry, including piercings and leave at home.  ____  No money or valuables needed. Please leave at home.  ____  Please bring any documents given by your doctor.  ____  If going home the same day, arrange for a ride home. You will not be able to             drive if Anesthesia was used.  ____  Children under 18 years require a parent / guardian present the entire time             they are in surgery / recovery.  ____  Wear loose fitting clothing. Allow for dressings, bandages.  ____  Stop Aspirin, Ibuprofen, Motrin, Aleve, Goody's/BC powders, Excedrine and Naproxen (NSAIDS) at least 3-5 days before surgery, unless otherwise instructed by your doctor, or the nurse.   You MAY use Tylenol/acetaminophen until day of surgery.  ____  Wash the surgical area with Hibiclens the night before surgery, and again the             morning of surgery.  Be sure to rinse hibiclens off completely (if instructed by   nurse).  ____  If you take diabetic medication, do not take am of surgery unless instructed by Doctor.  ____  Call MD for temperature above 101 degrees or any other signs of infection such as Urinary (bladder) infection, Upper respiratory infection, skin boils, etc.  ____ Stop taking any Fish Oil supplement or any Vitamins that contain Vitamin E at  least 5 days prior to surgery.  ____ Do Not wear your contact lenses the day of your procedure.  You may wear your glasses.      ____Do not shave surgical site for 3 days prior to surgery.  ____ Practice Good hand washing before, during, and after procedure.      I have read or had read and explained to me, and understand the above information.  Additional comments or instructions:  For additional questions call [phone]      ANESTHESIA SIDE EFFECTS  -For the first 24 hours after surgery:  Do not drive, use heavy equipment, make important decisions, or drink alcohol  -It is normal to feel sleepy for several hours.  Rest until you are more awake.  -Have someone stay with you, if needed.  They can watch for problems and help keep you safe.  -Some possible post anesthesia side effects include: nausea and vomiting, sore throat and hoarseness, sleepiness, and dizziness.        Pre-Op Bathing Instructions    Before surgery, you can play an important role in your own health.    Because skin is not sterile, we need to be sure that your skin is as free of germs as possible. By following the instructions below, you can reduce the number of germs on your skin before surgery.    IMPORTANT: You will need to shower with a special soap called Hibiclens*, available at any pharmacy.  If you are allergic to Chlorhexidine (the antiseptic in Hibiclens), use an antibacterial soap such as Dial Soap for your preoperative shower.  You will shower with Hibiclens both the night before your surgery and the morning of your surgery.  Do not use Hibiclens on the head, face or genitals to avoid injury to those areas.    STEP #1: THE NIGHT BEFORE YOUR SURGERY     Do not shave the area of your body where your surgery will be performed.  Shower and wash your hair and body as usual with your normal soap and shampoo.  Rinse your hair and body thoroughly after you shower to remove all soap residue.  With your hand, apply one packet of Hibiclens soap to  the surgical site.   Wash the site gently for five (5) minutes. Do not scrub your skin too hard.   Do not wash with your regular soap after Hibiclens is used.  Rinse your body thoroughly.  Pat yourself dry with a clean, soft towel.  Do not use lotion, cream, or powder.  Wear clean clothes.    STEP #2: THE MORNING OF YOUR SURGERY     Repeat Step #1.    * Not to be used by people allergic to Chlorhexidine.

## 2022-06-20 NOTE — H&P (VIEW-ONLY)
Chief Complaint   Patient presents with    Follow-up     Sign concepts    .    HPI:     Scooter Morrissey is a 71 y.o. male who is known to me for CRS, NSD, inferior turbinate hypertorphy last seen in 2020. He  presents for evaluation of a several month history of nasal congestion, post-nasal drip, and bloody rhinorrhea.  He feels that he has had right sided facial tenderness and swelling. He does feel that he has mild tenderness in the right cheek region.  He describes this as a 1-2/10 on pain scale. He does not have any pain on the left side.  He does use sinus rinses or nasal sprays. He stopped FLonase due to nose bleeds. He admits to rhinorrhea and postnasal drip. He has not had sinus or nasal surgery. There is no history of sinonasal trauma. He does work as a .     Interval HPI 4/25/2022:  Follow up visit. Reports he has had hospitalization for pneumonia since last visit. He is following with his pulmonolotist Dr. Sanchez. He was on IV Cefepime and doxycycline in the hospital. CT sinus around the time of hospitalization showed pansinusitis. He reports that he is having continued rhinorrhea and post-nasal drip. He has had facial pain/pressure and productive cough.He has been using nasal saline rinses and Flonase.        Interval HPI 5/24/2022:  Follow up visit.Reports slight improvement after last visit. He completed 21 day course of Levaquin and 14 day course of doxycycline.  He has continued rhinorrhea, post-nasal drip, and facial pain/pressure.       Past Medical History:   Diagnosis Date    Acute hypoxemic respiratory failure 1/12/2022    NOEMI (acute kidney injury) 3/9/2022    Anxiety 6/29/2016    Asthma with chronic obstructive pulmonary disease (COPD)     Benign essential hypertension 4/5/2016    Hyperlipidemia 4/13/2016    Interstitial pneumonitis 4/13/2016    Other specified anemias 3/13/2020     Social History     Socioeconomic History    Marital status:    Occupational History     Occupation: lawn service   Tobacco Use    Smoking status: Former Smoker     Types: Cigars    Smokeless tobacco: Former User     Quit date: 5/12/1996   Substance and Sexual Activity    Alcohol use: No    Drug use: No    Sexual activity: Yes     Partners: Female     Social Determinants of Health     Financial Resource Strain: Low Risk     Difficulty of Paying Living Expenses: Not hard at all   Food Insecurity: No Food Insecurity    Worried About Running Out of Food in the Last Year: Never true    Ran Out of Food in the Last Year: Never true   Transportation Needs: No Transportation Needs    Lack of Transportation (Medical): No    Lack of Transportation (Non-Medical): No   Physical Activity: Sufficiently Active    Days of Exercise per Week: 6 days    Minutes of Exercise per Session: 90 min   Stress: No Stress Concern Present    Feeling of Stress : Only a little   Social Connections: Moderately Isolated    Frequency of Communication with Friends and Family: Three times a week    Frequency of Social Gatherings with Friends and Family: Twice a week    Attends Cheondoism Services: Never    Active Member of Clubs or Organizations: No    Attends Club or Organization Meetings: Never    Marital Status:    Housing Stability: Low Risk     Unable to Pay for Housing in the Last Year: No    Number of Places Lived in the Last Year: 1    Unstable Housing in the Last Year: No     Past Surgical History:   Procedure Laterality Date    ADENOIDECTOMY      COLONOSCOPY N/A 6/1/2016    Procedure: COLONOSCOPY;  Surgeon: Meme Mills MD;  Location: [facility];  Service: Endoscopy;  Laterality: N/A;    FINGER SURGERY      15 years ago    TONSILLECTOMY       Family History   Problem Relation Age of Onset    No Known Problems Mother     No Known Problems Father     No Known Problems Daughter            Review of Systems  General: negative for chills, fever or weight loss  Psychological: negative for  mood changes or depression  Ophthalmic: negative for blurry vision, photophobia or eye pain  ENT: see HPI  Respiratory: no cough, shortness of breath, or wheezing  Cardiovascular: no chest pain or dyspnea on exertion  Gastrointestinal: no abdominal pain, change in bowel habits, or black/ bloody stools  Musculoskeletal: negative for gait disturbance or muscular weakness  Neurological: no syncope or seizures; no ataxia  Dermatological: negative for puritis,  rash and jaundice  Hematologic/lymphatic: no easy bruising, no new lumps or bumps      Physical Exam:    Vitals:    06/20/22 1340   BP: 117/70   Pulse: 79       Constitutional: Well appearing / communicating without difficutly.  NAD.  Eyes: EOM I Bilaterally  Head/Face: Normocephalic.  Negative paranasal sinus pressure/tenderness.  Salivary glands WNL.  House Brackmann I Bilaterally.    Right Ear: Auricle normal appearance. External Auditory Canal within normal limits,TM w/o masses/lesions/perforations. TM mobility noted.   Left Ear: Auricle normal appearance. External Auditory Canal WNL,TM w/o masses/lesions/perforations. TM mobility noted.  Nose: Nasal septal deviation to the right. Inferior Turbinates 3+ bilaterally. No septal perforation. No masses/lesions. External nasal skin appears normal without masses/lesions.  Oral Cavity: Gingiva/lips within normal limits.  Dentition/gingiva healthy appearing. Mucus membranes moist. Floor of mouth soft, no masses palpated. Oral Tongue mobile. Hard Palate appears normal.    Oropharynx: Base of tongue appears normal. No masses/lesions noted. Tonsillar fossa/pharyngeal wall without lesions. Posterior oropharynx WNL.  Soft palate without masses. Midline uvula.   Neck/Lymphatic: No LAD I-VI bilaterally.  No thyromegaly.  No masses noted on exam.      See separate procedure note for nasal endoscopy.     CT sinus 3/28/2022:     FINDINGS:  There is mild mucosal thickening of the inferior frontal sinuses.  Patchy mucosal  thickening with scattered partial aerated secretion of the bilateral ethmoid sinuses and sphenoid sinuses.  Left greater than right circumferential mucosal thickening of the maxillary sinuses containing some mixed attenuation dependent debris as well as partial aerated secretion on the left.  Nasal septal deviation to the right with small right projecting nasal spur.  Opacification of bilateral ostiomeatal units.  No agnieszka bullosa.  Bilateral lamina papyracea and ethmoid roofs appear intact.  Partial fluid-filled bilateral mastoid air cells.  Partial opacification of the left mesotympanum.  Bilateral globes appear unremarkable.  Limited intracranial assessment demonstrates nothing unusual.    CT sinus 5/17/2022:   Similar mild mucosal thickening of the inferior frontal sinuses.  Patchy mucosal thickening with partial aerated secretion involving the ethmoid and sphenoid sinuses, similar.  Left greater than right mucosal thickening of the maxillary sinuses containing mixed attenuation dependent debris/fluid with partial aerated secretion on the right.  Degree of maxillary mucosal thickening appears slightly improved from prior.  Nasal septal deviation to the right with small right projecting nasal spur.  Opacification of bilateral ostiomeatal units.  Bilateral lamina papyracea and ethmoid roofs appear intact.  Partial fluid-filled bilateral mastoid air cells, right greater than left with opacification of the epitympanum and mesotympanum on the right.  Bilateral globes are unremarkable.  Limited intracranial assessment demonstrates nothing unusual.      Assessment:    ICD-10-CM ICD-9-CM    1. Other chronic sinusitis  J32.8 473.8    2. Nasal septal deviation  J34.2 470    3. Hypertrophy of inferior nasal turbinate  J34.3 478.0    4. Chronic rhinitis  J31.0 472.0      The primary encounter diagnosis was Other chronic sinusitis. Diagnoses of Nasal septal deviation, Hypertrophy of inferior nasal turbinate, and Chronic  rhinitis were also pertinent to this visit.      Plan:  No orders of the defined types were placed in this encounter.    Continue Nasal saline rinses BID  Continue  Flonase 2 sprays per nostril daily  Continue  xyzal 5 mg PO daily  He again discussed that he would  benefit from endoscopic sinus surgery and septoplasty for the treatment of his condition.  This would include all sinuses and would be bilateral.  Inferior turbinate reduction would be included.  I discussed the risks, benefits and alternatives to surgery with the patient, as well as the expected postoperative course.  I gave him the opportunity to ask questions and I answered all of them. He is ready to proceed with surgery and gave informed consent today.  I provided relevant printed information on his condition for him to review at home. Will schedule surgery in the near future.    Clara Jennings MD

## 2022-06-21 ENCOUNTER — TELEPHONE (OUTPATIENT)
Dept: FAMILY MEDICINE | Facility: CLINIC | Age: 72
End: 2022-06-21
Payer: MEDICARE

## 2022-06-21 ENCOUNTER — OFFICE VISIT (OUTPATIENT)
Dept: SLEEP MEDICINE | Facility: CLINIC | Age: 72
End: 2022-06-21
Payer: MEDICARE

## 2022-06-21 ENCOUNTER — HOSPITAL ENCOUNTER (OUTPATIENT)
Dept: PULMONOLOGY | Facility: CLINIC | Age: 72
Discharge: HOME | End: 2022-06-21
Payer: MEDICARE

## 2022-06-21 VITALS
HEIGHT: 63 IN | WEIGHT: 176.56 LBS | SYSTOLIC BLOOD PRESSURE: 140 MMHG | DIASTOLIC BLOOD PRESSURE: 82 MMHG | HEART RATE: 80 BPM | OXYGEN SATURATION: 95 % | BODY MASS INDEX: 31.29 KG/M2

## 2022-06-21 DIAGNOSIS — R80.9 TYPE 2 DIABETES MELLITUS WITH MICROALBUMINURIA, UNSPECIFIED WHETHER LONG TERM INSULIN USE: Primary | ICD-10-CM

## 2022-06-21 DIAGNOSIS — J45.909 ASTHMA, UNSPECIFIED ASTHMA SEVERITY, UNSPECIFIED WHETHER COMPLICATED, UNSPECIFIED WHETHER PERSISTENT: ICD-10-CM

## 2022-06-21 DIAGNOSIS — J32.9 CHRONIC RHINOSINUSITIS: ICD-10-CM

## 2022-06-21 DIAGNOSIS — Z01.818 PRE-OP EVALUATION: ICD-10-CM

## 2022-06-21 DIAGNOSIS — Z86.16 PERSONAL HISTORY OF COVID-19: ICD-10-CM

## 2022-06-21 DIAGNOSIS — Z87.01 HISTORY OF PSEUDOMONAS PNEUMONIA: ICD-10-CM

## 2022-06-21 DIAGNOSIS — J47.9 BRONCHIECTASIS WITHOUT COMPLICATION: Primary | ICD-10-CM

## 2022-06-21 DIAGNOSIS — R09.82 POST-NASAL DRIP: ICD-10-CM

## 2022-06-21 DIAGNOSIS — J44.89 ASTHMA-COPD OVERLAP SYNDROME: ICD-10-CM

## 2022-06-21 DIAGNOSIS — E11.29 TYPE 2 DIABETES MELLITUS WITH MICROALBUMINURIA, UNSPECIFIED WHETHER LONG TERM INSULIN USE: Primary | ICD-10-CM

## 2022-06-21 DIAGNOSIS — Z86.14 HISTORY OF MRSA INFECTION: ICD-10-CM

## 2022-06-21 LAB
DLCO ADJ PRE: 9.6 ML/(MIN*MMHG) (ref 15.65–29.5)
DLCO SINGLE BREATH LLN: 15.65
DLCO SINGLE BREATH PRE REF: 39.2 %
DLCO SINGLE BREATH REF: 22.58
DLCOC SBVA LLN: 2.36
DLCOC SBVA PRE REF: 96 %
DLCOC SBVA REF: 3.69
DLCOC SINGLE BREATH LLN: 15.65
DLCOC SINGLE BREATH PRE REF: 42.5 %
DLCOC SINGLE BREATH REF: 22.58
DLCOCSBVAULN: 5.03
DLCOCSINGLEBREATHULN: 29.5
DLCOSINGLEBREATHULN: 29.5
DLCOVA LLN: 2.36
DLCOVA PRE REF: 88.4 %
DLCOVA PRE: 3.27 ML/(MIN*MMHG*L) (ref 2.36–5.03)
DLCOVA REF: 3.69
DLCOVAULN: 5.03
DLVAADJ PRE: 3.55 ML/(MIN*MMHG*L) (ref 2.36–5.03)
FEF 25 75 LLN: 0.61
FEF 25 75 PRE REF: 33.8 %
FEF 25 75 REF: 1.78
FET100 CHG: 3.6 %
FEV05 LLN: 1
FEV05 REF: 2.14
FEV1 CHG: -3.2 %
FEV1 FVC LLN: 64
FEV1 FVC PRE REF: 78.6 %
FEV1 FVC REF: 77
FEV1 LLN: 1.53
FEV1 PRE REF: 59.7 %
FEV1 REF: 2.26
FEV1 VOL CHG: -0.04
FVC CHG: -2.5 %
FVC LLN: 2.1
FVC PRE REF: 75.9 %
FVC REF: 2.93
FVC VOL CHG: -0.06
IVC PRE: 2.02 L (ref 2.1–3.78)
IVC SINGLE BREATH LLN: 2.1
IVC SINGLE BREATH PRE REF: 68.8 %
IVC SINGLE BREATH REF: 2.93
IVCSINGLEBREATHULN: 3.78
PEF LLN: 4.45
PEF PRE REF: 50 %
PEF REF: 6.74
PHYSICIAN COMMENT: ABNORMAL
POST FEF 25 75: 0.57 L/S (ref 0.61–3.57)
POST FET 100: 6.67 SEC
POST FEV1 FVC: 60.16 % (ref 63.62–89.15)
POST FEV1: 1.3 L (ref 1.53–2.93)
POST FEV5: 1.02 L (ref 1–3.27)
POST FVC: 2.17 L (ref 2.1–3.78)
POST PEF: 3.45 L/S (ref 4.45–9.03)
PRE DLCO: 8.85 ML/(MIN*MMHG) (ref 15.65–29.5)
PRE FEF 25 75: 0.6 L/S (ref 0.61–3.57)
PRE FET 100: 6.44 SEC
PRE FEV05 REF: 48.6 %
PRE FEV1 FVC: 60.57 % (ref 63.62–89.15)
PRE FEV1: 1.35 L (ref 1.53–2.93)
PRE FEV5: 1.04 L (ref 1–3.27)
PRE FVC: 2.22 L (ref 2.1–3.78)
PRE PEF: 3.37 L/S (ref 4.45–9.03)
VA PRE: 2.71 L (ref 5.96–5.96)
VA SINGLE BREATH LLN: 5.96
VA SINGLE BREATH PRE REF: 45.4 %
VA SINGLE BREATH REF: 5.96
VASINGLEBREATHULN: 5.96

## 2022-06-21 PROCEDURE — 3060F POS MICROALBUMINURIA REV: CPT | Mod: CPTII,S$GLB,, | Performed by: INTERNAL MEDICINE

## 2022-06-21 PROCEDURE — 99999 PR PBB SHADOW E&M-EST. PATIENT-LVL V: CPT | Mod: PBBFAC,,, | Performed by: INTERNAL MEDICINE

## 2022-06-21 PROCEDURE — 1160F RVW MEDS BY RX/DR IN RCRD: CPT | Mod: CPTII,S$GLB,, | Performed by: INTERNAL MEDICINE

## 2022-06-21 PROCEDURE — 99214 PR OFFICE/OUTPT VISIT, EST, LEVL IV, 30-39 MIN: ICD-10-PCS | Mod: S$GLB,,, | Performed by: INTERNAL MEDICINE

## 2022-06-21 PROCEDURE — 3077F SYST BP >= 140 MM HG: CPT | Mod: CPTII,S$GLB,, | Performed by: INTERNAL MEDICINE

## 2022-06-21 PROCEDURE — 1126F PR PAIN SEVERITY QUANTIFIED, NO PAIN PRESENT: ICD-10-PCS | Mod: CPTII,S$GLB,, | Performed by: INTERNAL MEDICINE

## 2022-06-21 PROCEDURE — 94729 DIFFUSING CAPACITY: CPT | Mod: S$GLB,,, | Performed by: INTERNAL MEDICINE

## 2022-06-21 PROCEDURE — 94060 PR EVAL OF BRONCHOSPASM: ICD-10-PCS | Mod: S$GLB,,, | Performed by: INTERNAL MEDICINE

## 2022-06-21 PROCEDURE — 4010F ACE/ARB THERAPY RXD/TAKEN: CPT | Mod: CPTII,S$GLB,, | Performed by: INTERNAL MEDICINE

## 2022-06-21 PROCEDURE — 3288F PR FALLS RISK ASSESSMENT DOCUMENTED: ICD-10-PCS | Mod: CPTII,S$GLB,, | Performed by: INTERNAL MEDICINE

## 2022-06-21 PROCEDURE — 1159F PR MEDICATION LIST DOCUMENTED IN MEDICAL RECORD: ICD-10-PCS | Mod: CPTII,S$GLB,, | Performed by: INTERNAL MEDICINE

## 2022-06-21 PROCEDURE — 94727 PR PULM FUNCTION TEST BY GAS: ICD-10-PCS | Mod: S$GLB,,, | Performed by: INTERNAL MEDICINE

## 2022-06-21 PROCEDURE — 3066F NEPHROPATHY DOC TX: CPT | Mod: CPTII,S$GLB,, | Performed by: INTERNAL MEDICINE

## 2022-06-21 PROCEDURE — 3077F PR MOST RECENT SYSTOLIC BLOOD PRESSURE >= 140 MM HG: ICD-10-PCS | Mod: CPTII,S$GLB,, | Performed by: INTERNAL MEDICINE

## 2022-06-21 PROCEDURE — 3079F DIAST BP 80-89 MM HG: CPT | Mod: CPTII,S$GLB,, | Performed by: INTERNAL MEDICINE

## 2022-06-21 PROCEDURE — 94727 GAS DIL/WSHOT DETER LNG VOL: CPT | Mod: S$GLB,,, | Performed by: INTERNAL MEDICINE

## 2022-06-21 PROCEDURE — 99214 OFFICE O/P EST MOD 30 MIN: CPT | Mod: S$GLB,,, | Performed by: INTERNAL MEDICINE

## 2022-06-21 PROCEDURE — 3066F PR DOCUMENTATION OF TREATMENT FOR NEPHROPATHY: ICD-10-PCS | Mod: CPTII,S$GLB,, | Performed by: INTERNAL MEDICINE

## 2022-06-21 PROCEDURE — 1126F AMNT PAIN NOTED NONE PRSNT: CPT | Mod: CPTII,S$GLB,, | Performed by: INTERNAL MEDICINE

## 2022-06-21 PROCEDURE — 3044F PR MOST RECENT HEMOGLOBIN A1C LEVEL <7.0%: ICD-10-PCS | Mod: CPTII,S$GLB,, | Performed by: INTERNAL MEDICINE

## 2022-06-21 PROCEDURE — 4010F PR ACE/ARB THEARPY RXD/TAKEN: ICD-10-PCS | Mod: CPTII,S$GLB,, | Performed by: INTERNAL MEDICINE

## 2022-06-21 PROCEDURE — 94729 PR C02/MEMBANE DIFFUSE CAPACITY: ICD-10-PCS | Mod: S$GLB,,, | Performed by: INTERNAL MEDICINE

## 2022-06-21 PROCEDURE — 3044F HG A1C LEVEL LT 7.0%: CPT | Mod: CPTII,S$GLB,, | Performed by: INTERNAL MEDICINE

## 2022-06-21 PROCEDURE — 3008F PR BODY MASS INDEX (BMI) DOCUMENTED: ICD-10-PCS | Mod: CPTII,S$GLB,, | Performed by: INTERNAL MEDICINE

## 2022-06-21 PROCEDURE — 99999 PR PBB SHADOW E&M-EST. PATIENT-LVL V: ICD-10-PCS | Mod: PBBFAC,,, | Performed by: INTERNAL MEDICINE

## 2022-06-21 PROCEDURE — 1160F PR REVIEW ALL MEDS BY PRESCRIBER/CLIN PHARMACIST DOCUMENTED: ICD-10-PCS | Mod: CPTII,S$GLB,, | Performed by: INTERNAL MEDICINE

## 2022-06-21 PROCEDURE — 3079F PR MOST RECENT DIASTOLIC BLOOD PRESSURE 80-89 MM HG: ICD-10-PCS | Mod: CPTII,S$GLB,, | Performed by: INTERNAL MEDICINE

## 2022-06-21 PROCEDURE — 1159F MED LIST DOCD IN RCRD: CPT | Mod: CPTII,S$GLB,, | Performed by: INTERNAL MEDICINE

## 2022-06-21 PROCEDURE — 1101F PT FALLS ASSESS-DOCD LE1/YR: CPT | Mod: CPTII,S$GLB,, | Performed by: INTERNAL MEDICINE

## 2022-06-21 PROCEDURE — 3060F PR POS MICROALBUMINURIA RESULT DOCUMENTED/REVIEW: ICD-10-PCS | Mod: CPTII,S$GLB,, | Performed by: INTERNAL MEDICINE

## 2022-06-21 PROCEDURE — 94060 EVALUATION OF WHEEZING: CPT | Mod: S$GLB,,, | Performed by: INTERNAL MEDICINE

## 2022-06-21 PROCEDURE — 1101F PR PT FALLS ASSESS DOC 0-1 FALLS W/OUT INJ PAST YR: ICD-10-PCS | Mod: CPTII,S$GLB,, | Performed by: INTERNAL MEDICINE

## 2022-06-21 PROCEDURE — 3288F FALL RISK ASSESSMENT DOCD: CPT | Mod: CPTII,S$GLB,, | Performed by: INTERNAL MEDICINE

## 2022-06-21 PROCEDURE — 3008F BODY MASS INDEX DOCD: CPT | Mod: CPTII,S$GLB,, | Performed by: INTERNAL MEDICINE

## 2022-06-21 RX ORDER — METFORMIN HYDROCHLORIDE 500 MG/1
500 TABLET, EXTENDED RELEASE ORAL
Qty: 90 TABLET | Refills: 4 | Status: SHIPPED | OUTPATIENT
Start: 2022-06-21 | End: 2023-08-28

## 2022-06-21 NOTE — PATIENT INSTRUCTIONS
Continue using Symbicort two puffs twice a day every day  Continue using Spiriva two puffs daily  Start using nebulizer solution twice a day up until surgery and every day after surgery  Blood test will be ordered. IgG, IgA, IgM, A1 Antitrypsin level  Referral to immunologist

## 2022-06-21 NOTE — PROGRESS NOTES
Subjective:       Patient ID: Scooter Morrissey is a 71 y.o. male.    Chief Complaint: COPD and Bronchiectasis    HPI   Scooter Morrissey is a 71 y.o.male presents to pulmonary clinic as a follow up visit, initially seen in clinic on March 21, 2022 for post hospitalization visit, with COPD-asthma and bronchiectasis. His ENT doctor, Dr. Jennings is planning for endoscopic sinus surgery and septoplasty to treat chronic rhino sinusitis, post-nasal drip and rhinorrhea. This is scheduled for 6/30/22.     He was originally referred after a recent multiple hospitalizations for pneumonia, last on March 10, 2022. He has COPD and post -COVID syndrome. He was diagnosed with COVID infection in January 2021 and 2022. He also had pneumonia in December 2021 treated with Azithromycin and Ceftriaxone.   He was previously seen by Dr. Vishal Guerrier, last in 2017 for asthma/COPD.     Since last seen in March 2022, he has been feeling better. No dyspnea at rest, but does have dyspnea with walking up a flight of stairs carrying grocery. He is however able to walk a good distance on a flat surface without becoming short of breath.  When he does get winded, he would rest. He is cutting grass with lawn .     He does have a chronic cough, clear-yellow sputum. Lately, it has been thin. He does have significant nasal congestion and nasal drip. He was treated with He was treated with 21 days of LQ and 14 days of Doxycycline for sinus infection with Serratia marcescens. He currently uses Symbicort two puffs daily and rinses mouth after. He admits to not using Spiriva regularly. He does not typically use rescue inhaler. He does use the Acapella regularly.     Chronic infections:  COVID-19 January 2021  Pseudomonas aeruginosa from [id number]  COVID-19 January 2022  Pseudomonas alcaligenes from sputum March 2022  MRSA from sputum March 2022  Serratia marcescens from sinus April 2022      Social Hx:  Quit cigarettes 1975 and cigar for about 9  years. Works as a .   His wife still smokes cigarette    Chest CT from 3/10./22 with multifocal pneumonia, b/l bronchocentric consolidative opacities and mucoid impaction. He had middle lobe,ligular and basal bronchiectasis. Bronchiectatic changes known since CT chest from April 2016.    PFT 6/21/22:  FEV1/FVC:60%  FEV1: 1.35 Lt, 59.7%  FVC: 2.22 Lt, 75.9%  DLCO: 39.2%    Compared to prior PFT from 2016, FEV1/FVC ratio is up from 55%, FEV1 is up from 1.15 Lt, FVC is up from 2.22 Lt     Patient walked in the hallway. There is no desaturation.     Review of Systems   Constitutional: Negative for fever, chills, activity change, fatigue, night sweats and weakness.   HENT: Negative for postnasal drip, sinus pressure, sore throat, trouble swallowing and congestion.    Respiratory: Positive for cough, chest tightness, shortness of breath, wheezing and dyspnea on extertion. Negative for sputum production, orthopnea and Paroxysmal Nocturnal Dyspnea.    Cardiovascular: Negative for chest pain, palpitations and leg swelling.   Musculoskeletal: Negative for arthralgias and back pain.   Skin: Negative for rash.   Gastrointestinal: Negative for nausea, vomiting and acid reflux.   Neurological: Negative for light-headedness and headaches.       Objective:      Physical Exam   Constitutional: He is oriented to person, place, and time. He appears well-developed. He is not obese.   HENT:   Head: Normocephalic.   Nose: No mucosal edema.   Mouth/Throat: Oropharynx is clear and moist.   Cardiovascular: Normal rate, regular rhythm and normal heart sounds.   No murmur heard.  Pulmonary/Chest: Normal expansion, symmetric chest wall expansion, effort normal and breath sounds normal. No stridor. He has no decreased breath sounds. He has no wheezes.   Abdominal: He exhibits no distension.   Musculoskeletal:         General: No edema.      Cervical back: Normal range of motion and neck supple.   Neurological: He is alert and oriented  to person, place, and time.   Skin: Skin is warm and dry.   Psychiatric: He has a normal mood and affect. His behavior is normal. Thought content normal.         Assessment:       1. Bronchiectasis without complication    2. Asthma-COPD overlap syndrome    3. Chronic rhinosinusitis    4. Post-nasal drip    5. History of MRSA infection    6. History of Pseudomonas pneumonia    7. Personal history of COVID-19        Outpatient Encounter Medications as of 6/21/2022   Medication Sig Dispense Refill    albuterol (ACCUNEB) 1.25 mg/3 mL Nebu Take 3 mLs (1.25 mg total) by nebulization every 6 (six) hours as needed (shortness of breath or wheezing). Rescue 75 mL 11    albuterol (VENTOLIN HFA) 90 mcg/actuation inhaler Inhale 2 puffs into the lungs every 4 (four) hours as needed for Wheezing or Shortness of Breath. Rescue 18 g 11    albuterol-ipratropium (DUO-NEB) 2.5 mg-0.5 mg/3 mL nebulizer solution Take 3 mLs by nebulization every 6 (six) hours as needed for Wheezing. Rescue 30 each 5    amLODIPine (NORVASC) 10 MG tablet TAKE 1 TABLET BY MOUTH EVERY DAY (Patient taking differently: Take 10 mg by mouth once daily.) 90 tablet 4    ascorbic acid, vitamin C, (VITAMIN C) 500 MG tablet Take 1 tablet (500 mg total) by mouth 2 (two) times daily.      atorvastatin (LIPITOR) 40 MG tablet Take 1 tablet (40 mg total) by mouth every evening. 90 tablet 3    budesonide-formoterol 160-4.5 mcg (SYMBICORT) 160-4.5 mcg/actuation HFAA INHALE 2 PUFFS INTO THE LUNGS BY MOUTH TWICE DAILY 30.6 g 4    calcium-vitamin D3 (OS-SALLY 500 + D3) 500 mg-5 mcg (200 unit) per tablet Take 2 tablets by mouth once daily. 60 tablet 11    doxycycline (VIBRA-TABS) 100 MG tablet Take 1 tablet (100 mg total) by mouth 2 (two) times daily. 28 tablet 0    fluticasone propionate (FLONASE) 50 mcg/actuation nasal spray 2 sprays (100 mcg total) by Each Nostril route once daily. 9.9 mL 11    fluticasone-salmeterol diskus inhaler 500-50 mcg Inhale 1 puff into the  lungs 2 (two) times daily. Controller 180 each 4    levocetirizine (XYZAL) 5 MG tablet Take 1 tablet (5 mg total) by mouth every evening. 30 tablet 11    losartan (COZAAR) 100 MG tablet TAKE 1 TABLET(100 MG) BY MOUTH EVERY DAY (Patient taking differently: Take 100 mg by mouth once daily.) 90 tablet 4    metFORMIN (GLUCOPHAGE-XR) 500 MG ER 24hr tablet Take 1 tablet (500 mg total) by mouth daily with dinner or evening meal. 90 tablet 4    metoprolol succinate (TOPROL-XL) 100 MG 24 hr tablet TAKE 1 TABLET(100 MG) BY MOUTH EVERY DAY (Patient taking differently: Take 100 mg by mouth once daily.) 90 tablet 4    multivitamin with minerals tablet Take 1 tablet by mouth nightly.       pulse oximeter (PULSE OXIMETER) device by Apply Externally route 2 (two) times a day. Use twice daily at 8 AM and 3 PM and record the value in [email or web address] as directed. 1 each 0    tiotropium bromide (SPIRIVA RESPIMAT) 2.5 mcg/actuation inhaler Inhale 2 puffs into the lungs Daily. Controller 4 g 5    vitamin D (VITAMIN D3) 1000 units Tab Take 1,000 Units by mouth once daily.       No facility-administered encounter medications on file as of 6/21/2022.     Orders Placed This Encounter   Procedures    IgG     Standing Status:   Future     Standing Expiration Date:   8/20/2023    IGA     Standing Status:   Future     Standing Expiration Date:   8/20/2023    IgM     Standing Status:   Future     Standing Expiration Date:   8/20/2023    Misc Sendout Test, Blood Pneumoccocal 23 titer     Pneumoccocal 23 titer     Standing Status:   Future     Standing Expiration Date:   8/20/2023     Order Specific Question:   Specimen Type     Answer:   Blood     Order Specific Question:   Name of Test     Answer:   Pneumoccocal 23 titer     Order Specific Question:   Release to patient     Answer:   Immediate       Plan:         Bronchiectasis  - Likely a result of recurrent infections, though no workup has been done in the past  - evident since earliest  available CT from 2016  - A1AT ordered from last visit. Adding on IgG, IgA, IgM, and Pneumococcal 23 titer. Need blood work  - Referral to immunology to eval for immunodeficiencies due to recurrent infections  - He has an Aerobika. I encouraged him to use it at least 3 times a day    2. Asthma-COPD overlap syndrome  - Evidence of airflow obstruction dating as far back as 2016.   - PFT with some improvement in volumes compared to 2017  - He is on optimal therapy. LABA/ICS/LAMA, thought admits to noncompliance with Spiriva   - Symbicort technique was reviewed today, and he was actually using it better. Optimal use demonstrated  - recommended to use Duoneb solution at least 2x daily up until ENT surgery and for 1 week after    3. Chronic rhinosinusitis  4. Post-nasal drip  - Serratia marcescens from sinus April 2022 treated with LQ + Doxycycline  - Planned for sinus surgery on 6/30/22    5. History of MRSA infection  6. History of Pseudomonas pneumonia  7. Personal history of COVID-19  Recurrent bacterial infections. Need immunologic workup    Multifocal pneumonia  - recent MRSA and Pseudomonas in March 2022, treated with antibiotics and clinical improvement. Had COVID in January 2022 with evidence of consolidation on CXR at that time. Prior to that, he had pneumonia in December treated as CAP.  - need CT chest for interval follow up of pneumonia from CT chest in March    Pre-op evaluation:  Planned for MAXILLARY ANTROSTOMY (Bilateral),    SEPTOPLASTY, NOSE (N/A Nose), CAUTERIZATION, MUCOSA, NASAL TURBINATE (Bilateral Nose) SPHENOIDECTOMY (Bilateral Face), SINUSOTOMY, FRONTAL SINUS, OBLITERATIVE (Bilateral Face) on 6/30/22    Pre operative evaluation: patient with history of COPD, which appears to be stable at this time and recurrent pneumonia. Patient is at least a moderate risk (intermediate risk assuming a 2-3 hr procedure) from pulmonary point of view for surgical procedure at this time, but no absolute  contraindication. He is at increased risk of infections. He appears to be as stable as he can be given his condition. My rationale was explained to the patient in detail.     In order to reduce surgical complication patient should:   1-Use incentive spirometry pre and post surgery   2-Limit sedatives or opioids, if possible after surgery.   3-DVT prophylaxis post surgical procedure, as needed   4-Bronchodilators around the block as indicated above   5-Close monitoring pre and post surgery in order to evaluate for desaturation as well as mental status change which could be related to acute hypercapnia.    Follow up in 2 months.

## 2022-06-24 ENCOUNTER — TELEPHONE (OUTPATIENT)
Dept: OTOLARYNGOLOGY | Facility: CLINIC | Age: 72
End: 2022-06-24
Payer: MEDICARE

## 2022-06-24 ENCOUNTER — TELEPHONE (OUTPATIENT)
Dept: PREADMISSION TESTING | Facility: HOSPITAL | Age: 72
End: 2022-06-24
Payer: MEDICARE

## 2022-06-24 NOTE — TELEPHONE ENCOUNTER
Left message for the patient to return call regarding needing a pre admit appointment prior to surgery on 6/30/22

## 2022-06-24 NOTE — TELEPHONE ENCOUNTER
----- Message from Eufemia Huerta sent at 6/24/2022 12:37 PM CDT -----  Contact: [phone]    Type:  Patient Returning Call    Who Called:pt called   Who Left Message for Patient:avi  Does the patient know what this is regarding?:procedure  Would the patient rather a call back or a response via [facility]? Call back  Best Call Back Number:[phone]  Additional Information:

## 2022-06-27 ENCOUNTER — OFFICE VISIT (OUTPATIENT)
Dept: FAMILY MEDICINE | Facility: CLINIC | Age: 72
End: 2022-06-27
Payer: MEDICARE

## 2022-06-27 VITALS
OXYGEN SATURATION: 95 % | SYSTOLIC BLOOD PRESSURE: 136 MMHG | HEART RATE: 81 BPM | HEIGHT: 63 IN | WEIGHT: 174 LBS | DIASTOLIC BLOOD PRESSURE: 74 MMHG | BODY MASS INDEX: 30.83 KG/M2

## 2022-06-27 DIAGNOSIS — E66.09 CLASS 1 OBESITY DUE TO EXCESS CALORIES WITH SERIOUS COMORBIDITY AND BODY MASS INDEX (BMI) OF 30.0 TO 30.9 IN ADULT: ICD-10-CM

## 2022-06-27 DIAGNOSIS — Z01.810 PREOP CARDIOVASCULAR EXAM: ICD-10-CM

## 2022-06-27 DIAGNOSIS — Z86.16 PERSONAL HISTORY OF COVID-19: ICD-10-CM

## 2022-06-27 DIAGNOSIS — R80.9 TYPE 2 DIABETES MELLITUS WITH MICROALBUMINURIA, UNSPECIFIED WHETHER LONG TERM INSULIN USE: ICD-10-CM

## 2022-06-27 DIAGNOSIS — D64.9 ANEMIA, UNSPECIFIED TYPE: ICD-10-CM

## 2022-06-27 DIAGNOSIS — I10 ESSENTIAL HYPERTENSION: Chronic | ICD-10-CM

## 2022-06-27 DIAGNOSIS — E11.29 TYPE 2 DIABETES MELLITUS WITH MICROALBUMINURIA, UNSPECIFIED WHETHER LONG TERM INSULIN USE: ICD-10-CM

## 2022-06-27 DIAGNOSIS — I70.0 AORTIC ATHEROSCLEROSIS: ICD-10-CM

## 2022-06-27 DIAGNOSIS — J44.89 ASTHMA-COPD OVERLAP SYNDROME: ICD-10-CM

## 2022-06-27 DIAGNOSIS — J47.0 BRONCHIECTASIS WITH ACUTE LOWER RESPIRATORY INFECTION: ICD-10-CM

## 2022-06-27 DIAGNOSIS — Z87.09 H/O PLEURAL EMPYEMA: ICD-10-CM

## 2022-06-27 DIAGNOSIS — J32.2 CHRONIC ETHMOIDAL SINUSITIS: Primary | ICD-10-CM

## 2022-06-27 PROBLEM — E66.811 CLASS 1 OBESITY DUE TO EXCESS CALORIES WITH SERIOUS COMORBIDITY AND BODY MASS INDEX (BMI) OF 30.0 TO 30.9 IN ADULT: Status: ACTIVE | Noted: 2022-06-27

## 2022-06-27 PROCEDURE — 3066F NEPHROPATHY DOC TX: CPT | Mod: CPTII,S$GLB,, | Performed by: FAMILY MEDICINE

## 2022-06-27 PROCEDURE — 4010F ACE/ARB THERAPY RXD/TAKEN: CPT | Mod: CPTII,S$GLB,, | Performed by: FAMILY MEDICINE

## 2022-06-27 PROCEDURE — 3066F PR DOCUMENTATION OF TREATMENT FOR NEPHROPATHY: ICD-10-PCS | Mod: CPTII,S$GLB,, | Performed by: FAMILY MEDICINE

## 2022-06-27 PROCEDURE — 3075F PR MOST RECENT SYSTOLIC BLOOD PRESS GE 130-139MM HG: ICD-10-PCS | Mod: CPTII,S$GLB,, | Performed by: FAMILY MEDICINE

## 2022-06-27 PROCEDURE — 99214 OFFICE O/P EST MOD 30 MIN: CPT | Mod: S$GLB,,, | Performed by: FAMILY MEDICINE

## 2022-06-27 PROCEDURE — 3078F PR MOST RECENT DIASTOLIC BLOOD PRESSURE < 80 MM HG: ICD-10-PCS | Mod: CPTII,S$GLB,, | Performed by: FAMILY MEDICINE

## 2022-06-27 PROCEDURE — 1126F PR PAIN SEVERITY QUANTIFIED, NO PAIN PRESENT: ICD-10-PCS | Mod: CPTII,S$GLB,, | Performed by: FAMILY MEDICINE

## 2022-06-27 PROCEDURE — 3044F HG A1C LEVEL LT 7.0%: CPT | Mod: CPTII,S$GLB,, | Performed by: FAMILY MEDICINE

## 2022-06-27 PROCEDURE — 3060F PR POS MICROALBUMINURIA RESULT DOCUMENTED/REVIEW: ICD-10-PCS | Mod: CPTII,S$GLB,, | Performed by: FAMILY MEDICINE

## 2022-06-27 PROCEDURE — 99999 PR PBB SHADOW E&M-EST. PATIENT-LVL IV: ICD-10-PCS | Mod: PBBFAC,,, | Performed by: FAMILY MEDICINE

## 2022-06-27 PROCEDURE — 1160F PR REVIEW ALL MEDS BY PRESCRIBER/CLIN PHARMACIST DOCUMENTED: ICD-10-PCS | Mod: CPTII,S$GLB,, | Performed by: FAMILY MEDICINE

## 2022-06-27 PROCEDURE — 1159F MED LIST DOCD IN RCRD: CPT | Mod: CPTII,S$GLB,, | Performed by: FAMILY MEDICINE

## 2022-06-27 PROCEDURE — 3078F DIAST BP <80 MM HG: CPT | Mod: CPTII,S$GLB,, | Performed by: FAMILY MEDICINE

## 2022-06-27 PROCEDURE — 1160F RVW MEDS BY RX/DR IN RCRD: CPT | Mod: CPTII,S$GLB,, | Performed by: FAMILY MEDICINE

## 2022-06-27 PROCEDURE — 3060F POS MICROALBUMINURIA REV: CPT | Mod: CPTII,S$GLB,, | Performed by: FAMILY MEDICINE

## 2022-06-27 PROCEDURE — 1126F AMNT PAIN NOTED NONE PRSNT: CPT | Mod: CPTII,S$GLB,, | Performed by: FAMILY MEDICINE

## 2022-06-27 PROCEDURE — 99214 PR OFFICE/OUTPT VISIT, EST, LEVL IV, 30-39 MIN: ICD-10-PCS | Mod: S$GLB,,, | Performed by: FAMILY MEDICINE

## 2022-06-27 PROCEDURE — 4010F PR ACE/ARB THEARPY RXD/TAKEN: ICD-10-PCS | Mod: CPTII,S$GLB,, | Performed by: FAMILY MEDICINE

## 2022-06-27 PROCEDURE — 99999 PR PBB SHADOW E&M-EST. PATIENT-LVL IV: CPT | Mod: PBBFAC,,, | Performed by: FAMILY MEDICINE

## 2022-06-27 PROCEDURE — 1159F PR MEDICATION LIST DOCUMENTED IN MEDICAL RECORD: ICD-10-PCS | Mod: CPTII,S$GLB,, | Performed by: FAMILY MEDICINE

## 2022-06-27 PROCEDURE — 3288F FALL RISK ASSESSMENT DOCD: CPT | Mod: CPTII,S$GLB,, | Performed by: FAMILY MEDICINE

## 2022-06-27 PROCEDURE — 3075F SYST BP GE 130 - 139MM HG: CPT | Mod: CPTII,S$GLB,, | Performed by: FAMILY MEDICINE

## 2022-06-27 PROCEDURE — 3008F BODY MASS INDEX DOCD: CPT | Mod: CPTII,S$GLB,, | Performed by: FAMILY MEDICINE

## 2022-06-27 PROCEDURE — 3008F PR BODY MASS INDEX (BMI) DOCUMENTED: ICD-10-PCS | Mod: CPTII,S$GLB,, | Performed by: FAMILY MEDICINE

## 2022-06-27 PROCEDURE — 3044F PR MOST RECENT HEMOGLOBIN A1C LEVEL <7.0%: ICD-10-PCS | Mod: CPTII,S$GLB,, | Performed by: FAMILY MEDICINE

## 2022-06-27 PROCEDURE — 1101F PR PT FALLS ASSESS DOC 0-1 FALLS W/OUT INJ PAST YR: ICD-10-PCS | Mod: CPTII,S$GLB,, | Performed by: FAMILY MEDICINE

## 2022-06-27 PROCEDURE — 3288F PR FALLS RISK ASSESSMENT DOCUMENTED: ICD-10-PCS | Mod: CPTII,S$GLB,, | Performed by: FAMILY MEDICINE

## 2022-06-27 PROCEDURE — 1101F PT FALLS ASSESS-DOCD LE1/YR: CPT | Mod: CPTII,S$GLB,, | Performed by: FAMILY MEDICINE

## 2022-06-27 NOTE — PROGRESS NOTES
Office Visit    Patient Name: Scooter Morrissey    : 1950  MRN: [id number]    Subjective:  Scooter is a 71 y.o. male who presents today for:    Pre-op Exam    Surgery: ETHMOIDECTOMY  Indication: recurrent sinusitis  Surgeon and anticipated date of surgery: ENT Dr Simmons, scheduled 22    Feeling Healthy and Well Without Symptoms of Illness: yes, denies, f/c/n/v, sore throat/URI sx, dysuria, diarrhea, rash. Does have his chronic level of thick sinus drainage.     Exercise Tolerance:  Can walk briskly for several blocks and yard work. Denies chest pain, shortness of breath, palpitations, dizziness/lightheadedness, edema.    Previous Trouble with Anesthesia: NONE    Easy Bleeding/Bruising?  NO Use of anticoagulants, blood thinners? Anti-Platelets? NSAIDs? NONE    Chronic Conditions well Controlled: Yes, EKG/ Labs unremarkable except for A1c 6.5 c/w new dx of type 2 diabetes with mild increased urin microalbumin but normal EFGR > 60.   COPD/Asthma well controlled on Symbicort 2 Puffs BID and only used rescue inhaler once last week. Pulmonologist advised perioperative nebulizer treatments 2 times per day for 2 weeks prior to surgery and 1 week after-- not yet compliant with this  He was previously advised to add Spiriva to his daily Symbicort but could not afford this and did not feel like it was overly effective for the price.      He was cleared by Pulmonary for surgery on 2022.        PAST MEDICAL HISTORY, SURGICAL/SOCIAL/FAMILY HISTORY REVIEWED AS PER CHART, WITH PERTINENT FINDINGS INCLUDED IN HISTORY SECTION OF NOTE.     Current Medications    Medication List with Changes/Refills   Current Medications    ALBUTEROL (ACCUNEB) 1.25 MG/3 ML NEBU    Take 3 mLs (1.25 mg total) by nebulization every 6 (six) hours as needed (shortness of breath or wheezing). Rescue    ALBUTEROL (VENTOLIN HFA) 90 MCG/ACTUATION INHALER    Inhale 2 puffs into the lungs every 4 (four) hours as needed for Wheezing or Shortness of  Breath. Rescue    ALBUTEROL-IPRATROPIUM (DUO-NEB) 2.5 MG-0.5 MG/3 ML NEBULIZER SOLUTION    Take 3 mLs by nebulization every 6 (six) hours as needed for Wheezing. Rescue    AMLODIPINE (NORVASC) 10 MG TABLET    TAKE 1 TABLET BY MOUTH EVERY DAY    ASCORBIC ACID, VITAMIN C, (VITAMIN C) 500 MG TABLET    Take 1 tablet (500 mg total) by mouth 2 (two) times daily.    ATORVASTATIN (LIPITOR) 40 MG TABLET    Take 1 tablet (40 mg total) by mouth every evening.    BUDESONIDE-FORMOTEROL 160-4.5 MCG (SYMBICORT) 160-4.5 MCG/ACTUATION HFAA    INHALE 2 PUFFS INTO THE LUNGS BY MOUTH TWICE DAILY    CALCIUM-VITAMIN D3 (OS-SALLY 500 + D3) 500 MG-5 MCG (200 UNIT) PER TABLET    Take 2 tablets by mouth once daily.    DOXYCYCLINE (VIBRA-TABS) 100 MG TABLET    Take 1 tablet (100 mg total) by mouth 2 (two) times daily.    FLUTICASONE PROPIONATE (FLONASE) 50 MCG/ACTUATION NASAL SPRAY    2 sprays (100 mcg total) by Each Nostril route once daily.    FLUTICASONE-SALMETEROL DISKUS INHALER 500-50 MCG    Inhale 1 puff into the lungs 2 (two) times daily. Controller    LEVOCETIRIZINE (XYZAL) 5 MG TABLET    Take 1 tablet (5 mg total) by mouth every evening.    LOSARTAN (COZAAR) 100 MG TABLET    TAKE 1 TABLET(100 MG) BY MOUTH EVERY DAY    METFORMIN (GLUCOPHAGE-XR) 500 MG ER 24HR TABLET    Take 1 tablet (500 mg total) by mouth daily with dinner or evening meal.    METOPROLOL SUCCINATE (TOPROL-XL) 100 MG 24 HR TABLET    TAKE 1 TABLET(100 MG) BY MOUTH EVERY DAY    MULTIVITAMIN WITH MINERALS TABLET    Take 1 tablet by mouth nightly.     PULSE OXIMETER (PULSE OXIMETER) DEVICE    by Apply Externally route 2 (two) times a day. Use twice daily at 8 AM and 3 PM and [facility] the [facility] as directed.    TIOTROPIUM BROMIDE (SPIRIVA RESPIMAT) 2.5 MCG/ACTUATION INHALER    Inhale 2 puffs into the lungs Daily. Controller    VITAMIN D (VITAMIN D3) 1000 UNITS TAB    Take 1,000 Units by mouth once daily.       Allergies   Review of patient's allergies indicates:  No  "Known Allergies      Review of Systems (Pertinent positives)  Review of Systems   Constitutional: Negative for chills, fever and unexpected weight change.   HENT: Positive for congestion and postnasal drip. Negative for sore throat and trouble swallowing.    Eyes: Negative for visual disturbance.   Respiratory: Negative for shortness of breath.    Cardiovascular: Negative for chest pain and leg swelling.   Allergic/Immunologic: Positive for environmental allergies.   Neurological: Negative for dizziness, light-headedness and headaches.       /74   Pulse 81   Ht 5' 3" (1.6 m)   Wt 78.9 kg (174 lb)   SpO2 95%   BMI 30.82 kg/m²     Physical Exam      Assessment/Plan:  Scooter Morrissey is a 71 y.o. male who presents today for :        ICD-10-CM ICD-9-CM    1. Chronic ethmoidal sinusitis  J32.2 473.2    2. Preop cardiovascular exam  Z01.810 V72.81    3. Class 1 obesity due to excess calories with serious comorbidity and body mass index (BMI) of 30.0 to 30.9 in adult  E66.09 278.00     Z68.30 V85.30    4. Asthma-COPD overlap syndrome  J44.9 493.20    5. Anemia, unspecified type  D64.9 285.9    6. Essential hypertension  I10 401.9    7. Type 2 diabetes mellitus with microalbuminuria, unspecified whether long term insulin use  E11.29 250.40 Comprehensive Metabolic Panel    R80.9 791.0 Hemoglobin A1C   8. Personal history of COVID-19  Z86.16 V12.09    9. H/O pleural empyema  Z87.09 V12.69    10. Bronchiectasis with acute lower respiratory infection  J47.0 494.1    11. Aortic atherosclerosis  I70.0 440.0      71-year-old male patient with surgical risk factors that include obesity, controlled type 2 diabetes with microalbuminuria, hypertension, asthma/COPD with bronchiectasis/history of pleural empyema who presents today for surgical clearance for ethmoidectomy.    Chronic conditions are well controlled-breathing stable on current inhaler regimen and he was already cleared by Pulmonary from a pulmonary " standpoint.    Lab/EKG unremarkable except for A1c of 6.5-has started metformin 500XR nightly with dinner, along with attention to a low carb diet.    Exercise tolerance is greater than 4 Mets.    He is clear for upcoming sinus surgery with Dr. Simmons.    He is on no blood thinners/antiplatelets and takes no regular NSAIDs.  Advised that he may take his morning blood pressure medications with a tiny sip of water even when NPO.    Have scheduled him for repeat blood work to re-evaluate A1c in 3 months and ensure improvement with lifestyle changes and initiation of metformin.    Patient Instructions   Pulmonary recommends to use Duoneb solution at least 2x daily up until ENT surgery and for 1 week after        Follow up in about 3 months (around 9/27/2022) for to follow up on lab results, return as needed for new concerns.

## 2022-06-27 NOTE — PATIENT INSTRUCTIONS
Pulmonary recommends to use Duoneb solution at least 2x daily up until ENT surgery and for 1 week after    MORNING OF SURGERY ADVISED TO TAKE BLOOD PRESSURE PILLS WITH TINY SIP OF WATER

## 2022-06-30 ENCOUNTER — ANESTHESIA (OUTPATIENT)
Dept: SURGERY | Facility: HOSPITAL | Age: 72
End: 2022-06-30
Payer: MEDICARE

## 2022-06-30 ENCOUNTER — HOSPITAL ENCOUNTER (OUTPATIENT)
Facility: HOSPITAL | Age: 72
Discharge: HOME | End: 2022-06-30
Attending: OTOLARYNGOLOGY | Admitting: OTOLARYNGOLOGY
Payer: MEDICARE

## 2022-06-30 VITALS
SYSTOLIC BLOOD PRESSURE: 128 MMHG | WEIGHT: 170 LBS | RESPIRATION RATE: 16 BRPM | HEART RATE: 64 BPM | HEIGHT: 65 IN | BODY MASS INDEX: 28.32 KG/M2 | DIASTOLIC BLOOD PRESSURE: 69 MMHG | TEMPERATURE: 98 F | OXYGEN SATURATION: 95 %

## 2022-06-30 DIAGNOSIS — J32.8 OTHER CHRONIC SINUSITIS: Primary | ICD-10-CM

## 2022-06-30 LAB — POCT GLUCOSE: 83 MG/DL (ref 70–110)

## 2022-06-30 PROCEDURE — 25000003 PHARM REV CODE 250: Performed by: OTOLARYNGOLOGY

## 2022-06-30 PROCEDURE — 30140 PR EXCISION TURBINATE,SUBMUCOUS: ICD-10-PCS | Mod: 50,51,, | Performed by: OTOLARYNGOLOGY

## 2022-06-30 PROCEDURE — 63600175 PHARM REV CODE 636 W HCPCS: Performed by: NURSE PRACTITIONER

## 2022-06-30 PROCEDURE — 30520 PR REPAIR, NASAL SEPTUM: ICD-10-PCS | Mod: 51,,, | Performed by: OTOLARYNGOLOGY

## 2022-06-30 PROCEDURE — 25000003 PHARM REV CODE 250: Performed by: NURSE ANESTHETIST, CERTIFIED REGISTERED

## 2022-06-30 PROCEDURE — 63600175 PHARM REV CODE 636 W HCPCS: Performed by: NURSE ANESTHETIST, CERTIFIED REGISTERED

## 2022-06-30 PROCEDURE — 37000008 HC ANESTHESIA 1ST 15 MINUTES: Performed by: OTOLARYNGOLOGY

## 2022-06-30 PROCEDURE — 31257 PR ENDOSCOPY, NASAL/SINUS, W/ETHMOIDECTOMY/SPHENOIDOTOMY: ICD-10-PCS | Mod: 50,,, | Performed by: OTOLARYNGOLOGY

## 2022-06-30 PROCEDURE — 31276 NSL/SINS NDSC FRNT TISS RMVL: CPT | Mod: 50,51,, | Performed by: OTOLARYNGOLOGY

## 2022-06-30 PROCEDURE — 71000015 HC POSTOP RECOV 1ST HR: Performed by: OTOLARYNGOLOGY

## 2022-06-30 PROCEDURE — 37000009 HC ANESTHESIA EA ADD 15 MINS: Performed by: OTOLARYNGOLOGY

## 2022-06-30 PROCEDURE — 87186 SC STD MICRODIL/AGAR DIL: CPT | Performed by: OTOLARYNGOLOGY

## 2022-06-30 PROCEDURE — C1726 CATH, BAL DIL, NON-VASCULAR: HCPCS | Performed by: OTOLARYNGOLOGY

## 2022-06-30 PROCEDURE — 31276 PR NASAL/SINUS ENDOSCOPY,EXPLOR FRONTAL SINUS: ICD-10-PCS | Mod: 50,51,, | Performed by: OTOLARYNGOLOGY

## 2022-06-30 PROCEDURE — C1763 CONN TISS, NON-HUMAN: HCPCS | Performed by: OTOLARYNGOLOGY

## 2022-06-30 PROCEDURE — 25000242 PHARM REV CODE 250 ALT 637 W/ HCPCS: Performed by: NURSE PRACTITIONER

## 2022-06-30 PROCEDURE — 99900035 HC TECH TIME PER 15 MIN (STAT)

## 2022-06-30 PROCEDURE — 87075 CULTR BACTERIA EXCEPT BLOOD: CPT | Performed by: OTOLARYNGOLOGY

## 2022-06-30 PROCEDURE — 94761 N-INVAS EAR/PLS OXIMETRY MLT: CPT

## 2022-06-30 PROCEDURE — 88312 SPECIAL STAINS GROUP 1: CPT | Mod: 26,,, | Performed by: PATHOLOGY

## 2022-06-30 PROCEDURE — 31267 ENDOSCOPY MAXILLARY SINUS: CPT | Mod: 50,51,, | Performed by: OTOLARYNGOLOGY

## 2022-06-30 PROCEDURE — 87076 CULTURE ANAEROBE IDENT EACH: CPT | Performed by: OTOLARYNGOLOGY

## 2022-06-30 PROCEDURE — 87070 CULTURE OTHR SPECIMN AEROBIC: CPT | Performed by: OTOLARYNGOLOGY

## 2022-06-30 PROCEDURE — 31267 PR NASAL/SINUS ENDOSCOPY,RMV TISS MAXILL SINUS: ICD-10-PCS | Mod: 50,51,, | Performed by: OTOLARYNGOLOGY

## 2022-06-30 PROCEDURE — 36000711: Performed by: OTOLARYNGOLOGY

## 2022-06-30 PROCEDURE — 87077 CULTURE AEROBIC IDENTIFY: CPT | Performed by: OTOLARYNGOLOGY

## 2022-06-30 PROCEDURE — 88312 SPECIAL STAINS GROUP 1: CPT | Performed by: PATHOLOGY

## 2022-06-30 PROCEDURE — 36000710: Performed by: OTOLARYNGOLOGY

## 2022-06-30 PROCEDURE — 63600175 PHARM REV CODE 636 W HCPCS: Performed by: OTOLARYNGOLOGY

## 2022-06-30 PROCEDURE — 30140 RESECT INFERIOR TURBINATE: CPT | Mod: 50,51,, | Performed by: OTOLARYNGOLOGY

## 2022-06-30 PROCEDURE — 88305 TISSUE EXAM BY PATHOLOGIST: CPT | Mod: 59 | Performed by: PATHOLOGY

## 2022-06-30 PROCEDURE — 27201423 OPTIME MED/SURG SUP & DEVICES STERILE SUPPLY: Performed by: OTOLARYNGOLOGY

## 2022-06-30 PROCEDURE — 88305 TISSUE EXAM BY PATHOLOGIST: CPT | Mod: 26,,, | Performed by: PATHOLOGY

## 2022-06-30 PROCEDURE — 61782 SCAN PROC CRANIAL EXTRA: CPT | Mod: ,,, | Performed by: OTOLARYNGOLOGY

## 2022-06-30 PROCEDURE — 94640 AIRWAY INHALATION TREATMENT: CPT

## 2022-06-30 PROCEDURE — 71000033 HC RECOVERY, INTIAL HOUR: Performed by: OTOLARYNGOLOGY

## 2022-06-30 PROCEDURE — C2625 STENT, NON-COR, TEM W/DEL SY: HCPCS | Performed by: OTOLARYNGOLOGY

## 2022-06-30 PROCEDURE — 31257 NSL/SINS NDSC TOT W/SPHENDT: CPT | Mod: 50,,, | Performed by: OTOLARYNGOLOGY

## 2022-06-30 PROCEDURE — 61782 PR STEREOTACTIC COMP ASSIST PROC,CRANIAL,EXTRADURAL: ICD-10-PCS | Mod: ,,, | Performed by: OTOLARYNGOLOGY

## 2022-06-30 PROCEDURE — 30520 REPAIR OF NASAL SEPTUM: CPT | Mod: 51,,, | Performed by: OTOLARYNGOLOGY

## 2022-06-30 PROCEDURE — 88305 TISSUE EXAM BY PATHOLOGIST: ICD-10-PCS | Mod: 26,,, | Performed by: PATHOLOGY

## 2022-06-30 PROCEDURE — 88312 PR  SPECIAL STAINS,GROUP I: ICD-10-PCS | Mod: 26,,, | Performed by: PATHOLOGY

## 2022-06-30 DEVICE — IMPLANT PROPEL MOMETASONE: Type: IMPLANTABLE DEVICE | Site: NOSE | Status: FUNCTIONAL

## 2022-06-30 RX ORDER — HYDROCODONE BITARTRATE AND ACETAMINOPHEN 5; 325 MG/1; MG/1
1 TABLET ORAL EVERY 4 HOURS PRN
Status: DISCONTINUED | OUTPATIENT
Start: 2022-06-30 | End: 2022-06-30 | Stop reason: HOSPADM

## 2022-06-30 RX ORDER — OXYMETAZOLINE HCL 0.05 %
2 SPRAY, NON-AEROSOL (ML) NASAL ONCE
Status: COMPLETED | OUTPATIENT
Start: 2022-06-30 | End: 2022-06-30

## 2022-06-30 RX ORDER — LIDOCAINE HCL/EPINEPHRINE/PF 2%-1:200K
VIAL (ML) INJECTION
Status: DISCONTINUED | OUTPATIENT
Start: 2022-06-30 | End: 2022-06-30 | Stop reason: HOSPADM

## 2022-06-30 RX ORDER — ONDANSETRON 2 MG/ML
4 INJECTION INTRAMUSCULAR; INTRAVENOUS DAILY PRN
Status: DISCONTINUED | OUTPATIENT
Start: 2022-06-30 | End: 2022-06-30 | Stop reason: HOSPADM

## 2022-06-30 RX ORDER — HYDROMORPHONE HYDROCHLORIDE 2 MG/ML
0.5 INJECTION, SOLUTION INTRAMUSCULAR; INTRAVENOUS; SUBCUTANEOUS EVERY 5 MIN PRN
Status: DISCONTINUED | OUTPATIENT
Start: 2022-06-30 | End: 2022-06-30 | Stop reason: HOSPADM

## 2022-06-30 RX ORDER — NEOSTIGMINE METHYLSULFATE 1 MG/ML
INJECTION, SOLUTION INTRAVENOUS
Status: DISCONTINUED | OUTPATIENT
Start: 2022-06-30 | End: 2022-06-30

## 2022-06-30 RX ORDER — DEXAMETHASONE SODIUM PHOSPHATE 4 MG/ML
INJECTION, SOLUTION INTRA-ARTICULAR; INTRALESIONAL; INTRAMUSCULAR; INTRAVENOUS; SOFT TISSUE
Status: DISCONTINUED | OUTPATIENT
Start: 2022-06-30 | End: 2022-06-30

## 2022-06-30 RX ORDER — HYDROCODONE BITARTRATE AND ACETAMINOPHEN 10; 325 MG/1; MG/1
1 TABLET ORAL EVERY 4 HOURS PRN
Status: DISCONTINUED | OUTPATIENT
Start: 2022-06-30 | End: 2022-06-30 | Stop reason: HOSPADM

## 2022-06-30 RX ORDER — DEXMEDETOMIDINE HYDROCHLORIDE 100 UG/ML
INJECTION, SOLUTION INTRAVENOUS
Status: DISCONTINUED | OUTPATIENT
Start: 2022-06-30 | End: 2022-06-30

## 2022-06-30 RX ORDER — SODIUM CHLORIDE, SODIUM LACTATE, POTASSIUM CHLORIDE, CALCIUM CHLORIDE 600; 310; 30; 20 MG/100ML; MG/100ML; MG/100ML; MG/100ML
INJECTION, SOLUTION INTRAVENOUS CONTINUOUS
Status: DISCONTINUED | OUTPATIENT
Start: 2022-06-30 | End: 2022-06-30 | Stop reason: HOSPADM

## 2022-06-30 RX ORDER — ONDANSETRON 2 MG/ML
INJECTION INTRAMUSCULAR; INTRAVENOUS
Status: DISCONTINUED | OUTPATIENT
Start: 2022-06-30 | End: 2022-06-30

## 2022-06-30 RX ORDER — LIDOCAINE HCL/PF 100 MG/5ML
SYRINGE (ML) INTRAVENOUS
Status: DISCONTINUED | OUTPATIENT
Start: 2022-06-30 | End: 2022-06-30

## 2022-06-30 RX ORDER — LIDOCAINE HYDROCHLORIDE 10 MG/ML
1 INJECTION, SOLUTION EPIDURAL; INFILTRATION; INTRACAUDAL; PERINEURAL ONCE
Status: ACTIVE | OUTPATIENT
Start: 2022-06-30

## 2022-06-30 RX ORDER — OXYMETAZOLINE HCL 0.05 %
SPRAY, NON-AEROSOL (ML) NASAL
Status: DISCONTINUED | OUTPATIENT
Start: 2022-06-30 | End: 2022-06-30 | Stop reason: HOSPADM

## 2022-06-30 RX ORDER — PHENYLEPHRINE HYDROCHLORIDE 10 MG/ML
INJECTION INTRAVENOUS
Status: DISCONTINUED | OUTPATIENT
Start: 2022-06-30 | End: 2022-06-30

## 2022-06-30 RX ORDER — OXYCODONE HYDROCHLORIDE 5 MG/1
5 TABLET ORAL
Status: DISCONTINUED | OUTPATIENT
Start: 2022-06-30 | End: 2022-06-30 | Stop reason: HOSPADM

## 2022-06-30 RX ORDER — CEFDINIR 300 MG/1
300 CAPSULE ORAL 2 TIMES DAILY
Qty: 20 CAPSULE | Refills: 0 | Status: SHIPPED | OUTPATIENT
Start: 2022-06-30 | End: 2022-07-10

## 2022-06-30 RX ORDER — MUPIROCIN 20 MG/G
OINTMENT TOPICAL
Status: DISCONTINUED | OUTPATIENT
Start: 2022-06-30 | End: 2022-06-30 | Stop reason: HOSPADM

## 2022-06-30 RX ORDER — ROCURONIUM BROMIDE 10 MG/ML
INJECTION, SOLUTION INTRAVENOUS
Status: DISCONTINUED | OUTPATIENT
Start: 2022-06-30 | End: 2022-06-30

## 2022-06-30 RX ORDER — IPRATROPIUM BROMIDE 0.5 MG/2.5ML
0.5 SOLUTION RESPIRATORY (INHALATION) ONCE
Status: COMPLETED | OUTPATIENT
Start: 2022-06-30 | End: 2022-06-30

## 2022-06-30 RX ORDER — LIDOCAINE HYDROCHLORIDE 10 MG/ML
1 INJECTION, SOLUTION EPIDURAL; INFILTRATION; INTRACAUDAL; PERINEURAL ONCE
Status: DISCONTINUED | OUTPATIENT
Start: 2022-06-30 | End: 2022-06-30 | Stop reason: HOSPADM

## 2022-06-30 RX ORDER — FENTANYL CITRATE 50 UG/ML
INJECTION, SOLUTION INTRAMUSCULAR; INTRAVENOUS
Status: DISCONTINUED | OUTPATIENT
Start: 2022-06-30 | End: 2022-06-30

## 2022-06-30 RX ORDER — EPHEDRINE SULFATE 50 MG/ML
INJECTION, SOLUTION INTRAVENOUS
Status: DISCONTINUED | OUTPATIENT
Start: 2022-06-30 | End: 2022-06-30

## 2022-06-30 RX ORDER — CEFAZOLIN SODIUM 2 G/50ML
2 SOLUTION INTRAVENOUS
Status: COMPLETED | OUTPATIENT
Start: 2022-06-30 | End: 2022-06-30

## 2022-06-30 RX ORDER — ONDANSETRON 8 MG/1
8 TABLET, ORALLY DISINTEGRATING ORAL ONCE
Status: DISCONTINUED | OUTPATIENT
Start: 2022-06-30 | End: 2022-06-30 | Stop reason: HOSPADM

## 2022-06-30 RX ORDER — HYDROCODONE BITARTRATE AND ACETAMINOPHEN 7.5; 325 MG/1; MG/1
1 TABLET ORAL EVERY 6 HOURS PRN
Qty: 20 TABLET | Refills: 0 | Status: SHIPPED | OUTPATIENT
Start: 2022-06-30 | End: 2022-10-03 | Stop reason: ALTCHOICE

## 2022-06-30 RX ORDER — PROPOFOL 10 MG/ML
VIAL (ML) INTRAVENOUS
Status: DISCONTINUED | OUTPATIENT
Start: 2022-06-30 | End: 2022-06-30

## 2022-06-30 RX ORDER — ACETAMINOPHEN 10 MG/ML
INJECTION, SOLUTION INTRAVENOUS
Status: DISCONTINUED | OUTPATIENT
Start: 2022-06-30 | End: 2022-06-30

## 2022-06-30 RX ADMIN — DEXMEDETOMIDINE HYDROCHLORIDE 8 MCG: 100 INJECTION, SOLUTION, CONCENTRATE INTRAVENOUS at 03:06

## 2022-06-30 RX ADMIN — DEXAMETHASONE SODIUM PHOSPHATE 12 MG: 4 INJECTION, SOLUTION INTRA-ARTICULAR; INTRALESIONAL; INTRAMUSCULAR; INTRAVENOUS; SOFT TISSUE at 12:06

## 2022-06-30 RX ADMIN — PHENYLEPHRINE HYDROCHLORIDE 0.4 MCG/KG/MIN: 10 INJECTION INTRAVENOUS at 12:06

## 2022-06-30 RX ADMIN — PHENYLEPHRINE HYDROCHLORIDE 200 MCG: 10 INJECTION INTRAVENOUS at 12:06

## 2022-06-30 RX ADMIN — DEXMEDETOMIDINE HYDROCHLORIDE 4 MCG: 100 INJECTION, SOLUTION, CONCENTRATE INTRAVENOUS at 03:06

## 2022-06-30 RX ADMIN — ACETAMINOPHEN 1000 MG: 10 INJECTION, SOLUTION INTRAVENOUS at 12:06

## 2022-06-30 RX ADMIN — GLYCOPYRROLATE 0.2 MG: 0.2 INJECTION, SOLUTION INTRAMUSCULAR; INTRAVITREAL at 11:06

## 2022-06-30 RX ADMIN — ROCURONIUM BROMIDE 40 MG: 10 INJECTION, SOLUTION INTRAVENOUS at 11:06

## 2022-06-30 RX ADMIN — GLYCOPYRROLATE 0.6 MG: 0.2 INJECTION, SOLUTION INTRAMUSCULAR; INTRAVITREAL at 03:06

## 2022-06-30 RX ADMIN — SODIUM CHLORIDE, SODIUM LACTATE, POTASSIUM CHLORIDE, AND CALCIUM CHLORIDE: .6; .31; .03; .02 INJECTION, SOLUTION INTRAVENOUS at 08:06

## 2022-06-30 RX ADMIN — EPHEDRINE SULFATE 10 MG: 50 INJECTION, SOLUTION INTRAMUSCULAR; INTRAVENOUS; SUBCUTANEOUS at 12:06

## 2022-06-30 RX ADMIN — CEFAZOLIN SODIUM 2 G: 2 SOLUTION INTRAVENOUS at 12:06

## 2022-06-30 RX ADMIN — NEOSTIGMINE METHYLSULFATE 5 MG: 1 INJECTION INTRAVENOUS at 03:06

## 2022-06-30 RX ADMIN — FENTANYL CITRATE 100 MCG: 50 INJECTION, SOLUTION INTRAMUSCULAR; INTRAVENOUS at 11:06

## 2022-06-30 RX ADMIN — HYDROCODONE BITARTRATE AND ACETAMINOPHEN 1 TABLET: 10; 325 TABLET ORAL at 04:06

## 2022-06-30 RX ADMIN — ROCURONIUM BROMIDE 10 MG: 10 INJECTION, SOLUTION INTRAVENOUS at 12:06

## 2022-06-30 RX ADMIN — OXYMETAZOLINE HYDROCHLORIDE 2 SPRAY: 0.05 SPRAY NASAL at 08:06

## 2022-06-30 RX ADMIN — DEXMEDETOMIDINE HYDROCHLORIDE 8 MCG: 100 INJECTION, SOLUTION, CONCENTRATE INTRAVENOUS at 11:06

## 2022-06-30 RX ADMIN — PROPOFOL 130 MG: 10 INJECTION, EMULSION INTRAVENOUS at 11:06

## 2022-06-30 RX ADMIN — LIDOCAINE HYDROCHLORIDE 100 MG: 20 INJECTION, SOLUTION INTRAVENOUS at 11:06

## 2022-06-30 RX ADMIN — SODIUM CHLORIDE, SODIUM LACTATE, POTASSIUM CHLORIDE, AND CALCIUM CHLORIDE: .6; .31; .03; .02 INJECTION, SOLUTION INTRAVENOUS at 03:06

## 2022-06-30 RX ADMIN — IPRATROPIUM BROMIDE 0.5 MG: 0.5 SOLUTION RESPIRATORY (INHALATION) at 07:06

## 2022-06-30 RX ADMIN — ONDANSETRON 8 MG: 2 INJECTION, SOLUTION INTRAMUSCULAR; INTRAVENOUS at 03:06

## 2022-06-30 NOTE — OP NOTE
DATE OF OPERATION:      SURGEON:  Clara Simmons MD     ASSISTANT SURGEON:  none     OPERATION:     1. Bilateral image-guided endoscopic anterior/posterior  ethmoidectomy with removal of tissue.   2. Bilateral image-guided endoscopic maxillary antrostomy with removal of tissue  3. Bilateral image-guided sphenoidotomy with removal of tissue.  4. Bilateral image-guided frontal sinusotomy with removal of tissue  5. Septoplasty  6.  Bilateral inferior turbinate reduction with submucosal resection.     PREOPERATIVE DIAGNOSIS:      1. Chronic rhinosinusitis with sinonasal polyposis  2. Hypertrophic turbinates.  3. Nasal septal deviation     POSTOPERATIVE DIAGNOSIS:      1. Chronic rhinosinusitis with sinonasal polyposis  2. Hypertrophic turbinates.  3. Nasal septal deviation     ANESTHESIA: General.     COMPLICATIONS: None.     ESTIMATED BLOOD LOSS: 25 mL     SPECIMEN: Bilateral sinonasal contents     WOUND EXPECTANCY: Clean-contaminated.     DRESSING: merogel packing  in bilateral middle meatus.  Merocel pack bilaterally.     FINDINGS: Bilateral polypoid tissue in bilateral middle meatus; ethmoid cavity, bilateral maxillary sinsues, sphenoid sinuses. Purulent drainged was present in bilateral ethmoid cells; left maxillary sinus, and right sphenoid sinus. Cultures obtained. Bilateral frontal sinus outflow tract occluded with edematous mucosa. NSD to the right  with bony spur; bilateral inferior tubinate hypertrophy.      INDICATIONS: Chronic rhinosinusitis, not controlled with maximal medical therapy.     I discussed the risks, benefits and alternatives of surgical correction of the chronically obstructed sinuses and associated turbinate hypertrophy with the patient as well as the expected postoperative course. I gave her the opportunity to ask questions and I answered all of them. On the morning of surgery I again met with the patient and reviewed the indications for surgery and she consented to proceed.     DESCRIPTION  OF PROCEDURE: The patient was brought to the operating room and placed supine on the operating table. The patient was placed under general anesthesia and intubated. The patient was positioned with a donut under the head and the image-guidance headset for the [employer] Fusion system was applied.  Image-guided navigation was indicated to facilitate exenteration of all ethmoid cells and the extent of sinusotomy.  The CT scan images were loaded into the image-guidance system and registered with the patient tracking system according to the 's instructions. The pointer was calibrated and registration was verified using predefined landmarks.  Cottonoid pledgets soaked with neosynepherine were placed into the nasal cavity bilaterally for mucosal decongestion.  Prophylactic cefazolin was given prior to the surgery start. A time-out was performed to confirm the proper patient, site and procedure.  The CT images were again reviewed prior to surgical start.  The patient was prepped and draped in the usual fashion.  The bed was placed in 20-degree reverse Trendelenberg position.     A 0-degree endoscope was used to examine the nasal cavity.  Local injections of 1% lidocaine with 1:100,000 parts epinephrine were then performed around the middle turbinates bilaterally as well as the inferior turbinate and the sphenopalatine ganglion region bilaterally.    At this point, approximately 2 cc of 1% lidocaine with epinephrine was injected into the septum bilaterally in the submucoperichondrial plane.  A 15 blade was then used to make a hemitransfixion incision on the left.  The mucoperichondrial flap was then elevated first on the left, and then the incision was carried through the septum to the right and the right flap was elevated in a similar fashion.  Using a swivel knife as well as double action forceps, the deviated portion of the septum was removed.The anterior septal incision was then closed using a chromic suture,  and the bilateral mucoperichondrial flaps were re approximated using a quilting caopting suture technique with a 4-0 plain gut suture.        Attention was then turned to the sinuses.  The middle turbinate was medialized to with a jarret elevator to gain access into the middle meatus.    The uncinate process was then visualized and a sickle knife was used to incise the uncinate process. The uncinate was dissected to its superior attachment and removed using cutting forceps.  A agnieszka bullosa was not present.       After removal of the bone, the natural ostium of the right  maxillary sinus was visible. The lumen was visualized with a 30-degree scope and entered using a curved suction to confirm the dimension. The antrostomy was enlarged with cutting instruments to include the natural sinus ostium, taking care not to move anterior to the maxillary line so as to avoid injury to the nasolacrimal duct.  Additional bone was removed using forceps.  Polypoid tissue  was present within the maxillary sinus.  This was thoroughly removed and sent for pathologic evaluation.     The ethmoid bulla was entered using a microdebrider and anterior ethmoidectomy was performed.  The roof of the bulla was removed with forceps and the suprabullar recess was exposed. The grand lamella of the middle turbinate was then traversed and posterior ethmoidectomy was performed. Dissection was tedious and required additional surgical time due the extent of polyposis.   An Onodi cell was not present.  The mucosa was hypertrophic throughout the sinuses with nicole polyposis, indicative of chronic inflammation.  Topical hemostatic agents on pledgets were then placed into the ethmoid cavity for hemostasis.     The sphenoid sinus rostrum was identified and the sinus was entered with the [employer] sphenoid balloon  in a low and medial fashion, adjacent to the superior turbinate. This sphenoidotomy was enlarged with mushroom punch  to include the posterior  segment of this superior turbinate and the natural ostium of the sphenoid sinus.  Polypoid tissue and purulent rhinorrhea was present within the sphenoid sinus.  This was thoroughly removed and sent for pathologic evaluation.     Dissection was performed at the site of the nasofrontal recess.  Using a 70-degree scope, a suprabullar cell was dissected and uncapped in a medial-to-lateral direction.  Afterward, the frontal sinus ostium was visible and patent, completing frontal sinusotomy. The anterior ethmoidal artery was identified and avoided with assistance from the image-guidance system probe.  At the conclusion of dissection there was excellent visualization into the frontal sinus, which would not otherwise have been possible.      Attention was then turned to the left side of the sinonasal tract.  A similar procedure was performed on this side, including uncinectomy, maxillary antrostomy, anterior/posterior  ethmoidectomy, sphenoidotomy, and frontal sinusotomy.      At the conclusion of these procedures, the image-guidance probe was used to verify that all anterior ethmoid cells had been properly opened and that the skull base was visible and that the lamina papyracea had not been traversed on either side.  All sinuses were copiously irrigated with warm normal saline solution.      Next, the head of each inferior turbinate was injected with 0.5 cc of 1% lidocaine with epinephrine and a 15 blade was used to make a small incision at the head of the turbinate.  A Jayda elevator was used to elevate the erectile tissue of the inferior turbinate.  The micodebrider with the turbinate blade was then used to remove the erectile tissue in the submucous plane.       At this point, the pledgets were all removed.  At this point, the pledgets were all removed. Chitogel hemostatic agent was applied.  A Propel Contour steroid-eluting stent was placed into the bilateral frontal sinus ostia. Propel sinus stent was placed into  bilateral ethmoid cavities.   Merogel packing  was placed into the bilateral ethmoid cavities to stent open the surgical site.  Silastic splints were then placed along the septum and secured anteriorly using a 2-0 silk suture.   The nasal cavity was bilateral Isacc packs.  Mupirocin ointment was applied to the vestibule bilaterally.  At this point, the headset was removed and the drapes were taken down The patient was turned back toward the anesthesiologist and awakened from anesthesia, extubated and transferred to the recovery room in stable condition. The needle, instrument, and sponge counts were correct at the termination of the procedure.

## 2022-06-30 NOTE — PATIENT INSTRUCTIONS
INSTRUCTIONS TO FOLLOW AFTER SINUS SURGERY  DR. Tauzin - [facility]      Your first office visit with Dr. Simmons after surgery should have been already scheduled.  If you dont know when it is, call Dr. Simmons's nurse Laurence at [phone].    ACTIVITY:    Sleep on your back with the head of the bead elevated, up on 2-3 pillows, or in a recliner for the first 3 to 5 days. This will help with swelling.     After surgery you may have a lot of nasal drainage. This is normal. Do not wipe, touch, or dab your nose. You may breathe through your nose if youre able but avoid inhaling forcibly. Let all drainage fall on your mustache dressing and change it as needed.    You may shower 24 hours after surgery.    If you use CPAP or BiPAP to sleep at night, you should wait at least 48 hours before resuming use.  Dr. Simmons will advise you when it is safe to do this.    DRESSINGS:    Change the mustache dressing under your nose as needed. (If unsure what this dressing is or how to do this, ask your doctor or nurse before you leave the clinic/hospital.) You may have pinkish-red drainage for 2-3 days.    In certain cases you may have gauze packing inside your nostrils.  If so, these will turn from white to red from the drainage. This is normal so do not be alarmed. Do not touch or pull at the packing. The packing will be removed by your doctor at your first post-op visit.     You may also have a dissolvable stent or dissolvable sponge placed into the sinuses during surgery.  These usually do not need to be removed unless you are told otherwise by Dr. Simmons.  You may notice small fragments of these items come out of your nose in the weeks following surgery.    MEDICATIONS:  After surgery, you are generally sent home with prescription for an antibiotic, a pain medication, and an anti-nausea medication.     Start your antibiotics when you get home from surgery and take them until they are all gone.  It is best to take them with  food to prevent an upset stomach.    Most people need prescription pain medication for the first few days after surgery.  If you find that this is not necessary, you may use over-the-counter Tylenol (Acetaminophen) instead.    Avoid Aspirin, Motrin (Ibuprofen), Advil, Aleve and Vitamin E for 1 week before surgery and 1 week after surgery. There are many other medications to avoid as well due to the fact they act as blood thinners.      Some people have problems with bowel movements after surgery. If you have NOT had a bowel movement 3-5 days after surgery, go to your local pharmacy and purchase an over the counter stool softener such as COLACE. You can also ask the pharmacist for his or her recommendation. If you still do not have a bowel movement after starting the softener, please call Dr. Sanz office.    You will need these over-the-counter medications after surgery:    Saline Sinus Rinse ([employer]ly[employer] Med brand):  You will use this to rinse out your nose and sinuses after surgery.  Begin doing this two days after surgery, unless instructed otherwise by Dr. Simmons.  You should do this 2 times a day, following the instructions on the box.    Afrin (regular strength): Only use if you have nasal congestion or bleeding. Use 2 times per day for 3 days, stop for 1 day, continue 2 times per day for 3 days, then stop completely.  NOTE:  You may not need to do this at all.      RESTRICTED ACTIVITIES AFTER SURGERY:    Do NOT blow your nose for 2 weeks. If you have to sneeze or cough, do so with your mouth open.   AVOID all heavy lifting, straining or bending for 2 weeks.   AVOID any sexual activity for 2 weeks after surgery.  AVOID semi-contact sports or vigorous exercising for 3-4 weeks. Dr. Simmons will let you know when you are cleared to resume exercise.  No Swimming for 3-4 weeks.  No Flying for 2 weeks.    Do NOT operate a motor vehicle or any type of heavy machinery within 24 hours of taking pain medication.  DO NOT  smoke or be around smokers.  AVOID irritating substances such as dust, chalk, harsh chemicals, and allergic triggers.    DIET:    Avoid hot and spicy foods, or salty soups for 1 week after surgery.  Begin with bland foods the day after surgery and advance to your regular diet as tolerated.  It is not necessary to take only soft food unless you are recovering from tonsil surgery.  Drink plenty of fluids (water is best).   Avoid alcoholic and caffeinated beverages for 1 week after surgery.

## 2022-06-30 NOTE — TRANSFER OF CARE
"Anesthesia Transfer of Care Note    Patient: Scooter Morrissey    Procedure(s) Performed: Procedure(s) (LRB):  ETHMOIDECTOMY (Bilateral)  MAXILLARY ANTROSTOMY (Bilateral)  SEPTOPLASTY, NOSE (N/A)  FESS, USING COMPUTER-ASSISTED NAVIGATION (Bilateral)  CAUTERIZATION, MUCOSA, NASAL TURBINATE (Bilateral)  SPHENOIDECTOMY (Bilateral)  SINUSOTOMY, FRONTAL SINUS, OBLITERATIVE (Bilateral)    Patient location: PACU    Anesthesia Type: MAC    Transport from OR: Transported from OR on 6-10 L/min O2 by face mask with adequate spontaneous ventilation    Post pain: adequate analgesia    Post assessment: no apparent anesthetic complications    Post vital signs: stable    Level of consciousness: awake    Nausea/Vomiting: no nausea/vomiting    Complications: none    Transfer of care protocol was followed      Last vitals:   Visit Vitals  BP (!) 168/83 (BP Location: Left arm, Patient Position: Lying)   Pulse 77   Temp 37.6 °C (99.7 °F) (Skin)   Resp 17   Ht 5' 5" (1.651 m)   Wt 77.1 kg (170 lb)   SpO2 95%   BMI 28.29 kg/m²     "

## 2022-06-30 NOTE — ANESTHESIA PROCEDURE NOTES
Intubation    Date/Time: 6/30/2022 11:57 AM  Performed by: Eliane Son CRNA  Authorized by: Vik Berumen MD     Intubation:     Induction:  Intravenous    Intubated:  Postinduction    Mask Ventilation:  Easy mask    Attempts:  1    Attempted By:  Student    Method of Intubation:  Direct    Blade:  Kacie 3    Laryngeal View Grade: Grade I - full view of cords      Difficult Airway Encountered?: No      Complications:  None    Airway Device:  Oral raul    Airway Device Size:  7.5    Style/Cuff Inflation:  Cuffed (inflated to minimal occlusive pressure)    Inflation Amount (mL):  8    Tube secured:  21    Secured at:  The lips    Placement Verified By:  Capnometry    Complicating Factors:  None    Findings Post-Intubation:  BS equal bilateral

## 2022-06-30 NOTE — ANESTHESIA POSTPROCEDURE EVALUATION
Anesthesia Post Evaluation    Patient: Scooter Morrissey    Procedure(s) Performed: Procedure(s) (LRB):  ETHMOIDECTOMY (Bilateral)  MAXILLARY ANTROSTOMY (Bilateral)  SEPTOPLASTY, NOSE (N/A)  FESS, USING COMPUTER-ASSISTED NAVIGATION (Bilateral)  CAUTERIZATION, MUCOSA, NASAL TURBINATE (Bilateral)  SPHENOIDECTOMY (Bilateral)  SINUSOTOMY, FRONTAL SINUS, OBLITERATIVE (Bilateral)    Final Anesthesia Type: general      Patient location during evaluation: PACU  Patient participation: Yes- Able to Participate  Level of consciousness: awake and alert  Post-procedure vital signs: reviewed and stable  Pain management: adequate  Airway patency: patent    PONV status at discharge: No PONV  Anesthetic complications: no      Cardiovascular status: blood pressure returned to baseline  Respiratory status: unassisted  Hydration status: euvolemic            Vitals Value Taken Time   /69 06/30/22 1715   Temp 36.8 °C (98.2 °F) 06/30/22 1715   Pulse 64 06/30/22 1715   Resp 16 06/30/22 1715   SpO2 95 % 06/30/22 1715         Event Time   Out of Recovery 16:38:23         Pain/Patrick Score: Pain Rating Prior to Med Admin: 7 (6/30/2022  4:51 PM)  Patrick Score: 10 (6/30/2022  5:16 PM)

## 2022-06-30 NOTE — DISCHARGE SUMMARY
How Many Skin Cancers Have You Had?: one
[facility] - Surgery (Hospital)  Discharge Note  Short Stay    Procedure(s) (LRB):  ETHMOIDECTOMY (Bilateral)  MAXILLARY ANTROSTOMY (Bilateral)  SEPTOPLASTY, NOSE (N/A)  FESS, USING COMPUTER-ASSISTED NAVIGATION (Bilateral)  CAUTERIZATION, MUCOSA, NASAL TURBINATE (Bilateral)  SPHENOIDECTOMY (Bilateral)  SINUSOTOMY, FRONTAL SINUS, OBLITERATIVE (Bilateral)    OUTCOME: Patient tolerated treatment/procedure well without complication and is now ready for discharge.    DISPOSITION: Home or Self Care    FINAL DIAGNOSIS: Chronic rhinosinusitis; nasal septal deviation, inferior turbinate hypertrophy    FOLLOWUP: In clinic    DISCHARGE INSTRUCTIONS:    Discharge Procedure Orders   Diet general     Change dressing (specify)   Order Comments: Change dressing prn saturation.     Call MD for:  temperature >100.4     Call MD for:  persistent nausea and vomiting     Call MD for:  severe uncontrolled pain     Call MD for:  difficulty breathing, headache or visual disturbances          
What Is The Reason For Today's Visit?: History of Non-Melanoma Skin Cancer
When Was Your Last Cancer Diagnosed?: 2017

## 2022-07-02 LAB — BACTERIA SPEC AEROBE CULT: ABNORMAL

## 2022-07-05 ENCOUNTER — PATIENT MESSAGE (OUTPATIENT)
Dept: OTOLARYNGOLOGY | Facility: CLINIC | Age: 72
End: 2022-07-05
Payer: MEDICARE

## 2022-07-05 LAB — BACTERIA SPEC ANAEROBE CULT: ABNORMAL

## 2022-07-05 RX ORDER — SULFAMETHOXAZOLE AND TRIMETHOPRIM 800; 160 MG/1; MG/1
1 TABLET ORAL 2 TIMES DAILY
Qty: 28 TABLET | Refills: 0 | Status: SHIPPED | OUTPATIENT
Start: 2022-07-05 | End: 2022-07-19

## 2022-07-05 NOTE — TELEPHONE ENCOUNTER
Contacted patient and advised that dr. Simmons sent in new Rx due to culture results from surgery. Patient mentioned he was experiencing slight bleeding in which I advised was normal. Patient also confirmed appointment for tomorrow at 11am and thanked me for calling.

## 2022-07-05 NOTE — TELEPHONE ENCOUNTER
Please notify patient that cultures from surgery show a bacteria growing that will require different antibiotic(bactrim). I have sent this to his pharmacy to start.

## 2022-07-06 ENCOUNTER — OFFICE VISIT (OUTPATIENT)
Dept: OTOLARYNGOLOGY | Facility: CLINIC | Age: 72
End: 2022-07-06
Payer: MEDICARE

## 2022-07-06 VITALS
SYSTOLIC BLOOD PRESSURE: 137 MMHG | HEIGHT: 65 IN | BODY MASS INDEX: 28.28 KG/M2 | HEART RATE: 94 BPM | DIASTOLIC BLOOD PRESSURE: 76 MMHG | WEIGHT: 169.75 LBS

## 2022-07-06 DIAGNOSIS — J32.8 OTHER CHRONIC SINUSITIS: Primary | Chronic | ICD-10-CM

## 2022-07-06 DIAGNOSIS — J31.0 CHRONIC RHINITIS: Chronic | ICD-10-CM

## 2022-07-06 PROCEDURE — 3008F BODY MASS INDEX DOCD: CPT | Mod: CPTII,S$GLB,, | Performed by: OTOLARYNGOLOGY

## 2022-07-06 PROCEDURE — 3288F FALL RISK ASSESSMENT DOCD: CPT | Mod: CPTII,S$GLB,, | Performed by: OTOLARYNGOLOGY

## 2022-07-06 PROCEDURE — 99999 PR PBB SHADOW E&M-EST. PATIENT-LVL IV: ICD-10-PCS | Mod: PBBFAC,,, | Performed by: OTOLARYNGOLOGY

## 2022-07-06 PROCEDURE — 4010F ACE/ARB THERAPY RXD/TAKEN: CPT | Mod: CPTII,S$GLB,, | Performed by: OTOLARYNGOLOGY

## 2022-07-06 PROCEDURE — 99999 PR PBB SHADOW E&M-EST. PATIENT-LVL IV: CPT | Mod: PBBFAC,,, | Performed by: OTOLARYNGOLOGY

## 2022-07-06 PROCEDURE — 3075F PR MOST RECENT SYSTOLIC BLOOD PRESS GE 130-139MM HG: ICD-10-PCS | Mod: CPTII,S$GLB,, | Performed by: OTOLARYNGOLOGY

## 2022-07-06 PROCEDURE — 3066F PR DOCUMENTATION OF TREATMENT FOR NEPHROPATHY: ICD-10-PCS | Mod: CPTII,S$GLB,, | Performed by: OTOLARYNGOLOGY

## 2022-07-06 PROCEDURE — 3044F HG A1C LEVEL LT 7.0%: CPT | Mod: CPTII,S$GLB,, | Performed by: OTOLARYNGOLOGY

## 2022-07-06 PROCEDURE — 1160F PR REVIEW ALL MEDS BY PRESCRIBER/CLIN PHARMACIST DOCUMENTED: ICD-10-PCS | Mod: CPTII,S$GLB,, | Performed by: OTOLARYNGOLOGY

## 2022-07-06 PROCEDURE — 3288F PR FALLS RISK ASSESSMENT DOCUMENTED: ICD-10-PCS | Mod: CPTII,S$GLB,, | Performed by: OTOLARYNGOLOGY

## 2022-07-06 PROCEDURE — 3078F DIAST BP <80 MM HG: CPT | Mod: CPTII,S$GLB,, | Performed by: OTOLARYNGOLOGY

## 2022-07-06 PROCEDURE — 31237 PR NASAL/SINUS ENDOSCOPY,BX/RMV POLYP/DEBRID: ICD-10-PCS | Mod: 79,50,S$GLB, | Performed by: OTOLARYNGOLOGY

## 2022-07-06 PROCEDURE — 3060F PR POS MICROALBUMINURIA RESULT DOCUMENTED/REVIEW: ICD-10-PCS | Mod: CPTII,S$GLB,, | Performed by: OTOLARYNGOLOGY

## 2022-07-06 PROCEDURE — 1101F PT FALLS ASSESS-DOCD LE1/YR: CPT | Mod: CPTII,S$GLB,, | Performed by: OTOLARYNGOLOGY

## 2022-07-06 PROCEDURE — 99213 OFFICE O/P EST LOW 20 MIN: CPT | Mod: 25,S$GLB,, | Performed by: OTOLARYNGOLOGY

## 2022-07-06 PROCEDURE — 3066F NEPHROPATHY DOC TX: CPT | Mod: CPTII,S$GLB,, | Performed by: OTOLARYNGOLOGY

## 2022-07-06 PROCEDURE — 3075F SYST BP GE 130 - 139MM HG: CPT | Mod: CPTII,S$GLB,, | Performed by: OTOLARYNGOLOGY

## 2022-07-06 PROCEDURE — 1126F PR PAIN SEVERITY QUANTIFIED, NO PAIN PRESENT: ICD-10-PCS | Mod: CPTII,S$GLB,, | Performed by: OTOLARYNGOLOGY

## 2022-07-06 PROCEDURE — 3008F PR BODY MASS INDEX (BMI) DOCUMENTED: ICD-10-PCS | Mod: CPTII,S$GLB,, | Performed by: OTOLARYNGOLOGY

## 2022-07-06 PROCEDURE — 3060F POS MICROALBUMINURIA REV: CPT | Mod: CPTII,S$GLB,, | Performed by: OTOLARYNGOLOGY

## 2022-07-06 PROCEDURE — 1101F PR PT FALLS ASSESS DOC 0-1 FALLS W/OUT INJ PAST YR: ICD-10-PCS | Mod: CPTII,S$GLB,, | Performed by: OTOLARYNGOLOGY

## 2022-07-06 PROCEDURE — 1159F MED LIST DOCD IN RCRD: CPT | Mod: CPTII,S$GLB,, | Performed by: OTOLARYNGOLOGY

## 2022-07-06 PROCEDURE — 1160F RVW MEDS BY RX/DR IN RCRD: CPT | Mod: CPTII,S$GLB,, | Performed by: OTOLARYNGOLOGY

## 2022-07-06 PROCEDURE — 1126F AMNT PAIN NOTED NONE PRSNT: CPT | Mod: CPTII,S$GLB,, | Performed by: OTOLARYNGOLOGY

## 2022-07-06 PROCEDURE — 31237 NSL/SINS NDSC SURG BX POLYPC: CPT | Mod: 79,50,S$GLB, | Performed by: OTOLARYNGOLOGY

## 2022-07-06 PROCEDURE — 4010F PR ACE/ARB THEARPY RXD/TAKEN: ICD-10-PCS | Mod: CPTII,S$GLB,, | Performed by: OTOLARYNGOLOGY

## 2022-07-06 PROCEDURE — 1159F PR MEDICATION LIST DOCUMENTED IN MEDICAL RECORD: ICD-10-PCS | Mod: CPTII,S$GLB,, | Performed by: OTOLARYNGOLOGY

## 2022-07-06 PROCEDURE — 99213 PR OFFICE/OUTPT VISIT, EST, LEVL III, 20-29 MIN: ICD-10-PCS | Mod: 25,S$GLB,, | Performed by: OTOLARYNGOLOGY

## 2022-07-06 PROCEDURE — 3044F PR MOST RECENT HEMOGLOBIN A1C LEVEL <7.0%: ICD-10-PCS | Mod: CPTII,S$GLB,, | Performed by: OTOLARYNGOLOGY

## 2022-07-06 PROCEDURE — 3078F PR MOST RECENT DIASTOLIC BLOOD PRESSURE < 80 MM HG: ICD-10-PCS | Mod: CPTII,S$GLB,, | Performed by: OTOLARYNGOLOGY

## 2022-07-06 NOTE — PROCEDURES
Procedures     Endoscopic Sinonasal Debridement    Indication: Wound debridement following surgery for chronic sinusitis    Fiberoptic nasal endoscopy was used to assist with debridement of the sinonasal cavities as part of necessary postoperative care.  Informed consent was obtained prior to proceeding.  The nasal cavity was decongested with topical 0.25% phenylephrine and anesthetized with 4% lidocaine.  A rigid 0-degree endoscope was introduced into the nasal cavity.    Findings:  Nasal cavity:  inferior turbinates and septum intact   no synechiae   Ethmoid cavities:  dense crusted debris present bilaterally   debrided with Micheal forceps   widely patent following debridement   Maxillary sinus:  widely patent antrostomy bilaterally   dense crusted debris present bilaterally   Sphenoid sinus:  patent sphenoidotomy bilaterally   no polyps or exudate   Frontal outflow:  patent frontal outflow tract bilaterally   no polyps or exudate     The patient tolerated the procedure well.

## 2022-07-11 LAB
FINAL PATHOLOGIC DIAGNOSIS: NORMAL
GROSS: NORMAL
Lab: NORMAL
SUPPLEMENTAL DIAGNOSIS: NORMAL

## 2022-07-13 ENCOUNTER — OFFICE VISIT (OUTPATIENT)
Dept: OTOLARYNGOLOGY | Facility: CLINIC | Age: 72
End: 2022-07-13
Payer: MEDICARE

## 2022-07-13 VITALS
HEIGHT: 65 IN | HEART RATE: 83 BPM | WEIGHT: 167.88 LBS | BODY MASS INDEX: 27.97 KG/M2 | SYSTOLIC BLOOD PRESSURE: 141 MMHG | DIASTOLIC BLOOD PRESSURE: 77 MMHG

## 2022-07-13 DIAGNOSIS — J31.0 CHRONIC RHINITIS: Chronic | ICD-10-CM

## 2022-07-13 DIAGNOSIS — J32.8 OTHER CHRONIC SINUSITIS: Primary | Chronic | ICD-10-CM

## 2022-07-13 PROCEDURE — 3060F POS MICROALBUMINURIA REV: CPT | Mod: CPTII,S$GLB,, | Performed by: OTOLARYNGOLOGY

## 2022-07-13 PROCEDURE — 3066F PR DOCUMENTATION OF TREATMENT FOR NEPHROPATHY: ICD-10-PCS | Mod: CPTII,S$GLB,, | Performed by: OTOLARYNGOLOGY

## 2022-07-13 PROCEDURE — 1159F MED LIST DOCD IN RCRD: CPT | Mod: CPTII,S$GLB,, | Performed by: OTOLARYNGOLOGY

## 2022-07-13 PROCEDURE — 3060F PR POS MICROALBUMINURIA RESULT DOCUMENTED/REVIEW: ICD-10-PCS | Mod: CPTII,S$GLB,, | Performed by: OTOLARYNGOLOGY

## 2022-07-13 PROCEDURE — 3288F PR FALLS RISK ASSESSMENT DOCUMENTED: ICD-10-PCS | Mod: CPTII,S$GLB,, | Performed by: OTOLARYNGOLOGY

## 2022-07-13 PROCEDURE — 3066F NEPHROPATHY DOC TX: CPT | Mod: CPTII,S$GLB,, | Performed by: OTOLARYNGOLOGY

## 2022-07-13 PROCEDURE — 1126F PR PAIN SEVERITY QUANTIFIED, NO PAIN PRESENT: ICD-10-PCS | Mod: CPTII,S$GLB,, | Performed by: OTOLARYNGOLOGY

## 2022-07-13 PROCEDURE — 4010F PR ACE/ARB THEARPY RXD/TAKEN: ICD-10-PCS | Mod: CPTII,S$GLB,, | Performed by: OTOLARYNGOLOGY

## 2022-07-13 PROCEDURE — 3078F PR MOST RECENT DIASTOLIC BLOOD PRESSURE < 80 MM HG: ICD-10-PCS | Mod: CPTII,S$GLB,, | Performed by: OTOLARYNGOLOGY

## 2022-07-13 PROCEDURE — 3077F SYST BP >= 140 MM HG: CPT | Mod: CPTII,S$GLB,, | Performed by: OTOLARYNGOLOGY

## 2022-07-13 PROCEDURE — 3008F BODY MASS INDEX DOCD: CPT | Mod: CPTII,S$GLB,, | Performed by: OTOLARYNGOLOGY

## 2022-07-13 PROCEDURE — 1159F PR MEDICATION LIST DOCUMENTED IN MEDICAL RECORD: ICD-10-PCS | Mod: CPTII,S$GLB,, | Performed by: OTOLARYNGOLOGY

## 2022-07-13 PROCEDURE — 3288F FALL RISK ASSESSMENT DOCD: CPT | Mod: CPTII,S$GLB,, | Performed by: OTOLARYNGOLOGY

## 2022-07-13 PROCEDURE — 99999 PR PBB SHADOW E&M-EST. PATIENT-LVL IV: ICD-10-PCS | Mod: PBBFAC,,, | Performed by: OTOLARYNGOLOGY

## 2022-07-13 PROCEDURE — 1160F PR REVIEW ALL MEDS BY PRESCRIBER/CLIN PHARMACIST DOCUMENTED: ICD-10-PCS | Mod: CPTII,S$GLB,, | Performed by: OTOLARYNGOLOGY

## 2022-07-13 PROCEDURE — 31237 NSL/SINS NDSC SURG BX POLYPC: CPT | Mod: 79,50,S$GLB, | Performed by: OTOLARYNGOLOGY

## 2022-07-13 PROCEDURE — 1101F PR PT FALLS ASSESS DOC 0-1 FALLS W/OUT INJ PAST YR: ICD-10-PCS | Mod: CPTII,S$GLB,, | Performed by: OTOLARYNGOLOGY

## 2022-07-13 PROCEDURE — 3008F PR BODY MASS INDEX (BMI) DOCUMENTED: ICD-10-PCS | Mod: CPTII,S$GLB,, | Performed by: OTOLARYNGOLOGY

## 2022-07-13 PROCEDURE — 31237 PR NASAL/SINUS ENDOSCOPY,BX/RMV POLYP/DEBRID: ICD-10-PCS | Mod: 79,50,S$GLB, | Performed by: OTOLARYNGOLOGY

## 2022-07-13 PROCEDURE — 3044F PR MOST RECENT HEMOGLOBIN A1C LEVEL <7.0%: ICD-10-PCS | Mod: CPTII,S$GLB,, | Performed by: OTOLARYNGOLOGY

## 2022-07-13 PROCEDURE — 1160F RVW MEDS BY RX/DR IN RCRD: CPT | Mod: CPTII,S$GLB,, | Performed by: OTOLARYNGOLOGY

## 2022-07-13 PROCEDURE — 1126F AMNT PAIN NOTED NONE PRSNT: CPT | Mod: CPTII,S$GLB,, | Performed by: OTOLARYNGOLOGY

## 2022-07-13 PROCEDURE — 3077F PR MOST RECENT SYSTOLIC BLOOD PRESSURE >= 140 MM HG: ICD-10-PCS | Mod: CPTII,S$GLB,, | Performed by: OTOLARYNGOLOGY

## 2022-07-13 PROCEDURE — 4010F ACE/ARB THERAPY RXD/TAKEN: CPT | Mod: CPTII,S$GLB,, | Performed by: OTOLARYNGOLOGY

## 2022-07-13 PROCEDURE — 3078F DIAST BP <80 MM HG: CPT | Mod: CPTII,S$GLB,, | Performed by: OTOLARYNGOLOGY

## 2022-07-13 PROCEDURE — 99213 OFFICE O/P EST LOW 20 MIN: CPT | Mod: 25,S$GLB,, | Performed by: OTOLARYNGOLOGY

## 2022-07-13 PROCEDURE — 99213 PR OFFICE/OUTPT VISIT, EST, LEVL III, 20-29 MIN: ICD-10-PCS | Mod: 25,S$GLB,, | Performed by: OTOLARYNGOLOGY

## 2022-07-13 PROCEDURE — 3044F HG A1C LEVEL LT 7.0%: CPT | Mod: CPTII,S$GLB,, | Performed by: OTOLARYNGOLOGY

## 2022-07-13 PROCEDURE — 1101F PT FALLS ASSESS-DOCD LE1/YR: CPT | Mod: CPTII,S$GLB,, | Performed by: OTOLARYNGOLOGY

## 2022-07-13 PROCEDURE — 99999 PR PBB SHADOW E&M-EST. PATIENT-LVL IV: CPT | Mod: PBBFAC,,, | Performed by: OTOLARYNGOLOGY

## 2022-07-13 NOTE — PROCEDURES
Procedures       Endoscopic Sinonasal Debridement    Indication: Wound debridement following surgery for chronic sinusitis    Fiberoptic nasal endoscopy was used to assist with debridement of the sinonasal cavities as part of necessary postoperative care.  Informed consent was obtained prior to proceeding.  The nasal cavity was decongested with topical 0.25% phenylephrine and anesthetized with 4% lidocaine.  A rigid 0-degree endoscope was introduced into the nasal cavity.    Findings:  Nasal cavity:  inferior turbinates and septum intact   no synechiae   Ethmoid cavities:  dense crusted debris present bilaterally   debrided with Micheal forceps  Propel stents removed   widely patent following debridement   Maxillary sinus:  widely patent antrostomy bilaterally   dense crusted debris present bilaterally   Sphenoid sinus:  patent sphenoidotomy bilaterally   no polyps or exudate   Frontal outflow:  patent frontal outflow tract bilaterally   no polyps or exudate     The patient tolerated the procedure well.

## 2022-07-13 NOTE — PROGRESS NOTES
"  Subjective:      Scooter Morirssey is a 71 y.o. male who comes for follow-up status-post endoscopic sinus surgery 6/30/2022.  He feels well since last visit.  Denies epistaxis. He denies pain.     Objective:     BP (!) 141/77 (BP Location: Right arm, Patient Position: Sitting, BP Method: Large (Automatic))   Pulse 83   Ht 5' 5" (1.651 m)   Wt 76.1 kg (167 lb 14.1 oz)   BMI 27.94 kg/m²      General:   not in distress   Nasal:  edematous mucosa   no septal hematoma   no bleeding  Septum midline  No evidence of perforation  Left hemitransfixion incision healing well   Oral Cavity:   clear   Oropharynx:   no bleeding   Neck:   nontender       Procedure    Endoscopic debridement performed.  See procedure note.        Data Reviewed    Pathology report indicated pending.  Cultures showed E. Coli and prevotella oris.      Assessment:       1. Other chronic sinusitis    2. Chronic rhinitis         Plan:     Recommend nasal saline rinses QID        Follow up in about 10 days (around 7/23/2022).     Clara Jennings MD    "

## 2022-07-26 ENCOUNTER — OFFICE VISIT (OUTPATIENT)
Dept: OTOLARYNGOLOGY | Facility: CLINIC | Age: 72
End: 2022-07-26
Payer: MEDICARE

## 2022-07-26 VITALS
HEART RATE: 81 BPM | DIASTOLIC BLOOD PRESSURE: 78 MMHG | HEIGHT: 65 IN | WEIGHT: 170.88 LBS | SYSTOLIC BLOOD PRESSURE: 142 MMHG | BODY MASS INDEX: 28.47 KG/M2

## 2022-07-26 DIAGNOSIS — J32.8 OTHER CHRONIC SINUSITIS: Primary | Chronic | ICD-10-CM

## 2022-07-26 DIAGNOSIS — J31.0 CHRONIC RHINITIS: Chronic | ICD-10-CM

## 2022-07-26 PROCEDURE — 99999 PR PBB SHADOW E&M-EST. PATIENT-LVL IV: ICD-10-PCS | Mod: PBBFAC,,, | Performed by: OTOLARYNGOLOGY

## 2022-07-26 PROCEDURE — 1101F PR PT FALLS ASSESS DOC 0-1 FALLS W/OUT INJ PAST YR: ICD-10-PCS | Mod: CPTII,S$GLB,, | Performed by: OTOLARYNGOLOGY

## 2022-07-26 PROCEDURE — 3077F PR MOST RECENT SYSTOLIC BLOOD PRESSURE >= 140 MM HG: ICD-10-PCS | Mod: CPTII,S$GLB,, | Performed by: OTOLARYNGOLOGY

## 2022-07-26 PROCEDURE — 1160F PR REVIEW ALL MEDS BY PRESCRIBER/CLIN PHARMACIST DOCUMENTED: ICD-10-PCS | Mod: CPTII,S$GLB,, | Performed by: OTOLARYNGOLOGY

## 2022-07-26 PROCEDURE — 1126F AMNT PAIN NOTED NONE PRSNT: CPT | Mod: CPTII,S$GLB,, | Performed by: OTOLARYNGOLOGY

## 2022-07-26 PROCEDURE — 3078F DIAST BP <80 MM HG: CPT | Mod: CPTII,S$GLB,, | Performed by: OTOLARYNGOLOGY

## 2022-07-26 PROCEDURE — 3078F PR MOST RECENT DIASTOLIC BLOOD PRESSURE < 80 MM HG: ICD-10-PCS | Mod: CPTII,S$GLB,, | Performed by: OTOLARYNGOLOGY

## 2022-07-26 PROCEDURE — 1101F PT FALLS ASSESS-DOCD LE1/YR: CPT | Mod: CPTII,S$GLB,, | Performed by: OTOLARYNGOLOGY

## 2022-07-26 PROCEDURE — 3066F PR DOCUMENTATION OF TREATMENT FOR NEPHROPATHY: ICD-10-PCS | Mod: CPTII,S$GLB,, | Performed by: OTOLARYNGOLOGY

## 2022-07-26 PROCEDURE — 31237 NSL/SINS NDSC SURG BX POLYPC: CPT | Mod: 50,79,S$GLB, | Performed by: OTOLARYNGOLOGY

## 2022-07-26 PROCEDURE — 1159F MED LIST DOCD IN RCRD: CPT | Mod: CPTII,S$GLB,, | Performed by: OTOLARYNGOLOGY

## 2022-07-26 PROCEDURE — 99214 OFFICE O/P EST MOD 30 MIN: CPT | Mod: 25,24,S$GLB, | Performed by: OTOLARYNGOLOGY

## 2022-07-26 PROCEDURE — 1159F PR MEDICATION LIST DOCUMENTED IN MEDICAL RECORD: ICD-10-PCS | Mod: CPTII,S$GLB,, | Performed by: OTOLARYNGOLOGY

## 2022-07-26 PROCEDURE — 1126F PR PAIN SEVERITY QUANTIFIED, NO PAIN PRESENT: ICD-10-PCS | Mod: CPTII,S$GLB,, | Performed by: OTOLARYNGOLOGY

## 2022-07-26 PROCEDURE — 4010F ACE/ARB THERAPY RXD/TAKEN: CPT | Mod: CPTII,S$GLB,, | Performed by: OTOLARYNGOLOGY

## 2022-07-26 PROCEDURE — 99214 PR OFFICE/OUTPT VISIT, EST, LEVL IV, 30-39 MIN: ICD-10-PCS | Mod: 25,24,S$GLB, | Performed by: OTOLARYNGOLOGY

## 2022-07-26 PROCEDURE — 3008F PR BODY MASS INDEX (BMI) DOCUMENTED: ICD-10-PCS | Mod: CPTII,S$GLB,, | Performed by: OTOLARYNGOLOGY

## 2022-07-26 PROCEDURE — 1160F RVW MEDS BY RX/DR IN RCRD: CPT | Mod: CPTII,S$GLB,, | Performed by: OTOLARYNGOLOGY

## 2022-07-26 PROCEDURE — 3288F FALL RISK ASSESSMENT DOCD: CPT | Mod: CPTII,S$GLB,, | Performed by: OTOLARYNGOLOGY

## 2022-07-26 PROCEDURE — 3066F NEPHROPATHY DOC TX: CPT | Mod: CPTII,S$GLB,, | Performed by: OTOLARYNGOLOGY

## 2022-07-26 PROCEDURE — 3044F HG A1C LEVEL LT 7.0%: CPT | Mod: CPTII,S$GLB,, | Performed by: OTOLARYNGOLOGY

## 2022-07-26 PROCEDURE — 3060F POS MICROALBUMINURIA REV: CPT | Mod: CPTII,S$GLB,, | Performed by: OTOLARYNGOLOGY

## 2022-07-26 PROCEDURE — 3077F SYST BP >= 140 MM HG: CPT | Mod: CPTII,S$GLB,, | Performed by: OTOLARYNGOLOGY

## 2022-07-26 PROCEDURE — 99999 PR PBB SHADOW E&M-EST. PATIENT-LVL IV: CPT | Mod: PBBFAC,,, | Performed by: OTOLARYNGOLOGY

## 2022-07-26 PROCEDURE — 3044F PR MOST RECENT HEMOGLOBIN A1C LEVEL <7.0%: ICD-10-PCS | Mod: CPTII,S$GLB,, | Performed by: OTOLARYNGOLOGY

## 2022-07-26 PROCEDURE — 3060F PR POS MICROALBUMINURIA RESULT DOCUMENTED/REVIEW: ICD-10-PCS | Mod: CPTII,S$GLB,, | Performed by: OTOLARYNGOLOGY

## 2022-07-26 PROCEDURE — 3008F BODY MASS INDEX DOCD: CPT | Mod: CPTII,S$GLB,, | Performed by: OTOLARYNGOLOGY

## 2022-07-26 PROCEDURE — 4010F PR ACE/ARB THEARPY RXD/TAKEN: ICD-10-PCS | Mod: CPTII,S$GLB,, | Performed by: OTOLARYNGOLOGY

## 2022-07-26 PROCEDURE — 31237 PR NASAL/SINUS ENDOSCOPY,BX/RMV POLYP/DEBRID: ICD-10-PCS | Mod: 50,79,S$GLB, | Performed by: OTOLARYNGOLOGY

## 2022-07-26 PROCEDURE — 3288F PR FALLS RISK ASSESSMENT DOCUMENTED: ICD-10-PCS | Mod: CPTII,S$GLB,, | Performed by: OTOLARYNGOLOGY

## 2022-07-26 NOTE — PROGRESS NOTES
"  Subjective:      Scooter Morrissey is a 71 y.o. male who comes for follow-up status-post endoscopic sinus surgery 6/30/2022.  Reports he is feeling much better since last visit. He is breathing well through bilateral nostrils.  Denies epistaxis. He denies pain. He feels he can smell and taste better.     Objective:     BP (!) 142/78 (BP Location: Right arm, Patient Position: Sitting, BP Method: Large (Automatic))   Pulse 81   Ht 5' 5" (1.651 m)   Wt 77.5 kg (170 lb 13.7 oz)   BMI 28.43 kg/m²      General:   not in distress   Nasal:  edematous mucosa   no septal hematoma   no bleeding  Septum midline  No evidence of perforation  Left hemitransfixion incision healing well   Oral Cavity:   clear   Oropharynx:   no bleeding   Neck:   nontender       Procedure    Endoscopic debridement performed.  See procedure note.        Data Reviewed    Pathology report indicated non-eosinophilic chronic inflammation.  Cultures showed E. Coli and prevotella oris.      Assessment:       1. Other chronic sinusitis    2. 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  He was counseled on the off-label nature of this therapy and he consented to its use.          Follow up in about 4 weeks (around 8/23/2022).     Clara Jennings MD    "

## 2022-07-26 NOTE — PROCEDURES
Procedures       Endoscopic Sinonasal Debridement    Indication: Wound debridement following surgery for chronic sinusitis    Fiberoptic nasal endoscopy was used to assist with debridement of the sinonasal cavities as part of necessary postoperative care.  Informed consent was obtained prior to proceeding.  The nasal cavity was decongested with topical 0.25% phenylephrine and anesthetized with 4% lidocaine.  A rigid 0-degree endoscope was introduced into the nasal cavity.    Findings:  Nasal cavity:  inferior turbinates and septum intact   no synechiae   Ethmoid cavities:  minimal debris bilaterally   debrided with Micheal forceps   widely patent following debridement   Maxillary sinus:  widely patent antrostomy bilaterally   minimal debris bilaterally   Sphenoid sinus:  patent sphenoidotomy bilaterally   no polyps or exudate   Frontal outflow:  patent frontal outflow tract bilaterally   no polyps or exudate     The patient tolerated the procedure well.

## 2022-07-27 ENCOUNTER — HOSPITAL ENCOUNTER (OUTPATIENT)
Dept: RADIOLOGY | Facility: HOSPITAL | Age: 72
Discharge: HOME | End: 2022-07-27
Attending: INTERNAL MEDICINE
Payer: MEDICARE

## 2022-07-27 DIAGNOSIS — J44.89 ASTHMA WITH COPD: ICD-10-CM

## 2022-07-27 DIAGNOSIS — J18.9 MULTIFOCAL PNEUMONIA: ICD-10-CM

## 2022-07-27 PROCEDURE — 71250 CT THORAX DX C-: CPT | Mod: 26,,, | Performed by: RADIOLOGY

## 2022-07-27 PROCEDURE — 71250 CT THORAX DX C-: CPT | Mod: TC

## 2022-07-27 PROCEDURE — 71250 CT CHEST WITHOUT CONTRAST: ICD-10-PCS | Mod: 26,,, | Performed by: RADIOLOGY

## 2022-08-02 ENCOUNTER — PATIENT OUTREACH (OUTPATIENT)
Dept: ADMINISTRATIVE | Facility: HOSPITAL | Age: 72
End: 2022-08-02
Payer: MEDICARE

## 2022-08-02 NOTE — PROGRESS NOTES
Non-compliant [facility] report chart review - Chart review completed for the following HM test if overdue  (Mammogram, Colonoscopy, Cervical Cancer Screening,  Diabetic lab testing, and/or Dilated EYE EXAM)  08/02/2022    Care Everywhere and Media reports - updates requested and reviewed.              Health Maintenance Due   Topic Date Due    Foot Exam  Never done    Eye Exam  Never done    Shingles Vaccine (1 of 2) Never done    COVID-19 Vaccine (3 - Booster for Moderna series) 02/28/2022

## 2022-08-22 ENCOUNTER — PATIENT OUTREACH (OUTPATIENT)
Dept: ADMINISTRATIVE | Facility: HOSPITAL | Age: 72
End: 2022-08-22
Payer: MEDICARE

## 2022-08-22 NOTE — PROGRESS NOTES
Non-compliant [facility] report chart review - Chart review completed for the following HM test if overdue  (Mammogram, Colonoscopy, Cervical Cancer Screening,  Diabetic lab testing, and/or Dilated EYE EXAM)  08/22/2022    Care Everywhere and Media reports - updates requested and reviewed.            Health Maintenance Due   Topic Date Due    Foot Exam  Never done    Eye Exam  Never done    Shingles Vaccine (1 of 2) Never done    COVID-19 Vaccine (3 - Booster for Moderna series) 02/28/2022

## 2022-08-24 ENCOUNTER — PATIENT MESSAGE (OUTPATIENT)
Dept: ADMINISTRATIVE | Facility: HOSPITAL | Age: 72
End: 2022-08-24
Payer: MEDICARE

## 2022-09-06 ENCOUNTER — OFFICE VISIT (OUTPATIENT)
Dept: OTOLARYNGOLOGY | Facility: CLINIC | Age: 72
End: 2022-09-06
Payer: MEDICARE

## 2022-09-06 VITALS
HEART RATE: 77 BPM | HEIGHT: 65 IN | WEIGHT: 173.63 LBS | SYSTOLIC BLOOD PRESSURE: 147 MMHG | DIASTOLIC BLOOD PRESSURE: 81 MMHG | BODY MASS INDEX: 28.93 KG/M2 | TEMPERATURE: 98 F

## 2022-09-06 DIAGNOSIS — J32.8 OTHER CHRONIC SINUSITIS: Primary | Chronic | ICD-10-CM

## 2022-09-06 DIAGNOSIS — J31.0 CHRONIC RHINITIS: Chronic | ICD-10-CM

## 2022-09-06 PROCEDURE — 3060F POS MICROALBUMINURIA REV: CPT | Mod: CPTII,S$GLB,, | Performed by: OTOLARYNGOLOGY

## 2022-09-06 PROCEDURE — 3044F PR MOST RECENT HEMOGLOBIN A1C LEVEL <7.0%: ICD-10-PCS | Mod: CPTII,S$GLB,, | Performed by: OTOLARYNGOLOGY

## 2022-09-06 PROCEDURE — 3288F FALL RISK ASSESSMENT DOCD: CPT | Mod: CPTII,S$GLB,, | Performed by: OTOLARYNGOLOGY

## 2022-09-06 PROCEDURE — 3008F BODY MASS INDEX DOCD: CPT | Mod: CPTII,S$GLB,, | Performed by: OTOLARYNGOLOGY

## 2022-09-06 PROCEDURE — 99999 PR PBB SHADOW E&M-EST. PATIENT-LVL IV: ICD-10-PCS | Mod: PBBFAC,,, | Performed by: OTOLARYNGOLOGY

## 2022-09-06 PROCEDURE — 4010F PR ACE/ARB THEARPY RXD/TAKEN: ICD-10-PCS | Mod: CPTII,S$GLB,, | Performed by: OTOLARYNGOLOGY

## 2022-09-06 PROCEDURE — 3077F SYST BP >= 140 MM HG: CPT | Mod: CPTII,S$GLB,, | Performed by: OTOLARYNGOLOGY

## 2022-09-06 PROCEDURE — 1126F AMNT PAIN NOTED NONE PRSNT: CPT | Mod: CPTII,S$GLB,, | Performed by: OTOLARYNGOLOGY

## 2022-09-06 PROCEDURE — 3079F DIAST BP 80-89 MM HG: CPT | Mod: CPTII,S$GLB,, | Performed by: OTOLARYNGOLOGY

## 2022-09-06 PROCEDURE — 31231 PR NASAL ENDOSCOPY, DX: ICD-10-PCS | Mod: 79,S$GLB,, | Performed by: OTOLARYNGOLOGY

## 2022-09-06 PROCEDURE — 3044F HG A1C LEVEL LT 7.0%: CPT | Mod: CPTII,S$GLB,, | Performed by: OTOLARYNGOLOGY

## 2022-09-06 PROCEDURE — 99214 OFFICE O/P EST MOD 30 MIN: CPT | Mod: 25,24,S$GLB, | Performed by: OTOLARYNGOLOGY

## 2022-09-06 PROCEDURE — 3077F PR MOST RECENT SYSTOLIC BLOOD PRESSURE >= 140 MM HG: ICD-10-PCS | Mod: CPTII,S$GLB,, | Performed by: OTOLARYNGOLOGY

## 2022-09-06 PROCEDURE — 1101F PR PT FALLS ASSESS DOC 0-1 FALLS W/OUT INJ PAST YR: ICD-10-PCS | Mod: CPTII,S$GLB,, | Performed by: OTOLARYNGOLOGY

## 2022-09-06 PROCEDURE — 31231 NASAL ENDOSCOPY DX: CPT | Mod: 79,S$GLB,, | Performed by: OTOLARYNGOLOGY

## 2022-09-06 PROCEDURE — 3079F PR MOST RECENT DIASTOLIC BLOOD PRESSURE 80-89 MM HG: ICD-10-PCS | Mod: CPTII,S$GLB,, | Performed by: OTOLARYNGOLOGY

## 2022-09-06 PROCEDURE — 3066F PR DOCUMENTATION OF TREATMENT FOR NEPHROPATHY: ICD-10-PCS | Mod: CPTII,S$GLB,, | Performed by: OTOLARYNGOLOGY

## 2022-09-06 PROCEDURE — 1159F MED LIST DOCD IN RCRD: CPT | Mod: CPTII,S$GLB,, | Performed by: OTOLARYNGOLOGY

## 2022-09-06 PROCEDURE — 3288F PR FALLS RISK ASSESSMENT DOCUMENTED: ICD-10-PCS | Mod: CPTII,S$GLB,, | Performed by: OTOLARYNGOLOGY

## 2022-09-06 PROCEDURE — 1159F PR MEDICATION LIST DOCUMENTED IN MEDICAL RECORD: ICD-10-PCS | Mod: CPTII,S$GLB,, | Performed by: OTOLARYNGOLOGY

## 2022-09-06 PROCEDURE — 3060F PR POS MICROALBUMINURIA RESULT DOCUMENTED/REVIEW: ICD-10-PCS | Mod: CPTII,S$GLB,, | Performed by: OTOLARYNGOLOGY

## 2022-09-06 PROCEDURE — 1160F RVW MEDS BY RX/DR IN RCRD: CPT | Mod: CPTII,S$GLB,, | Performed by: OTOLARYNGOLOGY

## 2022-09-06 PROCEDURE — 99214 PR OFFICE/OUTPT VISIT, EST, LEVL IV, 30-39 MIN: ICD-10-PCS | Mod: 25,24,S$GLB, | Performed by: OTOLARYNGOLOGY

## 2022-09-06 PROCEDURE — 1160F PR REVIEW ALL MEDS BY PRESCRIBER/CLIN PHARMACIST DOCUMENTED: ICD-10-PCS | Mod: CPTII,S$GLB,, | Performed by: OTOLARYNGOLOGY

## 2022-09-06 PROCEDURE — 1126F PR PAIN SEVERITY QUANTIFIED, NO PAIN PRESENT: ICD-10-PCS | Mod: CPTII,S$GLB,, | Performed by: OTOLARYNGOLOGY

## 2022-09-06 PROCEDURE — 3008F PR BODY MASS INDEX (BMI) DOCUMENTED: ICD-10-PCS | Mod: CPTII,S$GLB,, | Performed by: OTOLARYNGOLOGY

## 2022-09-06 PROCEDURE — 99999 PR PBB SHADOW E&M-EST. PATIENT-LVL IV: CPT | Mod: PBBFAC,,, | Performed by: OTOLARYNGOLOGY

## 2022-09-06 PROCEDURE — 4010F ACE/ARB THERAPY RXD/TAKEN: CPT | Mod: CPTII,S$GLB,, | Performed by: OTOLARYNGOLOGY

## 2022-09-06 PROCEDURE — 1101F PT FALLS ASSESS-DOCD LE1/YR: CPT | Mod: CPTII,S$GLB,, | Performed by: OTOLARYNGOLOGY

## 2022-09-06 PROCEDURE — 3066F NEPHROPATHY DOC TX: CPT | Mod: CPTII,S$GLB,, | Performed by: OTOLARYNGOLOGY

## 2022-09-06 RX ORDER — BUDESONIDE 0.5 MG/2ML
INHALANT ORAL
COMMUNITY
Start: 2022-07-27

## 2022-09-06 RX ORDER — AMOXICILLIN AND CLAVULANATE POTASSIUM 875; 125 MG/1; MG/1
1 TABLET, FILM COATED ORAL 2 TIMES DAILY
Qty: 20 TABLET | Refills: 0 | Status: SHIPPED | OUTPATIENT
Start: 2022-09-06 | End: 2022-09-16

## 2022-09-06 NOTE — PROCEDURES
Procedures    PROCEDURE NOTE:  Nasal endoscopy   Preprocedure diagnosis:  Chronic sinusitis  Postprocedure diangosis:  Same  Complications:  None  Blood Loss:  None    Procedure in detail:  After verbal consent was obtained, the patient's nasal cavity was anesthesized using topical 1%lidocaine and Neosynepherine.  A rigid 0 degree endoscope was placed in first the right, then the left nasal cavity.  The inferior and middle turbinates were examined, and found to be normal bilaterally.  The middle meatus and maxillary antrum was also examined, and found to be patent bilaterally. Ethmoid cavities, bilateral sphenoidotomy, and frontal sinus outflow tract patent.  Mild purulent drainage seen in middle meatus. No masses seen.  The patient tolerated the procedure well and there were no complications.

## 2022-09-27 ENCOUNTER — LAB VISIT (OUTPATIENT)
Dept: LAB | Facility: HOSPITAL | Age: 72
End: 2022-09-27
Attending: FAMILY MEDICINE
Payer: MEDICARE

## 2022-09-27 DIAGNOSIS — E11.29 TYPE 2 DIABETES MELLITUS WITH MICROALBUMINURIA, UNSPECIFIED WHETHER LONG TERM INSULIN USE: ICD-10-CM

## 2022-09-27 DIAGNOSIS — R80.9 TYPE 2 DIABETES MELLITUS WITH MICROALBUMINURIA, UNSPECIFIED WHETHER LONG TERM INSULIN USE: ICD-10-CM

## 2022-09-27 LAB
ALBUMIN SERPL BCP-MCNC: 3.7 G/DL (ref 3.5–5.2)
ALP SERPL-CCNC: 83 U/L (ref 55–135)
ALT SERPL W/O P-5'-P-CCNC: 20 U/L (ref 10–44)
ANION GAP SERPL CALC-SCNC: 7 MMOL/L (ref 8–16)
AST SERPL-CCNC: 18 U/L (ref 10–40)
BILIRUB SERPL-MCNC: 0.3 MG/DL (ref 0.1–1)
BUN SERPL-MCNC: 17 MG/DL (ref 8–23)
CALCIUM SERPL-MCNC: 9.6 MG/DL (ref 8.7–10.5)
CHLORIDE SERPL-SCNC: 108 MMOL/L (ref 95–110)
CO2 SERPL-SCNC: 24 MMOL/L (ref 23–29)
CREAT SERPL-MCNC: 1.2 MG/DL (ref 0.5–1.4)
EST. GFR  (NO RACE VARIABLE): >60 ML/MIN/1.73 M^2
ESTIMATED AVG GLUCOSE: 111 MG/DL (ref 68–131)
GLUCOSE SERPL-MCNC: 99 MG/DL (ref 70–110)
HBA1C MFR BLD: 5.5 % (ref 4–5.6)
POTASSIUM SERPL-SCNC: 4.1 MMOL/L (ref 3.5–5.1)
PROT SERPL-MCNC: 8.8 G/DL (ref 6–8.4)
SODIUM SERPL-SCNC: 139 MMOL/L (ref 136–145)

## 2022-09-27 PROCEDURE — 83036 HEMOGLOBIN GLYCOSYLATED A1C: CPT | Performed by: FAMILY MEDICINE

## 2022-09-27 PROCEDURE — 80053 COMPREHEN METABOLIC PANEL: CPT | Performed by: FAMILY MEDICINE

## 2022-09-27 PROCEDURE — 36415 COLL VENOUS BLD VENIPUNCTURE: CPT | Performed by: FAMILY MEDICINE

## 2022-10-03 ENCOUNTER — OFFICE VISIT (OUTPATIENT)
Dept: FAMILY MEDICINE | Facility: CLINIC | Age: 72
End: 2022-10-03
Payer: MEDICARE

## 2022-10-03 VITALS
DIASTOLIC BLOOD PRESSURE: 72 MMHG | HEIGHT: 65 IN | OXYGEN SATURATION: 94 % | HEART RATE: 79 BPM | WEIGHT: 173.94 LBS | BODY MASS INDEX: 28.98 KG/M2 | SYSTOLIC BLOOD PRESSURE: 130 MMHG

## 2022-10-03 DIAGNOSIS — Z00.00 ROUTINE GENERAL MEDICAL EXAMINATION AT A HEALTH CARE FACILITY: Primary | ICD-10-CM

## 2022-10-03 DIAGNOSIS — Z79.899 MEDICATION MANAGEMENT: ICD-10-CM

## 2022-10-03 DIAGNOSIS — Z86.16 PERSONAL HISTORY OF COVID-19: ICD-10-CM

## 2022-10-03 DIAGNOSIS — Z87.09 H/O PLEURAL EMPYEMA: ICD-10-CM

## 2022-10-03 DIAGNOSIS — Z87.09 HISTORY OF CHRONIC SINUSITIS: ICD-10-CM

## 2022-10-03 DIAGNOSIS — E11.29 TYPE 2 DIABETES MELLITUS WITH MICROALBUMINURIA, UNSPECIFIED WHETHER LONG TERM INSULIN USE: ICD-10-CM

## 2022-10-03 DIAGNOSIS — Z98.890: ICD-10-CM

## 2022-10-03 DIAGNOSIS — E78.2 MIXED HYPERLIPIDEMIA: Chronic | ICD-10-CM

## 2022-10-03 DIAGNOSIS — R80.9 TYPE 2 DIABETES MELLITUS WITH MICROALBUMINURIA, UNSPECIFIED WHETHER LONG TERM INSULIN USE: ICD-10-CM

## 2022-10-03 DIAGNOSIS — J44.89 ASTHMA-COPD OVERLAP SYNDROME: ICD-10-CM

## 2022-10-03 DIAGNOSIS — J47.0 BRONCHIECTASIS WITH ACUTE LOWER RESPIRATORY INFECTION: ICD-10-CM

## 2022-10-03 DIAGNOSIS — I10 ESSENTIAL HYPERTENSION: Chronic | ICD-10-CM

## 2022-10-03 DIAGNOSIS — I70.0 AORTIC ATHEROSCLEROSIS: ICD-10-CM

## 2022-10-03 DIAGNOSIS — D64.9 ANEMIA, UNSPECIFIED TYPE: ICD-10-CM

## 2022-10-03 PROBLEM — E66.3 OVERWEIGHT (BMI 25.0-29.9): Status: ACTIVE | Noted: 2022-06-27

## 2022-10-03 PROCEDURE — 3288F FALL RISK ASSESSMENT DOCD: CPT | Mod: CPTII,S$GLB,, | Performed by: FAMILY MEDICINE

## 2022-10-03 PROCEDURE — 1126F AMNT PAIN NOTED NONE PRSNT: CPT | Mod: CPTII,S$GLB,, | Performed by: FAMILY MEDICINE

## 2022-10-03 PROCEDURE — 3066F PR DOCUMENTATION OF TREATMENT FOR NEPHROPATHY: ICD-10-PCS | Mod: CPTII,S$GLB,, | Performed by: FAMILY MEDICINE

## 2022-10-03 PROCEDURE — 3044F PR MOST RECENT HEMOGLOBIN A1C LEVEL <7.0%: ICD-10-PCS | Mod: CPTII,S$GLB,, | Performed by: FAMILY MEDICINE

## 2022-10-03 PROCEDURE — 99397 PER PM REEVAL EST PAT 65+ YR: CPT | Mod: S$GLB,,, | Performed by: FAMILY MEDICINE

## 2022-10-03 PROCEDURE — 3008F PR BODY MASS INDEX (BMI) DOCUMENTED: ICD-10-PCS | Mod: CPTII,S$GLB,, | Performed by: FAMILY MEDICINE

## 2022-10-03 PROCEDURE — 1101F PT FALLS ASSESS-DOCD LE1/YR: CPT | Mod: CPTII,S$GLB,, | Performed by: FAMILY MEDICINE

## 2022-10-03 PROCEDURE — 1159F MED LIST DOCD IN RCRD: CPT | Mod: CPTII,S$GLB,, | Performed by: FAMILY MEDICINE

## 2022-10-03 PROCEDURE — 3044F HG A1C LEVEL LT 7.0%: CPT | Mod: CPTII,S$GLB,, | Performed by: FAMILY MEDICINE

## 2022-10-03 PROCEDURE — 3066F NEPHROPATHY DOC TX: CPT | Mod: CPTII,S$GLB,, | Performed by: FAMILY MEDICINE

## 2022-10-03 PROCEDURE — 4010F PR ACE/ARB THEARPY RXD/TAKEN: ICD-10-PCS | Mod: CPTII,S$GLB,, | Performed by: FAMILY MEDICINE

## 2022-10-03 PROCEDURE — 3060F PR POS MICROALBUMINURIA RESULT DOCUMENTED/REVIEW: ICD-10-PCS | Mod: CPTII,S$GLB,, | Performed by: FAMILY MEDICINE

## 2022-10-03 PROCEDURE — 99397 PR PREVENTIVE VISIT,EST,65 & OVER: ICD-10-PCS | Mod: S$GLB,,, | Performed by: FAMILY MEDICINE

## 2022-10-03 PROCEDURE — 99999 PR PBB SHADOW E&M-EST. PATIENT-LVL IV: CPT | Mod: PBBFAC,,, | Performed by: FAMILY MEDICINE

## 2022-10-03 PROCEDURE — 1160F RVW MEDS BY RX/DR IN RCRD: CPT | Mod: CPTII,S$GLB,, | Performed by: FAMILY MEDICINE

## 2022-10-03 PROCEDURE — 3075F PR MOST RECENT SYSTOLIC BLOOD PRESS GE 130-139MM HG: ICD-10-PCS | Mod: CPTII,S$GLB,, | Performed by: FAMILY MEDICINE

## 2022-10-03 PROCEDURE — 99999 PR PBB SHADOW E&M-EST. PATIENT-LVL IV: ICD-10-PCS | Mod: PBBFAC,,, | Performed by: FAMILY MEDICINE

## 2022-10-03 PROCEDURE — 1101F PR PT FALLS ASSESS DOC 0-1 FALLS W/OUT INJ PAST YR: ICD-10-PCS | Mod: CPTII,S$GLB,, | Performed by: FAMILY MEDICINE

## 2022-10-03 PROCEDURE — 3060F POS MICROALBUMINURIA REV: CPT | Mod: CPTII,S$GLB,, | Performed by: FAMILY MEDICINE

## 2022-10-03 PROCEDURE — 3075F SYST BP GE 130 - 139MM HG: CPT | Mod: CPTII,S$GLB,, | Performed by: FAMILY MEDICINE

## 2022-10-03 PROCEDURE — 3008F BODY MASS INDEX DOCD: CPT | Mod: CPTII,S$GLB,, | Performed by: FAMILY MEDICINE

## 2022-10-03 PROCEDURE — 1126F PR PAIN SEVERITY QUANTIFIED, NO PAIN PRESENT: ICD-10-PCS | Mod: CPTII,S$GLB,, | Performed by: FAMILY MEDICINE

## 2022-10-03 PROCEDURE — 4010F ACE/ARB THERAPY RXD/TAKEN: CPT | Mod: CPTII,S$GLB,, | Performed by: FAMILY MEDICINE

## 2022-10-03 PROCEDURE — 3288F PR FALLS RISK ASSESSMENT DOCUMENTED: ICD-10-PCS | Mod: CPTII,S$GLB,, | Performed by: FAMILY MEDICINE

## 2022-10-03 PROCEDURE — 99499 UNLISTED E&M SERVICE: CPT | Mod: HCNC,S$GLB,, | Performed by: FAMILY MEDICINE

## 2022-10-03 PROCEDURE — 1160F PR REVIEW ALL MEDS BY PRESCRIBER/CLIN PHARMACIST DOCUMENTED: ICD-10-PCS | Mod: CPTII,S$GLB,, | Performed by: FAMILY MEDICINE

## 2022-10-03 PROCEDURE — 3078F PR MOST RECENT DIASTOLIC BLOOD PRESSURE < 80 MM HG: ICD-10-PCS | Mod: CPTII,S$GLB,, | Performed by: FAMILY MEDICINE

## 2022-10-03 PROCEDURE — 3078F DIAST BP <80 MM HG: CPT | Mod: CPTII,S$GLB,, | Performed by: FAMILY MEDICINE

## 2022-10-03 PROCEDURE — 1159F PR MEDICATION LIST DOCUMENTED IN MEDICAL RECORD: ICD-10-PCS | Mod: CPTII,S$GLB,, | Performed by: FAMILY MEDICINE

## 2022-10-03 NOTE — PROGRESS NOTES
Office Visit    Patient Name: Scooter Morrissey    : 1950  MRN: [id number]    Subjective:  Scooter is a 72 y.o. male who presents today for:    Annual Exam    Scooter Morrissey is a 70 year old male with past medical history of COPD/ Asthma, HTN, HLD, previous COVID-19 Infection (2021).   He was recently diagnosed with type 2 diabetes with an A1c of 6.5 on 2022, urine microalbumin of 72.    He presents today for his annual physical with monitoring of chronic conditions.   Labs prior to office visit 2022 show improvement in A1c 5.5 with initiation of metformin 500 XR daily with lifestyle changes.  CMP is unremarkable with normal liver and kidney function.    HTN: He is continuing to take his medications regularly- Toprol , Amlodipine 10 and Losartan 100-- Home BPs in goal range.   HLD: He is taking atorvastatin regularly   COPD/Asthma: He is taking Symbicort regularly AM and PM   Allergies/Chronic Sinusitis:  Noted significant improvement following ethmoidectomy/sinus surgery per ENT Dr. Simmons.  Continuing Xyzal every night but has been able to stop his nasal saline and Flonase.  Breathing very well through his nose.       He continues to not smoke or drink alcohol.     No acute complaints. 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     Current Medications    Medication List with Changes/Refills   Current Medications    ALBUTEROL (ACCUNEB) 1.25 MG/3 ML NEBU    Take 3 mLs (1.25 mg total) by nebulization every 6 (six) hours as needed (shortness of breath or wheezing). Rescue    ALBUTEROL (VENTOLIN HFA) 90 MCG/ACTUATION INHALER    Inhale 2 puffs into the lungs every 4 (four) hours as needed for Wheezing or Shortness of Breath. Rescue    ALBUTEROL-IPRATROPIUM (DUO-NEB) 2.5 MG-0.5 MG/3 ML NEBULIZER SOLUTION    Take 3 mLs by nebulization every 6 (six) hours as needed for Wheezing. Rescue    AMLODIPINE (NORVASC) 10 MG TABLET    TAKE 1 TABLET BY MOUTH EVERY DAY    ASCORBIC ACID, VITAMIN C, (VITAMIN C) 500 MG TABLET    Take 1 tablet (500 mg total) by mouth 2 (two) times daily.    ATORVASTATIN (LIPITOR) 40 MG TABLET    Take 1 tablet (40 mg total) by mouth every evening.    BUDESONIDE (PULMICORT) 0.5 MG/2 ML NEBULIZER SOLUTION    EMPTY CONTENTS OF 1 VIAL INTO NASAL IRRIGATION SYSTEM, ADD DISTILLED WATER, SALT PACK, MIX & IRRIGATE. PERFORM 1-2 TIMES DAILY    BUDESONIDE-FORMOTEROL 160-4.5 MCG (SYMBICORT) 160-4.5 MCG/ACTUATION HFAA    INHALE 2 PUFFS INTO THE LUNGS BY MOUTH TWICE DAILY    CALCIUM-VITAMIN D3 (OS-SALLY 500 + D3) 500 MG-5 MCG (200 UNIT) PER TABLET    Take 2 tablets by mouth once daily.    FLUTICASONE PROPIONATE (FLONASE) 50 MCG/ACTUATION NASAL SPRAY    2 sprays (100 mcg total) by Each Nostril route once daily.    LEVOCETIRIZINE (XYZAL) 5 MG TABLET    Take 1 tablet (5 mg total) by mouth every evening.    LOSARTAN (COZAAR) 100 MG TABLET    TAKE 1 TABLET(100 MG) BY MOUTH EVERY DAY    METFORMIN (GLUCOPHAGE-XR) 500 MG ER 24HR TABLET    Take 1 tablet (500 mg total) by mouth daily with dinner or evening meal.    METOPROLOL SUCCINATE (TOPROL-XL) 100 MG 24 HR TABLET    TAKE 1 TABLET(100 MG) BY MOUTH EVERY DAY    MULTIVITAMIN WITH  "MINERALS TABLET    Take 1 tablet by mouth nightly.     PULSE OXIMETER (PULSE OXIMETER) DEVICE    by Apply Externally route 2 (two) times a day. Use twice daily at 8 AM and 3 PM and record the value in MyChart as directed.    VITAMIN D (VITAMIN D3) 1000 UNITS TAB    Take 1,000 Units by mouth once daily.   Discontinued Medications    FLUTICASONE-SALMETEROL DISKUS INHALER 500-50 MCG    Inhale 1 puff into the lungs 2 (two) times daily. Controller    HYDROCODONE-ACETAMINOPHEN (NORCO) 7.5-325 MG PER TABLET    Take 1 tablet by mouth every 6 (six) hours as needed for Pain.    TIOTROPIUM BROMIDE (SPIRIVA RESPIMAT) 2.5 MCG/ACTUATION INHALER    Inhale 2 puffs into the lungs Daily. Controller       Allergies   Review of patient's allergies indicates:  No Known Allergies      Review of Systems (Pertinent positives)  Review of Systems   Constitutional:  Negative for unexpected weight change.   HENT:  Negative for congestion, dental problem and postnasal drip.    Eyes:  Negative for pain.   Respiratory:  Positive for shortness of breath (chronic stable) and wheezing.    Cardiovascular:  Negative for chest pain and leg swelling.   Gastrointestinal:  Negative for nausea and vomiting.   Genitourinary:  Negative for difficulty urinating.   Allergic/Immunologic: Positive for environmental allergies (stable on xyzal).   Neurological:  Negative for dizziness and light-headedness.   Psychiatric/Behavioral:  Negative for dysphoric mood and sleep disturbance.      /72 (BP Location: Right arm, Patient Position: Sitting)   Pulse 79   Ht 5' 5" (1.651 m)   Wt 78.9 kg (173 lb 15.1 oz)   SpO2 (!) 94%   BMI 28.95 kg/m²     Physical Exam  Vitals reviewed.   Constitutional:       General: He is not in acute distress.  HENT:      Head: Normocephalic and atraumatic.      Right Ear: External ear normal.      Left Ear: External ear normal.      Nose: Nose normal.      Mouth/Throat:      Pharynx: No oropharyngeal exudate.   Eyes:      General: " No scleral icterus.     Conjunctiva/sclera: Conjunctivae normal.   Cardiovascular:      Rate and Rhythm: Normal rate and regular rhythm.      Pulses:           Dorsalis pedis pulses are 2+ on the right side and 2+ on the left side.        Posterior tibial pulses are 2+ on the right side and 2+ on the left side.      Heart sounds: Normal heart sounds.   Pulmonary:      Effort: Pulmonary effort is normal. No respiratory distress.      Breath sounds: No stridor. Wheezing (scattered wheezes, predominantely in bases) and rales (scattered rales) present.   Abdominal:      General: Bowel sounds are normal. There is no distension.      Palpations: Abdomen is soft.      Tenderness: There is no abdominal tenderness.   Musculoskeletal:         General: Normal range of motion.      Right lower leg: No edema.      Left lower leg: No edema.      Right foot: Normal range of motion. No deformity.      Left foot: Normal range of motion. No deformity.   Feet:      Right foot:      Protective Sensation: 10 sites tested.  10 sites sensed.      Skin integrity: No ulcer, blister, skin breakdown, erythema, warmth, callus or dry skin.      Left foot:      Protective Sensation: 10 sites tested.  10 sites sensed.      Skin integrity: No ulcer, blister, skin breakdown, erythema, warmth, callus or dry skin.   Lymphadenopathy:      Cervical: No cervical adenopathy.   Skin:     General: Skin is warm and dry.      Findings: No rash.   Neurological:      General: No focal deficit present.      Mental Status: He is alert and oriented to person, place, and time.   Psychiatric:         Mood and Affect: Mood normal.         Behavior: Behavior normal.         Assessment/Plan:  Scooter Morrissey is a 72 y.o. male who presents today for :        ICD-10-CM ICD-9-CM    1. Routine general medical examination at a health care facility  Z00.00 V70.0       2. BMI 28.0-28.9,adult  Z68.28 V85.24       3. Type 2 diabetes mellitus with microalbuminuria, unspecified  whether long term insulin use  E11.29 250.40 Hemoglobin A1C    R80.9 791.0 Comprehensive Metabolic Panel      Lipid Panel      CBC Auto Differential      TSH      Vitamin D      Magnesium      Vitamin B12      Microalbumin/Creatinine Ratio, Urine      Ferritin      Iron and TIBC      4. Essential hypertension  I10 401.9 Comprehensive Metabolic Panel      Lipid Panel      CBC Auto Differential      TSH      5. Aortic atherosclerosis  I70.0 440.0       6. Mixed hyperlipidemia  E78.2 272.2       7. Personal history of COVID-19  Z86.16 V12.09       8. H/O pleural empyema  Z87.09 V12.69       9. Asthma-COPD overlap syndrome  J44.9 493.20       10. Bronchiectasis with acute lower respiratory infection  J47.0 494.1       11. History of ethmoidectomy  Z98.890 V45.89       12. History of chronic sinusitis  Z87.09 V12.69       13. Anemia, unspecified type  D64.9 285.9 Comprehensive Metabolic Panel      CBC Auto Differential      Vitamin B12      Ferritin      Iron and TIBC      14. Anxiety  F41.9 300.00       15. Medication management  Z79.899 V58.69 Hemoglobin A1C      Comprehensive Metabolic Panel      Lipid Panel      CBC Auto Differential      TSH      Vitamin D      Magnesium      Vitamin B12      Microalbumin/Creatinine Ratio, Urine      Ferritin      Iron and TIBC        ADVISED ON DIET/EXERCISE/SLEEP, ROUTINE EYE/DENTAL EXAMS, AND THE IMPORTANCE OF KEEPING UP WITH APPROPRIATE SCREENING TESTS BASED ON AGE AND RISK FACTORS.  IMMUNIZATIONS REVIEWED:  SEASONAL FLU SHOT, OMICRON COVID -19 BOOSTER AND SHINGRIX ADVISED  NEXT COLONOSCOPY 6/2026    OVERWEIGHT BMI:  Advised on continued attention to low carb diet, regular exercise, continue Metformin 500 XR daily  & repeat A1c & microalbumin in 4 months,    CHRONIC CONTROLLED TYPE 2 DIABETES W/ MILD MICROALBUMINURIA:  REPEAT A1C IMPROVED TO 5.5 ON METFORMIN 500 XR DAILY, RECHECK IN 4 MONTHS, has upcoming eye exam     HTN: controlled on Toprol , Amlodipine 10 and Losartan  "100- ongoing monitoring    ALLERGIC RHINITIS, CHRONIC SINUSITIS:  Significantly improved following ethmoidectomy/sinus surgery with ENT Dr. Simmons  Continuing Xyzal.     ASTHMA/COPD, HISTORY OF COVID PNEUMONIA AND PRIOR HISTORY OF PLEURAL EMPYEMA:  Has some chronic crackles and wheezes on his lung exam, but his breathing and exercise tolerance are overall stable to improved.  Continue Symbicort daily.  Continue regular exercise, has followed up with Dr. Guerrier pulmonology.  Albuterol, nebulizer p.r.n., monitors his oxygen level and has had no concerns.    HYPERLIPIDEMIA WITH AORTIC ATHEROSCLEROSIS:  Remaining tobacco free, blood pressure controlled, continue atorvastatin 40    ANEMIA:  Recheck with next lab draw, check B12, iron studies.                Patient Instructions   OBTAIN THE YEARLY FLU SHOT AND THE COVID-19 UPDATED "OMICRON" BOOSTER      Follow up in about 4 months (around 2/3/2023) for to follow up on lab results, return as needed for new concerns.      "

## 2022-10-10 ENCOUNTER — PATIENT MESSAGE (OUTPATIENT)
Dept: ADMINISTRATIVE | Facility: HOSPITAL | Age: 72
End: 2022-10-10
Payer: MEDICARE

## 2022-10-10 NOTE — PROGRESS NOTES
Chief Complaint   Patient presents with    Follow-up     Sign concepts    .    HPI:     Scooter Morrissey is a 71 y.o. male who is known to me for CRS, NSD, inferior turbinate hypertorphy last seen in 2020. He  presents for evaluation of a several month history of nasal congestion, post-nasal drip, and bloody rhinorrhea.  He feels that he has had right sided facial tenderness and swelling. He does feel that he has mild tenderness in the right cheek region.  He describes this as a 1-2/10 on pain scale. He does not have any pain on the left side.  He does use sinus rinses or nasal sprays. He stopped FLonase due to nose bleeds. He admits to rhinorrhea and postnasal drip. He has not had sinus or nasal surgery. There is no history of sinonasal trauma. He does work as a .     Interval HPI 4/25/2022:  Follow up visit. Reports he has had hospitalization for pneumonia since last visit. He is following with his pulmonolotist Dr. Sanchez. He was on IV Cefepime and doxycycline in the hospital. CT sinus around the time of hospitalization showed pansinusitis. He reports that he is having continued rhinorrhea and post-nasal drip. He has had facial pain/pressure and productive cough.He has been using nasal saline rinses and Flonase.        Interval HPI 5/24/2022:  Follow up visit.Reports slight improvement after last visit. He completed 21 day course of Levaquin and 14 day course of doxycycline.  He has continued rhinorrhea, post-nasal drip, and facial pain/pressure.       Past Medical History:   Diagnosis Date    Acute hypoxemic respiratory failure 1/12/2022    NOEMI (acute kidney injury) 3/9/2022    Anxiety 6/29/2016    Asthma with chronic obstructive pulmonary disease (COPD)     Benign essential hypertension 4/5/2016    Hyperlipidemia 4/13/2016    Interstitial pneumonitis 4/13/2016    Other specified anemias 3/13/2020     Social History     Socioeconomic History    Marital status:    Occupational History     Occupation: lawn service   Tobacco Use    Smoking status: Former Smoker     Types: Cigars    Smokeless tobacco: Former User     Quit date: 5/12/1996   Substance and Sexual Activity    Alcohol use: No    Drug use: No    Sexual activity: Yes     Partners: Female     Social Determinants of Health     Financial Resource Strain: Low Risk     Difficulty of Paying Living Expenses: Not hard at all   Food Insecurity: No Food Insecurity    Worried About Running Out of Food in the Last Year: Never true    Ran Out of Food in the Last Year: Never true   Transportation Needs: No Transportation Needs    Lack of Transportation (Medical): No    Lack of Transportation (Non-Medical): No   Physical Activity: Sufficiently Active    Days of Exercise per Week: 6 days    Minutes of Exercise per Session: 90 min   Stress: No Stress Concern Present    Feeling of Stress : Only a little   Social Connections: Moderately Isolated    Frequency of Communication with Friends and Family: Three times a week    Frequency of Social Gatherings with Friends and Family: Twice a week    Attends Buddhist Services: Never    Active Member of Clubs or Organizations: No    Attends Club or Organization Meetings: Never    Marital Status:    Housing Stability: Low Risk     Unable to Pay for Housing in the Last Year: No    Number of Places Lived in the Last Year: 1    Unstable Housing in the Last Year: No     Past Surgical History:   Procedure Laterality Date    ADENOIDECTOMY      COLONOSCOPY N/A 6/1/2016    Procedure: COLONOSCOPY;  Surgeon: Meme Mills MD;  [facility]: [facility];  Service: Endoscopy;  Laterality: N/A;    FINGER SURGERY      15 years ago    TONSILLECTOMY       Family History   Problem Relation Age of Onset    No Known Problems Mother     No Known Problems Father     No Known Problems Daughter            Review of Systems  General: negative for chills, fever or weight loss  Psychological: negative for  mood changes or depression  Ophthalmic: negative for blurry vision, photophobia or eye pain  ENT: see HPI  Respiratory: no cough, shortness of breath, or wheezing  Cardiovascular: no chest pain or dyspnea on exertion  Gastrointestinal: no abdominal pain, change in bowel habits, or black/ bloody stools  Musculoskeletal: negative for gait disturbance or muscular weakness  Neurological: no syncope or seizures; no ataxia  Dermatological: negative for puritis,  rash and jaundice  Hematologic/lymphatic: no easy bruising, no new lumps or bumps      Physical Exam:    Vitals:    06/20/22 1340   BP: 117/70   Pulse: 79       Constitutional: Well appearing / communicating without difficutly.  NAD.  Eyes: EOM I Bilaterally  Head/Face: Normocephalic.  Negative paranasal sinus pressure/tenderness.  Salivary glands WNL.  House Brackmann I Bilaterally.    Right Ear: Auricle normal appearance. External Auditory Canal within normal limits,TM w/o masses/lesions/perforations. TM mobility noted.   Left Ear: Auricle normal appearance. External Auditory Canal WNL,TM w/o masses/lesions/perforations. TM mobility noted.  Nose: Nasal septal deviation to the right. Inferior Turbinates 3+ bilaterally. No septal perforation. No masses/lesions. External nasal skin appears normal without masses/lesions.  Oral Cavity: Gingiva/lips within normal limits.  Dentition/gingiva healthy appearing. Mucus membranes moist. Floor of mouth soft, no masses palpated. Oral Tongue mobile. Hard Palate appears normal.    Oropharynx: Base of tongue appears normal. No masses/lesions noted. Tonsillar fossa/pharyngeal wall without lesions. Posterior oropharynx WNL.  Soft palate without masses. Midline uvula.   Neck/Lymphatic: No LAD I-VI bilaterally.  No thyromegaly.  No masses noted on exam.      See separate procedure note for nasal endoscopy.     CT sinus 3/28/2022:     FINDINGS:  There is mild mucosal thickening of the inferior frontal sinuses.  Patchy mucosal  thickening with scattered partial aerated secretion of the bilateral ethmoid sinuses and sphenoid sinuses.  Left greater than right circumferential mucosal thickening of the maxillary sinuses containing some mixed attenuation dependent debris as well as partial aerated secretion on the left.  Nasal septal deviation to the right with small right projecting nasal spur.  Opacification of bilateral ostiomeatal units.  No agnieszka bullosa.  Bilateral lamina papyracea and ethmoid roofs appear intact.  Partial fluid-filled bilateral mastoid air cells.  Partial opacification of the left mesotympanum.  Bilateral globes appear unremarkable.  Limited intracranial assessment demonstrates nothing unusual.    CT sinus 5/17/2022:   Similar mild mucosal thickening of the inferior frontal sinuses.  Patchy mucosal thickening with partial aerated secretion involving the ethmoid and sphenoid sinuses, similar.  Left greater than right mucosal thickening of the maxillary sinuses containing mixed attenuation dependent debris/fluid with partial aerated secretion on the right.  Degree of maxillary mucosal thickening appears slightly improved from prior.  Nasal septal deviation to the right with small right projecting nasal spur.  Opacification of bilateral ostiomeatal units.  Bilateral lamina papyracea and ethmoid roofs appear intact.  Partial fluid-filled bilateral mastoid air cells, right greater than left with opacification of the epitympanum and mesotympanum on the right.  Bilateral globes are unremarkable.  Limited intracranial assessment demonstrates nothing unusual.      Assessment:    ICD-10-CM ICD-9-CM    1. Other chronic sinusitis  J32.8 473.8    2. Nasal septal deviation  J34.2 470    3. Hypertrophy of inferior nasal turbinate  J34.3 478.0    4. Chronic rhinitis  J31.0 472.0      The primary encounter diagnosis was Other chronic sinusitis. Diagnoses of Nasal septal deviation, Hypertrophy of inferior nasal turbinate, and Chronic  rhinitis were also pertinent to this visit.      Plan:  No orders of the defined types were placed in this encounter.    Continue Nasal saline rinses BID  Continue  Flonase 2 sprays per nostril daily  Continue  xyzal 5 mg PO daily  He again discussed that he would  benefit from endoscopic sinus surgery and septoplasty for the treatment of his condition.  This would include all sinuses and would be bilateral.  Inferior turbinate reduction would be included.  I discussed the risks, benefits and alternatives to surgery with the patient, as well as the expected postoperative course.  I gave him the opportunity to ask questions and I answered all of them. He is ready to proceed with surgery and gave informed consent today.  I provided relevant printed information on his condition for him to review at home. Will schedule surgery in the near future.    Clara Jennings MD   No

## 2022-10-13 ENCOUNTER — TELEPHONE (OUTPATIENT)
Dept: OTOLARYNGOLOGY | Facility: CLINIC | Age: 72
End: 2022-10-13
Payer: MEDICARE

## 2022-10-14 ENCOUNTER — TELEPHONE (OUTPATIENT)
Dept: OTOLARYNGOLOGY | Facility: CLINIC | Age: 72
End: 2022-10-14
Payer: MEDICARE

## 2022-10-14 NOTE — TELEPHONE ENCOUNTER
Verbal order called into requested pharmacy as per Dr. Simmons.   ----- Message from Diogenes Goode sent at 10/13/2022  1:21 PM CDT -----  Contact: pt  .Type:  Needs Medical Advice    Who Called: pt    Pharmacy name and phone #:  [facility] [place] - [address]   Phone: [phone]  Fax:  [phone]  Would the patient rather a call back or a response via [facility]? Call back  Best Call Back Number: [phone]  Additional Information: Pt. Is requesting a refill budesonide (PULMICORT) 0.5 mg/2 mL nebulizer solution

## 2022-10-24 ENCOUNTER — PATIENT OUTREACH (OUTPATIENT)
Dept: ADMINISTRATIVE | Facility: HOSPITAL | Age: 72
End: 2022-10-24
Payer: MEDICARE

## 2022-10-27 ENCOUNTER — PATIENT OUTREACH (OUTPATIENT)
Dept: ADMINISTRATIVE | Facility: HOSPITAL | Age: 72
End: 2022-10-27
Payer: MEDICARE

## 2022-11-23 ENCOUNTER — TELEPHONE (OUTPATIENT)
Dept: OTOLARYNGOLOGY | Facility: CLINIC | Age: 72
End: 2022-11-23
Payer: MEDICARE

## 2022-11-23 NOTE — TELEPHONE ENCOUNTER
Attempted to reach patient in regards to appointment coming up with Dr. Simmons. Patient will need to be rescheduled due to provider being out of the office on unexpected medical leave for upcoming scheduled appointment. Unable to leave message or talk to patient as he disconnected call.

## 2022-12-02 ENCOUNTER — TELEPHONE (OUTPATIENT)
Dept: OTOLARYNGOLOGY | Facility: CLINIC | Age: 72
End: 2022-12-02
Payer: MEDICARE

## 2022-12-02 NOTE — TELEPHONE ENCOUNTER
Called pt to inform that Dr. Simmons is out on medical leave for 6 weeks and that appointment would need to be rescheduled. No answer, left voicemail to return my call.

## 2022-12-19 DIAGNOSIS — I10 BENIGN ESSENTIAL HYPERTENSION: ICD-10-CM

## 2022-12-19 NOTE — TELEPHONE ENCOUNTER
No new care gaps identified.  [facility] Embedded Care Gaps. Reference number: [id number]. 12/19/2022   3:10:12 PM CST

## 2022-12-20 RX ORDER — LOSARTAN POTASSIUM 100 MG/1
100 TABLET ORAL DAILY
Qty: 90 TABLET | Refills: 0 | Status: SHIPPED | OUTPATIENT
Start: 2022-12-20 | End: 2023-03-21

## 2022-12-20 NOTE — TELEPHONE ENCOUNTER
----- Message from Ok Mena MA sent at 12/19/2022  4:54 PM CST -----    ----- Message -----  From: Jorge L Quiroz  Sent: 12/19/2022   3:13 PM CST  To: [facility] Staff    Type:  Needs Medical Advice    Who Called: pilar  Reason:said he has been requesting a refill for over a week for his losartan (COZAAR) 100 MG tablet  Would the patient rather a call back or a response via [facility]? call  Best Call Back Number: [id number] - [place], [place] - [address] [address]   Phone:  [phone]  Fax:  [phone]

## 2023-01-13 ENCOUNTER — PATIENT OUTREACH (OUTPATIENT)
Dept: ADMINISTRATIVE | Facility: HOSPITAL | Age: 73
End: 2023-01-13
Payer: MEDICARE

## 2023-01-13 ENCOUNTER — PATIENT MESSAGE (OUTPATIENT)
Dept: ADMINISTRATIVE | Facility: HOSPITAL | Age: 73
End: 2023-01-13
Payer: MEDICARE

## 2023-01-13 NOTE — LETTER
January 23, 2023    Scooter Morrissey  [address]  [address]  [place] [address]             [facility]  [address]  [place] [address]  Phone: [phone] [facility] is committed to your overall health.  To help you get the most out of each of your visits, we will review your information to make sure you are up to date on all of your recommended tests and/or procedures.       Katelynn Rojas MD  has found that your chart shows you may be due for :         Health Maintenance Due   Topic    Eye Exam     Shingles Vaccine (1 of 2)    COVID-19 Vaccine (3 - Booster for Moderna series)    Influenza Vaccine (1)         If you have had any of the above done at another facility, please bring the records or information with you so that your record at [facility] will be complete.  If you would like to schedule any of these test, please call [phone] or send a message via [email or web address].        If you are currently taking medication, please bring it with you to your appointment for review.           Thank you for letting us care for you,        Katelynn Rojas MD and your [facility] Team

## 2023-01-13 NOTE — PROGRESS NOTES
Care Everywhere updates requested and reviewed.  Immunizations reconciled. Media reports reviewed.  Duplicate HM overrides and  orders removed.  Overdue HM topic chart audit and/or requested.  Overdue lab testing linked to upcoming lab appointments if applies.    Health Maintenance Due   Topic Date Due    Eye Exam  Never done    Shingles Vaccine (1 of 2) Never done    COVID-19 Vaccine (3 - Booster for Moderna series) 2021    Influenza Vaccine (1) Never done

## 2023-01-23 ENCOUNTER — PATIENT OUTREACH (OUTPATIENT)
Dept: ADMINISTRATIVE | Facility: HOSPITAL | Age: 73
End: 2023-01-23
Payer: MEDICARE

## 2023-01-27 ENCOUNTER — LAB VISIT (OUTPATIENT)
Dept: LAB | Facility: HOSPITAL | Age: 73
End: 2023-01-27
Attending: FAMILY MEDICINE
Payer: MEDICARE

## 2023-01-27 DIAGNOSIS — E11.29 TYPE 2 DIABETES MELLITUS WITH MICROALBUMINURIA, UNSPECIFIED WHETHER LONG TERM INSULIN USE: ICD-10-CM

## 2023-01-27 DIAGNOSIS — Z79.899 MEDICATION MANAGEMENT: ICD-10-CM

## 2023-01-27 DIAGNOSIS — R80.9 TYPE 2 DIABETES MELLITUS WITH MICROALBUMINURIA, UNSPECIFIED WHETHER LONG TERM INSULIN USE: ICD-10-CM

## 2023-01-27 DIAGNOSIS — I10 ESSENTIAL HYPERTENSION: Chronic | ICD-10-CM

## 2023-01-27 DIAGNOSIS — D64.9 ANEMIA, UNSPECIFIED TYPE: ICD-10-CM

## 2023-01-27 LAB
25(OH)D3+25(OH)D2 SERPL-MCNC: 43 NG/ML (ref 30–96)
ALBUMIN SERPL BCP-MCNC: 3.5 G/DL (ref 3.5–5.2)
ALBUMIN/CREAT UR: 305.2 UG/MG (ref 0–30)
ALP SERPL-CCNC: 75 U/L (ref 55–135)
ALT SERPL W/O P-5'-P-CCNC: 36 U/L (ref 10–44)
ANION GAP SERPL CALC-SCNC: 8 MMOL/L (ref 8–16)
AST SERPL-CCNC: 22 U/L (ref 10–40)
BASOPHILS # BLD AUTO: 0.02 K/UL (ref 0–0.2)
BASOPHILS NFR BLD: 0.2 % (ref 0–1.9)
BILIRUB SERPL-MCNC: 0.5 MG/DL (ref 0.1–1)
BUN SERPL-MCNC: 22 MG/DL (ref 8–23)
CALCIUM SERPL-MCNC: 9.5 MG/DL (ref 8.7–10.5)
CHLORIDE SERPL-SCNC: 108 MMOL/L (ref 95–110)
CHOLEST SERPL-MCNC: 127 MG/DL (ref 120–199)
CHOLEST/HDLC SERPL: 4.1 {RATIO} (ref 2–5)
CO2 SERPL-SCNC: 22 MMOL/L (ref 23–29)
CREAT SERPL-MCNC: 1.2 MG/DL (ref 0.5–1.4)
CREAT UR-MCNC: 77 MG/DL (ref 23–375)
DIFFERENTIAL METHOD: ABNORMAL
EOSINOPHIL # BLD AUTO: 0.2 K/UL (ref 0–0.5)
EOSINOPHIL NFR BLD: 2.4 % (ref 0–8)
ERYTHROCYTE [DISTWIDTH] IN BLOOD BY AUTOMATED COUNT: 13.9 % (ref 11.5–14.5)
EST. GFR  (NO RACE VARIABLE): >60 ML/MIN/1.73 M^2
ESTIMATED AVG GLUCOSE: 117 MG/DL (ref 68–131)
FERRITIN SERPL-MCNC: 319 NG/ML (ref 20–300)
GLUCOSE SERPL-MCNC: 107 MG/DL (ref 70–110)
HBA1C MFR BLD: 5.7 % (ref 4–5.6)
HCT VFR BLD AUTO: 37.7 % (ref 40–54)
HDLC SERPL-MCNC: 31 MG/DL (ref 40–75)
HDLC SERPL: 24.4 % (ref 20–50)
HGB BLD-MCNC: 12.2 G/DL (ref 14–18)
IMM GRANULOCYTES # BLD AUTO: 0.03 K/UL (ref 0–0.04)
IMM GRANULOCYTES NFR BLD AUTO: 0.3 % (ref 0–0.5)
IRON SERPL-MCNC: 76 UG/DL (ref 45–160)
LDLC SERPL CALC-MCNC: 82.6 MG/DL (ref 63–159)
LYMPHOCYTES # BLD AUTO: 3.3 K/UL (ref 1–4.8)
LYMPHOCYTES NFR BLD: 36.6 % (ref 18–48)
MAGNESIUM SERPL-MCNC: 1.8 MG/DL (ref 1.6–2.6)
MCH RBC QN AUTO: 32.9 PG (ref 27–31)
MCHC RBC AUTO-ENTMCNC: 32.4 G/DL (ref 32–36)
MCV RBC AUTO: 102 FL (ref 82–98)
MICROALBUMIN UR DL<=1MG/L-MCNC: 235 UG/ML
MONOCYTES # BLD AUTO: 0.5 K/UL (ref 0.3–1)
MONOCYTES NFR BLD: 6.1 % (ref 4–15)
NEUTROPHILS # BLD AUTO: 4.8 K/UL (ref 1.8–7.7)
NEUTROPHILS NFR BLD: 54.4 % (ref 38–73)
NONHDLC SERPL-MCNC: 96 MG/DL
NRBC BLD-RTO: 0 /100 WBC
PLATELET # BLD AUTO: 220 K/UL (ref 150–450)
PMV BLD AUTO: 10.3 FL (ref 9.2–12.9)
POTASSIUM SERPL-SCNC: 4.3 MMOL/L (ref 3.5–5.1)
PROT SERPL-MCNC: 8.8 G/DL (ref 6–8.4)
RBC # BLD AUTO: 3.71 M/UL (ref 4.6–6.2)
SATURATED IRON: 24 % (ref 20–50)
SODIUM SERPL-SCNC: 138 MMOL/L (ref 136–145)
TOTAL IRON BINDING CAPACITY: 315 UG/DL (ref 250–450)
TRANSFERRIN SERPL-MCNC: 213 MG/DL (ref 200–375)
TRIGL SERPL-MCNC: 67 MG/DL (ref 30–150)
TSH SERPL DL<=0.005 MIU/L-ACNC: 1.2 UIU/ML (ref 0.4–4)
VIT B12 SERPL-MCNC: 465 PG/ML (ref 210–950)
WBC # BLD AUTO: 8.89 K/UL (ref 3.9–12.7)

## 2023-01-27 PROCEDURE — 82570 ASSAY OF URINE CREATININE: CPT | Mod: HCNC | Performed by: FAMILY MEDICINE

## 2023-01-27 PROCEDURE — 80061 LIPID PANEL: CPT | Mod: HCNC | Performed by: FAMILY MEDICINE

## 2023-01-27 PROCEDURE — 82306 VITAMIN D 25 HYDROXY: CPT | Mod: HCNC | Performed by: FAMILY MEDICINE

## 2023-01-27 PROCEDURE — 82728 ASSAY OF FERRITIN: CPT | Mod: HCNC | Performed by: FAMILY MEDICINE

## 2023-01-27 PROCEDURE — 82607 VITAMIN B-12: CPT | Mod: HCNC | Performed by: FAMILY MEDICINE

## 2023-01-27 PROCEDURE — 85025 COMPLETE CBC W/AUTO DIFF WBC: CPT | Mod: HCNC | Performed by: FAMILY MEDICINE

## 2023-01-27 PROCEDURE — 84466 ASSAY OF TRANSFERRIN: CPT | Mod: HCNC | Performed by: FAMILY MEDICINE

## 2023-01-27 PROCEDURE — 84443 ASSAY THYROID STIM HORMONE: CPT | Mod: HCNC | Performed by: FAMILY MEDICINE

## 2023-01-27 PROCEDURE — 36415 COLL VENOUS BLD VENIPUNCTURE: CPT | Mod: HCNC | Performed by: FAMILY MEDICINE

## 2023-01-27 PROCEDURE — 83036 HEMOGLOBIN GLYCOSYLATED A1C: CPT | Mod: HCNC | Performed by: FAMILY MEDICINE

## 2023-01-27 PROCEDURE — 80053 COMPREHEN METABOLIC PANEL: CPT | Mod: HCNC | Performed by: FAMILY MEDICINE

## 2023-01-27 PROCEDURE — 83735 ASSAY OF MAGNESIUM: CPT | Mod: HCNC | Performed by: FAMILY MEDICINE

## 2023-01-30 ENCOUNTER — OFFICE VISIT (OUTPATIENT)
Dept: FAMILY MEDICINE | Facility: CLINIC | Age: 73
End: 2023-01-30
Payer: MEDICARE

## 2023-01-30 VITALS
TEMPERATURE: 99 F | HEART RATE: 88 BPM | BODY MASS INDEX: 27.85 KG/M2 | HEIGHT: 66 IN | OXYGEN SATURATION: 96 % | SYSTOLIC BLOOD PRESSURE: 138 MMHG | DIASTOLIC BLOOD PRESSURE: 68 MMHG | WEIGHT: 173.31 LBS

## 2023-01-30 DIAGNOSIS — R80.9 TYPE 2 DIABETES MELLITUS WITH MICROALBUMINURIA, UNSPECIFIED WHETHER LONG TERM INSULIN USE: Primary | ICD-10-CM

## 2023-01-30 DIAGNOSIS — J47.9 BRONCHIECTASIS WITHOUT COMPLICATION: ICD-10-CM

## 2023-01-30 DIAGNOSIS — E78.2 MIXED HYPERLIPIDEMIA: Chronic | ICD-10-CM

## 2023-01-30 DIAGNOSIS — Z86.16 PERSONAL HISTORY OF COVID-19: ICD-10-CM

## 2023-01-30 DIAGNOSIS — E66.3 OVERWEIGHT (BMI 25.0-29.9): ICD-10-CM

## 2023-01-30 DIAGNOSIS — J44.89 ASTHMA-COPD OVERLAP SYNDROME: ICD-10-CM

## 2023-01-30 DIAGNOSIS — I10 ESSENTIAL HYPERTENSION: Chronic | ICD-10-CM

## 2023-01-30 DIAGNOSIS — Z87.09 H/O PLEURAL EMPYEMA: ICD-10-CM

## 2023-01-30 DIAGNOSIS — Z79.899 MEDICATION MANAGEMENT: ICD-10-CM

## 2023-01-30 DIAGNOSIS — E11.29 TYPE 2 DIABETES MELLITUS WITH MICROALBUMINURIA, UNSPECIFIED WHETHER LONG TERM INSULIN USE: Primary | ICD-10-CM

## 2023-01-30 DIAGNOSIS — I70.0 AORTIC ATHEROSCLEROSIS: ICD-10-CM

## 2023-01-30 PROCEDURE — 1126F PR PAIN SEVERITY QUANTIFIED, NO PAIN PRESENT: ICD-10-PCS | Mod: HCNC,CPTII,S$GLB, | Performed by: FAMILY MEDICINE

## 2023-01-30 PROCEDURE — 3288F FALL RISK ASSESSMENT DOCD: CPT | Mod: HCNC,CPTII,S$GLB, | Performed by: FAMILY MEDICINE

## 2023-01-30 PROCEDURE — 3288F PR FALLS RISK ASSESSMENT DOCUMENTED: ICD-10-PCS | Mod: HCNC,CPTII,S$GLB, | Performed by: FAMILY MEDICINE

## 2023-01-30 PROCEDURE — 1101F PT FALLS ASSESS-DOCD LE1/YR: CPT | Mod: HCNC,CPTII,S$GLB, | Performed by: FAMILY MEDICINE

## 2023-01-30 PROCEDURE — 1160F RVW MEDS BY RX/DR IN RCRD: CPT | Mod: HCNC,CPTII,S$GLB, | Performed by: FAMILY MEDICINE

## 2023-01-30 PROCEDURE — 99999 PR PBB SHADOW E&M-EST. PATIENT-LVL V: ICD-10-PCS | Mod: PBBFAC,HCNC,, | Performed by: FAMILY MEDICINE

## 2023-01-30 PROCEDURE — 99214 PR OFFICE/OUTPT VISIT, EST, LEVL IV, 30-39 MIN: ICD-10-PCS | Mod: HCNC,S$GLB,, | Performed by: FAMILY MEDICINE

## 2023-01-30 PROCEDURE — 3062F POS MACROALBUMINURIA REV: CPT | Mod: HCNC,CPTII,S$GLB, | Performed by: FAMILY MEDICINE

## 2023-01-30 PROCEDURE — 3066F PR DOCUMENTATION OF TREATMENT FOR NEPHROPATHY: ICD-10-PCS | Mod: HCNC,CPTII,S$GLB, | Performed by: FAMILY MEDICINE

## 2023-01-30 PROCEDURE — 3008F BODY MASS INDEX DOCD: CPT | Mod: HCNC,CPTII,S$GLB, | Performed by: FAMILY MEDICINE

## 2023-01-30 PROCEDURE — 3075F PR MOST RECENT SYSTOLIC BLOOD PRESS GE 130-139MM HG: ICD-10-PCS | Mod: HCNC,CPTII,S$GLB, | Performed by: FAMILY MEDICINE

## 2023-01-30 PROCEDURE — 3078F PR MOST RECENT DIASTOLIC BLOOD PRESSURE < 80 MM HG: ICD-10-PCS | Mod: HCNC,CPTII,S$GLB, | Performed by: FAMILY MEDICINE

## 2023-01-30 PROCEDURE — 1126F AMNT PAIN NOTED NONE PRSNT: CPT | Mod: HCNC,CPTII,S$GLB, | Performed by: FAMILY MEDICINE

## 2023-01-30 PROCEDURE — 3062F PR POS MACROALBUMINURIA RESULT DOCUMENTED/REVIEW: ICD-10-PCS | Mod: HCNC,CPTII,S$GLB, | Performed by: FAMILY MEDICINE

## 2023-01-30 PROCEDURE — 1159F MED LIST DOCD IN RCRD: CPT | Mod: HCNC,CPTII,S$GLB, | Performed by: FAMILY MEDICINE

## 2023-01-30 PROCEDURE — 3066F NEPHROPATHY DOC TX: CPT | Mod: HCNC,CPTII,S$GLB, | Performed by: FAMILY MEDICINE

## 2023-01-30 PROCEDURE — 3044F HG A1C LEVEL LT 7.0%: CPT | Mod: HCNC,CPTII,S$GLB, | Performed by: FAMILY MEDICINE

## 2023-01-30 PROCEDURE — 1101F PR PT FALLS ASSESS DOC 0-1 FALLS W/OUT INJ PAST YR: ICD-10-PCS | Mod: HCNC,CPTII,S$GLB, | Performed by: FAMILY MEDICINE

## 2023-01-30 PROCEDURE — 3008F PR BODY MASS INDEX (BMI) DOCUMENTED: ICD-10-PCS | Mod: HCNC,CPTII,S$GLB, | Performed by: FAMILY MEDICINE

## 2023-01-30 PROCEDURE — 3075F SYST BP GE 130 - 139MM HG: CPT | Mod: HCNC,CPTII,S$GLB, | Performed by: FAMILY MEDICINE

## 2023-01-30 PROCEDURE — 99214 OFFICE O/P EST MOD 30 MIN: CPT | Mod: HCNC,S$GLB,, | Performed by: FAMILY MEDICINE

## 2023-01-30 PROCEDURE — 1160F PR REVIEW ALL MEDS BY PRESCRIBER/CLIN PHARMACIST DOCUMENTED: ICD-10-PCS | Mod: HCNC,CPTII,S$GLB, | Performed by: FAMILY MEDICINE

## 2023-01-30 PROCEDURE — 1159F PR MEDICATION LIST DOCUMENTED IN MEDICAL RECORD: ICD-10-PCS | Mod: HCNC,CPTII,S$GLB, | Performed by: FAMILY MEDICINE

## 2023-01-30 PROCEDURE — 3078F DIAST BP <80 MM HG: CPT | Mod: HCNC,CPTII,S$GLB, | Performed by: FAMILY MEDICINE

## 2023-01-30 PROCEDURE — 3044F PR MOST RECENT HEMOGLOBIN A1C LEVEL <7.0%: ICD-10-PCS | Mod: HCNC,CPTII,S$GLB, | Performed by: FAMILY MEDICINE

## 2023-01-30 PROCEDURE — 99999 PR PBB SHADOW E&M-EST. PATIENT-LVL V: CPT | Mod: PBBFAC,HCNC,, | Performed by: FAMILY MEDICINE

## 2023-01-30 NOTE — PROGRESS NOTES
Office Visit    Patient Name: Scooter Morrissey    : 1950  MRN: [id number]    Subjective:  Scooter is a 72 y.o. male who presents today for:    Follow-up (4 month)    ANNUAL PHYSICAL 10/03/2022, 4 MONTH FOLLOW-UP       Scooter Morrissey is a 72 year old male with past medical history of COPD/ Asthma, HTN, HLD, previous COVID-19 Infection (2021).   He was recently diagnosed with type 2 diabetes with an A1c of 6.5 on 2022, urine microalbumin of 72.     He presents today for four-month follow-up of labs and monitoring of chronic conditions.     Labs prior to office visit 23 show slight increase in A1c from 5.5->5.7-taking metformin 500 XR daily.    LDL 82 on Lipitor.   CMP unremarkable, CBC with chronic mild macrocytic anemia, B12, vitamin-D, thyroid level unremarkable.  Iron studies unremarkable.    He is frustrated that his A1c went up slightly as he is really trying to watch his diet and limit sugars/carbs.  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    HTN: He is continuing to take his medications regularly- Toprol , Amlodipine 10 and Losartan 100-- Home BPs in goal range (130s/70s)  HLD: He is taking atorvastatin regularly   COPD/Asthma: He is taking Symbicort regularly AM and PM   Allergies/Chronic Sinusitis:  Noted significant improvement following ethmoidectomy/sinus surgery per ENT Dr. Simmons.  Continuing Xyzal every night but has been able to stop his nasal saline and Flonase.  Breathing very well through his nose.       He continues to not smoke or drink alcohol.      No acute complaints. 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     Current Medications    Medication List with Changes/Refills   Current Medications    ALBUTEROL (VENTOLIN HFA) 90 MCG/ACTUATION INHALER    Inhale 2 puffs into the lungs every 4 (four) hours as needed for Wheezing or Shortness of Breath. Rescue    ALBUTEROL-IPRATROPIUM (DUO-NEB) 2.5 MG-0.5 MG/3 ML NEBULIZER SOLUTION    Take 3 mLs by nebulization every 6 (six) hours as needed for Wheezing. Rescue    AMLODIPINE (NORVASC) 10 MG TABLET    TAKE 1 TABLET BY MOUTH EVERY DAY    ASCORBIC ACID, VITAMIN C, (VITAMIN C) 500 MG TABLET    Take 1 tablet (500 mg total) by mouth 2 (two) times daily.    ATORVASTATIN (LIPITOR) 40 MG TABLET    Take 1 tablet (40 mg total) by mouth every evening.    BUDESONIDE (PULMICORT) 0.5 MG/2 ML NEBULIZER SOLUTION    EMPTY CONTENTS OF 1 VIAL INTO NASAL IRRIGATION SYSTEM, ADD DISTILLED WATER, SALT PACK, MIX & IRRIGATE. PERFORM 1-2 TIMES DAILY    BUDESONIDE-FORMOTEROL 160-4.5 MCG (SYMBICORT) 160-4.5 MCG/ACTUATION HFAA    INHALE 2 PUFFS INTO THE LUNGS BY MOUTH TWICE DAILY    CALCIUM-VITAMIN D3 (OS-SALLY 500 + D3) 500 MG-5 MCG (200 UNIT) PER TABLET    Take 2 tablets by mouth once daily.    FLUTICASONE PROPIONATE (FLONASE) 50 MCG/ACTUATION NASAL SPRAY    2 sprays (100 mcg total) by Each Nostril route once daily.    LEVOCETIRIZINE (XYZAL) 5 MG TABLET    Take 1 tablet (5 mg total) by mouth every evening.    LOSARTAN (COZAAR) 100 MG TABLET     "Take 1 tablet (100 mg total) by mouth once daily.    METFORMIN (GLUCOPHAGE-XR) 500 MG ER 24HR TABLET    Take 1 tablet (500 mg total) by mouth daily with dinner or evening meal.    METOPROLOL SUCCINATE (TOPROL-XL) 100 MG 24 HR TABLET    TAKE 1 TABLET(100 MG) BY MOUTH EVERY DAY    MULTIVITAMIN WITH MINERALS TABLET    Take 1 tablet by mouth nightly.     PULSE OXIMETER (PULSE OXIMETER) DEVICE    by Apply Externally route 2 (two) times a day. Use twice daily at 8 AM and 3 PM and record the value in MyChart as directed.    VITAMIN D (VITAMIN D3) 1000 UNITS TAB    Take 1,000 Units by mouth once daily.       Allergies   Review of patient's allergies indicates:  No Known Allergies      Review of Systems (Pertinent positives)  Review of Systems   Constitutional:  Negative for unexpected weight change.   HENT:  Negative for sinus pressure and sinus pain.    Respiratory:  Negative for cough and shortness of breath.    Allergic/Immunologic: Positive for environmental allergies.     /68   Pulse 88   Temp 99 °F (37.2 °C) (Oral)   Ht 5' 6" (1.676 m)   Wt 78.6 kg (173 lb 4.5 oz)   SpO2 96%   BMI 27.97 kg/m²     Physical Exam  Vitals reviewed.   Constitutional:       General: He is not in acute distress.     Appearance: He is well-developed.   HENT:      Head: Normocephalic and atraumatic.   Eyes:      Conjunctiva/sclera: Conjunctivae normal.   Cardiovascular:      Rate and Rhythm: Normal rate and regular rhythm.   Pulmonary:      Effort: Pulmonary effort is normal. No respiratory distress.      Breath sounds: Examination of the right-lower field reveals rales. Examination of the left-lower field reveals rales. Decreased breath sounds and rales present. No wheezing or rhonchi.   Musculoskeletal:      Right lower leg: No edema.      Left lower leg: No edema.   Skin:     General: Skin is warm and dry.   Neurological:      General: No focal deficit present.      Mental Status: He is alert and oriented to person, place, and " time.   Psychiatric:         Mood and Affect: Mood normal.         Behavior: Behavior normal.         Assessment/Plan:  Scooter Morrissey is a 72 y.o. male who presents today for :        ICD-10-CM ICD-9-CM    1. Type 2 diabetes mellitus with microalbuminuria, unspecified whether long term insulin use  E11.29 250.40 Hemoglobin A1C    R80.9 791.0 Comprehensive Metabolic Panel      CBC Auto Differential      2. Essential hypertension  I10 401.9       3. Aortic atherosclerosis  I70.0 440.0       4. Mixed hyperlipidemia  E78.2 272.2       5. Overweight (BMI 25.0-29.9)  E66.3 278.02       6. Asthma-COPD overlap syndrome  J44.9 493.20       7. Bronchiectasis without complication  J47.9 494.0       8. H/O pleural empyema  Z87.09 V12.69       9. Personal history of COVID-19  Z86.16 V12.09       10. Medication management  Z79.899 V58.69 Hemoglobin A1C      Comprehensive Metabolic Panel      CBC Auto Differential        IMMUNIZATIONS REVIEWED:  SEASONAL FLU SHOT & OMICRON COVID -19 BOOSTER DECLINED, SHINGRIX ADVISED  NEXT COLONOSCOPY 6/2026     OVERWEIGHT BMI:  Advised on continued attention to low carb diet, regular exercise, continue Metformin 500 XR daily  & repeat A1c & microalbumin in 4 months,     CHRONIC CONTROLLED TYPE 2 DIABETES W/ MILD MICROALBUMINURIA:  A1C IMPROVED ON METFORMIN 500 XR DAILY & CURRENTLY 5.7, RECHECK IN 4 MONTHS     HTN: controlled on Toprol , Amlodipine 10 and Losartan 100- ongoing monitoring     ALLERGIC RHINITIS, CHRONIC SINUSITIS:  Significantly improved following ethmoidectomy/sinus surgery with ENT Dr. Simmons 6/30/22, Continuing Xyzal.      ASTHMA/COPD, HISTORY OF COVID PNEUMONIA AND PRIOR HISTORY OF PLEURAL EMPYEMA:  Has some chronic crackles on his lung exam, but breathing and exercise tolerance are overall stable to improved.    Continue Symbicort daily.  Continue regular exercise. Follows with Dr. Guerrier pulmonology.  Albuterol, nebulizer p.r.n., monitors his oxygen level and has had no  concerns.     HYPERLIPIDEMIA WITH AORTIC ATHEROSCLEROSIS:  Remaining tobacco free, blood pressure controlled, continue atorvastatin 40     ANEMIA: mildly improving, B12, iron studies unremarkable     There are no Patient Instructions on file for this visit.      Follow up in about 4 months (around 5/30/2023) for to follow up on lab results, return as needed for new concerns.

## 2023-02-07 DIAGNOSIS — Z00.00 ENCOUNTER FOR MEDICARE ANNUAL WELLNESS EXAM: ICD-10-CM

## 2023-02-09 DIAGNOSIS — Z00.00 ENCOUNTER FOR MEDICARE ANNUAL WELLNESS EXAM: ICD-10-CM

## 2023-02-10 ENCOUNTER — OFFICE VISIT (OUTPATIENT)
Dept: OTOLARYNGOLOGY | Facility: CLINIC | Age: 73
End: 2023-02-10
Payer: MEDICARE

## 2023-02-10 VITALS
DIASTOLIC BLOOD PRESSURE: 79 MMHG | HEART RATE: 81 BPM | BODY MASS INDEX: 27.74 KG/M2 | HEIGHT: 66 IN | WEIGHT: 172.63 LBS | SYSTOLIC BLOOD PRESSURE: 142 MMHG

## 2023-02-10 DIAGNOSIS — J32.8 OTHER CHRONIC SINUSITIS: Primary | ICD-10-CM

## 2023-02-10 PROCEDURE — 3288F FALL RISK ASSESSMENT DOCD: CPT | Mod: HCNC,CPTII,S$GLB, | Performed by: OTOLARYNGOLOGY

## 2023-02-10 PROCEDURE — 99999 PR PBB SHADOW E&M-EST. PATIENT-LVL IV: CPT | Mod: PBBFAC,HCNC,, | Performed by: OTOLARYNGOLOGY

## 2023-02-10 PROCEDURE — 1126F AMNT PAIN NOTED NONE PRSNT: CPT | Mod: HCNC,CPTII,S$GLB, | Performed by: OTOLARYNGOLOGY

## 2023-02-10 PROCEDURE — 3062F POS MACROALBUMINURIA REV: CPT | Mod: HCNC,CPTII,S$GLB, | Performed by: OTOLARYNGOLOGY

## 2023-02-10 PROCEDURE — 3077F PR MOST RECENT SYSTOLIC BLOOD PRESSURE >= 140 MM HG: ICD-10-PCS | Mod: HCNC,CPTII,S$GLB, | Performed by: OTOLARYNGOLOGY

## 2023-02-10 PROCEDURE — 3077F SYST BP >= 140 MM HG: CPT | Mod: HCNC,CPTII,S$GLB, | Performed by: OTOLARYNGOLOGY

## 2023-02-10 PROCEDURE — 3078F PR MOST RECENT DIASTOLIC BLOOD PRESSURE < 80 MM HG: ICD-10-PCS | Mod: HCNC,CPTII,S$GLB, | Performed by: OTOLARYNGOLOGY

## 2023-02-10 PROCEDURE — 1160F PR REVIEW ALL MEDS BY PRESCRIBER/CLIN PHARMACIST DOCUMENTED: ICD-10-PCS | Mod: HCNC,CPTII,S$GLB, | Performed by: OTOLARYNGOLOGY

## 2023-02-10 PROCEDURE — 1160F RVW MEDS BY RX/DR IN RCRD: CPT | Mod: HCNC,CPTII,S$GLB, | Performed by: OTOLARYNGOLOGY

## 2023-02-10 PROCEDURE — 87186 SC STD MICRODIL/AGAR DIL: CPT | Mod: 59,HCNC | Performed by: OTOLARYNGOLOGY

## 2023-02-10 PROCEDURE — 31231 NASAL ENDOSCOPY DX: CPT | Mod: HCNC,S$GLB,, | Performed by: OTOLARYNGOLOGY

## 2023-02-10 PROCEDURE — 1126F PR PAIN SEVERITY QUANTIFIED, NO PAIN PRESENT: ICD-10-PCS | Mod: HCNC,CPTII,S$GLB, | Performed by: OTOLARYNGOLOGY

## 2023-02-10 PROCEDURE — 3066F NEPHROPATHY DOC TX: CPT | Mod: HCNC,CPTII,S$GLB, | Performed by: OTOLARYNGOLOGY

## 2023-02-10 PROCEDURE — 3044F HG A1C LEVEL LT 7.0%: CPT | Mod: HCNC,CPTII,S$GLB, | Performed by: OTOLARYNGOLOGY

## 2023-02-10 PROCEDURE — 1159F PR MEDICATION LIST DOCUMENTED IN MEDICAL RECORD: ICD-10-PCS | Mod: HCNC,CPTII,S$GLB, | Performed by: OTOLARYNGOLOGY

## 2023-02-10 PROCEDURE — 87070 CULTURE OTHR SPECIMN AEROBIC: CPT | Mod: HCNC | Performed by: OTOLARYNGOLOGY

## 2023-02-10 PROCEDURE — 99999 PR PBB SHADOW E&M-EST. PATIENT-LVL IV: ICD-10-PCS | Mod: PBBFAC,HCNC,, | Performed by: OTOLARYNGOLOGY

## 2023-02-10 PROCEDURE — 3078F DIAST BP <80 MM HG: CPT | Mod: HCNC,CPTII,S$GLB, | Performed by: OTOLARYNGOLOGY

## 2023-02-10 PROCEDURE — 31231 PR NASAL ENDOSCOPY, DX: ICD-10-PCS | Mod: HCNC,S$GLB,, | Performed by: OTOLARYNGOLOGY

## 2023-02-10 PROCEDURE — 3288F PR FALLS RISK ASSESSMENT DOCUMENTED: ICD-10-PCS | Mod: HCNC,CPTII,S$GLB, | Performed by: OTOLARYNGOLOGY

## 2023-02-10 PROCEDURE — 99214 PR OFFICE/OUTPT VISIT, EST, LEVL IV, 30-39 MIN: ICD-10-PCS | Mod: 25,HCNC,S$GLB, | Performed by: OTOLARYNGOLOGY

## 2023-02-10 PROCEDURE — 3062F PR POS MACROALBUMINURIA RESULT DOCUMENTED/REVIEW: ICD-10-PCS | Mod: HCNC,CPTII,S$GLB, | Performed by: OTOLARYNGOLOGY

## 2023-02-10 PROCEDURE — 1101F PT FALLS ASSESS-DOCD LE1/YR: CPT | Mod: HCNC,CPTII,S$GLB, | Performed by: OTOLARYNGOLOGY

## 2023-02-10 PROCEDURE — 1101F PR PT FALLS ASSESS DOC 0-1 FALLS W/OUT INJ PAST YR: ICD-10-PCS | Mod: HCNC,CPTII,S$GLB, | Performed by: OTOLARYNGOLOGY

## 2023-02-10 PROCEDURE — 3008F BODY MASS INDEX DOCD: CPT | Mod: HCNC,CPTII,S$GLB, | Performed by: OTOLARYNGOLOGY

## 2023-02-10 PROCEDURE — 87077 CULTURE AEROBIC IDENTIFY: CPT | Mod: HCNC | Performed by: OTOLARYNGOLOGY

## 2023-02-10 PROCEDURE — 3008F PR BODY MASS INDEX (BMI) DOCUMENTED: ICD-10-PCS | Mod: HCNC,CPTII,S$GLB, | Performed by: OTOLARYNGOLOGY

## 2023-02-10 PROCEDURE — 3066F PR DOCUMENTATION OF TREATMENT FOR NEPHROPATHY: ICD-10-PCS | Mod: HCNC,CPTII,S$GLB, | Performed by: OTOLARYNGOLOGY

## 2023-02-10 PROCEDURE — 1159F MED LIST DOCD IN RCRD: CPT | Mod: HCNC,CPTII,S$GLB, | Performed by: OTOLARYNGOLOGY

## 2023-02-10 PROCEDURE — 99214 OFFICE O/P EST MOD 30 MIN: CPT | Mod: 25,HCNC,S$GLB, | Performed by: OTOLARYNGOLOGY

## 2023-02-10 PROCEDURE — 3044F PR MOST RECENT HEMOGLOBIN A1C LEVEL <7.0%: ICD-10-PCS | Mod: HCNC,CPTII,S$GLB, | Performed by: OTOLARYNGOLOGY

## 2023-02-10 RX ORDER — METHYLPREDNISOLONE 4 MG/1
TABLET ORAL
Qty: 1 EACH | Refills: 0 | Status: SHIPPED | OUTPATIENT
Start: 2023-02-10 | End: 2023-06-19 | Stop reason: ALTCHOICE

## 2023-02-10 RX ORDER — FLUTICASONE PROPIONATE 50 MCG
2 SPRAY, SUSPENSION (ML) NASAL DAILY
Qty: 9.9 ML | Refills: 11 | Status: SHIPPED | OUTPATIENT
Start: 2023-02-10 | End: 2024-01-29 | Stop reason: SDUPTHER

## 2023-02-10 RX ORDER — CIPROFLOXACIN 500 MG/1
500 TABLET ORAL 2 TIMES DAILY
Qty: 28 TABLET | Refills: 0 | Status: SHIPPED | OUTPATIENT
Start: 2023-02-10 | End: 2023-02-24

## 2023-02-10 NOTE — PROCEDURES
Procedures    PROCEDURE NOTE:  Nasal endoscopy   Preprocedure diagnosis:  Chronic sinusitis  Postprocedure diangosis:  Same  Complications:  None  Blood Loss:  None    Procedure in detail:  After verbal consent was obtained, the patient's nasal cavity was anesthesized using topical 1%lidocaine and Neosynepherine.  A rigid 0 degree endoscope was placed in first the right, then the left nasal cavity.  The inferior and middle turbinates were examined, and found to be normal bilaterally.  The middle meatus and maxillary antrum was also examined, and found to be patent bilaterally. Ethmoid cavities, bilateral sphenoidotomy, and frontal sinus outflow tract patent.  + purulent drainage seen in the left middle meatus.  Culture was obtained. No masses seen.  The patient tolerated the procedure well and there were no complications.

## 2023-02-10 NOTE — PROGRESS NOTES
"  Subjective:      Scooter Morrissey is a 72 y.o. male who comes for follow-up status-post endoscopic sinus surgery 6/30/2022. He denies complaints today.  He is breathing well through bilateral nostrils.  Denies epistaxis. He is using budesonide saline irrigations as directed.  Sense of smell and taste are improved.     Interval HPI 2/10/2023:  Follow up visit. He has feeling well overall. He stopped the budesonide a week ago but still doing the saline nasal rinses bid. He is on Xyzal. No complaints today. He did have a foul smell in his nose last week but this has improved.           Objective:     BP (!) 142/79 (BP Location: Left arm, Patient Position: Sitting, BP Method: Large (Automatic))   Pulse 81   Ht 5' 6" (1.676 m)   Wt 78.3 kg (172 lb 9.9 oz)   BMI 27.86 kg/m²      General:   not in distress   Nasal:  edematous mucosa   no septal hematoma   no bleeding  Septum midline  No evidence of perforation  Left hemitransfixion incision well healed   Oral Cavity:   clear   Oropharynx:   no bleeding   Neck:   nontender       Procedure    Nasal endoscopy performed.  See procedure note.        Data Reviewed    Pathology report indicated non-eosinophilic chronic inflammation.  Cultures showed E. Coli and prevotella oris.      Assessment:       1. Other chronic sinusitis             Plan:     Culture obtained from the left middle meatus  Start nasal saline rinses b.i.d.  Start Medrol Dosepak  Start Cipro 500 mg p.o. b.i.d.  Resume Flonase 2 sprays per nostril daily      Follow up in about 6 weeks (around 3/24/2023).     Clara Jennings MD        "

## 2023-02-14 ENCOUNTER — TELEPHONE (OUTPATIENT)
Dept: OTOLARYNGOLOGY | Facility: CLINIC | Age: 73
End: 2023-02-14
Payer: MEDICARE

## 2023-02-14 ENCOUNTER — TELEPHONE (OUTPATIENT)
Dept: ADMINISTRATIVE | Facility: OTHER | Age: 73
End: 2023-02-14
Payer: MEDICARE

## 2023-02-14 DIAGNOSIS — J32.8 OTHER CHRONIC SINUSITIS: Primary | ICD-10-CM

## 2023-02-14 NOTE — TELEPHONE ENCOUNTER
Called patient to schedule ID appt. Patient did not answer call. Left voicemail with call back number of [phone]  ----- Message from Clara Jennings MD sent at 2/14/2023  1:21 PM CST -----  I spoke with patient regarding culture results. Please schedule appt for ID. Referral placed.   ----- Message -----  From: Eh, [facility] Interface  Sent: 2/12/2023  10:14 AM CST  To: Clara Jennings MD       [Patient/caregiver able to verbalize understanding of medications, including indications and side effects] : Patient/caregiver able to verbalize understanding of medications, including indications and side effects

## 2023-02-15 ENCOUNTER — TELEPHONE (OUTPATIENT)
Dept: OTOLARYNGOLOGY | Facility: CLINIC | Age: 73
End: 2023-02-15
Payer: MEDICARE

## 2023-02-15 LAB
BACTERIA NPH AEROBE CULT: ABNORMAL
BACTERIA NPH AEROBE CULT: ABNORMAL

## 2023-02-15 NOTE — TELEPHONE ENCOUNTER
Returned call to patient. I will send a message to ID regarding schedule patient. Patient states he does not normally carry phone on person, but if ID can schedule him for either Monday, Tuesday or Thursday in the afternoon at some time since he sleeps in the morning. Patient is awaiting call or message with schedule. Was thanked for call.   ----- Message from Sera Haro LPN sent at 2/14/2023  4:44 PM CST -----  Chrystal Elizabeth Staff  Caller: Unspecified (Today,  4:30 PM)  .Type:  Patient Returning Call     Who Called:pt   Who Left Message for Patient:Sera   Does the patient know what this is regarding?:scheduling   Would the patient rather a call back or a response via [facility]? call   Best Call Back Number:[phone]   Additional Information:     Pt says that he may not awake in the morning for call

## 2023-02-20 ENCOUNTER — OFFICE VISIT (OUTPATIENT)
Dept: INFECTIOUS DISEASES | Facility: CLINIC | Age: 73
End: 2023-02-20
Payer: MEDICARE

## 2023-02-20 VITALS
DIASTOLIC BLOOD PRESSURE: 75 MMHG | SYSTOLIC BLOOD PRESSURE: 134 MMHG | TEMPERATURE: 98 F | BODY MASS INDEX: 27.46 KG/M2 | WEIGHT: 170.88 LBS | HEART RATE: 86 BPM | HEIGHT: 66 IN

## 2023-02-20 DIAGNOSIS — J32.8 OTHER CHRONIC SINUSITIS: ICD-10-CM

## 2023-02-20 PROCEDURE — 1159F PR MEDICATION LIST DOCUMENTED IN MEDICAL RECORD: ICD-10-PCS | Mod: HCNC,CPTII,S$GLB, | Performed by: INTERNAL MEDICINE

## 2023-02-20 PROCEDURE — 3288F PR FALLS RISK ASSESSMENT DOCUMENTED: ICD-10-PCS | Mod: HCNC,CPTII,S$GLB, | Performed by: INTERNAL MEDICINE

## 2023-02-20 PROCEDURE — 3062F POS MACROALBUMINURIA REV: CPT | Mod: HCNC,CPTII,S$GLB, | Performed by: INTERNAL MEDICINE

## 2023-02-20 PROCEDURE — 1126F AMNT PAIN NOTED NONE PRSNT: CPT | Mod: HCNC,CPTII,S$GLB, | Performed by: INTERNAL MEDICINE

## 2023-02-20 PROCEDURE — 1101F PR PT FALLS ASSESS DOC 0-1 FALLS W/OUT INJ PAST YR: ICD-10-PCS | Mod: HCNC,CPTII,S$GLB, | Performed by: INTERNAL MEDICINE

## 2023-02-20 PROCEDURE — 1126F PR PAIN SEVERITY QUANTIFIED, NO PAIN PRESENT: ICD-10-PCS | Mod: HCNC,CPTII,S$GLB, | Performed by: INTERNAL MEDICINE

## 2023-02-20 PROCEDURE — 3044F PR MOST RECENT HEMOGLOBIN A1C LEVEL <7.0%: ICD-10-PCS | Mod: HCNC,CPTII,S$GLB, | Performed by: INTERNAL MEDICINE

## 2023-02-20 PROCEDURE — 3066F NEPHROPATHY DOC TX: CPT | Mod: HCNC,CPTII,S$GLB, | Performed by: INTERNAL MEDICINE

## 2023-02-20 PROCEDURE — 3008F PR BODY MASS INDEX (BMI) DOCUMENTED: ICD-10-PCS | Mod: HCNC,CPTII,S$GLB, | Performed by: INTERNAL MEDICINE

## 2023-02-20 PROCEDURE — 3044F HG A1C LEVEL LT 7.0%: CPT | Mod: HCNC,CPTII,S$GLB, | Performed by: INTERNAL MEDICINE

## 2023-02-20 PROCEDURE — 3078F DIAST BP <80 MM HG: CPT | Mod: HCNC,CPTII,S$GLB, | Performed by: INTERNAL MEDICINE

## 2023-02-20 PROCEDURE — 3288F FALL RISK ASSESSMENT DOCD: CPT | Mod: HCNC,CPTII,S$GLB, | Performed by: INTERNAL MEDICINE

## 2023-02-20 PROCEDURE — 99999 PR PBB SHADOW E&M-EST. PATIENT-LVL V: ICD-10-PCS | Mod: PBBFAC,HCNC,, | Performed by: INTERNAL MEDICINE

## 2023-02-20 PROCEDURE — 1159F MED LIST DOCD IN RCRD: CPT | Mod: HCNC,CPTII,S$GLB, | Performed by: INTERNAL MEDICINE

## 2023-02-20 PROCEDURE — 99999 PR PBB SHADOW E&M-EST. PATIENT-LVL V: CPT | Mod: PBBFAC,HCNC,, | Performed by: INTERNAL MEDICINE

## 2023-02-20 PROCEDURE — 1160F RVW MEDS BY RX/DR IN RCRD: CPT | Mod: HCNC,CPTII,S$GLB, | Performed by: INTERNAL MEDICINE

## 2023-02-20 PROCEDURE — 3075F SYST BP GE 130 - 139MM HG: CPT | Mod: HCNC,CPTII,S$GLB, | Performed by: INTERNAL MEDICINE

## 2023-02-20 PROCEDURE — 3075F PR MOST RECENT SYSTOLIC BLOOD PRESS GE 130-139MM HG: ICD-10-PCS | Mod: HCNC,CPTII,S$GLB, | Performed by: INTERNAL MEDICINE

## 2023-02-20 PROCEDURE — 3066F PR DOCUMENTATION OF TREATMENT FOR NEPHROPATHY: ICD-10-PCS | Mod: HCNC,CPTII,S$GLB, | Performed by: INTERNAL MEDICINE

## 2023-02-20 PROCEDURE — 1160F PR REVIEW ALL MEDS BY PRESCRIBER/CLIN PHARMACIST DOCUMENTED: ICD-10-PCS | Mod: HCNC,CPTII,S$GLB, | Performed by: INTERNAL MEDICINE

## 2023-02-20 PROCEDURE — 1101F PT FALLS ASSESS-DOCD LE1/YR: CPT | Mod: HCNC,CPTII,S$GLB, | Performed by: INTERNAL MEDICINE

## 2023-02-20 PROCEDURE — 99213 PR OFFICE/OUTPT VISIT, EST, LEVL III, 20-29 MIN: ICD-10-PCS | Mod: HCNC,S$GLB,, | Performed by: INTERNAL MEDICINE

## 2023-02-20 PROCEDURE — 3078F PR MOST RECENT DIASTOLIC BLOOD PRESSURE < 80 MM HG: ICD-10-PCS | Mod: HCNC,CPTII,S$GLB, | Performed by: INTERNAL MEDICINE

## 2023-02-20 PROCEDURE — 3008F BODY MASS INDEX DOCD: CPT | Mod: HCNC,CPTII,S$GLB, | Performed by: INTERNAL MEDICINE

## 2023-02-20 PROCEDURE — 3062F PR POS MACROALBUMINURIA RESULT DOCUMENTED/REVIEW: ICD-10-PCS | Mod: HCNC,CPTII,S$GLB, | Performed by: INTERNAL MEDICINE

## 2023-02-20 PROCEDURE — 99213 OFFICE O/P EST LOW 20 MIN: CPT | Mod: HCNC,S$GLB,, | Performed by: INTERNAL MEDICINE

## 2023-02-20 NOTE — TELEPHONE ENCOUNTER
----- Message from Chrystal Chand sent at 2/20/2023  2:05 PM CST -----  .Type:  RX Refill Request    Who Called: pt   Refill or New Rx:refill   RX Name and Strength:levocetirizine (XYZAL) 5 MG tablet  How is the patient currently taking it? (ex. 1XDay): 1xday  Is this a 30 day or 90 day RX: 30  Preferred Pharmacy with phone number: [id number]  [place], [place] - [address] AT [address]   Phone:  [phone]  Local or Mail Order:local  Ordering Provider:[place]   Would the patient rather a call back or a response via [facility]? call  Best Call Back Number: [phone]  Additional Information:     Pt stated that he only has one pill left

## 2023-02-20 NOTE — PROGRESS NOTES
Subjective:      Patient ID: Scooter Morrissey is a 72 y.o. male.    Chief Complaint:Recurrent Sinusitis      History of Present Illness    Mr. Morrissey is a 72 year old male who was referred to me for evaluation of recurrent sinusitis.  He has a history of sinus surgery in June of 2022.  Prior to sinus surgery, he felt stuffy all the time and his breathing was not 'very free'.  Sometimes it was a davenport to breath.  After the surgery he felt completely better.  He is now feeling a little bit congested.  But his breathing is very good.  Says he feels like someone put a fan in his nose and he can breath.  Was prescribed a specialized nasal/sinus rinse.  Doing regular saline rince.  Noticed a smell when he laid down.  Lasted for 6 days.  When he saw Dr. Jennings, she got a sample from his sinus that had the same smell.  Cultures from the sample returned positive for ESBL E coli (no oral antibiotic options) and another gram negative sahra.  He was started on ciprofloxacin and referred to ID clinic.      Medical History  DM type 2  Chronic sinusitis  Hyperlipidemia  COPD    Surgical History  Endoscopic sinus surgery in June, 2022.    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  Has been  for 50.  Has a 46 year old daughter.  Quit smoking cigars in 2014/2015.      Review of Systems   Constitutional: Negative for chills, decreased appetite, fever, malaise/fatigue, night sweats, weight gain and weight loss.   HENT:  Negative for congestion, ear pain, hearing loss, hoarse voice, sore throat and tinnitus.    Eyes:  Negative for blurred vision, redness and visual disturbance.   Cardiovascular:  Negative for chest pain, leg swelling and palpitations.   Respiratory:  Negative for cough, hemoptysis, shortness of breath, sputum production and wheezing.    Hematologic/Lymphatic: Negative for adenopathy. Does not bruise/bleed easily.   Skin:  Negative for dry skin, itching, rash and suspicious lesions.    Musculoskeletal:  Negative for back pain, joint pain, myalgias and neck pain.   Gastrointestinal:  Negative for abdominal pain, constipation, diarrhea, heartburn, nausea and vomiting.   Genitourinary:  Negative for dysuria, flank pain, frequency, hematuria, hesitancy and urgency.   Neurological:  Negative for dizziness, headaches, numbness, paresthesias and weakness.   Psychiatric/Behavioral:  Negative for depression and memory loss. The patient does not have insomnia and is not nervous/anxious.    Objective:   Physical Exam  Vitals and nursing note reviewed.   Constitutional:       General: He is not in acute distress.     Appearance: He is well-developed. He is not diaphoretic.   HENT:      Head: Normocephalic and atraumatic.      Right Ear: External ear normal.      Left Ear: External ear normal.      Nose: Nose normal.      Mouth/Throat:      Pharynx: No oropharyngeal exudate.   Eyes:      General: No scleral icterus.        Right eye: No discharge.         Left eye: No discharge.      Conjunctiva/sclera: Conjunctivae normal.      Pupils: Pupils are equal, round, and reactive to light.   Neck:      Thyroid: No thyromegaly.      Vascular: No JVD.      Trachea: No tracheal deviation.   Cardiovascular:      Rate and Rhythm: Normal rate and regular rhythm.      Heart sounds: No murmur heard.    No friction rub. No gallop.   Pulmonary:      Effort: Pulmonary effort is normal. No respiratory distress.      Breath sounds: Normal breath sounds. No stridor. No wheezing or rales.   Chest:      Chest wall: No tenderness.   Abdominal:      General: Bowel sounds are normal. There is no distension.      Palpations: Abdomen is soft. There is no mass.      Tenderness: There is no abdominal tenderness. There is no guarding or rebound.   Musculoskeletal:         General: No tenderness. Normal range of motion.      Cervical back: Normal range of motion and neck supple.   Lymphadenopathy:      Cervical: No cervical adenopathy.    Skin:     General: Skin is warm.      Coloration: Skin is not pale.      Findings: No erythema or rash.   Neurological:      Mental Status: He is alert and oriented to person, place, and time.      Cranial Nerves: No cranial nerve deficit.      Motor: No abnormal muscle tone.      Coordination: Coordination normal.      Deep Tendon Reflexes: Reflexes are normal and symmetric. Reflexes normal.   Psychiatric:         Behavior: Behavior normal.         Thought Content: Thought content normal.         Judgment: Judgment normal.     Assessment:       1. Other chronic sinusitis       72 year old with recurrent sinusitis.  His symptoms have been much better since his sinus procedure in 2022.  He had some foul smelling drainage from his sinuses recently that was cultured with results showing ESBL E coli (resistant to all oral antibiotics) and another gram negative sahra.  He was treated with ciprofloxacin with resolution of his symptoms.  He has been referred to me for evaluation. He is feeling well.  I don't think he needs IV antibiotics at this time.  He is to notify me if his symptoms recur.     Plan:       Other chronic sinusitis  -     Ambulatory referral/consult to Infectious Disease

## 2023-02-23 ENCOUNTER — TELEPHONE (OUTPATIENT)
Dept: OTOLARYNGOLOGY | Facility: CLINIC | Age: 73
End: 2023-02-23
Payer: MEDICARE

## 2023-02-23 RX ORDER — LEVOCETIRIZINE DIHYDROCHLORIDE 5 MG/1
5 TABLET, FILM COATED ORAL NIGHTLY
Qty: 30 TABLET | Refills: 11 | Status: SHIPPED | OUTPATIENT
Start: 2023-02-23 | End: 2024-01-29 | Stop reason: SDUPTHER

## 2023-02-23 NOTE — TELEPHONE ENCOUNTER
----- Message from Nahum Gomez sent at 2/23/2023 12:45 PM CST -----  Type:  RX Refill Request    Who Called: pt  Refill or New Rx:refill  RX Name and Strength:levocetirizine (XYZAL) 5 MG tablet  How is the patient currently taking it? (ex. 1XDay):Route: Take 1 tablet (5 mg total) by mouth every evening  Is this a 30 day or 90 day RX:30  Preferred Pharmacy with phone number:[id number] - [place] - [address] AT [facility] [facility]   Phone: [phone]  Fax:  [phone]  Local or Mail Order:local  Ordering Provider:Clara Jennings MD  Would the patient rather a call back or a response via [facility]?   Best Call Back Number:  Additional Information:

## 2023-02-23 NOTE — TELEPHONE ENCOUNTER
Returned call to patient to let him know that his medication refill was sent to Dr. Simmons and to please wait her response as she is not in the office today. Was thanked for call   ----- Message from Nahum Gomez sent at 2/23/2023 12:45 PM CST -----  Type:  RX Refill Request    Who Called: pt  Refill or New Rx:refill  RX Name and Strength:levocetirizine (XYZAL) 5 MG tablet  How is the patient currently taking it? (ex. 1XDay):Route: Take 1 tablet (5 mg total) by mouth every evening  Is this a 30 day or 90 day RX:30  Preferred Pharmacy with phone number:[facility] #[id number] - [place], [place] - [address] AT [address]   Phone: [phone]  Fax:  [phone]  Local or Mail Order:local  Ordering Provider:Clara Jennings MD  Would the patient rather a call back or a response via [facility]?   Best Call Back Number:  Additional Information:

## 2023-03-20 DIAGNOSIS — I10 BENIGN ESSENTIAL HYPERTENSION: ICD-10-CM

## 2023-03-21 RX ORDER — LOSARTAN POTASSIUM 100 MG/1
TABLET ORAL
Qty: 90 TABLET | Refills: 3 | Status: SHIPPED | OUTPATIENT
Start: 2023-03-21 | End: 2024-02-24 | Stop reason: SDUPTHER

## 2023-03-21 NOTE — TELEPHONE ENCOUNTER
Refill Decision Note   Scooter Morrissey  is requesting a refill authorization.  Brief Assessment and Rationale for Refill:  Approve     Medication Therapy Plan:       Medication Reconciliation Completed: No   Comments:     No Care Gaps recommended.     Note composed:9:16 AM 03/21/2023

## 2023-04-18 ENCOUNTER — OFFICE VISIT (OUTPATIENT)
Dept: INFECTIOUS DISEASES | Facility: CLINIC | Age: 73
End: 2023-04-18
Payer: MEDICARE

## 2023-04-18 VITALS
WEIGHT: 169.06 LBS | BODY MASS INDEX: 26.53 KG/M2 | SYSTOLIC BLOOD PRESSURE: 140 MMHG | HEIGHT: 67 IN | TEMPERATURE: 98 F | HEART RATE: 81 BPM | DIASTOLIC BLOOD PRESSURE: 68 MMHG

## 2023-04-18 DIAGNOSIS — J32.8 OTHER CHRONIC SINUSITIS: Primary | ICD-10-CM

## 2023-04-18 PROCEDURE — 99999 PR PBB SHADOW E&M-EST. PATIENT-LVL IV: CPT | Mod: PBBFAC,,, | Performed by: INTERNAL MEDICINE

## 2023-04-18 PROCEDURE — 1101F PT FALLS ASSESS-DOCD LE1/YR: CPT | Mod: CPTII,S$GLB,, | Performed by: INTERNAL MEDICINE

## 2023-04-18 PROCEDURE — 1159F MED LIST DOCD IN RCRD: CPT | Mod: CPTII,S$GLB,, | Performed by: INTERNAL MEDICINE

## 2023-04-18 PROCEDURE — 3044F PR MOST RECENT HEMOGLOBIN A1C LEVEL <7.0%: ICD-10-PCS | Mod: CPTII,S$GLB,, | Performed by: INTERNAL MEDICINE

## 2023-04-18 PROCEDURE — 3062F POS MACROALBUMINURIA REV: CPT | Mod: CPTII,S$GLB,, | Performed by: INTERNAL MEDICINE

## 2023-04-18 PROCEDURE — 1101F PR PT FALLS ASSESS DOC 0-1 FALLS W/OUT INJ PAST YR: ICD-10-PCS | Mod: CPTII,S$GLB,, | Performed by: INTERNAL MEDICINE

## 2023-04-18 PROCEDURE — 99999 PR PBB SHADOW E&M-EST. PATIENT-LVL IV: ICD-10-PCS | Mod: PBBFAC,,, | Performed by: INTERNAL MEDICINE

## 2023-04-18 PROCEDURE — 3077F PR MOST RECENT SYSTOLIC BLOOD PRESSURE >= 140 MM HG: ICD-10-PCS | Mod: CPTII,S$GLB,, | Performed by: INTERNAL MEDICINE

## 2023-04-18 PROCEDURE — 1159F PR MEDICATION LIST DOCUMENTED IN MEDICAL RECORD: ICD-10-PCS | Mod: CPTII,S$GLB,, | Performed by: INTERNAL MEDICINE

## 2023-04-18 PROCEDURE — 1160F RVW MEDS BY RX/DR IN RCRD: CPT | Mod: CPTII,S$GLB,, | Performed by: INTERNAL MEDICINE

## 2023-04-18 PROCEDURE — 4010F PR ACE/ARB THEARPY RXD/TAKEN: ICD-10-PCS | Mod: CPTII,S$GLB,, | Performed by: INTERNAL MEDICINE

## 2023-04-18 PROCEDURE — 3077F SYST BP >= 140 MM HG: CPT | Mod: CPTII,S$GLB,, | Performed by: INTERNAL MEDICINE

## 2023-04-18 PROCEDURE — 4010F ACE/ARB THERAPY RXD/TAKEN: CPT | Mod: CPTII,S$GLB,, | Performed by: INTERNAL MEDICINE

## 2023-04-18 PROCEDURE — 3062F PR POS MACROALBUMINURIA RESULT DOCUMENTED/REVIEW: ICD-10-PCS | Mod: CPTII,S$GLB,, | Performed by: INTERNAL MEDICINE

## 2023-04-18 PROCEDURE — 3066F PR DOCUMENTATION OF TREATMENT FOR NEPHROPATHY: ICD-10-PCS | Mod: CPTII,S$GLB,, | Performed by: INTERNAL MEDICINE

## 2023-04-18 PROCEDURE — 99213 OFFICE O/P EST LOW 20 MIN: CPT | Mod: S$GLB,,, | Performed by: INTERNAL MEDICINE

## 2023-04-18 PROCEDURE — 1160F PR REVIEW ALL MEDS BY PRESCRIBER/CLIN PHARMACIST DOCUMENTED: ICD-10-PCS | Mod: CPTII,S$GLB,, | Performed by: INTERNAL MEDICINE

## 2023-04-18 PROCEDURE — 3066F NEPHROPATHY DOC TX: CPT | Mod: CPTII,S$GLB,, | Performed by: INTERNAL MEDICINE

## 2023-04-18 PROCEDURE — 3078F DIAST BP <80 MM HG: CPT | Mod: CPTII,S$GLB,, | Performed by: INTERNAL MEDICINE

## 2023-04-18 PROCEDURE — 3078F PR MOST RECENT DIASTOLIC BLOOD PRESSURE < 80 MM HG: ICD-10-PCS | Mod: CPTII,S$GLB,, | Performed by: INTERNAL MEDICINE

## 2023-04-18 PROCEDURE — 3008F BODY MASS INDEX DOCD: CPT | Mod: CPTII,S$GLB,, | Performed by: INTERNAL MEDICINE

## 2023-04-18 PROCEDURE — 3008F PR BODY MASS INDEX (BMI) DOCUMENTED: ICD-10-PCS | Mod: CPTII,S$GLB,, | Performed by: INTERNAL MEDICINE

## 2023-04-18 PROCEDURE — 3044F HG A1C LEVEL LT 7.0%: CPT | Mod: CPTII,S$GLB,, | Performed by: INTERNAL MEDICINE

## 2023-04-18 PROCEDURE — 1126F PR PAIN SEVERITY QUANTIFIED, NO PAIN PRESENT: ICD-10-PCS | Mod: CPTII,S$GLB,, | Performed by: INTERNAL MEDICINE

## 2023-04-18 PROCEDURE — 3288F FALL RISK ASSESSMENT DOCD: CPT | Mod: CPTII,S$GLB,, | Performed by: INTERNAL MEDICINE

## 2023-04-18 PROCEDURE — 1126F AMNT PAIN NOTED NONE PRSNT: CPT | Mod: CPTII,S$GLB,, | Performed by: INTERNAL MEDICINE

## 2023-04-18 PROCEDURE — 3288F PR FALLS RISK ASSESSMENT DOCUMENTED: ICD-10-PCS | Mod: CPTII,S$GLB,, | Performed by: INTERNAL MEDICINE

## 2023-04-18 PROCEDURE — 99213 PR OFFICE/OUTPT VISIT, EST, LEVL III, 20-29 MIN: ICD-10-PCS | Mod: S$GLB,,, | Performed by: INTERNAL MEDICINE

## 2023-04-18 NOTE — PROGRESS NOTES
Subjective:      Patient ID: Scooter Morrissey is a 72 y.o. male.    Chief Complaint:Follow-up      History of Present Illness    Mr. Morrissey is a 72 year old male who was referred to me for evaluation of recurrent sinusitis.  He has a history of sinus surgery in June of 2022.  Prior to sinus surgery, he felt stuffy all the time and his breathing was not 'very free'.  Sometimes it was a davenport to breath.  After the surgery he felt completely better.  He is now feeling a little bit congested.  But his breathing is very good.  Says he feels like someone put a fan in his nose and he can breath.  Was prescribed a specialized nasal/sinus rinse.  Doing regular saline rince.  Noticed a smell when he laid down.  Lasted for 6 days.  When he saw Dr. Jennings, she got a sample from his sinus that had the same smell.  Cultures from the sample returned positive for ESBL E coli (no oral antibiotic options) and another gram negative sahra.  He was started on ciprofloxacin and referred to ID clinic.    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  Sees some stuff coming out of his nose.  Otherwise feeling great.  Hasn't had any problems with allergies.  Feels like the sinus surgery is like a miracle.  Been feeling good since the surgery.  Feels congested when he first gets home but by the time he is going to bed it has resolved.  He is still using the flush to remove the mucus.          Medical History  DM type 2  Chronic sinusitis  Hyperlipidemia  COPD     Surgical History  Endoscopic sinus surgery in June, 2022.     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  Has been  for 50.  Has a 46 year old daughter.  Quit smoking cigars in 2014/2015.        Review of Systems   Constitutional: Negative for chills, decreased appetite, fever, malaise/fatigue, night sweats, weight gain and weight loss.   HENT:  Negative for congestion, ear pain, hearing loss, hoarse voice, sore throat  and tinnitus.    Eyes:  Negative for blurred vision, redness and visual disturbance.   Cardiovascular:  Negative for chest pain, leg swelling and palpitations.   Respiratory:  Negative for cough, hemoptysis, shortness of breath, sputum production and wheezing.    Hematologic/Lymphatic: Negative for adenopathy. Does not bruise/bleed easily.   Skin:  Negative for dry skin, itching, rash and suspicious lesions.   Musculoskeletal:  Negative for back pain, joint pain, myalgias and neck pain.   Gastrointestinal:  Negative for abdominal pain, constipation, diarrhea, heartburn, nausea and vomiting.   Genitourinary:  Negative for dysuria, flank pain, frequency, hematuria, hesitancy and urgency.   Neurological:  Negative for dizziness, headaches, numbness, paresthesias and weakness.   Psychiatric/Behavioral:  Negative for depression and memory loss. The patient does not have insomnia and is not nervous/anxious.    Objective:   Physical Exam  Vitals and nursing note reviewed.   Constitutional:       General: He is not in acute distress.     Appearance: He is well-developed. He is not diaphoretic.   HENT:      Head: Normocephalic and atraumatic.      Right Ear: External ear normal.      Left Ear: External ear normal.      Nose: Nose normal.      Mouth/Throat:      Pharynx: No oropharyngeal exudate.   Eyes:      General: No scleral icterus.        Right eye: No discharge.         Left eye: No discharge.      Conjunctiva/sclera: Conjunctivae normal.      Pupils: Pupils are equal, round, and reactive to light.   Neck:      Thyroid: No thyromegaly.      Vascular: No JVD.      Trachea: No tracheal deviation.   Cardiovascular:      Rate and Rhythm: Normal rate and regular rhythm.      Heart sounds: No murmur heard.    No friction rub. No gallop.   Pulmonary:      Effort: Pulmonary effort is normal. No respiratory distress.      Breath sounds: Normal breath sounds. No stridor. No wheezing or rales.   Chest:      Chest wall: No  tenderness.   Abdominal:      General: Bowel sounds are normal. There is no distension.      Palpations: Abdomen is soft. There is no mass.      Tenderness: There is no abdominal tenderness. There is no guarding or rebound.   Musculoskeletal:         General: No tenderness. Normal range of motion.      Cervical back: Normal range of motion and neck supple.   Lymphadenopathy:      Cervical: No cervical adenopathy.   Skin:     General: Skin is warm.      Coloration: Skin is not pale.      Findings: No erythema or rash.   Neurological:      Mental Status: He is alert and oriented to person, place, and time.      Cranial Nerves: No cranial nerve deficit.      Motor: No abnormal muscle tone.      Coordination: Coordination normal.      Deep Tendon Reflexes: Reflexes are normal and symmetric. Reflexes normal.   Psychiatric:         Behavior: Behavior normal.         Thought Content: Thought content normal.         Judgment: Judgment normal.     Assessment:     1. Other chronic sinusitis    72 year old referred to me originally with positive sinus cultures with a pseudomonas isolate that was resistant to quinolones.  He was not started on antibiotics at that time as he was doing well.  He is here for follow up and is still doing well.  No antibiotics planned at this time.    Plan:      Other chronic sinusitis   - follow up as needed.

## 2023-05-15 ENCOUNTER — OFFICE VISIT (OUTPATIENT)
Dept: OTOLARYNGOLOGY | Facility: CLINIC | Age: 73
End: 2023-05-15
Payer: MEDICARE

## 2023-05-15 VITALS
WEIGHT: 168.88 LBS | SYSTOLIC BLOOD PRESSURE: 140 MMHG | BODY MASS INDEX: 26.45 KG/M2 | HEART RATE: 72 BPM | DIASTOLIC BLOOD PRESSURE: 77 MMHG

## 2023-05-15 DIAGNOSIS — J31.0 CHRONIC RHINITIS: Chronic | ICD-10-CM

## 2023-05-15 DIAGNOSIS — J32.8 OTHER CHRONIC SINUSITIS: Primary | Chronic | ICD-10-CM

## 2023-05-15 PROCEDURE — 3066F NEPHROPATHY DOC TX: CPT | Mod: CPTII,S$GLB,, | Performed by: OTOLARYNGOLOGY

## 2023-05-15 PROCEDURE — 99999 PR PBB SHADOW E&M-EST. PATIENT-LVL IV: CPT | Mod: PBBFAC,,, | Performed by: OTOLARYNGOLOGY

## 2023-05-15 PROCEDURE — 1126F AMNT PAIN NOTED NONE PRSNT: CPT | Mod: CPTII,S$GLB,, | Performed by: OTOLARYNGOLOGY

## 2023-05-15 PROCEDURE — 3078F PR MOST RECENT DIASTOLIC BLOOD PRESSURE < 80 MM HG: ICD-10-PCS | Mod: CPTII,S$GLB,, | Performed by: OTOLARYNGOLOGY

## 2023-05-15 PROCEDURE — 3062F POS MACROALBUMINURIA REV: CPT | Mod: CPTII,S$GLB,, | Performed by: OTOLARYNGOLOGY

## 2023-05-15 PROCEDURE — 31231 NASAL ENDOSCOPY DX: CPT | Mod: S$GLB,,, | Performed by: OTOLARYNGOLOGY

## 2023-05-15 PROCEDURE — 3008F PR BODY MASS INDEX (BMI) DOCUMENTED: ICD-10-PCS | Mod: CPTII,S$GLB,, | Performed by: OTOLARYNGOLOGY

## 2023-05-15 PROCEDURE — 3078F DIAST BP <80 MM HG: CPT | Mod: CPTII,S$GLB,, | Performed by: OTOLARYNGOLOGY

## 2023-05-15 PROCEDURE — 3077F PR MOST RECENT SYSTOLIC BLOOD PRESSURE >= 140 MM HG: ICD-10-PCS | Mod: CPTII,S$GLB,, | Performed by: OTOLARYNGOLOGY

## 2023-05-15 PROCEDURE — 3288F PR FALLS RISK ASSESSMENT DOCUMENTED: ICD-10-PCS | Mod: CPTII,S$GLB,, | Performed by: OTOLARYNGOLOGY

## 2023-05-15 PROCEDURE — 3066F PR DOCUMENTATION OF TREATMENT FOR NEPHROPATHY: ICD-10-PCS | Mod: CPTII,S$GLB,, | Performed by: OTOLARYNGOLOGY

## 2023-05-15 PROCEDURE — 4010F ACE/ARB THERAPY RXD/TAKEN: CPT | Mod: CPTII,S$GLB,, | Performed by: OTOLARYNGOLOGY

## 2023-05-15 PROCEDURE — 99999 PR PBB SHADOW E&M-EST. PATIENT-LVL IV: ICD-10-PCS | Mod: PBBFAC,,, | Performed by: OTOLARYNGOLOGY

## 2023-05-15 PROCEDURE — 3008F BODY MASS INDEX DOCD: CPT | Mod: CPTII,S$GLB,, | Performed by: OTOLARYNGOLOGY

## 2023-05-15 PROCEDURE — 1160F PR REVIEW ALL MEDS BY PRESCRIBER/CLIN PHARMACIST DOCUMENTED: ICD-10-PCS | Mod: CPTII,S$GLB,, | Performed by: OTOLARYNGOLOGY

## 2023-05-15 PROCEDURE — 3288F FALL RISK ASSESSMENT DOCD: CPT | Mod: CPTII,S$GLB,, | Performed by: OTOLARYNGOLOGY

## 2023-05-15 PROCEDURE — 99213 OFFICE O/P EST LOW 20 MIN: CPT | Mod: 25,S$GLB,, | Performed by: OTOLARYNGOLOGY

## 2023-05-15 PROCEDURE — 1159F PR MEDICATION LIST DOCUMENTED IN MEDICAL RECORD: ICD-10-PCS | Mod: CPTII,S$GLB,, | Performed by: OTOLARYNGOLOGY

## 2023-05-15 PROCEDURE — 1160F RVW MEDS BY RX/DR IN RCRD: CPT | Mod: CPTII,S$GLB,, | Performed by: OTOLARYNGOLOGY

## 2023-05-15 PROCEDURE — 99213 PR OFFICE/OUTPT VISIT, EST, LEVL III, 20-29 MIN: ICD-10-PCS | Mod: 25,S$GLB,, | Performed by: OTOLARYNGOLOGY

## 2023-05-15 PROCEDURE — 1101F PT FALLS ASSESS-DOCD LE1/YR: CPT | Mod: CPTII,S$GLB,, | Performed by: OTOLARYNGOLOGY

## 2023-05-15 PROCEDURE — 1126F PR PAIN SEVERITY QUANTIFIED, NO PAIN PRESENT: ICD-10-PCS | Mod: CPTII,S$GLB,, | Performed by: OTOLARYNGOLOGY

## 2023-05-15 PROCEDURE — 31231 PR NASAL ENDOSCOPY, DX: ICD-10-PCS | Mod: S$GLB,,, | Performed by: OTOLARYNGOLOGY

## 2023-05-15 PROCEDURE — 1101F PR PT FALLS ASSESS DOC 0-1 FALLS W/OUT INJ PAST YR: ICD-10-PCS | Mod: CPTII,S$GLB,, | Performed by: OTOLARYNGOLOGY

## 2023-05-15 PROCEDURE — 3044F HG A1C LEVEL LT 7.0%: CPT | Mod: CPTII,S$GLB,, | Performed by: OTOLARYNGOLOGY

## 2023-05-15 PROCEDURE — 4010F PR ACE/ARB THEARPY RXD/TAKEN: ICD-10-PCS | Mod: CPTII,S$GLB,, | Performed by: OTOLARYNGOLOGY

## 2023-05-15 PROCEDURE — 3077F SYST BP >= 140 MM HG: CPT | Mod: CPTII,S$GLB,, | Performed by: OTOLARYNGOLOGY

## 2023-05-15 PROCEDURE — 3062F PR POS MACROALBUMINURIA RESULT DOCUMENTED/REVIEW: ICD-10-PCS | Mod: CPTII,S$GLB,, | Performed by: OTOLARYNGOLOGY

## 2023-05-15 PROCEDURE — 1159F MED LIST DOCD IN RCRD: CPT | Mod: CPTII,S$GLB,, | Performed by: OTOLARYNGOLOGY

## 2023-05-15 PROCEDURE — 3044F PR MOST RECENT HEMOGLOBIN A1C LEVEL <7.0%: ICD-10-PCS | Mod: CPTII,S$GLB,, | Performed by: OTOLARYNGOLOGY

## 2023-05-15 NOTE — PROCEDURES
Procedures    PROCEDURE NOTE:  Nasal endoscopy   Preprocedure diagnosis:  Chronic sinusitis  Postprocedure diangosis:  Same  Complications:  None  Blood Loss:  None    Procedure in detail:  After verbal consent was obtained, the patient's nasal cavity was anesthesized using topical 1%lidocaine and Neosynepherine.  A rigid 0 degree endoscope was placed in first the right, then the left nasal cavity.  The inferior and middle turbinates were examined, and found to be normal bilaterally.  The middle meatus and maxillary antrum was also examined, and found to be patent bilaterally. Ethmoid cavities, bilateral sphenoidotomy, and frontal sinus outflow tract patent.   The patient tolerated the procedure well and there were no complications.

## 2023-05-15 NOTE — PROGRESS NOTES
Subjective:      Scooter Morrissey is a 72 y.o. male who comes for follow-up status-post endoscopic sinus surgery 6/30/2022. He denies complaints today.  He is breathing well through bilateral nostrils.  Denies epistaxis. He is using budesonide saline irrigations as directed.  Sense of smell and taste are improved.     Interval HPI 5/15/2023:  Follow up visit. He has feeling well overall. He stopped the budesonide rinses due to epistaxis  but still doing the oma med saline nasal rinses bid. He started Flonase but stopped this due to epistaxis as well. He is on Xyzal. No complaints today.       Objective:     Physical Exam     Vitals:    05/15/23 1415   BP: (!) 140/77   Pulse: 72       Constitutional: Well appearing / communicating.  NAD.  Eyes: EOM I Bilaterally  Head/Face: Normocephalic.  Negative paranasal sinus pressure/tenderness.  Salivary glands WNL.  House Brackmann I Bilaterally.    Right Ear: External Auditory Canal WNL,TM w/o masses/lesions/perforations.  Auricle WNL.  Left Ear: External Auditory Canal WNL,TM w/o masses/lesions/perforations. Auricle WNL.  Nose: No gross nasal septal deviation. Inferior Turbinates 3+ bilaterally. No septal perforation. No masses/lesions. External nasal skin without masses/lesions.  Oral Cavity: Gingiva/lips WNL.  FOM Soft, no masses palpated. Oral Tongue mobile. Hard Palate WNL.   Oropharynx: BOT WNL. No masses/lesions noted. Tonsillar fossa/pharyngeal wall without lesions. Posterior oropharynx WNL.  Soft palate without masses. Midline uvula.   Neck/Lymphatic: No LAD I-VI bilaterally.  No thyromegaly.  No masses noted on exam.        Procedure    Nasal endoscopy performed.  See procedure note.        Data Reviewed    Pathology report indicated non-eosinophilic chronic inflammation.  Cultures showed E. Coli and prevotella oris.      Assessment:       1. Other chronic sinusitis    2. Chronic rhinitis               Plan:     Continue nasal saline rinses b.i.d.  Continue  Flonase  2 sprays per nostril daily. Patient instructed to spray laterally.   Continue xyzal 5mg PO daily      Follow up in about 4 months (around 9/15/2023).     Clara Jennings MD

## 2023-05-17 ENCOUNTER — PATIENT OUTREACH (OUTPATIENT)
Dept: ADMINISTRATIVE | Facility: HOSPITAL | Age: 73
End: 2023-05-17
Payer: MEDICARE

## 2023-05-24 DIAGNOSIS — E11.9 TYPE 2 DIABETES MELLITUS WITHOUT COMPLICATION, UNSPECIFIED WHETHER LONG TERM INSULIN USE: ICD-10-CM

## 2023-05-31 ENCOUNTER — PATIENT MESSAGE (OUTPATIENT)
Dept: ADMINISTRATIVE | Facility: HOSPITAL | Age: 73
End: 2023-05-31
Payer: MEDICARE

## 2023-06-05 ENCOUNTER — PATIENT OUTREACH (OUTPATIENT)
Dept: ADMINISTRATIVE | Facility: HOSPITAL | Age: 73
End: 2023-06-05
Payer: MEDICARE

## 2023-06-05 NOTE — PROGRESS NOTES
Care Everywhere updates requested and reviewed.  Immunizations reconciled. Media reports reviewed.  Duplicate HM overrides and  orders removed.  Overdue HM topic chart audit and/or requested.  Overdue lab testing linked to upcoming lab appointments if applies.      Health Maintenance Due   Topic Date Due    Eye Exam  Never done    Shingles Vaccine (1 of 2) Never done    COVID-19 Vaccine (3 - Moderna series) 2021

## 2023-06-09 ENCOUNTER — LAB VISIT (OUTPATIENT)
Dept: LAB | Facility: HOSPITAL | Age: 73
End: 2023-06-09
Payer: MEDICARE

## 2023-06-09 DIAGNOSIS — R80.9 TYPE 2 DIABETES MELLITUS WITH MICROALBUMINURIA, UNSPECIFIED WHETHER LONG TERM INSULIN USE: ICD-10-CM

## 2023-06-09 DIAGNOSIS — E11.29 TYPE 2 DIABETES MELLITUS WITH MICROALBUMINURIA, UNSPECIFIED WHETHER LONG TERM INSULIN USE: ICD-10-CM

## 2023-06-09 DIAGNOSIS — Z79.899 MEDICATION MANAGEMENT: ICD-10-CM

## 2023-06-09 LAB
ALBUMIN SERPL BCP-MCNC: 3.5 G/DL (ref 3.5–5.2)
ALP SERPL-CCNC: 74 U/L (ref 55–135)
ALT SERPL W/O P-5'-P-CCNC: 17 U/L (ref 10–44)
ANION GAP SERPL CALC-SCNC: 9 MMOL/L (ref 8–16)
AST SERPL-CCNC: 18 U/L (ref 10–40)
BASOPHILS # BLD AUTO: 0.03 K/UL (ref 0–0.2)
BASOPHILS NFR BLD: 0.4 % (ref 0–1.9)
BILIRUB SERPL-MCNC: 0.4 MG/DL (ref 0.1–1)
BUN SERPL-MCNC: 21 MG/DL (ref 8–23)
CALCIUM SERPL-MCNC: 9.4 MG/DL (ref 8.7–10.5)
CHLORIDE SERPL-SCNC: 109 MMOL/L (ref 95–110)
CO2 SERPL-SCNC: 20 MMOL/L (ref 23–29)
CREAT SERPL-MCNC: 1.2 MG/DL (ref 0.5–1.4)
DIFFERENTIAL METHOD: ABNORMAL
EOSINOPHIL # BLD AUTO: 0.2 K/UL (ref 0–0.5)
EOSINOPHIL NFR BLD: 2.3 % (ref 0–8)
ERYTHROCYTE [DISTWIDTH] IN BLOOD BY AUTOMATED COUNT: 14.2 % (ref 11.5–14.5)
EST. GFR  (NO RACE VARIABLE): >60 ML/MIN/1.73 M^2
ESTIMATED AVG GLUCOSE: 111 MG/DL (ref 68–131)
GLUCOSE SERPL-MCNC: 139 MG/DL (ref 70–110)
HBA1C MFR BLD: 5.5 % (ref 4–5.6)
HCT VFR BLD AUTO: 36.8 % (ref 40–54)
HGB BLD-MCNC: 11.9 G/DL (ref 14–18)
IMM GRANULOCYTES # BLD AUTO: 0.02 K/UL (ref 0–0.04)
IMM GRANULOCYTES NFR BLD AUTO: 0.2 % (ref 0–0.5)
LYMPHOCYTES # BLD AUTO: 2.8 K/UL (ref 1–4.8)
LYMPHOCYTES NFR BLD: 34.5 % (ref 18–48)
MCH RBC QN AUTO: 32.4 PG (ref 27–31)
MCHC RBC AUTO-ENTMCNC: 32.3 G/DL (ref 32–36)
MCV RBC AUTO: 100 FL (ref 82–98)
MONOCYTES # BLD AUTO: 0.5 K/UL (ref 0.3–1)
MONOCYTES NFR BLD: 5.6 % (ref 4–15)
NEUTROPHILS # BLD AUTO: 4.7 K/UL (ref 1.8–7.7)
NEUTROPHILS NFR BLD: 57 % (ref 38–73)
NRBC BLD-RTO: 0 /100 WBC
PLATELET # BLD AUTO: 230 K/UL (ref 150–450)
PMV BLD AUTO: 10.6 FL (ref 9.2–12.9)
POTASSIUM SERPL-SCNC: 4.2 MMOL/L (ref 3.5–5.1)
PROT SERPL-MCNC: 8.6 G/DL (ref 6–8.4)
RBC # BLD AUTO: 3.67 M/UL (ref 4.6–6.2)
SODIUM SERPL-SCNC: 138 MMOL/L (ref 136–145)
WBC # BLD AUTO: 8.18 K/UL (ref 3.9–12.7)

## 2023-06-09 PROCEDURE — 80053 COMPREHEN METABOLIC PANEL: CPT | Performed by: FAMILY MEDICINE

## 2023-06-09 PROCEDURE — 85025 COMPLETE CBC W/AUTO DIFF WBC: CPT | Performed by: FAMILY MEDICINE

## 2023-06-09 PROCEDURE — 36415 COLL VENOUS BLD VENIPUNCTURE: CPT | Performed by: FAMILY MEDICINE

## 2023-06-09 PROCEDURE — 83036 HEMOGLOBIN GLYCOSYLATED A1C: CPT | Performed by: FAMILY MEDICINE

## 2023-06-14 NOTE — PROGRESS NOTES
"  Subjective:      Scooter Morrissey is a 71 y.o. male who comes for follow-up 1 week status-post endoscopic sinus surgery.  He reports nasal congestion with packing in place. Denies epistaxis. He denies pain.     Objective:     /76 (BP Location: Right arm, Patient Position: Sitting, BP Method: Large (Automatic))   Pulse 94   Ht 5' 5" (1.651 m)   Wt 77 kg (169 lb 12.1 oz)   BMI 28.25 kg/m²      General:   not in distress   Nasal:  edematous mucosa   no septal hematoma   no bleeding  Bilateral merocel packing removed  Bilateral silastic splints removed  Septum midline  No evidence of perforation  Left hemitransfixion incision healing well   Oral Cavity:   clear   Oropharynx:   no bleeding   Neck:   nontender       Procedure    Endoscopic debridement performed.  See procedure note.        Data Reviewed    Pathology report indicated pending.  Cultures showed E. Coli and prevotella oris.      Assessment:       1. Other chronic sinusitis    2. Chronic rhinitis         Plan:     Recommend nasal saline rinses QID  Continue Bactrim course      Follow up in about 10 days (around 7/16/2022).     Clara Jennings MD    "
Adequate (%)

## 2023-06-17 NOTE — PROGRESS NOTES
"  Subjective:      Scooter Morrissey is a 71 y.o. male who comes for follow-up status-post endoscopic sinus surgery 6/30/2022. He denies complaints today.  He is breathing well through bilateral nostrils.  Denies epistaxis. He is using budesonide saline irrigations as directed.  Sense of smell and taste are improved.     Objective:     BP (!) 147/81 (BP Location: Right arm, Patient Position: Sitting, BP Method: Large (Automatic))   Pulse 77   Temp 98.3 °F (36.8 °C) (Oral)   Ht 5' 5" (1.651 m)   Wt 78.8 kg (173 lb 9.8 oz)   BMI 28.89 kg/m²      General:   not in distress   Nasal:  edematous mucosa   no septal hematoma   no bleeding  Septum midline  No evidence of perforation  Left hemitransfixion incision well healed   Oral Cavity:   clear   Oropharynx:   no bleeding   Neck:   nontender       Procedure    Nasal endoscopy performed.  See procedure note.        Data Reviewed    Pathology report indicated non-eosinophilic chronic inflammation.  Cultures showed E. Coli and prevotella oris.      Assessment:       1. Other chronic sinusitis    2. 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  He was counseled on the off-label nature of this therapy and he consented to its use.  Start augmentin 875mg PO BID for 10 days      Follow up in about 4 months (around 1/6/2023).     Clara Jennings MD      " 73 M co urinary retention- had  shunt complc by post op urinary retention- had dill this week- was removed yesterday- has been unable to urinate since- worsening lower abd pain no n/v no f/c  mod-severe  dill placed with relief of abd pain

## 2023-06-19 ENCOUNTER — OFFICE VISIT (OUTPATIENT)
Dept: FAMILY MEDICINE | Facility: CLINIC | Age: 73
End: 2023-06-19
Payer: MEDICARE

## 2023-06-19 VITALS
WEIGHT: 168.44 LBS | TEMPERATURE: 98 F | DIASTOLIC BLOOD PRESSURE: 72 MMHG | HEART RATE: 90 BPM | BODY MASS INDEX: 26.44 KG/M2 | OXYGEN SATURATION: 93 % | SYSTOLIC BLOOD PRESSURE: 138 MMHG | HEIGHT: 67 IN

## 2023-06-19 DIAGNOSIS — E11.29 TYPE 2 DIABETES MELLITUS WITH MICROALBUMINURIA, UNSPECIFIED WHETHER LONG TERM INSULIN USE: Primary | ICD-10-CM

## 2023-06-19 DIAGNOSIS — J12.82 PNEUMONIA DUE TO COVID-19 VIRUS: ICD-10-CM

## 2023-06-19 DIAGNOSIS — I10 ESSENTIAL HYPERTENSION: Chronic | ICD-10-CM

## 2023-06-19 DIAGNOSIS — U07.1 PNEUMONIA DUE TO COVID-19 VIRUS: ICD-10-CM

## 2023-06-19 DIAGNOSIS — M19.042 PRIMARY OSTEOARTHRITIS OF BOTH HANDS: ICD-10-CM

## 2023-06-19 DIAGNOSIS — Z79.899 MEDICATION MANAGEMENT: ICD-10-CM

## 2023-06-19 DIAGNOSIS — D64.9 ANEMIA, UNSPECIFIED TYPE: ICD-10-CM

## 2023-06-19 DIAGNOSIS — E55.9 VITAMIN D DEFICIENCY: ICD-10-CM

## 2023-06-19 DIAGNOSIS — E78.2 MIXED HYPERLIPIDEMIA: Chronic | ICD-10-CM

## 2023-06-19 DIAGNOSIS — Z23 NEED FOR SHINGLES VACCINE: ICD-10-CM

## 2023-06-19 DIAGNOSIS — J47.9 BRONCHIECTASIS WITHOUT COMPLICATION: ICD-10-CM

## 2023-06-19 DIAGNOSIS — J44.89 ASTHMA-COPD OVERLAP SYNDROME: ICD-10-CM

## 2023-06-19 DIAGNOSIS — E66.3 OVERWEIGHT (BMI 25.0-29.9): ICD-10-CM

## 2023-06-19 DIAGNOSIS — R80.9 TYPE 2 DIABETES MELLITUS WITH MICROALBUMINURIA, UNSPECIFIED WHETHER LONG TERM INSULIN USE: Primary | ICD-10-CM

## 2023-06-19 DIAGNOSIS — M19.041 PRIMARY OSTEOARTHRITIS OF BOTH HANDS: ICD-10-CM

## 2023-06-19 DIAGNOSIS — F41.9 ANXIETY: ICD-10-CM

## 2023-06-19 DIAGNOSIS — Z87.09 H/O PLEURAL EMPYEMA: ICD-10-CM

## 2023-06-19 DIAGNOSIS — I70.0 AORTIC ATHEROSCLEROSIS: ICD-10-CM

## 2023-06-19 PROCEDURE — 99214 OFFICE O/P EST MOD 30 MIN: CPT | Mod: S$GLB,,, | Performed by: FAMILY MEDICINE

## 2023-06-19 PROCEDURE — 3078F PR MOST RECENT DIASTOLIC BLOOD PRESSURE < 80 MM HG: ICD-10-PCS | Mod: CPTII,S$GLB,, | Performed by: FAMILY MEDICINE

## 2023-06-19 PROCEDURE — 1101F PT FALLS ASSESS-DOCD LE1/YR: CPT | Mod: CPTII,S$GLB,, | Performed by: FAMILY MEDICINE

## 2023-06-19 PROCEDURE — 3066F PR DOCUMENTATION OF TREATMENT FOR NEPHROPATHY: ICD-10-PCS | Mod: CPTII,S$GLB,, | Performed by: FAMILY MEDICINE

## 2023-06-19 PROCEDURE — 3008F PR BODY MASS INDEX (BMI) DOCUMENTED: ICD-10-PCS | Mod: CPTII,S$GLB,, | Performed by: FAMILY MEDICINE

## 2023-06-19 PROCEDURE — 99214 PR OFFICE/OUTPT VISIT, EST, LEVL IV, 30-39 MIN: ICD-10-PCS | Mod: S$GLB,,, | Performed by: FAMILY MEDICINE

## 2023-06-19 PROCEDURE — 1101F PR PT FALLS ASSESS DOC 0-1 FALLS W/OUT INJ PAST YR: ICD-10-PCS | Mod: CPTII,S$GLB,, | Performed by: FAMILY MEDICINE

## 2023-06-19 PROCEDURE — 99999 PR PBB SHADOW E&M-EST. PATIENT-LVL IV: ICD-10-PCS | Mod: PBBFAC,,, | Performed by: FAMILY MEDICINE

## 2023-06-19 PROCEDURE — 3078F DIAST BP <80 MM HG: CPT | Mod: CPTII,S$GLB,, | Performed by: FAMILY MEDICINE

## 2023-06-19 PROCEDURE — 1126F PR PAIN SEVERITY QUANTIFIED, NO PAIN PRESENT: ICD-10-PCS | Mod: CPTII,S$GLB,, | Performed by: FAMILY MEDICINE

## 2023-06-19 PROCEDURE — 1160F RVW MEDS BY RX/DR IN RCRD: CPT | Mod: CPTII,S$GLB,, | Performed by: FAMILY MEDICINE

## 2023-06-19 PROCEDURE — 1159F PR MEDICATION LIST DOCUMENTED IN MEDICAL RECORD: ICD-10-PCS | Mod: CPTII,S$GLB,, | Performed by: FAMILY MEDICINE

## 2023-06-19 PROCEDURE — 1126F AMNT PAIN NOTED NONE PRSNT: CPT | Mod: CPTII,S$GLB,, | Performed by: FAMILY MEDICINE

## 2023-06-19 PROCEDURE — 3066F NEPHROPATHY DOC TX: CPT | Mod: CPTII,S$GLB,, | Performed by: FAMILY MEDICINE

## 2023-06-19 PROCEDURE — 3044F HG A1C LEVEL LT 7.0%: CPT | Mod: CPTII,S$GLB,, | Performed by: FAMILY MEDICINE

## 2023-06-19 PROCEDURE — 3288F PR FALLS RISK ASSESSMENT DOCUMENTED: ICD-10-PCS | Mod: CPTII,S$GLB,, | Performed by: FAMILY MEDICINE

## 2023-06-19 PROCEDURE — 3008F BODY MASS INDEX DOCD: CPT | Mod: CPTII,S$GLB,, | Performed by: FAMILY MEDICINE

## 2023-06-19 PROCEDURE — 99999 PR PBB SHADOW E&M-EST. PATIENT-LVL IV: CPT | Mod: PBBFAC,,, | Performed by: FAMILY MEDICINE

## 2023-06-19 PROCEDURE — 4010F ACE/ARB THERAPY RXD/TAKEN: CPT | Mod: CPTII,S$GLB,, | Performed by: FAMILY MEDICINE

## 2023-06-19 PROCEDURE — 3044F PR MOST RECENT HEMOGLOBIN A1C LEVEL <7.0%: ICD-10-PCS | Mod: CPTII,S$GLB,, | Performed by: FAMILY MEDICINE

## 2023-06-19 PROCEDURE — 3062F POS MACROALBUMINURIA REV: CPT | Mod: CPTII,S$GLB,, | Performed by: FAMILY MEDICINE

## 2023-06-19 PROCEDURE — 3075F PR MOST RECENT SYSTOLIC BLOOD PRESS GE 130-139MM HG: ICD-10-PCS | Mod: CPTII,S$GLB,, | Performed by: FAMILY MEDICINE

## 2023-06-19 PROCEDURE — 4010F PR ACE/ARB THEARPY RXD/TAKEN: ICD-10-PCS | Mod: CPTII,S$GLB,, | Performed by: FAMILY MEDICINE

## 2023-06-19 PROCEDURE — 3062F PR POS MACROALBUMINURIA RESULT DOCUMENTED/REVIEW: ICD-10-PCS | Mod: CPTII,S$GLB,, | Performed by: FAMILY MEDICINE

## 2023-06-19 PROCEDURE — 1160F PR REVIEW ALL MEDS BY PRESCRIBER/CLIN PHARMACIST DOCUMENTED: ICD-10-PCS | Mod: CPTII,S$GLB,, | Performed by: FAMILY MEDICINE

## 2023-06-19 PROCEDURE — 1159F MED LIST DOCD IN RCRD: CPT | Mod: CPTII,S$GLB,, | Performed by: FAMILY MEDICINE

## 2023-06-19 PROCEDURE — 3075F SYST BP GE 130 - 139MM HG: CPT | Mod: CPTII,S$GLB,, | Performed by: FAMILY MEDICINE

## 2023-06-19 PROCEDURE — 3288F FALL RISK ASSESSMENT DOCD: CPT | Mod: CPTII,S$GLB,, | Performed by: FAMILY MEDICINE

## 2023-06-19 RX ORDER — ATORVASTATIN CALCIUM 40 MG/1
40 TABLET, FILM COATED ORAL NIGHTLY
Qty: 90 TABLET | Refills: 3 | Status: SHIPPED | OUTPATIENT
Start: 2023-06-19 | End: 2024-06-18

## 2023-06-19 RX ORDER — DICLOFENAC SODIUM 10 MG/G
4 GEL TOPICAL 3 TIMES DAILY PRN
Qty: 100 G | Refills: 11 | Status: SHIPPED | OUTPATIENT
Start: 2023-06-19

## 2023-06-19 NOTE — PROGRESS NOTES
Office Visit    Patient Name: Scooter Morrissey    : 1950  MRN: [id number]    Subjective:  Scooter is a 72 y.o. male who presents today for:    Follow-up (6 month)    ANNUAL PHYSICAL 10/03/2022, MOST RECENT OFFICE VISIT WITH ME 2023  FOLLOWED UP WITH ENT DR. ARAUJO  05/15/2023:  ADVISED TO CONTINUE XYZAL, NEILMED SINUS RINSES AND FLONASE     Scooter Morrissey is a 72 year old male with past medical history of COPD/ Asthma, HTN, HLD, previous COVID-19 Infection (2021).   He was recently diagnosed with type 2 diabetes with an A1c of 6.5 on 2022, urine microalbumin of 72.     He presents today for four-month follow-up of labs and monitoring of chronic conditions.     VISIT 2023 WITH FURTHER IMPROVEMENT IN A1C TO 5.5 ON METFORMIN 500 XR DAILY, NORMAL CMP(GLUCOSE 139)     Prior labs 23 show slight increase in A1c from 5.5->5.7-taking metformin 500 XR daily.    LDL 82 on Lipitor.   CMP unremarkable, CBC with chronic mild macrocytic anemia, B12, vitamin-D, thyroid level unremarkable.  Iron studies unremarkable.     HTN: He is continuing to take his medications regularly- Toprol , Amlodipine 10 and Losartan 100-- Home BPs in goal range (130s/70s)  HLD: He is taking atorvastatin regularly and added a Fish oil supplement  COPD/Asthma: He is taking Symbicort regularly AM and PM, and he has not recently needed albuterol.  Allergies/Chronic Sinusitis:  Noted significant improvement following ethmoidectomy/sinus surgery per ENT Dr. Araujo 22.  Continuing Xyzal, nasal saline, and Flonase.  Breathing very well through his nose.       He continues to not smoke or drink alcohol.      No acute complaints.   Takes 2,000 IU vit D3 daily.     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     Current Medications    Medication List with Changes/Refills   New Medications    DICLOFENAC SODIUM (VOLTAREN ARTHRITIS PAIN) 1 % GEL    Apply 4 g topically 3 (three) times daily as needed (hand pain).   Current Medications    ALBUTEROL (VENTOLIN HFA) 90 MCG/ACTUATION INHALER    Inhale 2 puffs into the lungs every 4 (four) hours as needed for Wheezing or Shortness of Breath. Rescue    ALBUTEROL-IPRATROPIUM (DUO-NEB) 2.5 MG-0.5 MG/3 ML NEBULIZER SOLUTION    Take 3 mLs by nebulization every 6 (six) hours as needed for Wheezing. Rescue    AMLODIPINE (NORVASC) 10 MG TABLET    TAKE 1 TABLET BY MOUTH EVERY DAY    ASCORBIC ACID, VITAMIN C, (VITAMIN C) 500 MG TABLET    Take 1 tablet (500 mg total) by mouth 2 (two) times daily.    BUDESONIDE (PULMICORT) 0.5 MG/2 ML NEBULIZER SOLUTION    EMPTY CONTENTS OF 1 VIAL INTO NASAL IRRIGATION SYSTEM, ADD DISTILLED WATER, SALT PACK, MIX & IRRIGATE. PERFORM 1-2 TIMES DAILY    BUDESONIDE-FORMOTEROL 160-4.5 MCG (SYMBICORT) 160-4.5 MCG/ACTUATION HFAA    INHALE 2 PUFFS INTO THE LUNGS BY MOUTH TWICE DAILY    CALCIUM-VITAMIN D3 (OS-SALLY 500 + D3) 500 MG-5 MCG (200 UNIT) PER TABLET    Take 2 tablets by mouth once daily.    FLUTICASONE PROPIONATE (FLONASE) 50 MCG/ACTUATION NASAL SPRAY    2 sprays (100 mcg total) by Each Nostril route once daily.    LEVOCETIRIZINE (XYZAL) 5 MG TABLET    Take 1  "tablet (5 mg total) by mouth every evening.    LOSARTAN (COZAAR) 100 MG TABLET    TAKE 1 TABLET(100 MG) BY MOUTH EVERY DAY    METFORMIN (GLUCOPHAGE-XR) 500 MG ER 24HR TABLET    Take 1 tablet (500 mg total) by mouth daily with dinner or evening meal.    METOPROLOL SUCCINATE (TOPROL-XL) 100 MG 24 HR TABLET    TAKE 1 TABLET(100 MG) BY MOUTH EVERY DAY    MULTIVITAMIN WITH MINERALS TABLET    Take 1 tablet by mouth nightly.     PULSE OXIMETER (PULSE OXIMETER) DEVICE    by Apply Externally route 2 (two) times a day. Use twice daily at 8 AM and 3 PM and record the value in MyChart as directed.    VITAMIN D (VITAMIN D3) 1000 UNITS TAB    Take 1,000 Units by mouth once daily.   Changed and/or Refilled Medications    Modified Medication Previous Medication    ATORVASTATIN (LIPITOR) 40 MG TABLET atorvastatin (LIPITOR) 40 MG tablet       Take 1 tablet (40 mg total) by mouth every evening.    Take 1 tablet (40 mg total) by mouth every evening.   Discontinued Medications    METHYLPREDNISOLONE (MEDROL DOSEPACK) 4 MG TABLET    use as directed       Allergies   Review of patient's allergies indicates:  No Known Allergies      Review of Systems (Pertinent positives)  Review of Systems   Constitutional:  Negative for unexpected weight change.   HENT:  Negative for sinus pressure and sore throat.    Respiratory:  Positive for cough (chronic, stable). Negative for shortness of breath.    Cardiovascular:  Negative for chest pain and leg swelling.   Gastrointestinal:  Negative for constipation and diarrhea.   Genitourinary:  Negative for difficulty urinating.   Allergic/Immunologic: Positive for environmental allergies.   Psychiatric/Behavioral:  Negative for dysphoric mood.      /72   Pulse 90   Temp 98.2 °F (36.8 °C) (Oral)   Ht 5' 7" (1.702 m)   Wt 76.4 kg (168 lb 6.9 oz)   SpO2 (!) 93%   BMI 26.38 kg/m²     Physical Exam  Vitals reviewed.   Constitutional:       General: He is not in acute distress.     Appearance: He is " well-developed.   HENT:      Head: Normocephalic and atraumatic.   Eyes:      Conjunctiva/sclera: Conjunctivae normal.   Cardiovascular:      Rate and Rhythm: Normal rate and regular rhythm.   Pulmonary:      Effort: Pulmonary effort is normal. No respiratory distress.      Breath sounds: Examination of the right-lower field reveals rales. Examination of the left-lower field reveals rales. Decreased breath sounds, rhonchi (scattered) and rales present. No wheezing.   Musculoskeletal:      Right lower leg: No edema.      Left lower leg: No edema.   Skin:     General: Skin is warm and dry.   Neurological:      General: No focal deficit present.      Mental Status: He is alert and oriented to person, place, and time.   Psychiatric:         Mood and Affect: Mood normal.         Behavior: Behavior normal.         Assessment/Plan:  Scooter Morrissey is a 72 y.o. male who presents today for :        ICD-10-CM ICD-9-CM    1. Type 2 diabetes mellitus with microalbuminuria, unspecified whether long term insulin use  E11.29 250.40 Hemoglobin A1C    R80.9 791.0 Comprehensive Metabolic Panel      Lipid Panel      CBC Auto Differential      TSH      Vitamin D      Vitamin B12      Magnesium      Ferritin      Iron and TIBC      2. Overweight (BMI 25.0-29.9)  E66.3 278.02       3. Essential hypertension  I10 401.9 Hemoglobin A1C      Comprehensive Metabolic Panel      Lipid Panel      CBC Auto Differential      TSH      Vitamin D      Vitamin B12      Magnesium      Ferritin      Iron and TIBC      4. Mixed hyperlipidemia  E78.2 272.2 atorvastatin (LIPITOR) 40 MG tablet      Hemoglobin A1C      Comprehensive Metabolic Panel      Lipid Panel      CBC Auto Differential      TSH      Vitamin D      Vitamin B12      Magnesium      Ferritin      Iron and TIBC      5. Aortic atherosclerosis  I70.0 440.0       6. Asthma-COPD overlap syndrome  J44.9 493.20       7. Pneumonia due to COVID-19 virus  U07.1 480.8     J12.82 079.89       8. H/O  pleural empyema  Z87.09 V12.69       9. Bronchiectasis without complication  J47.9 494.0       10. Anemia, unspecified type  D64.9 285.9 Hemoglobin A1C      Comprehensive Metabolic Panel      Lipid Panel      CBC Auto Differential      TSH      Vitamin D      Vitamin B12      Magnesium      Ferritin      Iron and TIBC      11. Anxiety  F41.9 300.00       12. Primary osteoarthritis of both hands  M19.041 715.14 diclofenac sodium (VOLTAREN ARTHRITIS PAIN) 1 % Gel    M19.042        13. Vitamin D deficiency  E55.9 268.9 Vitamin D      14. Medication management  Z79.899 V58.69 Hemoglobin A1C      Comprehensive Metabolic Panel      Lipid Panel      CBC Auto Differential      TSH      Vitamin D      Vitamin B12      Magnesium      Ferritin      Iron and TIBC      15. Need for shingles vaccine  Z23 V04.89            IMMUNIZATIONS REVIEWED:  SEASONAL FLU SHOT & OMICRON COVID -19 BOOSTER DECLINED, SHINGRIX ADVISED  NEXT COLONOSCOPY 6/2026     OVERWEIGHT BMI:  Advised on continued attention to low carb diet, regular exercise, continue Metformin 500 XR daily-- repeat A1c 5.5 and down 5 lbs. Recheck in 4 months with physical.      CHRONIC CONTROLLED TYPE 2 DIABETES W/ MILD MICROALBUMINURIA:  A1C IMPROVED ON METFORMIN 500 XR DAILY & CURRENTLY 5.5, RECHECK IN 4 MONTHS     HTN: controlled on Toprol , Amlodipine 10 and Losartan 100- ongoing monitoring     ALLERGIC RHINITIS, CHRONIC SINUSITIS:  Significantly improved following ethmoidectomy/sinus surgery with ENT Dr. Simmons 6/30/22, Continuing Xyzal/ nasal saline and flonse.      ASTHMA/COPD, HISTORY OF COVID PNEUMONIA AND PRIOR HISTORY OF PLEURAL EMPYEMA:  Has some chronic crackles/rhonchi on his lung exam, but breathing and exercise tolerance are overall stable to improved.    Continue Symbicort daily.  Continue regular exercise. Follows with Dr. Guerrier pulmonology.  Albuterol, nebulizer p.r.n., monitors his oxygen level and has had no concerns.     HYPERLIPIDEMIA WITH AORTIC  ATHEROSCLEROSIS:  Remaining tobacco free, blood pressure controlled, continue atorvastatin 40-- recheck in 4 month.  He is now on a fish oil supplement.     ANEMIA: mildly improving, B12, iron studies unremarkable-- recheck with labs prior to annual in about 4 months       There are no Patient Instructions on file for this visit.      Follow up in about 4 months (around 10/19/2023) for to follow up on lab results, return as needed for new concerns.

## 2023-06-21 ENCOUNTER — PATIENT MESSAGE (OUTPATIENT)
Dept: ADMINISTRATIVE | Facility: HOSPITAL | Age: 73
End: 2023-06-21
Payer: MEDICARE

## 2023-07-05 ENCOUNTER — PES CALL (OUTPATIENT)
Dept: ADMINISTRATIVE | Facility: CLINIC | Age: 73
End: 2023-07-05
Payer: MEDICARE

## 2023-07-17 DIAGNOSIS — J44.89 ASTHMA WITH COPD: ICD-10-CM

## 2023-07-17 RX ORDER — BUDESONIDE AND FORMOTEROL FUMARATE DIHYDRATE 160; 4.5 UG/1; UG/1
AEROSOL RESPIRATORY (INHALATION)
Qty: 30.6 G | Refills: 4 | Status: SHIPPED | OUTPATIENT
Start: 2023-07-17

## 2023-07-17 NOTE — TELEPHONE ENCOUNTER
----- Message from Tobin Nicole sent at 7/17/2023 12:54 PM CDT -----  Type:  Patient Returning Call    Who Called:pt  Who Left Message for Patient:  Does the patient know what this is regarding?:rx   Would the patient rather a call back or a response via [facility]? Call  Best Call Back Number:[phone]  Additional Information: pt needs an refill on his budesonide-formoterol 160-4.5 mcg (SYMBICORT) 160-4.5 mcg/actuation [facility], sent to [id number] - [place], [place] - [address] AT [facility] OF [address] & [facility]   Phone: [phone]

## 2023-07-17 NOTE — TELEPHONE ENCOUNTER
No care due was identified.  [facility] Embedded Care Due Messages. Reference number: [id number].   7/17/2023 1:18:37 PM CDT

## 2023-08-28 DIAGNOSIS — E11.29 TYPE 2 DIABETES MELLITUS WITH MICROALBUMINURIA, UNSPECIFIED WHETHER LONG TERM INSULIN USE: ICD-10-CM

## 2023-08-28 DIAGNOSIS — I10 BENIGN ESSENTIAL HYPERTENSION: ICD-10-CM

## 2023-08-28 DIAGNOSIS — R80.9 TYPE 2 DIABETES MELLITUS WITH MICROALBUMINURIA, UNSPECIFIED WHETHER LONG TERM INSULIN USE: ICD-10-CM

## 2023-08-28 RX ORDER — METOPROLOL SUCCINATE 100 MG/1
100 TABLET, EXTENDED RELEASE ORAL DAILY
Qty: 90 TABLET | Refills: 3 | Status: SHIPPED | OUTPATIENT
Start: 2023-08-28

## 2023-08-28 RX ORDER — METFORMIN HYDROCHLORIDE 500 MG/1
500 TABLET, EXTENDED RELEASE ORAL
Qty: 90 TABLET | Refills: 1 | Status: SHIPPED | OUTPATIENT
Start: 2023-08-28 | End: 2024-02-24 | Stop reason: SDUPTHER

## 2023-08-28 NOTE — TELEPHONE ENCOUNTER
Refill Decision Note   Scooter Morrissey  is requesting a refill authorization.  Brief Assessment and Rationale for Refill:  Approve     Medication Therapy Plan:         Pharmacist review requested: Yes   Extended chart review required: Yes   Comments:     Note composed:5:37 PM 08/28/2023

## 2023-08-28 NOTE — TELEPHONE ENCOUNTER
No care due was identified.  [facility] Embedded Care Due Messages. Reference number: [id number].   8/28/2023 5:28:31 AM CDT

## 2023-08-28 NOTE — TELEPHONE ENCOUNTER
Refill Routing Note   Medication(s) are not appropriate for processing by [facility] for the following reason(s):      Drug-disease interaction:     [facility] action(s):  Defer Care Due:  None identified        Pharmacist review requested: Yes     Appointments  past 12m or future 3m with PCP    Date Provider   Last Visit   6/19/2023 Katelynn Rojas MD   Next Visit   11/6/2023 Katelynn Rojas MD   ED visits in past 90 days: 0        Note composed:3:22 PM 08/28/2023

## 2023-09-11 ENCOUNTER — PATIENT MESSAGE (OUTPATIENT)
Dept: ADMINISTRATIVE | Facility: HOSPITAL | Age: 73
End: 2023-09-11
Payer: MEDICARE

## 2023-09-18 ENCOUNTER — OFFICE VISIT (OUTPATIENT)
Dept: OTOLARYNGOLOGY | Facility: CLINIC | Age: 73
End: 2023-09-18
Payer: MEDICARE

## 2023-09-18 VITALS
HEART RATE: 89 BPM | DIASTOLIC BLOOD PRESSURE: 70 MMHG | SYSTOLIC BLOOD PRESSURE: 143 MMHG | BODY MASS INDEX: 26.07 KG/M2 | WEIGHT: 166.44 LBS

## 2023-09-18 DIAGNOSIS — J31.0 CHRONIC RHINITIS: Chronic | ICD-10-CM

## 2023-09-18 DIAGNOSIS — J32.8 OTHER CHRONIC SINUSITIS: Primary | Chronic | ICD-10-CM

## 2023-09-18 PROCEDURE — 3077F PR MOST RECENT SYSTOLIC BLOOD PRESSURE >= 140 MM HG: ICD-10-PCS | Mod: HCNC,CPTII,S$GLB, | Performed by: OTOLARYNGOLOGY

## 2023-09-18 PROCEDURE — 3077F SYST BP >= 140 MM HG: CPT | Mod: HCNC,CPTII,S$GLB, | Performed by: OTOLARYNGOLOGY

## 2023-09-18 PROCEDURE — 3288F FALL RISK ASSESSMENT DOCD: CPT | Mod: HCNC,CPTII,S$GLB, | Performed by: OTOLARYNGOLOGY

## 2023-09-18 PROCEDURE — 3066F NEPHROPATHY DOC TX: CPT | Mod: HCNC,CPTII,S$GLB, | Performed by: OTOLARYNGOLOGY

## 2023-09-18 PROCEDURE — 99999 PR PBB SHADOW E&M-EST. PATIENT-LVL III: CPT | Mod: PBBFAC,HCNC,, | Performed by: OTOLARYNGOLOGY

## 2023-09-18 PROCEDURE — 1160F PR REVIEW ALL MEDS BY PRESCRIBER/CLIN PHARMACIST DOCUMENTED: ICD-10-PCS | Mod: HCNC,CPTII,S$GLB, | Performed by: OTOLARYNGOLOGY

## 2023-09-18 PROCEDURE — 4010F ACE/ARB THERAPY RXD/TAKEN: CPT | Mod: HCNC,CPTII,S$GLB, | Performed by: OTOLARYNGOLOGY

## 2023-09-18 PROCEDURE — 3066F PR DOCUMENTATION OF TREATMENT FOR NEPHROPATHY: ICD-10-PCS | Mod: HCNC,CPTII,S$GLB, | Performed by: OTOLARYNGOLOGY

## 2023-09-18 PROCEDURE — 3008F PR BODY MASS INDEX (BMI) DOCUMENTED: ICD-10-PCS | Mod: HCNC,CPTII,S$GLB, | Performed by: OTOLARYNGOLOGY

## 2023-09-18 PROCEDURE — 3008F BODY MASS INDEX DOCD: CPT | Mod: HCNC,CPTII,S$GLB, | Performed by: OTOLARYNGOLOGY

## 2023-09-18 PROCEDURE — 1159F MED LIST DOCD IN RCRD: CPT | Mod: HCNC,CPTII,S$GLB, | Performed by: OTOLARYNGOLOGY

## 2023-09-18 PROCEDURE — 3062F POS MACROALBUMINURIA REV: CPT | Mod: HCNC,CPTII,S$GLB, | Performed by: OTOLARYNGOLOGY

## 2023-09-18 PROCEDURE — 99213 PR OFFICE/OUTPT VISIT, EST, LEVL III, 20-29 MIN: ICD-10-PCS | Mod: 25,HCNC,S$GLB, | Performed by: OTOLARYNGOLOGY

## 2023-09-18 PROCEDURE — 1160F RVW MEDS BY RX/DR IN RCRD: CPT | Mod: HCNC,CPTII,S$GLB, | Performed by: OTOLARYNGOLOGY

## 2023-09-18 PROCEDURE — 3078F PR MOST RECENT DIASTOLIC BLOOD PRESSURE < 80 MM HG: ICD-10-PCS | Mod: HCNC,CPTII,S$GLB, | Performed by: OTOLARYNGOLOGY

## 2023-09-18 PROCEDURE — 31231 NASAL ENDOSCOPY DX: CPT | Mod: HCNC,S$GLB,, | Performed by: OTOLARYNGOLOGY

## 2023-09-18 PROCEDURE — 3062F PR POS MACROALBUMINURIA RESULT DOCUMENTED/REVIEW: ICD-10-PCS | Mod: HCNC,CPTII,S$GLB, | Performed by: OTOLARYNGOLOGY

## 2023-09-18 PROCEDURE — 3044F HG A1C LEVEL LT 7.0%: CPT | Mod: HCNC,CPTII,S$GLB, | Performed by: OTOLARYNGOLOGY

## 2023-09-18 PROCEDURE — 3044F PR MOST RECENT HEMOGLOBIN A1C LEVEL <7.0%: ICD-10-PCS | Mod: HCNC,CPTII,S$GLB, | Performed by: OTOLARYNGOLOGY

## 2023-09-18 PROCEDURE — 3288F PR FALLS RISK ASSESSMENT DOCUMENTED: ICD-10-PCS | Mod: HCNC,CPTII,S$GLB, | Performed by: OTOLARYNGOLOGY

## 2023-09-18 PROCEDURE — 3078F DIAST BP <80 MM HG: CPT | Mod: HCNC,CPTII,S$GLB, | Performed by: OTOLARYNGOLOGY

## 2023-09-18 PROCEDURE — 4010F PR ACE/ARB THEARPY RXD/TAKEN: ICD-10-PCS | Mod: HCNC,CPTII,S$GLB, | Performed by: OTOLARYNGOLOGY

## 2023-09-18 PROCEDURE — 31231 PR NASAL ENDOSCOPY, DX: ICD-10-PCS | Mod: HCNC,S$GLB,, | Performed by: OTOLARYNGOLOGY

## 2023-09-18 PROCEDURE — 1126F PR PAIN SEVERITY QUANTIFIED, NO PAIN PRESENT: ICD-10-PCS | Mod: HCNC,CPTII,S$GLB, | Performed by: OTOLARYNGOLOGY

## 2023-09-18 PROCEDURE — 1101F PT FALLS ASSESS-DOCD LE1/YR: CPT | Mod: HCNC,CPTII,S$GLB, | Performed by: OTOLARYNGOLOGY

## 2023-09-18 PROCEDURE — 1126F AMNT PAIN NOTED NONE PRSNT: CPT | Mod: HCNC,CPTII,S$GLB, | Performed by: OTOLARYNGOLOGY

## 2023-09-18 PROCEDURE — 1101F PR PT FALLS ASSESS DOC 0-1 FALLS W/OUT INJ PAST YR: ICD-10-PCS | Mod: HCNC,CPTII,S$GLB, | Performed by: OTOLARYNGOLOGY

## 2023-09-18 PROCEDURE — 1159F PR MEDICATION LIST DOCUMENTED IN MEDICAL RECORD: ICD-10-PCS | Mod: HCNC,CPTII,S$GLB, | Performed by: OTOLARYNGOLOGY

## 2023-09-18 PROCEDURE — 99999 PR PBB SHADOW E&M-EST. PATIENT-LVL III: ICD-10-PCS | Mod: PBBFAC,HCNC,, | Performed by: OTOLARYNGOLOGY

## 2023-09-18 PROCEDURE — 99213 OFFICE O/P EST LOW 20 MIN: CPT | Mod: 25,HCNC,S$GLB, | Performed by: OTOLARYNGOLOGY

## 2023-09-18 NOTE — PROGRESS NOTES
Subjective:      Scooter Morrissey is a 72 y.o. male who comes for follow-up status-post endoscopic sinus surgery 6/30/2022. He denies complaints today.  He is breathing well through bilateral nostrils.  Denies epistaxis. He is using budesonide saline irrigations as directed.  Sense of smell and taste are improved.     Interval HPI 9/18/2023:  Follow up visit. He has been feeling well. He does having intermittent nasal congestion primarily when working outdoors.  He has been using nasal saline rinses BID.  He has been using Flonase intermittently. He is on Xyzal. No complaints today.       Objective:     Physical Exam     Vitals:    09/18/23 1419   BP: (!) 143/70   Pulse: 89       Constitutional: Well appearing / communicating.  NAD.  Eyes: EOM I Bilaterally  Head/Face: Normocephalic.  Negative paranasal sinus pressure/tenderness.  Salivary glands WNL.  House Brackmann I Bilaterally.    Right Ear: External Auditory Canal WNL,TM w/o masses/lesions/perforations.  Auricle WNL.  Left Ear: External Auditory Canal WNL,TM w/o masses/lesions/perforations. Auricle WNL.  Nose: No gross nasal septal deviation. Inferior Turbinates 3+ bilaterally. No septal perforation. No masses/lesions. External nasal skin without masses/lesions.  Oral Cavity: Gingiva/lips WNL.  FOM Soft, no masses palpated. Oral Tongue mobile. Hard Palate WNL.   Oropharynx: BOT WNL. No masses/lesions noted. Tonsillar fossa/pharyngeal wall without lesions. Posterior oropharynx WNL.  Soft palate without masses. Midline uvula.   Neck/Lymphatic: No LAD I-VI bilaterally.  No thyromegaly.  No masses noted on exam.        Procedure    Nasal endoscopy performed.  See procedure note.        Data Reviewed    Pathology report indicated non-eosinophilic chronic inflammation.  Cultures showed E. Coli and prevotella oris.      Assessment:       1. Other chronic sinusitis    2. Chronic rhinitis                 Plan:     Continue nasal saline rinses b.i.d.  Continue  Flonase 2  sprays per nostril daily. Patient instructed to spray laterally.   Continue xyzal 5mg PO daily      No follow-ups on file.     Clara Jennings MD

## 2023-09-18 NOTE — PROCEDURES
Procedures    PROCEDURE NOTE:  Nasal endoscopy   Preprocedure diagnosis:  Chronic sinusitis  Postprocedure diangosis:  Same  Complications:  None  Blood Loss:  None    Procedure in detail:  After verbal consent was obtained, the patient's nasal cavity was anesthesized using topical 1%lidocaine and Neosynepherine.  A rigid 0 degree endoscope was placed in first the right, then the left nasal cavity.  The inferior and middle turbinates were examined, and found to be normal bilaterally.  The middle meatus and maxillary antrum was also examined, and found to be patent bilaterally. Ethmoid cavities, bilateral sphenoidotomy, and frontal sinus outflow tract patent. No purulence or polyps present.   The patient tolerated the procedure well and there were no complications.

## 2023-09-20 ENCOUNTER — PATIENT MESSAGE (OUTPATIENT)
Dept: ADMINISTRATIVE | Facility: HOSPITAL | Age: 73
End: 2023-09-20
Payer: MEDICARE

## 2023-10-20 ENCOUNTER — LAB VISIT (OUTPATIENT)
Dept: LAB | Facility: HOSPITAL | Age: 73
End: 2023-10-20
Payer: MEDICARE

## 2023-10-20 DIAGNOSIS — I10 ESSENTIAL HYPERTENSION: Chronic | ICD-10-CM

## 2023-10-20 DIAGNOSIS — R80.9 TYPE 2 DIABETES MELLITUS WITH MICROALBUMINURIA, UNSPECIFIED WHETHER LONG TERM INSULIN USE: ICD-10-CM

## 2023-10-20 DIAGNOSIS — E78.2 MIXED HYPERLIPIDEMIA: Chronic | ICD-10-CM

## 2023-10-20 DIAGNOSIS — Z79.899 MEDICATION MANAGEMENT: ICD-10-CM

## 2023-10-20 DIAGNOSIS — D64.9 ANEMIA, UNSPECIFIED TYPE: ICD-10-CM

## 2023-10-20 DIAGNOSIS — E55.9 VITAMIN D DEFICIENCY: ICD-10-CM

## 2023-10-20 DIAGNOSIS — E11.29 TYPE 2 DIABETES MELLITUS WITH MICROALBUMINURIA, UNSPECIFIED WHETHER LONG TERM INSULIN USE: ICD-10-CM

## 2023-10-20 LAB
25(OH)D3+25(OH)D2 SERPL-MCNC: 46 NG/ML (ref 30–96)
ALBUMIN SERPL BCP-MCNC: 3.5 G/DL (ref 3.5–5.2)
ALP SERPL-CCNC: 70 U/L (ref 55–135)
ALT SERPL W/O P-5'-P-CCNC: 15 U/L (ref 10–44)
ANION GAP SERPL CALC-SCNC: 7 MMOL/L (ref 8–16)
AST SERPL-CCNC: 17 U/L (ref 10–40)
BASOPHILS # BLD AUTO: 0.02 K/UL (ref 0–0.2)
BASOPHILS NFR BLD: 0.2 % (ref 0–1.9)
BILIRUB SERPL-MCNC: 0.3 MG/DL (ref 0.1–1)
BUN SERPL-MCNC: 22 MG/DL (ref 8–23)
CALCIUM SERPL-MCNC: 9.9 MG/DL (ref 8.7–10.5)
CHLORIDE SERPL-SCNC: 107 MMOL/L (ref 95–110)
CHOLEST SERPL-MCNC: 115 MG/DL (ref 120–199)
CHOLEST/HDLC SERPL: 4.6 {RATIO} (ref 2–5)
CO2 SERPL-SCNC: 25 MMOL/L (ref 23–29)
CREAT SERPL-MCNC: 1.3 MG/DL (ref 0.5–1.4)
DIFFERENTIAL METHOD: ABNORMAL
EOSINOPHIL # BLD AUTO: 0.2 K/UL (ref 0–0.5)
EOSINOPHIL NFR BLD: 2.1 % (ref 0–8)
ERYTHROCYTE [DISTWIDTH] IN BLOOD BY AUTOMATED COUNT: 13.7 % (ref 11.5–14.5)
EST. GFR  (NO RACE VARIABLE): 58 ML/MIN/1.73 M^2
ESTIMATED AVG GLUCOSE: 114 MG/DL (ref 68–131)
FERRITIN SERPL-MCNC: 339 NG/ML (ref 20–300)
GLUCOSE SERPL-MCNC: 100 MG/DL (ref 70–110)
HBA1C MFR BLD: 5.6 % (ref 4–5.6)
HCT VFR BLD AUTO: 37.5 % (ref 40–54)
HDLC SERPL-MCNC: 25 MG/DL (ref 40–75)
HDLC SERPL: 21.7 % (ref 20–50)
HGB BLD-MCNC: 12.1 G/DL (ref 14–18)
IMM GRANULOCYTES # BLD AUTO: 0.03 K/UL (ref 0–0.04)
IMM GRANULOCYTES NFR BLD AUTO: 0.4 % (ref 0–0.5)
IRON SERPL-MCNC: 50 UG/DL (ref 45–160)
LDLC SERPL CALC-MCNC: 66 MG/DL (ref 63–159)
LYMPHOCYTES # BLD AUTO: 2.9 K/UL (ref 1–4.8)
LYMPHOCYTES NFR BLD: 35.1 % (ref 18–48)
MAGNESIUM SERPL-MCNC: 1.8 MG/DL (ref 1.6–2.6)
MCH RBC QN AUTO: 33.2 PG (ref 27–31)
MCHC RBC AUTO-ENTMCNC: 32.3 G/DL (ref 32–36)
MCV RBC AUTO: 103 FL (ref 82–98)
MONOCYTES # BLD AUTO: 0.6 K/UL (ref 0.3–1)
MONOCYTES NFR BLD: 7 % (ref 4–15)
NEUTROPHILS # BLD AUTO: 4.5 K/UL (ref 1.8–7.7)
NEUTROPHILS NFR BLD: 55.2 % (ref 38–73)
NONHDLC SERPL-MCNC: 90 MG/DL
NRBC BLD-RTO: 0 /100 WBC
PLATELET # BLD AUTO: 233 K/UL (ref 150–450)
PMV BLD AUTO: 10.2 FL (ref 9.2–12.9)
POTASSIUM SERPL-SCNC: 4.4 MMOL/L (ref 3.5–5.1)
PROT SERPL-MCNC: 9 G/DL (ref 6–8.4)
RBC # BLD AUTO: 3.64 M/UL (ref 4.6–6.2)
SATURATED IRON: 16 % (ref 20–50)
SODIUM SERPL-SCNC: 139 MMOL/L (ref 136–145)
TOTAL IRON BINDING CAPACITY: 321 UG/DL (ref 250–450)
TRANSFERRIN SERPL-MCNC: 217 MG/DL (ref 200–375)
TRIGL SERPL-MCNC: 120 MG/DL (ref 30–150)
TSH SERPL DL<=0.005 MIU/L-ACNC: 1.99 UIU/ML (ref 0.4–4)
VIT B12 SERPL-MCNC: 546 PG/ML (ref 210–950)
WBC # BLD AUTO: 8.14 K/UL (ref 3.9–12.7)

## 2023-10-20 PROCEDURE — 83036 HEMOGLOBIN GLYCOSYLATED A1C: CPT | Mod: HCNC | Performed by: FAMILY MEDICINE

## 2023-10-20 PROCEDURE — 82607 VITAMIN B-12: CPT | Mod: HCNC | Performed by: FAMILY MEDICINE

## 2023-10-20 PROCEDURE — 83735 ASSAY OF MAGNESIUM: CPT | Mod: HCNC | Performed by: FAMILY MEDICINE

## 2023-10-20 PROCEDURE — 36415 COLL VENOUS BLD VENIPUNCTURE: CPT | Mod: HCNC | Performed by: FAMILY MEDICINE

## 2023-10-20 PROCEDURE — 82728 ASSAY OF FERRITIN: CPT | Mod: HCNC | Performed by: FAMILY MEDICINE

## 2023-10-20 PROCEDURE — 80053 COMPREHEN METABOLIC PANEL: CPT | Mod: HCNC | Performed by: FAMILY MEDICINE

## 2023-10-20 PROCEDURE — 80061 LIPID PANEL: CPT | Mod: HCNC | Performed by: FAMILY MEDICINE

## 2023-10-20 PROCEDURE — 83540 ASSAY OF IRON: CPT | Mod: HCNC | Performed by: FAMILY MEDICINE

## 2023-10-20 PROCEDURE — 84443 ASSAY THYROID STIM HORMONE: CPT | Mod: HCNC | Performed by: FAMILY MEDICINE

## 2023-10-20 PROCEDURE — 85025 COMPLETE CBC W/AUTO DIFF WBC: CPT | Mod: HCNC | Performed by: FAMILY MEDICINE

## 2023-10-20 PROCEDURE — 84466 ASSAY OF TRANSFERRIN: CPT | Mod: HCNC | Performed by: FAMILY MEDICINE

## 2023-10-20 PROCEDURE — 82306 VITAMIN D 25 HYDROXY: CPT | Mod: HCNC | Performed by: FAMILY MEDICINE

## 2023-10-23 ENCOUNTER — PATIENT MESSAGE (OUTPATIENT)
Dept: ADMINISTRATIVE | Facility: HOSPITAL | Age: 73
End: 2023-10-23
Payer: MEDICARE

## 2023-10-26 ENCOUNTER — PATIENT MESSAGE (OUTPATIENT)
Dept: ADMINISTRATIVE | Facility: HOSPITAL | Age: 73
End: 2023-10-26
Payer: MEDICARE

## 2023-11-06 ENCOUNTER — OFFICE VISIT (OUTPATIENT)
Dept: FAMILY MEDICINE | Facility: CLINIC | Age: 73
End: 2023-11-06
Payer: MEDICARE

## 2023-11-06 VITALS
BODY MASS INDEX: 26.12 KG/M2 | HEART RATE: 85 BPM | HEIGHT: 67 IN | WEIGHT: 166.44 LBS | DIASTOLIC BLOOD PRESSURE: 70 MMHG | SYSTOLIC BLOOD PRESSURE: 138 MMHG | TEMPERATURE: 99 F | OXYGEN SATURATION: 96 %

## 2023-11-06 DIAGNOSIS — E78.2 MIXED HYPERLIPIDEMIA: Chronic | ICD-10-CM

## 2023-11-06 DIAGNOSIS — J44.89 ASTHMA-COPD OVERLAP SYNDROME: ICD-10-CM

## 2023-11-06 DIAGNOSIS — E11.29 TYPE 2 DIABETES MELLITUS WITH MICROALBUMINURIA, WITHOUT LONG-TERM CURRENT USE OF INSULIN: ICD-10-CM

## 2023-11-06 DIAGNOSIS — R80.9 TYPE 2 DIABETES MELLITUS WITH MICROALBUMINURIA, WITHOUT LONG-TERM CURRENT USE OF INSULIN: ICD-10-CM

## 2023-11-06 DIAGNOSIS — Z23 NEED FOR SHINGLES VACCINE: ICD-10-CM

## 2023-11-06 DIAGNOSIS — J47.9 BRONCHIECTASIS WITHOUT COMPLICATION: ICD-10-CM

## 2023-11-06 DIAGNOSIS — Z00.00 ROUTINE GENERAL MEDICAL EXAMINATION AT A HEALTH CARE FACILITY: Primary | ICD-10-CM

## 2023-11-06 DIAGNOSIS — E66.3 OVERWEIGHT (BMI 25.0-29.9): ICD-10-CM

## 2023-11-06 DIAGNOSIS — I10 ESSENTIAL HYPERTENSION: Chronic | ICD-10-CM

## 2023-11-06 DIAGNOSIS — Z79.899 MEDICATION MANAGEMENT: ICD-10-CM

## 2023-11-06 DIAGNOSIS — D64.9 ANEMIA, UNSPECIFIED TYPE: ICD-10-CM

## 2023-11-06 DIAGNOSIS — I70.0 AORTIC ATHEROSCLEROSIS: ICD-10-CM

## 2023-11-06 DIAGNOSIS — Z87.09 H/O PLEURAL EMPYEMA: ICD-10-CM

## 2023-11-06 PROCEDURE — 3044F HG A1C LEVEL LT 7.0%: CPT | Mod: HCNC,CPTII,S$GLB, | Performed by: FAMILY MEDICINE

## 2023-11-06 PROCEDURE — 99397 PR PREVENTIVE VISIT,EST,65 & OVER: ICD-10-PCS | Mod: HCNC,S$GLB,, | Performed by: FAMILY MEDICINE

## 2023-11-06 PROCEDURE — 3075F PR MOST RECENT SYSTOLIC BLOOD PRESS GE 130-139MM HG: ICD-10-PCS | Mod: HCNC,CPTII,S$GLB, | Performed by: FAMILY MEDICINE

## 2023-11-06 PROCEDURE — 4010F ACE/ARB THERAPY RXD/TAKEN: CPT | Mod: HCNC,CPTII,S$GLB, | Performed by: FAMILY MEDICINE

## 2023-11-06 PROCEDURE — 1126F AMNT PAIN NOTED NONE PRSNT: CPT | Mod: HCNC,CPTII,S$GLB, | Performed by: FAMILY MEDICINE

## 2023-11-06 PROCEDURE — 1159F MED LIST DOCD IN RCRD: CPT | Mod: HCNC,CPTII,S$GLB, | Performed by: FAMILY MEDICINE

## 2023-11-06 PROCEDURE — 3044F PR MOST RECENT HEMOGLOBIN A1C LEVEL <7.0%: ICD-10-PCS | Mod: HCNC,CPTII,S$GLB, | Performed by: FAMILY MEDICINE

## 2023-11-06 PROCEDURE — 3078F DIAST BP <80 MM HG: CPT | Mod: HCNC,CPTII,S$GLB, | Performed by: FAMILY MEDICINE

## 2023-11-06 PROCEDURE — 3288F FALL RISK ASSESSMENT DOCD: CPT | Mod: HCNC,CPTII,S$GLB, | Performed by: FAMILY MEDICINE

## 2023-11-06 PROCEDURE — 99397 PER PM REEVAL EST PAT 65+ YR: CPT | Mod: HCNC,S$GLB,, | Performed by: FAMILY MEDICINE

## 2023-11-06 PROCEDURE — 99999 PR PBB SHADOW E&M-EST. PATIENT-LVL IV: ICD-10-PCS | Mod: PBBFAC,HCNC,, | Performed by: FAMILY MEDICINE

## 2023-11-06 PROCEDURE — 4010F PR ACE/ARB THEARPY RXD/TAKEN: ICD-10-PCS | Mod: HCNC,CPTII,S$GLB, | Performed by: FAMILY MEDICINE

## 2023-11-06 PROCEDURE — 99999 PR PBB SHADOW E&M-EST. PATIENT-LVL IV: CPT | Mod: PBBFAC,HCNC,, | Performed by: FAMILY MEDICINE

## 2023-11-06 PROCEDURE — 3288F PR FALLS RISK ASSESSMENT DOCUMENTED: ICD-10-PCS | Mod: HCNC,CPTII,S$GLB, | Performed by: FAMILY MEDICINE

## 2023-11-06 PROCEDURE — 3066F NEPHROPATHY DOC TX: CPT | Mod: HCNC,CPTII,S$GLB, | Performed by: FAMILY MEDICINE

## 2023-11-06 PROCEDURE — 3075F SYST BP GE 130 - 139MM HG: CPT | Mod: HCNC,CPTII,S$GLB, | Performed by: FAMILY MEDICINE

## 2023-11-06 PROCEDURE — 3008F BODY MASS INDEX DOCD: CPT | Mod: HCNC,CPTII,S$GLB, | Performed by: FAMILY MEDICINE

## 2023-11-06 PROCEDURE — 1101F PT FALLS ASSESS-DOCD LE1/YR: CPT | Mod: HCNC,CPTII,S$GLB, | Performed by: FAMILY MEDICINE

## 2023-11-06 PROCEDURE — 3008F PR BODY MASS INDEX (BMI) DOCUMENTED: ICD-10-PCS | Mod: HCNC,CPTII,S$GLB, | Performed by: FAMILY MEDICINE

## 2023-11-06 PROCEDURE — 1159F PR MEDICATION LIST DOCUMENTED IN MEDICAL RECORD: ICD-10-PCS | Mod: HCNC,CPTII,S$GLB, | Performed by: FAMILY MEDICINE

## 2023-11-06 PROCEDURE — 1101F PR PT FALLS ASSESS DOC 0-1 FALLS W/OUT INJ PAST YR: ICD-10-PCS | Mod: HCNC,CPTII,S$GLB, | Performed by: FAMILY MEDICINE

## 2023-11-06 PROCEDURE — 3062F POS MACROALBUMINURIA REV: CPT | Mod: HCNC,CPTII,S$GLB, | Performed by: FAMILY MEDICINE

## 2023-11-06 PROCEDURE — 3062F PR POS MACROALBUMINURIA RESULT DOCUMENTED/REVIEW: ICD-10-PCS | Mod: HCNC,CPTII,S$GLB, | Performed by: FAMILY MEDICINE

## 2023-11-06 PROCEDURE — 3066F PR DOCUMENTATION OF TREATMENT FOR NEPHROPATHY: ICD-10-PCS | Mod: HCNC,CPTII,S$GLB, | Performed by: FAMILY MEDICINE

## 2023-11-06 PROCEDURE — 3078F PR MOST RECENT DIASTOLIC BLOOD PRESSURE < 80 MM HG: ICD-10-PCS | Mod: HCNC,CPTII,S$GLB, | Performed by: FAMILY MEDICINE

## 2023-11-06 PROCEDURE — 1126F PR PAIN SEVERITY QUANTIFIED, NO PAIN PRESENT: ICD-10-PCS | Mod: HCNC,CPTII,S$GLB, | Performed by: FAMILY MEDICINE

## 2023-11-06 NOTE — PATIENT INSTRUCTIONS
SEASONAL FLU SHOT & UPDATED COVID -19 SHOT DECLINED, SHINGRIX 2/2 ADVISED    Also advised RSV VACCINE for respiratory syncytial virus

## 2023-11-06 NOTE — PROGRESS NOTES
Office Visit    Patient Name: Scooter Morrissey    : 1950  MRN: [id number]    Subjective:  Scooter is a 73 y.o. male who presents today for:    Annual Exam    ANNUAL PHYSICAL 10/03/2022, MOST RECENT OFFICE VISIT WITH ME 23  FOLLOWED UP WITH ENT DR. ARAUJO  23:  ADVISED TO CONTINUE XYZAL, NEILMED SALINE SINUS RINSES AND FLONASE     Scooter Morrissey is a 73 year old male with past medical history of COPD/ Asthma, HTN, HLD, previous COVID-19 Infection (2021).   He was diagnosed with type 2 diabetes with an A1c of 6.5 on 2022, urine microalbumin of 72.     He presents today for annual physical with lab review and monitoring of chronic conditions.    LABS PRIOR TO OFFICE VISIT 10/20/2023 SHOW STABLE A1C OF 5.6, SLIGHT DECREASE IN EGFR OF 58 WITH NORMAL ELECTROLYTES, STABLE LIPIDS WITH PERSISTENTLY LOW HDL OF 25, LDL 66, CBC WITH STABLE CHRONIC MACROCYTIC ANEMIA-HEMATOCRIT IMPROVED TO 37.5.  PRIOR B12/FOLATE NORMAL-- B12 IS NORMAL  ON LABS TODAY.  VITAMIN-D IS 46, PERSISTENT MILD FERRITIN ELEVATION .   He is continuing to take metformin 500 extended release daily and Lipitor 40 mg daily.  He is on 2000 IU vitamin D3 daily.     HTN: He is continuing to take his medications regularly- Toprol , Amlodipine 10 and Losartan 100-- Home BPs in goal range (130s/70s)  HLD: He is taking atorvastatin regularly and added a Fish oil supplement  COPD/Asthma: He is taking Symbicort regularly AM and PM, and he has not recently needed albuterol.  Allergies/Chronic Sinusitis:  Noted significant improvement following ethmoidectomy/sinus surgery per ENT Dr. Araujo 22.  Continuing Xyzal, nasal saline, and Flonase.  Breathing very well through his nose.       He continues to not smoke or drink alcohol.      No acute complaints. no recent need for rescue inhaler.     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   Sleep: 8+ hours, feels rested in the morning  Weight: maintaining a mild weight loss to BMI of 26.      Immunizations: PREVNAR 13 4/10/15, PNEUMOVAX 23 11/29/17, TDaP 4/5/2016, SHINGRIX 2/2 ADVISED 1/2 6/19/23, YEARLY FLU SHOT DECLINED, MODERNA INITIAL COVID VACCINE COMPLETED AND UPDATED FALL 2023 COVID-19 VACCINE ADVISED ALONG WITH RSV VACCINE ESPECIALLY GIVEN HIS PULMONARY HISTORY.     Screening Tests: AAA SCREENING 5/17/21, Hep C Screen (-) 3/9/22, Colonoscopy 6/1/16- REPEAT 10 YEARS(6/2026), last PSA  0.43 10/4/19     Eye/Dental: referral for diabetic eye exam placed        PAST MEDICAL HISTORY, SURGICAL/SOCIAL/FAMILY HISTORY REVIEWED AS PER CHART, WITH PERTINENT FINDINGS INCLUDED IN HISTORY SECTION OF NOTE.     Current Medications    Medication List with Changes/Refills   Current Medications    ALBUTEROL (VENTOLIN HFA) 90 MCG/ACTUATION INHALER    Inhale 2 puffs into the lungs every 4 (four) hours as needed for Wheezing or Shortness of Breath. Rescue    AMLODIPINE (NORVASC) 10 MG TABLET    TAKE 1 TABLET BY MOUTH EVERY DAY    ASCORBIC ACID, VITAMIN C, (VITAMIN C) 500 MG TABLET    Take 1 tablet (500 mg total) by mouth 2 (two) times daily.    ATORVASTATIN (LIPITOR) 40 MG TABLET    Take 1 tablet (40 mg total) by mouth every evening.    BUDESONIDE (PULMICORT) 0.5 MG/2 ML NEBULIZER SOLUTION    EMPTY CONTENTS OF 1 VIAL INTO NASAL IRRIGATION SYSTEM, ADD DISTILLED WATER, SALT PACK, MIX & IRRIGATE. PERFORM 1-2 TIMES DAILY    BUDESONIDE-FORMOTEROL 160-4.5 MCG (SYMBICORT) 160-4.5 MCG/ACTUATION HFAA    INHALE 2 PUFFS INTO THE LUNGS BY MOUTH TWICE DAILY    CALCIUM-VITAMIN D3 (OS-SALLY 500 + D3) 500 MG-5 MCG (200 UNIT) PER TABLET    Take 2 tablets by mouth once daily.    DICLOFENAC SODIUM (VOLTAREN  ARTHRITIS PAIN) 1 % GEL    Apply 4 g topically 3 (three) times daily as needed (hand pain).    FLUTICASONE PROPIONATE (FLONASE) 50 MCG/ACTUATION NASAL SPRAY    2 sprays (100 mcg total) by Each Nostril route once daily.    LEVOCETIRIZINE (XYZAL) 5 MG TABLET    Take 1 tablet (5 mg total) by mouth every evening.    LOSARTAN (COZAAR) 100 MG TABLET    TAKE 1 TABLET(100 MG) BY MOUTH EVERY DAY    METFORMIN (GLUCOPHAGE-XR) 500 MG ER 24HR TABLET    Take 1 tablet (500 mg total) by mouth daily with dinner or evening meal.    METOPROLOL SUCCINATE (TOPROL-XL) 100 MG 24 HR TABLET    Take 1 tablet (100 mg total) by mouth once daily.    MULTIVITAMIN WITH MINERALS TABLET    Take 1 tablet by mouth nightly.     PULSE OXIMETER (PULSE OXIMETER) DEVICE    by Apply Externally route 2 (two) times a day. Use twice daily at 8 AM and 3 PM and record the value in MyChart as directed.    VITAMIN D (VITAMIN D3) 1000 UNITS TAB    Take 1,000 Units by mouth once daily.   Discontinued Medications    ALBUTEROL-IPRATROPIUM (DUO-NEB) 2.5 MG-0.5 MG/3 ML NEBULIZER SOLUTION    Take 3 mLs by nebulization every 6 (six) hours as needed for Wheezing. Rescue       Allergies   Review of patient's allergies indicates:  No Known Allergies      Review of Systems (Pertinent positives)  Review of Systems   Constitutional:  Negative for unexpected weight change.   HENT:  Negative for trouble swallowing.    Eyes:  Negative for visual disturbance.   Respiratory:  Negative for shortness of breath.    Cardiovascular:  Negative for chest pain.   Gastrointestinal:  Negative for constipation and vomiting.   Genitourinary:  Negative for difficulty urinating.   Musculoskeletal:  Negative for arthralgias and back pain.   Allergic/Immunologic: Positive for environmental allergies.   Neurological:  Negative for dizziness and light-headedness.   Psychiatric/Behavioral:  Negative for sleep disturbance.        /70 (BP Location: Left arm, Patient Position: Sitting, BP Method:  "Medium (Manual))   Pulse 85   Temp 98.9 °F (37.2 °C) (Oral)   Ht 5' 7" (1.702 m)   Wt 75.5 kg (166 lb 7.2 oz)   SpO2 96%   BMI 26.07 kg/m²     Physical Exam  Vitals reviewed.   Constitutional:       General: He is not in acute distress.  HENT:      Head: Normocephalic and atraumatic.      Right Ear: External ear normal.      Left Ear: External ear normal.      Nose: Nose normal.      Mouth/Throat:      Pharynx: No oropharyngeal exudate.   Eyes:      General: No scleral icterus.     Conjunctiva/sclera: Conjunctivae normal.   Cardiovascular:      Rate and Rhythm: Normal rate and regular rhythm.      Pulses:           Dorsalis pedis pulses are 2+ on the right side and 2+ on the left side.        Posterior tibial pulses are 2+ on the right side and 2+ on the left side.      Heart sounds: Normal heart sounds.   Pulmonary:      Effort: Pulmonary effort is normal. No respiratory distress.      Breath sounds: No stridor. Wheezing (scattered wheezes, predominantely in bases) and rales (scattered rales) present.   Abdominal:      General: Bowel sounds are normal. There is no distension.      Palpations: Abdomen is soft.      Tenderness: There is no abdominal tenderness.   Musculoskeletal:         General: Normal range of motion.      Right lower leg: No edema.      Left lower leg: No edema.      Right foot: Normal range of motion. No deformity.      Left foot: Normal range of motion. No deformity.   Feet:      Right foot:      Protective Sensation: 10 sites tested.  10 sites sensed.      Skin integrity: No ulcer, blister, skin breakdown, erythema, warmth, callus or dry skin.      Left foot:      Protective Sensation: 10 sites tested.  10 sites sensed.      Skin integrity: No ulcer, blister, skin breakdown, erythema, warmth, callus or dry skin.   Lymphadenopathy:      Cervical: No cervical adenopathy.   Skin:     General: Skin is warm and dry.      Findings: No rash.   Neurological:      General: No focal deficit " present.      Mental Status: He is alert and oriented to person, place, and time.   Psychiatric:         Mood and Affect: Mood normal.         Behavior: Behavior normal.           Assessment/Plan:  Scooter Morrissey is a 73 y.o. male who presents today for :        ICD-10-CM ICD-9-CM    1. Routine general medical examination at a health care facility  Z00.00 V70.0       2. Overweight (BMI 25.0-29.9)  E66.3 278.02       3. Anemia, unspecified type  D64.9 285.9 Comprehensive Metabolic Panel      CBC Auto Differential      FOLATE      4. Asthma-COPD overlap syndrome  J44.89 493.20       5. Bronchiectasis without complication  J47.9 494.0       6. H/O pleural empyema  Z87.09 V12.69       7. Type 2 diabetes mellitus with microalbuminuria, without long-term current use of insulin  E11.29 250.40 Hemoglobin A1C    R80.9 791.0 Comprehensive Metabolic Panel      Microalbumin/Creatinine Ratio, Urine      Ambulatory referral/consult to Optometry      8. Aortic atherosclerosis  I70.0 440.0       9. Essential hypertension  I10 401.9       10. Mixed hyperlipidemia  E78.2 272.2 Comprehensive Metabolic Panel      Lipid Panel      11. Medication management  Z79.899 V58.69 Hemoglobin A1C      Comprehensive Metabolic Panel      Lipid Panel      CBC Auto Differential      FOLATE      Microalbumin/Creatinine Ratio, Urine      12. Need for shingles vaccine  Z23 V04.89         ADVISED ON DIET/EXERCISE/SLEEP, ROUTINE EYE/DENTAL EXAMS, AND THE IMPORTANCE OF KEEPING UP WITH APPROPRIATE SCREENING TESTS BASED ON AGE AND RISK FACTORS.  IMMUNIZATIONS REVIEWED:  SEASONAL FLU SHOT & UPDATED COVID -19 SHOT DECLINED, SHINGRIX 2/2 ADVISED  NEXT COLONOSCOPY DUE 6/2026     OVERWEIGHT BMI:  Advised on continued attention to low carb diet, regular exercise, continue Metformin 500 XR daily-- repeat A1c 5.6 and down 5 lbs. Recheck in 6 months.     CHRONIC CONTROLLED TYPE 2 DIABETES W/ MILD MICROALBUMINURIA:  A1C IMPROVED ON METFORMIN 500 XR DAILY & CURRENTLY  5.6, RECHECK IN 6 MONTHS with updated urine microalbumin.  Blood pressure is also controlled and he is on an ARB.  Most recent kidney function with very slight declined a 58-counseled on the importance of hydration and will recheck in 6 months.     HTN: controlled on Toprol , Amlodipine 10 and Losartan 100- ongoing monitoring     ALLERGIC RHINITIS, CHRONIC SINUSITIS:  Significantly improved following ethmoidectomy/sinus surgery with ENT Dr. Simmons 6/30/22, Continuing Xyzal/ nasal saline and flonse.      ASTHMA/COPD, HISTORY OF COVID PNEUMONIA AND PRIOR HISTORY OF PLEURAL EMPYEMA:  Has some chronic crackles/rhonchi on his lung exam, but breathing and exercise tolerance are overall stable to improved.    Continue Symbicort daily.  Continue regular exercise. Follows periodically with Dr. Guerrier pulmonology.  Albuterol, nebulizer p.r.n., monitors his oxygen level and has had no concerns.     HYPERLIPIDEMIA WITH AORTIC ATHEROSCLEROSIS:  Remaining tobacco free, blood pressure controlled, continue atorvastatin 40-- recheck in 4 month.  He is now on a fish oil supplement.     ANEMIA: mildly improving, B12/folate, iron studies unremarkable-- ongoing monitoring.  Recently had slight decline in kidney function so will check that with updated CBC in 6 months.          Patient Instructions   SEASONAL FLU SHOT & UPDATED COVID -19 SHOT DECLINED, SHINGRIX 2/2 ADVISED    Also advised RSV VACCINE for respiratory syncytial virus      Follow up in about 6 months (around 5/6/2024) for to follow up on lab results, return as needed for new concerns.

## 2023-11-07 ENCOUNTER — TELEPHONE (OUTPATIENT)
Dept: ADMINISTRATIVE | Facility: OTHER | Age: 73
End: 2023-11-07
Payer: MEDICARE

## 2023-11-26 DIAGNOSIS — I10 BENIGN ESSENTIAL HYPERTENSION: ICD-10-CM

## 2023-11-26 NOTE — TELEPHONE ENCOUNTER
No care due was identified.  Health [facility] Embedded Care Due Messages. Reference number: [id number].   11/26/2023 5:34:11 AM CST

## 2023-11-27 RX ORDER — AMLODIPINE BESYLATE 10 MG/1
TABLET ORAL
Qty: 90 TABLET | Refills: 3 | Status: SHIPPED | OUTPATIENT
Start: 2023-11-27

## 2023-11-27 NOTE — TELEPHONE ENCOUNTER
Scooter Morrissey  is requesting a refill authorization.  Brief Assessment and Rationale for Refill:  Approve     Medication Therapy Plan:         Comments:     Note composed:4:35 AM 11/27/2023

## 2023-12-26 ENCOUNTER — TELEPHONE (OUTPATIENT)
Dept: FAMILY MEDICINE | Facility: CLINIC | Age: 73
End: 2023-12-26
Payer: MEDICARE

## 2024-01-29 ENCOUNTER — OFFICE VISIT (OUTPATIENT)
Dept: OTOLARYNGOLOGY | Facility: CLINIC | Age: 74
End: 2024-01-29
Payer: MEDICARE

## 2024-01-29 VITALS
SYSTOLIC BLOOD PRESSURE: 149 MMHG | WEIGHT: 168.56 LBS | DIASTOLIC BLOOD PRESSURE: 76 MMHG | BODY MASS INDEX: 26.4 KG/M2 | HEART RATE: 78 BPM

## 2024-01-29 DIAGNOSIS — J32.8 OTHER CHRONIC SINUSITIS: Primary | Chronic | ICD-10-CM

## 2024-01-29 DIAGNOSIS — J31.0 CHRONIC RHINITIS: Chronic | ICD-10-CM

## 2024-01-29 PROCEDURE — 1160F RVW MEDS BY RX/DR IN RCRD: CPT | Mod: HCNC,CPTII,S$GLB, | Performed by: OTOLARYNGOLOGY

## 2024-01-29 PROCEDURE — 3077F SYST BP >= 140 MM HG: CPT | Mod: HCNC,CPTII,S$GLB, | Performed by: OTOLARYNGOLOGY

## 2024-01-29 PROCEDURE — 99213 OFFICE O/P EST LOW 20 MIN: CPT | Mod: 25,HCNC,S$GLB, | Performed by: OTOLARYNGOLOGY

## 2024-01-29 PROCEDURE — 1101F PT FALLS ASSESS-DOCD LE1/YR: CPT | Mod: HCNC,CPTII,S$GLB, | Performed by: OTOLARYNGOLOGY

## 2024-01-29 PROCEDURE — 31231 NASAL ENDOSCOPY DX: CPT | Mod: HCNC,S$GLB,, | Performed by: OTOLARYNGOLOGY

## 2024-01-29 PROCEDURE — 3008F BODY MASS INDEX DOCD: CPT | Mod: HCNC,CPTII,S$GLB, | Performed by: OTOLARYNGOLOGY

## 2024-01-29 PROCEDURE — 3288F FALL RISK ASSESSMENT DOCD: CPT | Mod: HCNC,CPTII,S$GLB, | Performed by: OTOLARYNGOLOGY

## 2024-01-29 PROCEDURE — 3078F DIAST BP <80 MM HG: CPT | Mod: HCNC,CPTII,S$GLB, | Performed by: OTOLARYNGOLOGY

## 2024-01-29 PROCEDURE — 1126F AMNT PAIN NOTED NONE PRSNT: CPT | Mod: HCNC,CPTII,S$GLB, | Performed by: OTOLARYNGOLOGY

## 2024-01-29 PROCEDURE — 1159F MED LIST DOCD IN RCRD: CPT | Mod: HCNC,CPTII,S$GLB, | Performed by: OTOLARYNGOLOGY

## 2024-01-29 PROCEDURE — 99999 PR PBB SHADOW E&M-EST. PATIENT-LVL III: CPT | Mod: PBBFAC,HCNC,, | Performed by: OTOLARYNGOLOGY

## 2024-01-29 RX ORDER — LEVOCETIRIZINE DIHYDROCHLORIDE 5 MG/1
5 TABLET, FILM COATED ORAL NIGHTLY
Qty: 30 TABLET | Refills: 11 | Status: SHIPPED | OUTPATIENT
Start: 2024-01-29 | End: 2025-01-28

## 2024-01-29 RX ORDER — FLUTICASONE PROPIONATE 50 MCG
2 SPRAY, SUSPENSION (ML) NASAL DAILY
Qty: 9.9 ML | Refills: 11 | Status: SHIPPED | OUTPATIENT
Start: 2024-01-29

## 2024-01-29 NOTE — PROCEDURES
Nasal/sinus endoscopy    Date/Time: 1/29/2024 2:20 PM    Performed by: Clara Tracy MD  Authorized by: Clara Tracy MD    Consent Done?:  Yes (Verbal)  Anesthesia:     Local anesthetic:  Lidocaine 2% and Zheng-Synephrine 1/2%  Nose:     Procedure Performed:  Nasal Endoscopy  External:      No external nasal deformity  Intranasal:      Mucosa no polyps     Mucosa ulcers not present     No mucosa lesions present     Turbinates not enlarged     No septum gross deformity  Nasopharynx:      Posterior choanae patent     Eustachian tube patent     Maxillary antrum patent bilaterally. Ethmoid cavities, bilateral sphenoidotomy, and frontal sinus outflow tract patent. No purulence or polyps present.

## 2024-01-29 NOTE — PROGRESS NOTES
Subjective:      Scooter Morrissey is a 73 y.o. male who comes for follow-up status-post endoscopic sinus surgery 6/30/2022. He denies complaints today.  He is breathing well through bilateral nostrils.  Denies epistaxis. He is using budesonide saline irrigations as directed.  Sense of smell and taste are improved.     Interval HPI 1/29/2024:  Follow up visit for CRS with history of endoscopic sinus surgery 6/30/2022. He does having intermittent nasal congestion primarily when working outdoors. He has had some maxillary facial pressure.  He has been using nasal saline rinses BID.  He has been using Flonase intermittently. He is on Xyzal.       Objective:     Physical Exam     Vitals:    01/29/24 1415   BP: (!) 149/76   Pulse: 78       Constitutional: Well appearing / communicating.  NAD.  Eyes: EOM I Bilaterally  Head/Face: Normocephalic.  Negative paranasal sinus pressure/tenderness.  Salivary glands WNL.  House Brackmann I Bilaterally.    Right Ear: External Auditory Canal WNL,TM w/o masses/lesions/perforations.  Auricle WNL.  Left Ear: External Auditory Canal WNL,TM w/o masses/lesions/perforations. Auricle WNL.  Nose: No gross nasal septal deviation. Inferior Turbinates 3+ bilaterally. No septal perforation. No masses/lesions. External nasal skin without masses/lesions.  Oral Cavity: Gingiva/lips WNL.  FOM Soft, no masses palpated. Oral Tongue mobile. Hard Palate WNL.   Oropharynx: BOT WNL. No masses/lesions noted. Tonsillar fossa/pharyngeal wall without lesions. Posterior oropharynx WNL.  Soft palate without masses. Midline uvula.   Neck/Lymphatic: No LAD I-VI bilaterally.  No thyromegaly.  No masses noted on exam.        Procedure    Nasal endoscopy performed.  See procedure note.        Data Reviewed    Pathology report indicated non-eosinophilic chronic inflammation.  Cultures showed E. Coli and prevotella oris.      Assessment:       1. Other chronic sinusitis    2. Chronic rhinitis           Plan:     Continue  nasal saline rinses b.i.d.  Continue  Flonase 2 sprays per nostril daily. Patient instructed to spray laterally.   Continue xyzal 5mg PO daily      Follow up in about 4 months (around 5/29/2024).     Clara Jennings MD

## 2024-02-12 ENCOUNTER — OFFICE VISIT (OUTPATIENT)
Dept: OPTOMETRY | Facility: CLINIC | Age: 74
End: 2024-02-12
Payer: COMMERCIAL

## 2024-02-12 DIAGNOSIS — E11.29 TYPE 2 DIABETES MELLITUS WITH MICROALBUMINURIA, WITHOUT LONG-TERM CURRENT USE OF INSULIN: ICD-10-CM

## 2024-02-12 DIAGNOSIS — Z46.0 FITTING AND ADJUSTMENT OF SPECTACLES AND CONTACT LENSES: Primary | ICD-10-CM

## 2024-02-12 DIAGNOSIS — H52.4 PRESBYOPIA: ICD-10-CM

## 2024-02-12 DIAGNOSIS — I10 ESSENTIAL HYPERTENSION: Chronic | ICD-10-CM

## 2024-02-12 DIAGNOSIS — H25.013 CORTICAL AGE-RELATED CATARACT OF BOTH EYES: ICD-10-CM

## 2024-02-12 DIAGNOSIS — R80.9 TYPE 2 DIABETES MELLITUS WITH MICROALBUMINURIA, WITHOUT LONG-TERM CURRENT USE OF INSULIN: ICD-10-CM

## 2024-02-12 DIAGNOSIS — Z46.0 FITTING AND ADJUSTMENT OF SPECTACLES AND CONTACT LENSES: ICD-10-CM

## 2024-02-12 DIAGNOSIS — E11.9 TYPE 2 DIABETES MELLITUS WITHOUT OPHTHALMIC MANIFESTATIONS: ICD-10-CM

## 2024-02-12 DIAGNOSIS — H52.203 ASTIGMATISM OF BOTH EYES, UNSPECIFIED TYPE: Primary | ICD-10-CM

## 2024-02-12 DIAGNOSIS — H25.13 NS (NUCLEAR SCLEROSIS), BILATERAL: ICD-10-CM

## 2024-02-12 PROCEDURE — 92004 COMPRE OPH EXAM NEW PT 1/>: CPT | Mod: S$GLB,,, | Performed by: OPTOMETRIST

## 2024-02-12 PROCEDURE — 92310 CONTACT LENS FITTING OU: CPT | Mod: CSM,S$GLB,, | Performed by: OPTOMETRIST

## 2024-02-12 PROCEDURE — 99999 PR PBB SHADOW E&M-EST. PATIENT-LVL I: CPT | Mod: PBBFAC,,, | Performed by: OPTOMETRIST

## 2024-02-12 PROCEDURE — 92015 DETERMINE REFRACTIVE STATE: CPT | Mod: S$GLB,,, | Performed by: OPTOMETRIST

## 2024-02-12 PROCEDURE — 99999 PR PBB SHADOW E&M-EST. PATIENT-LVL III: CPT | Mod: PBBFAC,,, | Performed by: OPTOMETRIST

## 2024-02-12 NOTE — PROGRESS NOTES
HPI    Patient is here today for a diabetic eye exam. Last eye exam was 5 years   ago at [facility]. No visual complaints at this time. Denies eye pain,   flashes and floaters.    Eye Meds: OTC dry eye drops  Past Ocular Sx: None    Hemoglobin A1C       Date                     Value               Ref Range             Status                10/20/2023               5.6                 4.0 - 5.6 %           Final              Comment:    ADA Screening Guidelines:  5.7-6.4%  Consistent with   prediabetes  >or=6.5%  Consistent with diabetes    High levels of fetal   hemoglobin interfere with the HbA1C  assay. Heterozygous hemoglobin   variants (HbS, HgC, etc)do  not significantly interfere with this assay.     However, presence of multiple variants may affect accuracy.         06/09/2023               5.5                 4.0 - 5.6 %           Final              Comment:    ADA Screening Guidelines:  5.7-6.4%  Consistent with   prediabetes  >or=6.5%  Consistent with diabetes    High levels of fetal   hemoglobin interfere with the HbA1C  assay. Heterozygous hemoglobin   variants (HbS, HgC, etc)do  not significantly interfere with this assay.     However, presence of multiple variants may affect accuracy.         01/27/2023               5.7 (H)             4.0 - 5.6 %           Final              Comment:    ADA Screening Guidelines:  5.7-6.4%  Consistent with   prediabetes  >or=6.5%  Consistent with diabetes    High levels of fetal   hemoglobin interfere with the HbA1C  assay. Heterozygous hemoglobin   variants (HbS, HgC, etc)do  not significantly interfere with this assay.     However, presence of multiple variants may affect accuracy.    ----------   Last edited by Laurence Bearden on 2/12/2024 10:20 AM.            Assessment /Plan     For exam results, see Encounter Report.    Astigmatism of both eyes, unspecified type  Presbyopia  Fitting and adjustment of spectacles and contact lenses   Rx specs   CL fit today:  order trials of astig lenses OU   Remove nightly, replace daily   Ok to order if happy with vision and comfort    Type 2 diabetes mellitus without ophthalmic manifestations  Type 2 diabetes mellitus with microalbuminuria, without long-term current use of insulin  Essential hypertension   No retinopathy, monitor yearly    NS (nuclear sclerosis), bilateral  Cortical age-related cataract of both eyes   Visually significant, consult for cat eval when ready      RTC 1 year

## 2024-02-17 ENCOUNTER — PATIENT MESSAGE (OUTPATIENT)
Dept: OPTOMETRY | Facility: CLINIC | Age: 74
End: 2024-02-17
Payer: MEDICARE

## 2024-02-24 DIAGNOSIS — I10 BENIGN ESSENTIAL HYPERTENSION: ICD-10-CM

## 2024-02-24 DIAGNOSIS — R80.9 TYPE 2 DIABETES MELLITUS WITH MICROALBUMINURIA, UNSPECIFIED WHETHER LONG TERM INSULIN USE: ICD-10-CM

## 2024-02-24 DIAGNOSIS — E11.29 TYPE 2 DIABETES MELLITUS WITH MICROALBUMINURIA, UNSPECIFIED WHETHER LONG TERM INSULIN USE: ICD-10-CM

## 2024-02-24 RX ORDER — LOSARTAN POTASSIUM 100 MG/1
TABLET ORAL
Qty: 90 TABLET | Refills: 4 | Status: SHIPPED | OUTPATIENT
Start: 2024-02-24

## 2024-02-24 RX ORDER — METFORMIN HYDROCHLORIDE 500 MG/1
TABLET, EXTENDED RELEASE ORAL
Qty: 90 TABLET | Refills: 4 | Status: SHIPPED | OUTPATIENT
Start: 2024-02-24

## 2024-02-24 NOTE — TELEPHONE ENCOUNTER
Refill Routing Note   Medication(s) are not appropriate for processing by [facility] for the following reason(s):        Required vitals abnormal  Drug-disease interaction    [facility] action(s):  Defer        Medication Therapy Plan: FLOS ( A1c); Losartan: Vitals are out of range; Metformin: Drug-Disease: metFORMIN and Hepatic cyst      Appointments  past 12m or future 3m with PCP    Date Provider   Last Visit   11/6/2023 Katelynn Rojas MD   Next Visit   6/3/2024 Katelynn Rojas MD   ED visits in past 90 days: 0        Note composed:9:56 AM 02/24/2024

## 2024-02-24 NOTE — TELEPHONE ENCOUNTER
Care Due:                  Date            Visit Type   Department     Provider  --------------------------------------------------------------------------------                                EP -                              [facility]  Last Visit: 11-      CARE ([facility])   THOMAS Rojas                              [facility]  Next Visit: 06-      CARE ([facility])   THOMAS Rojas                                                            Last  Test          Frequency    Reason                     Performed    Due Date  --------------------------------------------------------------------------------    HBA1C.......  6 months...  metFORMIN................  10-   04-    Health [facility] Embedded Care Due Messages. Reference number: [id number].   2/24/2024 5:30:34 AM CST

## 2024-04-12 ENCOUNTER — PATIENT OUTREACH (OUTPATIENT)
Dept: ADMINISTRATIVE | Facility: HOSPITAL | Age: 74
End: 2024-04-12
Payer: MEDICARE

## 2024-04-12 NOTE — PROGRESS NOTES
Non-compliant GAP report chart review - Chart review completed for the following HM test if overdue  (Mammogram, Colonoscopy, Cervical Cancer Screening,  Diabetic lab testing, and/or Dilated EYE EXAM)      Care Everywhere and Media reports - updates requested and reviewed.      Microalbumin linked        Health Maintenance Due   Topic Date Due    RSV Vaccine (Age 60+ and Pregnant patients) (1 - 1-dose 60+ series) Never done    Influenza Vaccine (1) Never done    COVID-19 Vaccine (3 - 2023-24 season) 09/01/2023    Diabetes Urine Screening  01/27/2024    Hemoglobin A1c  04/20/2024

## 2024-04-14 NOTE — SUBJECTIVE & OBJECTIVE
Interval History: awake and alert, night was good   SOB with activity, coughing is improving, On 2 L NC     Review of Systems   Constitutional: Positive for fatigue. Negative for chills, diaphoresis and fever.   HENT: Negative for hearing loss and sore throat.    Respiratory: Positive for cough and shortness of breath.    Cardiovascular: Negative for chest pain, palpitations and leg swelling.   Gastrointestinal: Negative for abdominal pain, diarrhea, nausea and vomiting.   Genitourinary: Negative for difficulty urinating and flank pain.   Musculoskeletal: Negative for back pain and myalgias.   Skin: Negative for rash and wound.   Neurological: Negative for dizziness, weakness and headaches.   Psychiatric/Behavioral: Negative for agitation and confusion. The patient is not nervous/anxious.      Objective:     Vital Signs (Most Recent):  Temp: 97.7 °F (36.5 °C) (01/13/22 1141)  Pulse: 83 (01/13/22 1141)  Resp: 18 (01/13/22 1141)  BP: 131/68 (01/13/22 1141)  SpO2: 95 % (01/13/22 1141) Vital Signs (24h Range):  Temp:  [97.7 °F (36.5 °C)-98.9 °F (37.2 °C)] 97.7 °F (36.5 °C)  Pulse:  [] 83  Resp:  [18-24] 18  SpO2:  [88 %-96 %] 95 %  BP: (117-162)/(58-79) 131/68     Weight: 71.7 kg (158 lb)  Body mass index is 25.5 kg/m².    Intake/Output Summary (Last 24 hours) at 1/13/2022 1147  Last data filed at 1/12/2022 1749  Gross per 24 hour   Intake 245.46 ml   Output --   Net 245.46 ml      Physical Exam  Vitals and nursing note reviewed.   Constitutional:       General: He is not in acute distress.     Appearance: Normal appearance. He is not ill-appearing.      Interventions: Nasal cannula in place.      Comments: 2L   HENT:      Head: Normocephalic and atraumatic.   Cardiovascular:      Rate and Rhythm: Normal rate and regular rhythm.      Pulses: Normal pulses.      Heart sounds: Normal heart sounds.   Pulmonary:      Effort: Pulmonary effort is normal. No respiratory distress.   Abdominal:      General: There is no  Stay hydrated by drinking plenty of fluids.    Take antibiotics as prescribed.    Can take Zofran as needed for nausea.    Return to ER if new or worsening symptoms.   distension.      Palpations: Abdomen is soft.   Musculoskeletal:         General: No swelling.      Cervical back: Normal range of motion and neck supple.      Right lower leg: No edema.      Left lower leg: No edema.   Skin:     General: Skin is warm and dry.      Capillary Refill: Capillary refill takes less than 2 seconds.      Findings: No bruising, erythema or rash.   Neurological:      General: No focal deficit present.      Mental Status: He is alert and oriented to person, place, and time.      GCS: GCS eye subscore is 4. GCS verbal subscore is 5. GCS motor subscore is 6.   Psychiatric:         Mood and Affect: Mood normal.         Behavior: Behavior normal. Behavior is cooperative.         Significant Labs:   A1C: No results for input(s): HGBA1C in the last 4320 hours.  ABGs:   Recent Labs   Lab 01/12/22  1323   PH 7.435   PCO2 33.5*   HCO3 22.5*   POCSATURATED 96   BE -2   PO2 77*     Blood Culture: No results for input(s): LABBLOO in the last 48 hours.  CBC:   Recent Labs   Lab 01/12/22  1307 01/12/22  1323 01/13/22  0429   WBC 10.89  --  11.99   HGB 10.8*  --  9.5*   HCT 35.0* 33* 31.9*   *  --  521*     CMP:   Recent Labs   Lab 01/12/22  1307 01/13/22  0429    136  136   K 4.1 4.9  4.9    107  107   CO2 21* 20*  20*   * 164*  164*   BUN 17 27*  27*   CREATININE 1.1 1.0  1.0   CALCIUM 8.7 8.8  8.8   PROT 8.5* 8.0  8.0   ALBUMIN 2.5* 2.4*  2.4*   BILITOT 0.4 0.3  0.3   ALKPHOS 75 77  77   AST 22 16  16   ALT 23 23  23   ANIONGAP 11 9  9   EGFRNONAA >60 >60  >60     Lactic Acid:   Recent Labs   Lab 01/12/22  1508   LACTATE 0.8     Lipase: No results for input(s): LIPASE in the last 48 hours.  Lipid Panel: No results for input(s): CHOL, HDL, LDLCALC, TRIG, CHOLHDL in the last 48 hours.  Magnesium:   Recent Labs   Lab 01/13/22  0429   MG 2.1     Troponin:   Recent Labs   Lab 01/12/22  1508   TROPONINI <0.006     TSH: No results for input(s): TSH in the last 4320  hours.  Urine Culture: No results for input(s): LABURIN in the last 48 hours.  Urine Studies: No results for input(s): COLORU, APPEARANCEUA, PHUR, SPECGRAV, PROTEINUA, GLUCUA, KETONESU, BILIRUBINUA, OCCULTUA, NITRITE, UROBILINOGEN, LEUKOCYTESUR, RBCUA, WBCUA, BACTERIA, SQUAMEPITHEL, HYALINECASTS in the last 48 hours.    Invalid input(s): WRIGHTVIVIANA    Significant Imaging: I have reviewed all pertinent imaging results/findings within the past 24 hours.

## 2024-04-29 ENCOUNTER — LAB VISIT (OUTPATIENT)
Dept: LAB | Facility: HOSPITAL | Age: 74
End: 2024-04-29
Attending: FAMILY MEDICINE
Payer: MEDICARE

## 2024-04-29 DIAGNOSIS — E11.29 TYPE 2 DIABETES MELLITUS WITH MICROALBUMINURIA, WITHOUT LONG-TERM CURRENT USE OF INSULIN: ICD-10-CM

## 2024-04-29 DIAGNOSIS — Z79.899 MEDICATION MANAGEMENT: ICD-10-CM

## 2024-04-29 DIAGNOSIS — R80.9 TYPE 2 DIABETES MELLITUS WITH MICROALBUMINURIA, WITHOUT LONG-TERM CURRENT USE OF INSULIN: ICD-10-CM

## 2024-04-29 DIAGNOSIS — D64.9 ANEMIA, UNSPECIFIED TYPE: ICD-10-CM

## 2024-04-29 DIAGNOSIS — E78.2 MIXED HYPERLIPIDEMIA: Chronic | ICD-10-CM

## 2024-04-29 LAB
ALBUMIN SERPL BCP-MCNC: 3.4 G/DL (ref 3.5–5.2)
ALBUMIN/CREAT UR: 97.4 UG/MG (ref 0–30)
ALP SERPL-CCNC: 68 U/L (ref 55–135)
ALT SERPL W/O P-5'-P-CCNC: 13 U/L (ref 10–44)
ANION GAP SERPL CALC-SCNC: 9 MMOL/L (ref 8–16)
AST SERPL-CCNC: 17 U/L (ref 10–40)
BASOPHILS # BLD AUTO: 0.02 K/UL (ref 0–0.2)
BASOPHILS NFR BLD: 0.2 % (ref 0–1.9)
BILIRUB SERPL-MCNC: 0.4 MG/DL (ref 0.1–1)
BUN SERPL-MCNC: 14 MG/DL (ref 8–23)
CALCIUM SERPL-MCNC: 9.4 MG/DL (ref 8.7–10.5)
CHLORIDE SERPL-SCNC: 107 MMOL/L (ref 95–110)
CHOLEST SERPL-MCNC: 108 MG/DL (ref 120–199)
CHOLEST/HDLC SERPL: 3.7 {RATIO} (ref 2–5)
CO2 SERPL-SCNC: 21 MMOL/L (ref 23–29)
CREAT SERPL-MCNC: 1.1 MG/DL (ref 0.5–1.4)
CREAT UR-MCNC: 78 MG/DL (ref 23–375)
DIFFERENTIAL METHOD BLD: ABNORMAL
EOSINOPHIL # BLD AUTO: 0.2 K/UL (ref 0–0.5)
EOSINOPHIL NFR BLD: 2.1 % (ref 0–8)
ERYTHROCYTE [DISTWIDTH] IN BLOOD BY AUTOMATED COUNT: 14.6 % (ref 11.5–14.5)
EST. GFR  (NO RACE VARIABLE): >60 ML/MIN/1.73 M^2
ESTIMATED AVG GLUCOSE: 117 MG/DL (ref 68–131)
FOLATE SERPL-MCNC: 14 NG/ML (ref 4–24)
GLUCOSE SERPL-MCNC: 93 MG/DL (ref 70–110)
HBA1C MFR BLD: 5.7 % (ref 4–5.6)
HCT VFR BLD AUTO: 37.1 % (ref 40–54)
HDLC SERPL-MCNC: 29 MG/DL (ref 40–75)
HDLC SERPL: 26.9 % (ref 20–50)
HGB BLD-MCNC: 12 G/DL (ref 14–18)
IMM GRANULOCYTES # BLD AUTO: 0.02 K/UL (ref 0–0.04)
IMM GRANULOCYTES NFR BLD AUTO: 0.2 % (ref 0–0.5)
LDLC SERPL CALC-MCNC: 65 MG/DL (ref 63–159)
LYMPHOCYTES # BLD AUTO: 3.2 K/UL (ref 1–4.8)
LYMPHOCYTES NFR BLD: 36.8 % (ref 18–48)
MCH RBC QN AUTO: 32.8 PG (ref 27–31)
MCHC RBC AUTO-ENTMCNC: 32.3 G/DL (ref 32–36)
MCV RBC AUTO: 101 FL (ref 82–98)
MICROALBUMIN UR DL<=1MG/L-MCNC: 76 UG/ML
MONOCYTES # BLD AUTO: 0.5 K/UL (ref 0.3–1)
MONOCYTES NFR BLD: 5.9 % (ref 4–15)
NEUTROPHILS # BLD AUTO: 4.7 K/UL (ref 1.8–7.7)
NEUTROPHILS NFR BLD: 54.8 % (ref 38–73)
NONHDLC SERPL-MCNC: 79 MG/DL
NRBC BLD-RTO: 0 /100 WBC
PLATELET # BLD AUTO: 223 K/UL (ref 150–450)
PMV BLD AUTO: 11.3 FL (ref 9.2–12.9)
POTASSIUM SERPL-SCNC: 4.1 MMOL/L (ref 3.5–5.1)
PROT SERPL-MCNC: 8.6 G/DL (ref 6–8.4)
RBC # BLD AUTO: 3.66 M/UL (ref 4.6–6.2)
SODIUM SERPL-SCNC: 137 MMOL/L (ref 136–145)
TRIGL SERPL-MCNC: 70 MG/DL (ref 30–150)
WBC # BLD AUTO: 8.64 K/UL (ref 3.9–12.7)

## 2024-04-29 PROCEDURE — 36415 COLL VENOUS BLD VENIPUNCTURE: CPT | Performed by: FAMILY MEDICINE

## 2024-04-29 PROCEDURE — 82043 UR ALBUMIN QUANTITATIVE: CPT | Mod: HCNC | Performed by: FAMILY MEDICINE

## 2024-04-29 PROCEDURE — 80053 COMPREHEN METABOLIC PANEL: CPT | Mod: HCNC | Performed by: FAMILY MEDICINE

## 2024-04-29 PROCEDURE — 85025 COMPLETE CBC W/AUTO DIFF WBC: CPT | Mod: HCNC | Performed by: FAMILY MEDICINE

## 2024-04-29 PROCEDURE — 80061 LIPID PANEL: CPT | Mod: HCNC | Performed by: FAMILY MEDICINE

## 2024-04-29 PROCEDURE — 82746 ASSAY OF FOLIC ACID SERUM: CPT | Mod: HCNC | Performed by: FAMILY MEDICINE

## 2024-04-29 PROCEDURE — 83036 HEMOGLOBIN GLYCOSYLATED A1C: CPT | Mod: HCNC | Performed by: FAMILY MEDICINE

## 2024-05-24 DIAGNOSIS — E78.2 MIXED HYPERLIPIDEMIA: Chronic | ICD-10-CM

## 2024-05-24 RX ORDER — ATORVASTATIN CALCIUM 40 MG/1
40 TABLET, FILM COATED ORAL NIGHTLY
Qty: 90 TABLET | Refills: 1 | Status: SHIPPED | OUTPATIENT
Start: 2024-05-24

## 2024-05-24 NOTE — TELEPHONE ENCOUNTER
No care due was identified.  Health [facility] Embedded Care Due Messages. Reference number: [id number].   5/24/2024 5:29:03 AM CDT

## 2024-05-24 NOTE — TELEPHONE ENCOUNTER
Refill Decision Note   Scooter Morrissey  is requesting a refill authorization.    Brief Assessment and Rationale for Refill:   Approve       Medication Therapy Plan:         Comments:     Note composed:9:54 AM 05/24/2024

## 2024-06-02 NOTE — PROGRESS NOTES
Office Visit    Patient Name: Scooter Morrissey    : 1950  MRN: [id number]    Subjective:  Scooter is a 73 y.o. male who presents today for:    Follow-up (6m)    ANNUAL PHYSICAL 23  FOLLOWED UP WITH ENT DR. ARAUJO  24 (surgery 22):  ADVISED TO CONTINUE XYZAL, NEILMED SALINE SINUS RINSE & FLONASE     Scooter Morrissey is a 73 year old male with past medical history of COPD/ Asthma, HTN, HLD, previous COVID-19 Infection (2021).   He was diagnosed with type 2 diabetes with an A1c of 6.5 on 2022, urine microalbumin of 72.     He presents today for 6 month follow up  with lab review and monitoring of chronic conditions.     LABS PRIOR TO OFFICE VISIT 24 show chronic stable mild macrocytic anemia with HCT of 37.1, normal folate and previously normal B12, unremarkable CMP with normal liver/kidney function, stable A1c of 5.7, urine microalbumin improved from 305-->97.   Prior labs with B12  546 & VITAMIN-D IS 46.  He is continuing to take metformin 500 extended release daily and Lipitor 40 mg daily.  He is on 2000 IU vitamin D3 daily.     HTN: He is continuing to take his medications regularly- Toprol , Amlodipine 10 and Losartan 100-- Home BPs in goal range (130s/70s)  HLD: He is taking atorvastatin 40 regularly and added a Fish oil supplement  COPD/Asthma: He is taking Symbicort regularly AM and PM, and he has not recently needed albuterol.  Allergies/Chronic Sinusitis:  Noted significant improvement following ethmoidectomy/sinus surgery per ENT Dr. Araujo 22.  Continuing Xyzal, nasal saline, and Flonase.  Breathing well through his nose.  Allergy symptoms improve after spring season is over     He continues to not smoke or drink alcohol.      No acute complaints. no recent need for rescue inhaler. 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      GENERAL LIFESTYLE HABITS:   Diet:  More mindful of sugar intake and trying to substitute complex carbs, trying to drink 1/2 gallon  of water daily and fewer sugary drinks like Cokes & gatorade. Needs to be more mindful of salt.   Exercise: remains very active with grass [employer]/ lawn care business-- about 8-10 lawns per week but no additional exercise  Sleep: 8+ hours, feels rested in the morning  Weight:  Additional down trend in weight by about 5 lb with improvement in BMI from 26 down to 25.     Immunizations: PREVNAR 13 4/10/15, PNEUMOVAX 23 11/29/17, TDaP 4/5/2016, SHINGRIX 2/2 11/6/23, YEARLY FLU SHOT DECLINED, MODERNA INITIAL COVID VACCINE COMPLETED AND UPDATED FALL 2023 COVID-19 VACCINE ADVISED ALONG WITH RSV VACCINE ESPECIALLY GIVEN PULMONARY HISTORY.     Screening Tests: AAA SCREENING 5/17/21, Hep C Screen (-) 3/9/22, Colonoscopy 6/1/16- REPEAT 10 YEARS(6/2026), last PSA  0.43 10/4/19     Eye/Dental: Eye Exam Optometry Dr Napier 2/12/24-- no retinopathy, Dental        PAST MEDICAL HISTORY, SURGICAL/SOCIAL/FAMILY HISTORY REVIEWED AS PER CHART, WITH PERTINENT FINDINGS INCLUDED IN HISTORY SECTION OF NOTE.     Current Medications    Medication List with Changes/Refills   New Medications    RSVPREF3 ANTIGEN-AS01E, PF, (AREXVY, PF,) 120 MCG/0.5 ML SUSR VACCINE    Inject 0.5 mLs into the muscle once. for 1 dose   Current Medications    ALBUTEROL (VENTOLIN HFA) 90 MCG/ACTUATION INHALER    Inhale 2 puffs into the lungs every 4 (four) hours as needed for Wheezing or Shortness of Breath. Rescue    AMLODIPINE (NORVASC) 10 MG TABLET    TAKE 1 TABLET BY MOUTH EVERY DAY    ASCORBIC ACID, VITAMIN C, (VITAMIN C) 500 MG TABLET    Take 1 tablet (500 mg total) by mouth 2 (two) times daily.    ATORVASTATIN (LIPITOR) 40 MG TABLET    TAKE 1 TABLET(40 MG) BY MOUTH EVERY EVENING    BUDESONIDE (PULMICORT) 0.5 MG/2 ML NEBULIZER SOLUTION    EMPTY CONTENTS OF 1 VIAL INTO NASAL IRRIGATION SYSTEM, ADD DISTILLED WATER, SALT PACK, MIX & IRRIGATE. PERFORM 1-2 TIMES DAILY    BUDESONIDE-FORMOTEROL 160-4.5 MCG (SYMBICORT) 160-4.5 MCG/ACTUATION HFAA    INHALE 2 PUFFS INTO  "THE LUNGS BY MOUTH TWICE DAILY    CALCIUM-VITAMIN D3 (OS-SALLY 500 + D3) 500 MG-5 MCG (200 UNIT) PER TABLET    Take 2 tablets by mouth once daily.    DICLOFENAC SODIUM (VOLTAREN ARTHRITIS PAIN) 1 % GEL    Apply 4 g topically 3 (three) times daily as needed (hand pain).    FLUTICASONE PROPIONATE (FLONASE) 50 MCG/ACTUATION NASAL SPRAY    2 sprays (100 mcg total) by Each Nostril route once daily.    LEVOCETIRIZINE (XYZAL) 5 MG TABLET    Take 1 tablet (5 mg total) by mouth every evening.    LOSARTAN (COZAAR) 100 MG TABLET    TAKE 1 TABLET(100 MG) BY MOUTH EVERY DAY    METFORMIN (GLUCOPHAGE-XR) 500 MG ER 24HR TABLET    TAKE 1 TABLET BY MOUTH EVERY DAY WITH DINNER OR EVENING MEAL    METOPROLOL SUCCINATE (TOPROL-XL) 100 MG 24 HR TABLET    Take 1 tablet (100 mg total) by mouth once daily.    MULTIVITAMIN WITH MINERALS TABLET    Take 1 tablet by mouth nightly.     PULSE OXIMETER (PULSE OXIMETER) DEVICE    by Apply Externally route 2 (two) times a day. Use twice daily at 8 AM and 3 PM and record the value in MyChart as directed.    VITAMIN D (VITAMIN D3) 1000 UNITS TAB    Take 1,000 Units by mouth once daily.       Allergies   Review of patient's allergies indicates:  No Known Allergies      Review of Systems (Pertinent positives)  Review of Systems   Constitutional:  Negative for unexpected weight change.   Respiratory:  Negative for cough, shortness of breath and wheezing.    Cardiovascular:  Negative for chest pain and leg swelling.   Allergic/Immunologic: Positive for environmental allergies.       /68   Pulse 91   Temp 98.1 °F (36.7 °C)   Ht 5' 7" (1.702 m)   Wt 74.2 kg (163 lb 9.3 oz)   SpO2 95%   BMI 25.62 kg/m²     Physical Exam  Vitals reviewed.   Constitutional:       General: He is not in acute distress.     Appearance: He is well-developed.   HENT:      Head: Normocephalic and atraumatic.   Eyes:      Conjunctiva/sclera: Conjunctivae normal.   Cardiovascular:      Rate and Rhythm: Normal rate and regular " rhythm.   Pulmonary:      Effort: Pulmonary effort is normal. No respiratory distress.      Breath sounds: Wheezing and rhonchi present.      Comments: SOME CHRONIC SCATTERED WHEEZES AND RHONCHI  Musculoskeletal:      Right lower leg: No edema.      Left lower leg: No edema.   Skin:     General: Skin is warm and dry.   Neurological:      General: No focal deficit present.      Mental Status: He is alert and oriented to person, place, and time.   Psychiatric:         Mood and Affect: Mood normal.         Behavior: Behavior normal.           Assessment/Plan:  Scooter Morrissey is a 73 y.o. male who presents today for :        ICD-10-CM ICD-9-CM    1. Type 2 diabetes mellitus with microalbuminuria  E11.29 250.40 Hemoglobin A1C    R80.9 791.0 Comprehensive Metabolic Panel      Lipid Panel      CBC Auto Differential      TSH      Vitamin D      Vitamin B12      Magnesium      2. Mixed hyperlipidemia  E78.2 272.2 Comprehensive Metabolic Panel      Lipid Panel      3. Essential hypertension  I10 401.9 Hemoglobin A1C      Comprehensive Metabolic Panel      Lipid Panel      CBC Auto Differential      TSH      Vitamin D      Vitamin B12      Magnesium      4. Aortic atherosclerosis  I70.0 440.0       5. Asthma-COPD overlap syndrome  J44.89 493.20       6. H/O pleural empyema  Z87.09 V12.69       7. Anemia, unspecified type  D64.9 285.9 Comprehensive Metabolic Panel      CBC Auto Differential      Vitamin B12      8. Overweight (BMI 25.0-29.9)  E66.3 278.02       9. Medication management  Z79.899 V58.69 Hemoglobin A1C      Comprehensive Metabolic Panel      Lipid Panel      CBC Auto Differential      TSH      Vitamin D      Vitamin B12      Magnesium      10. Need for RSV immunization  Z29.11 V04.82         ADVISED ON DIET/EXERCISE/SLEEP, ROUTINE EYE/DENTAL EXAMS, AND THE IMPORTANCE OF KEEPING UP WITH APPROPRIATE SCREENING TESTS BASED ON AGE AND RISK FACTORS.  IMMUNIZATIONS REVIEWED:  SEASONAL FLU SHOT & UPDATED COVID -19 SHOT  DECLINED, SHINGRIX 2/2 COMPLETED, RSV VACCINE ADVISED TODAY THROUGH PHARMACY OBTAINED 6/3/24.   NEXT COLONOSCOPY DUE 6/2026     OVERWEIGHT BMI:  Advised on continued attention to low carb diet, regular exercise, continue Metformin 500 XR daily-- current A1c 5.7 and down 5 lbs. Recheck in 6 months.     CHRONIC CONTROLLED TYPE 2 DIABETES W/ MILD MICROALBUMINURIA:  A1C IMPROVED ON METFORMIN 500 XR DAILY & CURRENTLY 5.7 with decrease in urine microalbumin. Blood pressure is also controlled and he is on an ARB.  Most recent kidney function now normal--continued increased attention to hydration.  Recheck A1c/ CMP in 6 months.     HTN: controlled on Toprol , Amlodipine 10 and Losartan 100- ongoing monitoring     ALLERGIC RHINITIS, CHRONIC SINUSITIS:  Significantly improved following ethmoidectomy/sinus surgery with ENT Dr. Simmons 6/30/22, Continuing Xyzal/ nasal saline and flonse, PULMICORT RINSES WHEN ALLERGY SEASONS ARE HIGH.       ASTHMA/COPD, HISTORY OF COVID PNEUMONIA AND PRIOR HISTORY OF PLEURAL EMPYEMA:  Has some chronic crackles/rhonchi/wheezes on his lung exam, but breathing and exercise tolerance are overall stable to improved.    Continue Symbicort daily.  Continue regular exercise. Follows periodically with Dr. Guerrier pulmonology.  Albuterol, nebulizer p.r.n., monitors his oxygen level and has had no concerns.  Has not needed albuterol recently      HYPERLIPIDEMIA WITH AORTIC ATHEROSCLEROSIS:  Remaining tobacco free, blood pressure controlled, LDL at goal on atorvastatin 40-- recheck in 6 month.  He is now on a fish oil supplement.     ANEMIA: chronic mild and stable, B12/folate, iron studies unremarkable & renal function now WNL w/ EGFR >60 -- ongoing monitoring.       Patient Instructions   OBTAIN THE RSV VACCINE THROUGH THE PHARMACY       Follow up in about 6 months (around 12/3/2024) for to follow up on lab results, return as needed for new concerns.

## 2024-06-03 ENCOUNTER — OFFICE VISIT (OUTPATIENT)
Dept: FAMILY MEDICINE | Facility: CLINIC | Age: 74
End: 2024-06-03
Payer: MEDICARE

## 2024-06-03 VITALS
SYSTOLIC BLOOD PRESSURE: 128 MMHG | OXYGEN SATURATION: 95 % | DIASTOLIC BLOOD PRESSURE: 68 MMHG | BODY MASS INDEX: 25.67 KG/M2 | WEIGHT: 163.56 LBS | HEIGHT: 67 IN | TEMPERATURE: 98 F | HEART RATE: 91 BPM

## 2024-06-03 DIAGNOSIS — I70.0 AORTIC ATHEROSCLEROSIS: ICD-10-CM

## 2024-06-03 DIAGNOSIS — J44.89 ASTHMA-COPD OVERLAP SYNDROME: ICD-10-CM

## 2024-06-03 DIAGNOSIS — Z79.899 MEDICATION MANAGEMENT: ICD-10-CM

## 2024-06-03 DIAGNOSIS — Z87.09 H/O PLEURAL EMPYEMA: ICD-10-CM

## 2024-06-03 DIAGNOSIS — E11.29 TYPE 2 DIABETES MELLITUS WITH MICROALBUMINURIA: Primary | ICD-10-CM

## 2024-06-03 DIAGNOSIS — Z29.11 NEED FOR RSV IMMUNIZATION: ICD-10-CM

## 2024-06-03 DIAGNOSIS — D64.9 ANEMIA, UNSPECIFIED TYPE: ICD-10-CM

## 2024-06-03 DIAGNOSIS — R80.9 TYPE 2 DIABETES MELLITUS WITH MICROALBUMINURIA: Primary | ICD-10-CM

## 2024-06-03 DIAGNOSIS — I10 ESSENTIAL HYPERTENSION: Chronic | ICD-10-CM

## 2024-06-03 DIAGNOSIS — E78.2 MIXED HYPERLIPIDEMIA: Chronic | ICD-10-CM

## 2024-06-03 DIAGNOSIS — E66.3 OVERWEIGHT (BMI 25.0-29.9): ICD-10-CM

## 2024-06-03 PROCEDURE — 1159F MED LIST DOCD IN RCRD: CPT | Mod: HCNC,CPTII,S$GLB, | Performed by: FAMILY MEDICINE

## 2024-06-03 PROCEDURE — 3060F POS MICROALBUMINURIA REV: CPT | Mod: HCNC,CPTII,S$GLB, | Performed by: FAMILY MEDICINE

## 2024-06-03 PROCEDURE — 3288F FALL RISK ASSESSMENT DOCD: CPT | Mod: HCNC,CPTII,S$GLB, | Performed by: FAMILY MEDICINE

## 2024-06-03 PROCEDURE — 3078F DIAST BP <80 MM HG: CPT | Mod: HCNC,CPTII,S$GLB, | Performed by: FAMILY MEDICINE

## 2024-06-03 PROCEDURE — 99999 PR PBB SHADOW E&M-EST. PATIENT-LVL V: CPT | Mod: PBBFAC,HCNC,, | Performed by: FAMILY MEDICINE

## 2024-06-03 PROCEDURE — 1160F RVW MEDS BY RX/DR IN RCRD: CPT | Mod: HCNC,CPTII,S$GLB, | Performed by: FAMILY MEDICINE

## 2024-06-03 PROCEDURE — 1126F AMNT PAIN NOTED NONE PRSNT: CPT | Mod: HCNC,CPTII,S$GLB, | Performed by: FAMILY MEDICINE

## 2024-06-03 PROCEDURE — 99214 OFFICE O/P EST MOD 30 MIN: CPT | Mod: HCNC,S$GLB,, | Performed by: FAMILY MEDICINE

## 2024-06-03 PROCEDURE — 3066F NEPHROPATHY DOC TX: CPT | Mod: HCNC,CPTII,S$GLB, | Performed by: FAMILY MEDICINE

## 2024-06-03 PROCEDURE — 3044F HG A1C LEVEL LT 7.0%: CPT | Mod: HCNC,CPTII,S$GLB, | Performed by: FAMILY MEDICINE

## 2024-06-03 PROCEDURE — 4010F ACE/ARB THERAPY RXD/TAKEN: CPT | Mod: HCNC,CPTII,S$GLB, | Performed by: FAMILY MEDICINE

## 2024-06-03 PROCEDURE — 1101F PT FALLS ASSESS-DOCD LE1/YR: CPT | Mod: HCNC,CPTII,S$GLB, | Performed by: FAMILY MEDICINE

## 2024-06-03 PROCEDURE — 3074F SYST BP LT 130 MM HG: CPT | Mod: HCNC,CPTII,S$GLB, | Performed by: FAMILY MEDICINE

## 2024-08-28 DIAGNOSIS — I10 BENIGN ESSENTIAL HYPERTENSION: ICD-10-CM

## 2024-08-28 NOTE — TELEPHONE ENCOUNTER
Care Due:                  Date            Visit Type   Department     Provider  --------------------------------------------------------------------------------                                EP -                              [facility]  Last Visit: 06-      CARE ([facility])   THOMAS Rojas                              [facility]  Next Visit: 12-      CARE ([facility])   THOMAS Rojas                                                            Last  Test          Frequency    Reason                     Performed    Due Date  --------------------------------------------------------------------------------    HBA1C.......  6 months...  metFORMIN................  04-   10-    [facility] Embedded Care Due Messages. Reference number: [id number].   8/28/2024 3:52:32 PM CDT

## 2024-08-29 RX ORDER — METOPROLOL SUCCINATE 100 MG/1
100 TABLET, EXTENDED RELEASE ORAL DAILY
Qty: 90 TABLET | Refills: 3 | Status: SHIPPED | OUTPATIENT
Start: 2024-08-29

## 2024-09-12 ENCOUNTER — TELEPHONE (OUTPATIENT)
Dept: OPTOMETRY | Facility: CLINIC | Age: 74
End: 2024-09-12
Payer: MEDICARE

## 2024-09-12 RX ORDER — FLUTICASONE PROPIONATE 50 MCG
2 SPRAY, SUSPENSION (ML) NASAL DAILY
Qty: 9.9 ML | Refills: 11 | Status: SHIPPED | OUTPATIENT
Start: 2024-09-12

## 2024-09-12 NOTE — TELEPHONE ENCOUNTER
ELIZABETHM informing pt that his cat eval is scheduled with Dr. Mendieta at [facility] on Nov 11th. Reminder will be sent in the mail.

## 2024-10-16 DIAGNOSIS — J44.89 ASTHMA WITH COPD: ICD-10-CM

## 2024-10-16 RX ORDER — BUDESONIDE AND FORMOTEROL FUMARATE DIHYDRATE 160; 4.5 UG/1; UG/1
AEROSOL RESPIRATORY (INHALATION)
Qty: 30.6 G | Refills: 4 | Status: SHIPPED | OUTPATIENT
Start: 2024-10-16

## 2024-10-16 NOTE — TELEPHONE ENCOUNTER
No care due was identified.  [facility] Embedded Care Due Messages. Reference number: [id number].   10/16/2024 9:25:30 AM CDT

## 2024-11-11 ENCOUNTER — OFFICE VISIT (OUTPATIENT)
Dept: OPHTHALMOLOGY | Facility: CLINIC | Age: 74
End: 2024-11-11
Payer: MEDICARE

## 2024-11-11 DIAGNOSIS — H25.12 NUCLEAR SCLEROTIC CATARACT OF LEFT EYE: Primary | ICD-10-CM

## 2024-11-11 DIAGNOSIS — H52.203 HIGH DEGREE OF ASTIGMATISM IN BOTH EYES: ICD-10-CM

## 2024-11-11 DIAGNOSIS — H25.11 NUCLEAR SCLEROTIC CATARACT OF RIGHT EYE: ICD-10-CM

## 2024-11-11 PROCEDURE — 3044F HG A1C LEVEL LT 7.0%: CPT | Mod: CPTII,LT,S$GLB, | Performed by: OPHTHALMOLOGY

## 2024-11-11 PROCEDURE — 3066F NEPHROPATHY DOC TX: CPT | Mod: CPTII,S$GLB,, | Performed by: OPHTHALMOLOGY

## 2024-11-11 PROCEDURE — 4010F ACE/ARB THERAPY RXD/TAKEN: CPT | Mod: CPTII,S$GLB,, | Performed by: OPHTHALMOLOGY

## 2024-11-11 PROCEDURE — 1101F PT FALLS ASSESS-DOCD LE1/YR: CPT | Mod: CPTII,S$GLB,, | Performed by: OPHTHALMOLOGY

## 2024-11-11 PROCEDURE — 1159F MED LIST DOCD IN RCRD: CPT | Mod: CPTII,S$GLB,, | Performed by: OPHTHALMOLOGY

## 2024-11-11 PROCEDURE — 3060F POS MICROALBUMINURIA REV: CPT | Mod: CPTII,S$GLB,, | Performed by: OPHTHALMOLOGY

## 2024-11-11 PROCEDURE — 3288F FALL RISK ASSESSMENT DOCD: CPT | Mod: CPTII,S$GLB,, | Performed by: OPHTHALMOLOGY

## 2024-11-11 PROCEDURE — 99999 PR PBB SHADOW E&M-EST. PATIENT-LVL II: CPT | Mod: PBBFAC,,, | Performed by: OPHTHALMOLOGY

## 2024-11-11 PROCEDURE — 1126F AMNT PAIN NOTED NONE PRSNT: CPT | Mod: CPTII,S$GLB,, | Performed by: OPHTHALMOLOGY

## 2024-11-11 PROCEDURE — 92004 COMPRE OPH EXAM NEW PT 1/>: CPT | Mod: S$GLB,,, | Performed by: OPHTHALMOLOGY

## 2024-11-11 NOTE — PROGRESS NOTES
HPI    Referred by Dr. Karthikeyan newby in [place].    Nuclear Sclerosis OU  Cortical Age-Related Cataract of both eyes  Type 2 Diabetes Mellitus without Ophthalmic Manifestations  Type 2 Diabetes Mellitus with Microalbuminuria, without long-term current   use of Insulin     Eye Meds: None     Patient is here today for a cataract evaluation. States it is difficult to   drive at night due to bright lights and glare. Denies eye pain, flashes   and floaters.   Last edited by Yris Mendieta MD on 11/11/2024 10:02 AM.            Assessment /Plan     For exam results, see Encounter Report.    Nuclear sclerotic cataract of left eye  -     Cancel: IOL Master - OU - Both Eyes    Nuclear sclerotic cataract of right eye    High degree of astigmatism in both eyes          nuclear sclerotic cataract OU    Becoming visually significant, however patient not anxious to have surgery at this time.  We will hold off for now.      Patient wears toric CL for high astigmatism    Follow up 1 year, cataract evaluation, sooner prn

## 2024-12-02 ENCOUNTER — LAB VISIT (OUTPATIENT)
Dept: LAB | Facility: HOSPITAL | Age: 74
End: 2024-12-02
Attending: FAMILY MEDICINE
Payer: MEDICARE

## 2024-12-02 DIAGNOSIS — E11.29 TYPE 2 DIABETES MELLITUS WITH MICROALBUMINURIA: ICD-10-CM

## 2024-12-02 DIAGNOSIS — Z79.899 MEDICATION MANAGEMENT: ICD-10-CM

## 2024-12-02 DIAGNOSIS — R80.9 TYPE 2 DIABETES MELLITUS WITH MICROALBUMINURIA: ICD-10-CM

## 2024-12-02 DIAGNOSIS — D64.9 ANEMIA, UNSPECIFIED TYPE: ICD-10-CM

## 2024-12-02 DIAGNOSIS — E78.2 MIXED HYPERLIPIDEMIA: Chronic | ICD-10-CM

## 2024-12-02 DIAGNOSIS — I10 ESSENTIAL HYPERTENSION: Chronic | ICD-10-CM

## 2024-12-02 LAB
25(OH)D3+25(OH)D2 SERPL-MCNC: 60 NG/ML (ref 30–96)
ALBUMIN SERPL BCP-MCNC: 3.4 G/DL (ref 3.5–5.2)
ALP SERPL-CCNC: 63 U/L (ref 40–150)
ALT SERPL W/O P-5'-P-CCNC: 17 U/L (ref 10–44)
ANION GAP SERPL CALC-SCNC: 9 MMOL/L (ref 8–16)
AST SERPL-CCNC: 22 U/L (ref 10–40)
BASOPHILS # BLD AUTO: 0.03 K/UL (ref 0–0.2)
BASOPHILS NFR BLD: 0.3 % (ref 0–1.9)
BILIRUB SERPL-MCNC: 0.4 MG/DL (ref 0.1–1)
BUN SERPL-MCNC: 22 MG/DL (ref 8–23)
CALCIUM SERPL-MCNC: 9.8 MG/DL (ref 8.7–10.5)
CHLORIDE SERPL-SCNC: 106 MMOL/L (ref 95–110)
CHOLEST SERPL-MCNC: 126 MG/DL (ref 120–199)
CHOLEST/HDLC SERPL: 4.2 {RATIO} (ref 2–5)
CO2 SERPL-SCNC: 22 MMOL/L (ref 23–29)
CREAT SERPL-MCNC: 1.2 MG/DL (ref 0.5–1.4)
DIFFERENTIAL METHOD BLD: ABNORMAL
EOSINOPHIL # BLD AUTO: 0.3 K/UL (ref 0–0.5)
EOSINOPHIL NFR BLD: 3.6 % (ref 0–8)
ERYTHROCYTE [DISTWIDTH] IN BLOOD BY AUTOMATED COUNT: 14.7 % (ref 11.5–14.5)
EST. GFR  (NO RACE VARIABLE): >60 ML/MIN/1.73 M^2
ESTIMATED AVG GLUCOSE: 114 MG/DL (ref 68–131)
GLUCOSE SERPL-MCNC: 97 MG/DL (ref 70–110)
HBA1C MFR BLD: 5.6 % (ref 4–5.6)
HCT VFR BLD AUTO: 38 % (ref 40–54)
HDLC SERPL-MCNC: 30 MG/DL (ref 40–75)
HDLC SERPL: 23.8 % (ref 20–50)
HGB BLD-MCNC: 11.8 G/DL (ref 14–18)
IMM GRANULOCYTES # BLD AUTO: 0.01 K/UL (ref 0–0.04)
IMM GRANULOCYTES NFR BLD AUTO: 0.1 % (ref 0–0.5)
LDLC SERPL CALC-MCNC: 79.6 MG/DL (ref 63–159)
LYMPHOCYTES # BLD AUTO: 3.6 K/UL (ref 1–4.8)
LYMPHOCYTES NFR BLD: 40 % (ref 18–48)
MAGNESIUM SERPL-MCNC: 1.9 MG/DL (ref 1.6–2.6)
MCH RBC QN AUTO: 32.6 PG (ref 27–31)
MCHC RBC AUTO-ENTMCNC: 31.1 G/DL (ref 32–36)
MCV RBC AUTO: 105 FL (ref 82–98)
MONOCYTES # BLD AUTO: 0.6 K/UL (ref 0.3–1)
MONOCYTES NFR BLD: 7 % (ref 4–15)
NEUTROPHILS # BLD AUTO: 4.4 K/UL (ref 1.8–7.7)
NEUTROPHILS NFR BLD: 49 % (ref 38–73)
NONHDLC SERPL-MCNC: 96 MG/DL
NRBC BLD-RTO: 0 /100 WBC
PLATELET # BLD AUTO: 236 K/UL (ref 150–450)
PMV BLD AUTO: 10.9 FL (ref 9.2–12.9)
POTASSIUM SERPL-SCNC: 4.9 MMOL/L (ref 3.5–5.1)
PROT SERPL-MCNC: 8.9 G/DL (ref 6–8.4)
RBC # BLD AUTO: 3.62 M/UL (ref 4.6–6.2)
SODIUM SERPL-SCNC: 137 MMOL/L (ref 136–145)
TRIGL SERPL-MCNC: 82 MG/DL (ref 30–150)
TSH SERPL DL<=0.005 MIU/L-ACNC: 1.95 UIU/ML (ref 0.4–4)
VIT B12 SERPL-MCNC: 490 PG/ML (ref 210–950)
WBC # BLD AUTO: 8.98 K/UL (ref 3.9–12.7)

## 2024-12-02 PROCEDURE — 85025 COMPLETE CBC W/AUTO DIFF WBC: CPT | Mod: HCNC | Performed by: FAMILY MEDICINE

## 2024-12-02 PROCEDURE — 80061 LIPID PANEL: CPT | Mod: HCNC | Performed by: FAMILY MEDICINE

## 2024-12-02 PROCEDURE — 82607 VITAMIN B-12: CPT | Mod: HCNC | Performed by: FAMILY MEDICINE

## 2024-12-02 PROCEDURE — 83036 HEMOGLOBIN GLYCOSYLATED A1C: CPT | Mod: HCNC | Performed by: FAMILY MEDICINE

## 2024-12-02 PROCEDURE — 82306 VITAMIN D 25 HYDROXY: CPT | Mod: HCNC | Performed by: FAMILY MEDICINE

## 2024-12-02 PROCEDURE — 36415 COLL VENOUS BLD VENIPUNCTURE: CPT | Performed by: FAMILY MEDICINE

## 2024-12-02 PROCEDURE — 80053 COMPREHEN METABOLIC PANEL: CPT | Mod: HCNC | Performed by: FAMILY MEDICINE

## 2024-12-02 PROCEDURE — 83735 ASSAY OF MAGNESIUM: CPT | Mod: HCNC | Performed by: FAMILY MEDICINE

## 2024-12-02 PROCEDURE — 84443 ASSAY THYROID STIM HORMONE: CPT | Mod: HCNC | Performed by: FAMILY MEDICINE

## 2024-12-08 NOTE — PROGRESS NOTES
Office Visit    Patient Name: Scooter Morrissey    : 1950  MRN: [id number]    Subjective:  Scooter is a 74 y.o. male who presents today for:    Annual Exam    MOST RECENT OV w/ Me 6/3/24  Prior PHYSICAL 23 Cataract Eval w/ Dr Mendieta     FOLLOWED UP w/ ENT DR. ARAUJO  24 (Sinus surgery 22):  ADVISED TO CONTINUE XYZAL, NEILMED SALINE SINUS RINSE & FLONASE     Scooter Morrissey is a 74 year old male with past medical history of COPD/ Asthma, HTN, HLD, previous COVID-19 Infection (2021).   He was diagnosed with type 2 diabetes with an A1c of 6.5 on 2022, urine microalbumin of 72.     He presents today for annual physical with lab review and monitoring of chronic conditions.     LABS PRIOR TO OFFICE VISIT 24 with A1c improved to 5.6 (from 5.7), unremarkable CMP with normal liver and kidney function, CBC w/ HCT improved to 38(WBC/PLT WNL)- chronic macrocytosis w/ B12 490(Folate 14 24), Vit D 60 on 2,000 U D3 daily, TSH 1.9.  LDL 79.     Diabetes: Taking Metformin 500 XR daily.    HTN: Takes medications regularly- Toprol , Amlodipine 10 and Losartan 100-- Home BPs in goal range (130s/70s)  HLD: He is taking atorvastatin 40 regularly and added a Fish oil supplement  COPD/Asthma: He is taking Symbicort regularly AM and PM, and he has not recently needed albuterol.  Allergies/Chronic Sinusitis:  Noted significant improvement following ethmoidectomy/sinus surgery per ENT Dr. Araujo 22.  Continuing Xyzal, nasal saline, and Flonase.  Breathing well through his nose.  Allergy symptoms generally improve after spring season      He does not smoke or drink alcohol.      No acute complaints. no recent need for rescue inhaler. 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  Has not been able to add weight-bearing/strength training activities as previously advised and would like to discuss this.     GENERAL LIFESTYLE HABITS:   Diet:  More mindful of sugar intake and trying to substitute  complex carbs, trying to drink 1/2 gallon of water daily and fewer sugary drinks like Cokes & gatorade. Needs to be more mindful of salt.   Exercise: remains very active with grass [employer]/ lawn care business-- about 8-10 lawns per week but no additional exercise  Sleep: 8+ hours, feels rested in the morning tough tdoes go to be frequently after midnight  Weight:  Additional down trend in weight by about 5 lb with improvement in BMI from 26 down to 25 which she has been maintaining.     Immunizations: PREVNAR 13 4/10/15, PNEUMOVAX 23 11/29/17, TDaP 4/5/2016, SHINGRIX 2/2 11/6/23, YEARLY FLU SHOT DECLINED, MODERNA INITIAL COVID VACCINE COMPLETED & UPDATED FALL 2024 COVID-19 VACCINE ADVISED. RSV VACCINE COMPLETED 6/3/24     Screening Tests: AAA SCREENING 5/17/21, Hep C Screen (-) 3/9/22, Colonoscopy 6/1/16- REPEAT 10 YEARS(6/2026), last PSA  0.43 10/4/19     Eye/Dental: Eye Exam Optometry Dr Napier 2/12/24-- no retinopathy, Dental-- has upper full dentures lower partial and has not been to the dentist in awhile.        Cataract Eval with Ophthalmology 11/11/24       PAST MEDICAL HISTORY, SURGICAL/SOCIAL/FAMILY HISTORY REVIEWED AS PER CHART, WITH PERTINENT FINDINGS INCLUDED IN HISTORY SECTION OF NOTE.     Current Medications    Medication List with Changes/Refills   New Medications    SODIUM CHLORIDE 0.9 % SPRA    1 Application by Nasal route 2 (two) times a day.   Current Medications    AMLODIPINE (NORVASC) 10 MG TABLET    TAKE 1 TABLET BY MOUTH EVERY DAY    ASCORBIC ACID, VITAMIN C, (VITAMIN C) 500 MG TABLET    Take 1 tablet (500 mg total) by mouth 2 (two) times daily.    ATORVASTATIN (LIPITOR) 40 MG TABLET    TAKE 1 TABLET(40 MG) BY MOUTH EVERY EVENING    BUDESONIDE-FORMOTEROL 160-4.5 MCG (SYMBICORT) 160-4.5 MCG/ACTUATION HFAA    INHALE 2 PUFFS INTO THE LUNGS BY MOUTH TWICE DAILY    FLUTICASONE PROPIONATE (FLONASE) 50 MCG/ACTUATION NASAL SPRAY    2 sprays (100 mcg total) by Each Nostril route once daily.     LEVOCETIRIZINE (XYZAL) 5 MG TABLET    Take 1 tablet (5 mg total) by mouth every evening.    LOSARTAN (COZAAR) 100 MG TABLET    TAKE 1 TABLET(100 MG) BY MOUTH EVERY DAY    METFORMIN (GLUCOPHAGE-XR) 500 MG ER 24HR TABLET    TAKE 1 TABLET BY MOUTH EVERY DAY WITH DINNER OR EVENING MEAL    METOPROLOL SUCCINATE (TOPROL-XL) 100 MG 24 HR TABLET    Take 1 tablet (100 mg total) by mouth once daily.    MULTIVITAMIN WITH MINERALS TABLET    Take 1 tablet by mouth nightly.     PULSE OXIMETER (PULSE OXIMETER) DEVICE    by Apply Externally route 2 (two) times a day. Use twice daily at 8 AM and 3 PM and record the value in [facility] as directed.    VITAMIN D (VITAMIN D3) 1000 UNITS TAB    Take 2,000 Units by mouth once daily.   Changed and/or Refilled Medications    Modified Medication Previous Medication    ALBUTEROL (VENTOLIN HFA) 90 MCG/ACTUATION INHALER albuterol (VENTOLIN HFA) 90 mcg/actuation inhaler       Inhale 2 puffs into the lungs every 4 (four) hours as needed for Wheezing or Shortness of Breath. Rescue    Inhale 2 puffs into the lungs every 4 (four) hours as needed for Wheezing or Shortness of Breath. Rescue    CALCIUM-VITAMIN D3 (OS-SALLY 500 + D3) 500 MG-5 MCG (200 UNIT) PER TABLET calcium-vitamin D3 (OS-SALLY 500 + D3) 500 mg-5 mcg (200 unit) per tablet       Take 2 tablets by mouth once daily.    Take 2 tablets by mouth once daily.   Discontinued Medications    BUDESONIDE (PULMICORT) 0.5 MG/2 ML NEBULIZER SOLUTION    EMPTY CONTENTS OF 1 VIAL INTO NASAL IRRIGATION SYSTEM, ADD DISTILLED WATER, SALT PACK, MIX & IRRIGATE. PERFORM 1-2 TIMES DAILY       Allergies   Review of patient's allergies indicates:  No Known Allergies      Review of Systems (Pertinent positives)  Review of Systems   Constitutional:  Negative for chills, fever and unexpected weight change.   HENT:  Positive for congestion. Negative for trouble swallowing.    Eyes:  Negative for visual disturbance.   Respiratory:  Positive for cough (Chronic, stable).  "Negative for shortness of breath.    Cardiovascular:  Negative for chest pain.   Gastrointestinal:  Negative for blood in stool, constipation and diarrhea.   Genitourinary:  Negative for dysuria and hematuria.   Skin:  Negative for wound.   Allergic/Immunologic: Positive for environmental allergies (stable).   Neurological:  Negative for dizziness and syncope.   Psychiatric/Behavioral:  Negative for sleep disturbance.        /70 (BP Location: Right arm, Patient Position: Sitting)   Pulse 72   Temp 98.9 °F (37.2 °C) (Oral)   Ht 5' 7" (1.702 m)   Wt 73.4 kg (161 lb 13.1 oz)   SpO2 95%   BMI 25.34 kg/m²     Physical Exam  Vitals reviewed.   Constitutional:       General: He is not in acute distress.  HENT:      Head: Normocephalic and atraumatic.      Right Ear: External ear normal.      Left Ear: External ear normal.      Nose: Nose normal.      Mouth/Throat:      Pharynx: No oropharyngeal exudate.   Eyes:      General: No scleral icterus.     Conjunctiva/sclera: Conjunctivae normal.   Cardiovascular:      Rate and Rhythm: Normal rate and regular rhythm.      Pulses:           Dorsalis pedis pulses are 2+ on the right side and 2+ on the left side.        Posterior tibial pulses are 2+ on the right side and 2+ on the left side.      Heart sounds: Normal heart sounds.   Pulmonary:      Effort: Pulmonary effort is normal.      Breath sounds: Examination of the right-lower field reveals rales. Examination of the left-lower field reveals rales. Decreased breath sounds, rhonchi (scattered) and rales present. No wheezing.   Abdominal:      General: Bowel sounds are normal. There is no distension.      Palpations: Abdomen is soft.      Tenderness: There is no abdominal tenderness.   Musculoskeletal:         General: Normal range of motion.      Right lower leg: No edema.      Left lower leg: No edema.      Right foot: Normal range of motion. No deformity.      Left foot: Normal range of motion. No deformity. "   Feet:      Right foot:      Protective Sensation: 10 sites tested.  10 sites sensed.      Skin integrity: No ulcer, blister, skin breakdown, erythema, warmth, callus or dry skin.      Left foot:      Protective Sensation: 10 sites tested.  10 sites sensed.      Skin integrity: No ulcer, blister, skin breakdown, erythema, warmth, callus or dry skin.   Lymphadenopathy:      Cervical: No cervical adenopathy.   Skin:     General: Skin is warm and dry.      Findings: No rash.   Neurological:      General: No focal deficit present.      Mental Status: He is alert and oriented to person, place, and time.   Psychiatric:         Mood and Affect: Mood normal.         Behavior: Behavior normal.           Assessment/Plan:  Scooter Morrissey is a 74 y.o. male who presents today for :        ICD-10-CM ICD-9-CM    1. Routine general medical examination at a health care facility  Z00.00 V70.0       2. BMI 25.0-25.9,adult  Z68.25 V85.21       3. Type 2 diabetes mellitus with microalbuminuria  E11.29 250.40 Hemoglobin A1C    R80.9 791.0 Comprehensive Metabolic Panel      Lipid Panel      CBC Auto Differential      Microalbumin/Creatinine Ratio, Urine      4. Aortic atherosclerosis  I70.0 440.0       5. Essential hypertension  I10 401.9 Hemoglobin A1C      Comprehensive Metabolic Panel      Lipid Panel      CBC Auto Differential      Microalbumin/Creatinine Ratio, Urine      6. Mixed hyperlipidemia  E78.2 272.2 Hemoglobin A1C      Comprehensive Metabolic Panel      Lipid Panel      CBC Auto Differential      Microalbumin/Creatinine Ratio, Urine      7. Asthma-COPD overlap syndrome  J44.89 493.20 albuterol (VENTOLIN HFA) 90 mcg/actuation inhaler      sodium chloride 0.9 % SprA      8. Bronchiectasis without complication  J47.9 494.0 albuterol (VENTOLIN HFA) 90 mcg/actuation inhaler      9. Anemia, unspecified type  D64.9 285.9       10. H/O pleural empyema  Z87.09 V12.69       11. Pneumonia due to COVID-19 virus  U07.1 480.8     J12.82  079.89       12. Medication management  Z79.899 V58.69 Hemoglobin A1C      Comprehensive Metabolic Panel      Lipid Panel      CBC Auto Differential      Microalbumin/Creatinine Ratio, Urine        ADVISED ON DIET/EXERCISE/SLEEP, ROUTINE EYE/DENTAL EXAMS, AND THE IMPORTANCE OF KEEPING UP WITH APPROPRIATE SCREENING TESTS BASED ON AGE AND RISK FACTORS.  IMMUNIZATIONS REVIEWED:  SEASONAL FLU SHOT & UPDATED COVID -19 SHOT DECLINED, SHINGRIX & PNEUMONIA VACCINES COMPLETED, RSV VACCINE COMPLETED 6/3/24.   NEXT COLONOSCOPY DUE 6/2026     OVERWEIGHT BMI:  Advised on continued attention to low carb diet, regular exercise, continue Metformin 500 XR daily-- current A1c 5.6 and down 5 lbs. Recheck in 6 months.     CHRONIC CONTROLLED TYPE 2 DIABETES W/ MILD MICROALBUMINURIA:  A1C IMPROVED ON METFORMIN 500 XR DAILY & CURRENTLY 5.6 with recent decrease in urine microalbumin. Blood pressure is also controlled and he is on an ARB.  Most recent kidney function now normal--continued increased attention to hydration.  Recheck A1c/ CMP/urine microalbumin in 6 months.  Eye exam UTD   Foot Exam due 2/9/24.      HTN: controlled on Toprol , Amlodipine 10 and Losartan 100- ongoing monitoring     ALLERGIC RHINITIS, CHRONIC SINUSITIS:  Significantly improved following ethmoidectomy/sinus surgery with ENT Dr. Simmons 6/30/22, Continuing Xyzal/ nasal saline and flonse, PULMICORT RINSES WHEN ALLERGY SEASONS ARE HIGH.       ASTHMA/COPD/ BRONCHIECTASIS w/ HISTORY OF COVID PNEUMONIA AND PRIOR HISTORY OF PLEURAL EMPYEMA:  Has some chronic crackles/rhonchi/wheezes on his lung exam, but breathing and exercise tolerance are overall stable to improved.  Continue Symbicort daily.  Continue regular exercise. Previously followed with Dr. Guerrier pulmonology.  Albuterol, nebulizer p.r.n., monitors his oxygen level and has had no concerns.  Has not needed albuterol recently      HYPERLIPIDEMIA WITH AORTIC ATHEROSCLEROSIS:  Remaining tobacco free, blood  pressure controlled, LDL at goal on atorvastatin 40-- recheck in 6 months.  He is now on a fish oil supplement.     ANEMIA: chronic mild and stable w/ recent improvement in HCT to 38.  B12/folate, iron studies unremarkable & renal function now WNL w/ EGFR >60 -- ongoing monitoring.            There are no Patient Instructions on file for this visit.      Follow up in about 6 months (around 6/9/2025) for to follow up on lab results, return as needed for new concerns.

## 2024-12-09 ENCOUNTER — OFFICE VISIT (OUTPATIENT)
Dept: FAMILY MEDICINE | Facility: CLINIC | Age: 74
End: 2024-12-09
Payer: MEDICARE

## 2024-12-09 VITALS
HEART RATE: 72 BPM | HEIGHT: 67 IN | OXYGEN SATURATION: 95 % | WEIGHT: 161.81 LBS | SYSTOLIC BLOOD PRESSURE: 122 MMHG | DIASTOLIC BLOOD PRESSURE: 70 MMHG | BODY MASS INDEX: 25.4 KG/M2 | TEMPERATURE: 99 F

## 2024-12-09 DIAGNOSIS — Z00.00 ROUTINE GENERAL MEDICAL EXAMINATION AT A HEALTH CARE FACILITY: Primary | ICD-10-CM

## 2024-12-09 DIAGNOSIS — E11.29 TYPE 2 DIABETES MELLITUS WITH MICROALBUMINURIA: ICD-10-CM

## 2024-12-09 DIAGNOSIS — J12.82 PNEUMONIA DUE TO COVID-19 VIRUS: ICD-10-CM

## 2024-12-09 DIAGNOSIS — D64.9 ANEMIA, UNSPECIFIED TYPE: ICD-10-CM

## 2024-12-09 DIAGNOSIS — I70.0 AORTIC ATHEROSCLEROSIS: ICD-10-CM

## 2024-12-09 DIAGNOSIS — E78.2 MIXED HYPERLIPIDEMIA: Chronic | ICD-10-CM

## 2024-12-09 DIAGNOSIS — J47.9 BRONCHIECTASIS WITHOUT COMPLICATION: ICD-10-CM

## 2024-12-09 DIAGNOSIS — R80.9 TYPE 2 DIABETES MELLITUS WITH MICROALBUMINURIA: ICD-10-CM

## 2024-12-09 DIAGNOSIS — Z79.899 MEDICATION MANAGEMENT: ICD-10-CM

## 2024-12-09 DIAGNOSIS — Z87.09 H/O PLEURAL EMPYEMA: ICD-10-CM

## 2024-12-09 DIAGNOSIS — U07.1 PNEUMONIA DUE TO COVID-19 VIRUS: ICD-10-CM

## 2024-12-09 DIAGNOSIS — I10 ESSENTIAL HYPERTENSION: Chronic | ICD-10-CM

## 2024-12-09 DIAGNOSIS — J44.89 ASTHMA-COPD OVERLAP SYNDROME: ICD-10-CM

## 2024-12-09 PROCEDURE — 99999 PR PBB SHADOW E&M-EST. PATIENT-LVL IV: CPT | Mod: PBBFAC,HCNC,, | Performed by: FAMILY MEDICINE

## 2024-12-09 PROCEDURE — 3078F DIAST BP <80 MM HG: CPT | Mod: HCNC,CPTII,S$GLB, | Performed by: FAMILY MEDICINE

## 2024-12-09 PROCEDURE — 4010F ACE/ARB THERAPY RXD/TAKEN: CPT | Mod: HCNC,CPTII,S$GLB, | Performed by: FAMILY MEDICINE

## 2024-12-09 PROCEDURE — 3060F POS MICROALBUMINURIA REV: CPT | Mod: HCNC,CPTII,S$GLB, | Performed by: FAMILY MEDICINE

## 2024-12-09 PROCEDURE — 99397 PER PM REEVAL EST PAT 65+ YR: CPT | Mod: HCNC,S$GLB,, | Performed by: FAMILY MEDICINE

## 2024-12-09 PROCEDURE — 1159F MED LIST DOCD IN RCRD: CPT | Mod: HCNC,CPTII,S$GLB, | Performed by: FAMILY MEDICINE

## 2024-12-09 PROCEDURE — 1126F AMNT PAIN NOTED NONE PRSNT: CPT | Mod: HCNC,CPTII,S$GLB, | Performed by: FAMILY MEDICINE

## 2024-12-09 PROCEDURE — 1101F PT FALLS ASSESS-DOCD LE1/YR: CPT | Mod: HCNC,CPTII,S$GLB, | Performed by: FAMILY MEDICINE

## 2024-12-09 PROCEDURE — 3044F HG A1C LEVEL LT 7.0%: CPT | Mod: HCNC,CPTII,S$GLB, | Performed by: FAMILY MEDICINE

## 2024-12-09 PROCEDURE — 1160F RVW MEDS BY RX/DR IN RCRD: CPT | Mod: HCNC,CPTII,S$GLB, | Performed by: FAMILY MEDICINE

## 2024-12-09 PROCEDURE — 3074F SYST BP LT 130 MM HG: CPT | Mod: HCNC,CPTII,S$GLB, | Performed by: FAMILY MEDICINE

## 2024-12-09 PROCEDURE — 3288F FALL RISK ASSESSMENT DOCD: CPT | Mod: HCNC,CPTII,S$GLB, | Performed by: FAMILY MEDICINE

## 2024-12-09 PROCEDURE — 3066F NEPHROPATHY DOC TX: CPT | Mod: HCNC,CPTII,S$GLB, | Performed by: FAMILY MEDICINE

## 2024-12-09 RX ORDER — MELATONIN 10 MG
2 TABLET, SUBLINGUAL SUBLINGUAL DAILY
Qty: 60 TABLET | Refills: 11 | Status: SHIPPED | OUTPATIENT
Start: 2024-12-09 | End: 2025-12-09

## 2024-12-09 RX ORDER — ALBUTEROL SULFATE 90 UG/1
2 INHALANT RESPIRATORY (INHALATION) EVERY 4 HOURS PRN
Qty: 18 G | Refills: 11 | Status: SHIPPED | OUTPATIENT
Start: 2024-12-09

## 2024-12-21 NOTE — ASSESSMENT & PLAN NOTE
Steroids, breathing tx  abx- Rocephin and Z max    Will reduce Solumedrol to q 12 hours     [facility]

## 2025-02-04 NOTE — TELEPHONE ENCOUNTER
----- Message from Amy sent at 2/4/2025  2:27 PM CST -----  Regarding: Refill  Contact: [phone]  Type:  Needs Medical Advice    Who Called: Scooter  Pharm[facility] name and phone #:  [facility] [phone]  Would the patient rather a call back or a response via [facility]? Call  Best Call Back Number: [phone]  Additional Information:  Refill - levocetirizine (XYZAL) 5 MG tablet

## 2025-02-05 RX ORDER — LEVOCETIRIZINE DIHYDROCHLORIDE 5 MG/1
5 TABLET, FILM COATED ORAL NIGHTLY
Qty: 30 TABLET | Refills: 11 | Status: SHIPPED | OUTPATIENT
Start: 2025-02-05 | End: 2026-02-05

## 2025-02-21 DIAGNOSIS — Z00.00 ENCOUNTER FOR MEDICARE ANNUAL WELLNESS EXAM: ICD-10-CM

## 2025-04-30 DIAGNOSIS — I10 BENIGN ESSENTIAL HYPERTENSION: ICD-10-CM

## 2025-04-30 DIAGNOSIS — E78.2 MIXED HYPERLIPIDEMIA: Chronic | ICD-10-CM

## 2025-04-30 DIAGNOSIS — R80.9 TYPE 2 DIABETES MELLITUS WITH MICROALBUMINURIA, UNSPECIFIED WHETHER LONG TERM INSULIN USE: ICD-10-CM

## 2025-04-30 DIAGNOSIS — E11.29 TYPE 2 DIABETES MELLITUS WITH MICROALBUMINURIA, UNSPECIFIED WHETHER LONG TERM INSULIN USE: ICD-10-CM

## 2025-04-30 RX ORDER — ATORVASTATIN CALCIUM 40 MG/1
40 TABLET, FILM COATED ORAL NIGHTLY
Qty: 90 TABLET | Refills: 2 | Status: SHIPPED | OUTPATIENT
Start: 2025-04-30

## 2025-04-30 NOTE — TELEPHONE ENCOUNTER
Care Due:                  Date            Visit Type   Department     Provider  --------------------------------------------------------------------------------                                EP -                              PRIMARY      [facility] FAMILY  Last Visit: 12-      CARE ([facility])   THOMAS Rojas                              EP -                              PRIMARY      [facility] INTERNAL  Next Visit: 06-      CARE ([facility])   THOMAS Rojas                                                            Last  Test          Frequency    Reason                     Performed    Due Date  --------------------------------------------------------------------------------    HBA1C.......  6 months...  metFORMIN................  12- 06-    [facility] Embedded Care Due Messages. Reference number: [id number].   4/30/2025 6:37:12 PM CDT

## 2025-05-01 RX ORDER — METFORMIN HYDROCHLORIDE 500 MG/1
TABLET, EXTENDED RELEASE ORAL
Qty: 90 TABLET | Refills: 0 | Status: SHIPPED | OUTPATIENT
Start: 2025-05-01

## 2025-05-01 RX ORDER — LOSARTAN POTASSIUM 100 MG/1
100 TABLET ORAL
Qty: 90 TABLET | Refills: 2 | Status: SHIPPED | OUTPATIENT
Start: 2025-05-01

## 2025-05-01 NOTE — TELEPHONE ENCOUNTER
Refill Routing Note   Medication(s) are not appropriate for processing by [facility] for the following reason(s):        Drug-disease interaction    [facility] action(s):  Defer  Approve        Medication Therapy Plan: FLOS; Drug-Disease: metFORMIN and Hepatic cyst; Drug-Disease: losartan and Hepatic cyst    Pharmacist review requested: Yes     Appointments  past 12m or future 3m with PCP    Date Provider   Last Visit   12/9/2024 Katelynn Rojas MD   Next Visit   6/10/2025 Katelynn Rojas MD   ED visits in past 90 days: 0        Note composed:11:40 PM 04/30/2025

## 2025-05-01 NOTE — TELEPHONE ENCOUNTER
Scooter Morrissey  is requesting a refill authorization.  Brief Assessment and Rationale for Refill:  Approve     Medication Therapy Plan:  FLOS;      Pharmacist review requested: Yes   Extended chart review required: Yes   Comments:     Note composed:5:04 AM 05/01/2025

## 2025-05-19 DIAGNOSIS — I10 BENIGN ESSENTIAL HYPERTENSION: ICD-10-CM

## 2025-05-19 NOTE — TELEPHONE ENCOUNTER
No care due was identified.  [facility] Embedded Care Due Messages. Reference number: [id number].   5/19/2025 5:24:53 AM CDT

## 2025-05-20 RX ORDER — AMLODIPINE BESYLATE 10 MG/1
10 TABLET ORAL DAILY
Qty: 90 TABLET | Refills: 1 | Status: SHIPPED | OUTPATIENT
Start: 2025-05-20

## 2025-05-20 NOTE — TELEPHONE ENCOUNTER
Refill Routing Note   Medication(s) are not appropriate for processing by [facility] for the following reason(s):        Drug-disease interaction: Drug-Disease: amLODIPine and Hepatic cyst     [facility] action(s):  Defer           Pharmacist review requested: Yes     Appointments  past 12m or future 3m with PCP    Date Provider   Last Visit   12/9/2024 Katelynn Rojas MD   Next Visit   6/10/2025 Katelynn Rojas MD   ED visits in past 90 days: 0        Note composed:11:53 PM 05/19/2025

## 2025-05-20 NOTE — TELEPHONE ENCOUNTER
Scooter Morrissey  is requesting a refill authorization.  Brief Assessment and Rationale for Refill:  Approve     Medication Therapy Plan:         Pharmacist review requested: Yes   Extended chart review required: Yes   Comments:     Note composed:1:47 AM 05/20/2025

## 2025-06-03 ENCOUNTER — LAB VISIT (OUTPATIENT)
Dept: LAB | Facility: HOSPITAL | Age: 75
End: 2025-06-03
Attending: SPECIALIST
Payer: MEDICARE

## 2025-06-03 ENCOUNTER — RESULTS FOLLOW-UP (OUTPATIENT)
Dept: INTERNAL MEDICINE | Facility: CLINIC | Age: 75
End: 2025-06-03

## 2025-06-03 DIAGNOSIS — E78.2 MIXED HYPERLIPIDEMIA: ICD-10-CM

## 2025-06-03 DIAGNOSIS — I10 ESSENTIAL HYPERTENSION: ICD-10-CM

## 2025-06-03 DIAGNOSIS — Z79.899 MEDICATION MANAGEMENT: ICD-10-CM

## 2025-06-03 DIAGNOSIS — R80.9 TYPE 2 DIABETES MELLITUS WITH MICROALBUMINURIA: ICD-10-CM

## 2025-06-03 DIAGNOSIS — E11.29 TYPE 2 DIABETES MELLITUS WITH MICROALBUMINURIA: ICD-10-CM

## 2025-06-03 LAB
ABSOLUTE EOSINOPHIL (OHS): 0.18 K/UL
ABSOLUTE MONOCYTE (OHS): 0.44 K/UL (ref 0.3–1)
ABSOLUTE NEUTROPHIL COUNT (OHS): 4.54 K/UL (ref 1.8–7.7)
ALBUMIN SERPL BCP-MCNC: 3.2 G/DL (ref 3.5–5.2)
ALBUMIN/CREAT UR: 10.8 UG/MG
ALP SERPL-CCNC: 62 UNIT/L (ref 40–150)
ALT SERPL W/O P-5'-P-CCNC: 31 UNIT/L (ref 10–44)
ANION GAP (OHS): 6 MMOL/L (ref 8–16)
AST SERPL-CCNC: 29 UNIT/L (ref 11–45)
BASOPHILS # BLD AUTO: 0.03 K/UL
BASOPHILS NFR BLD AUTO: 0.4 %
BILIRUB SERPL-MCNC: 0.3 MG/DL (ref 0.1–1)
BUN SERPL-MCNC: 20 MG/DL (ref 8–23)
CALCIUM SERPL-MCNC: 8.8 MG/DL (ref 8.7–10.5)
CHLORIDE SERPL-SCNC: 109 MMOL/L (ref 95–110)
CHOLEST SERPL-MCNC: 82 MG/DL (ref 120–199)
CHOLEST/HDLC SERPL: 4.3 {RATIO} (ref 2–5)
CO2 SERPL-SCNC: 21 MMOL/L (ref 23–29)
CREAT SERPL-MCNC: 1.2 MG/DL (ref 0.5–1.4)
CREAT UR-MCNC: 93 MG/DL (ref 23–375)
EAG (OHS): 114 MG/DL (ref 68–131)
ERYTHROCYTE [DISTWIDTH] IN BLOOD BY AUTOMATED COUNT: 14.3 % (ref 11.5–14.5)
GFR SERPLBLD CREATININE-BSD FMLA CKD-EPI: >60 ML/MIN/1.73/M2
GLUCOSE SERPL-MCNC: 90 MG/DL (ref 70–110)
HBA1C MFR BLD: 5.6 % (ref 4–5.6)
HCT VFR BLD AUTO: 36.3 % (ref 40–54)
HDLC SERPL-MCNC: 19 MG/DL (ref 40–75)
HDLC SERPL: 23.2 % (ref 20–50)
HGB BLD-MCNC: 11.9 GM/DL (ref 14–18)
IMM GRANULOCYTES # BLD AUTO: 0.07 K/UL (ref 0–0.04)
IMM GRANULOCYTES NFR BLD AUTO: 0.9 % (ref 0–0.5)
LDLC SERPL CALC-MCNC: 48.4 MG/DL (ref 63–159)
LYMPHOCYTES # BLD AUTO: 2.57 K/UL (ref 1–4.8)
MCH RBC QN AUTO: 32.8 PG (ref 27–31)
MCHC RBC AUTO-ENTMCNC: 32.8 G/DL (ref 32–36)
MCV RBC AUTO: 100 FL (ref 82–98)
MICROALBUMIN UR-MCNC: 10 UG/ML (ref ?–5000)
NONHDLC SERPL-MCNC: 63 MG/DL
NUCLEATED RBC (/100WBC) (OHS): 0 /100 WBC
PLATELET # BLD AUTO: ABNORMAL 10*3/UL
PLATELET BLD QL SMEAR: ABNORMAL
PMV BLD AUTO: 12.2 FL (ref 9.2–12.9)
POTASSIUM SERPL-SCNC: 4.3 MMOL/L (ref 3.5–5.1)
PROT SERPL-MCNC: 7.8 GM/DL (ref 6–8.4)
RBC # BLD AUTO: 3.63 M/UL (ref 4.6–6.2)
RELATIVE EOSINOPHIL (OHS): 2.3 %
RELATIVE LYMPHOCYTE (OHS): 32.8 % (ref 18–48)
RELATIVE MONOCYTE (OHS): 5.6 % (ref 4–15)
RELATIVE NEUTROPHIL (OHS): 58 % (ref 38–73)
SODIUM SERPL-SCNC: 136 MMOL/L (ref 136–145)
TRIGL SERPL-MCNC: 73 MG/DL (ref 30–150)
WBC # BLD AUTO: 7.83 K/UL (ref 3.9–12.7)

## 2025-06-03 PROCEDURE — 82570 ASSAY OF URINE CREATININE: CPT

## 2025-06-03 PROCEDURE — 82040 ASSAY OF SERUM ALBUMIN: CPT

## 2025-06-03 PROCEDURE — 36415 COLL VENOUS BLD VENIPUNCTURE: CPT

## 2025-06-03 PROCEDURE — 83036 HEMOGLOBIN GLYCOSYLATED A1C: CPT

## 2025-06-03 PROCEDURE — 85025 COMPLETE CBC W/AUTO DIFF WBC: CPT

## 2025-06-03 PROCEDURE — 80061 LIPID PANEL: CPT

## 2025-06-10 ENCOUNTER — OFFICE VISIT (OUTPATIENT)
Dept: INTERNAL MEDICINE | Facility: CLINIC | Age: 75
End: 2025-06-10
Payer: MEDICARE

## 2025-06-10 VITALS
SYSTOLIC BLOOD PRESSURE: 120 MMHG | BODY MASS INDEX: 26.12 KG/M2 | OXYGEN SATURATION: 95 % | DIASTOLIC BLOOD PRESSURE: 62 MMHG | WEIGHT: 162.5 LBS | TEMPERATURE: 98 F | HEART RATE: 76 BPM | HEIGHT: 66 IN

## 2025-06-10 DIAGNOSIS — D64.9 ANEMIA, UNSPECIFIED TYPE: ICD-10-CM

## 2025-06-10 DIAGNOSIS — J30.89 NON-SEASONAL ALLERGIC RHINITIS, UNSPECIFIED TRIGGER: ICD-10-CM

## 2025-06-10 DIAGNOSIS — Z79.899 MEDICATION MANAGEMENT: ICD-10-CM

## 2025-06-10 DIAGNOSIS — J44.89 ASTHMA-COPD OVERLAP SYNDROME: ICD-10-CM

## 2025-06-10 DIAGNOSIS — E11.9 CONTROLLED TYPE 2 DIABETES MELLITUS WITHOUT COMPLICATION, WITHOUT LONG-TERM CURRENT USE OF INSULIN: Primary | ICD-10-CM

## 2025-06-10 DIAGNOSIS — H90.3 SENSORINEURAL HEARING LOSS (SNHL) OF BOTH EARS: ICD-10-CM

## 2025-06-10 DIAGNOSIS — Z98.890 HISTORY OF SINUS SURGERY: ICD-10-CM

## 2025-06-10 DIAGNOSIS — H93.8X3 CLOGGED EAR, BILATERAL: ICD-10-CM

## 2025-06-10 DIAGNOSIS — Z12.5 SCREENING FOR MALIGNANT NEOPLASM OF PROSTATE: ICD-10-CM

## 2025-06-10 DIAGNOSIS — I70.0 AORTIC ATHEROSCLEROSIS: ICD-10-CM

## 2025-06-10 DIAGNOSIS — E78.2 MIXED HYPERLIPIDEMIA: Chronic | ICD-10-CM

## 2025-06-10 DIAGNOSIS — I10 ESSENTIAL HYPERTENSION: Chronic | ICD-10-CM

## 2025-06-10 DIAGNOSIS — J47.9 BRONCHIECTASIS WITHOUT COMPLICATION: ICD-10-CM

## 2025-06-10 DIAGNOSIS — E66.3 OVERWEIGHT (BMI 25.0-29.9): ICD-10-CM

## 2025-06-10 PROBLEM — Z79.4 CONTROLLED TYPE 2 DIABETES MELLITUS WITHOUT COMPLICATION, WITH LONG-TERM CURRENT USE OF INSULIN: Status: ACTIVE | Noted: 2019-03-12

## 2025-06-10 PROCEDURE — 99999 PR PBB SHADOW E&M-EST. PATIENT-LVL V: CPT | Mod: PBBFAC,,, | Performed by: FAMILY MEDICINE

## 2025-06-10 RX ORDER — FEXOFENADINE HCL AND PSEUDOEPHEDRINE HCL 180; 240 MG/1; MG/1
1 TABLET, EXTENDED RELEASE ORAL EVERY MORNING
Qty: 10 TABLET | Refills: 0 | Status: SHIPPED | OUTPATIENT
Start: 2025-06-10 | End: 2025-06-20

## 2025-06-10 NOTE — PROGRESS NOTES
Office Visit    Patient Name: Scooter Morrissey    : 1950  MRN: [id number]    Subjective:  Scooter is a 74 y.o. male who presents today for:    Follow-up (6m, pt stated that he feels like his ears needs to pop but he has not had the pop. Has not been able to hear very well lately. Sounds like people are whispering to him. )    MOST RECENT OV w/ Me annual physical 2024 Cataract Eval w/ Dr Mendieta     FOLLOWED UP w/ ENT DR. ARAUJO  24 (Sinus surgery 22):  ADVISED TO CONTINUE XYZAL, NEILMED SALINE SINUS RINSE & FLONASE  PRIOR AUDIOGRAM IN 2019     Scooter Morrissey is a 74 year old male with past medical history of COPD/ Asthma, HTN, HLD, previous COVID-19 Infection (2021).   He was diagnosed with type 2 diabetes with an A1c of 6.5 on 2022, urine microalbumin of 72.     He presents today for six-month follow-up with lab review and monitoring of chronic conditions.     LABS PRIOR TO OFFICE VISIT 2025 with stable improvement in A1c to 5.6, unremarkable CMP with normal liver and kidney function, lipids with LDL 48.  Normal urine microalbumin.  CBC w/ HCT 36.6- mild anemia with chronic macrocytosis w/ B12 490(Folate 14 24), Vit D 60 on 2,000 U D3 daily, TSH 1.9.    Diabetes: Taking Metformin 500 XR daily.    HTN: Takes medications regularly- Toprol , Amlodipine 10 and Losartan 100-- Home BPs in goal range   HLD: He is taking atorvastatin 40 regularly and added a Fish oil supplement  COPD/Asthma: He is taking Symbicort regularly AM and PM, and he has not recently needed albuterol.  Allergies/Chronic Sinusitis:  Noted significant improvement following ethmoidectomy/sinus surgery per ENT Dr. Araujo 22.  Continuing Xyzal, nasal saline, and Flonase.  Breathing well through his nose.  Allergy symptoms generally improve after spring season.  CURRENTLY HE DOES COMPLAIN OF HIS EARS FEELING CLOGGED.  HE REPORTS FEELING LIKE THEY NEED TO POP.      He does not smoke or drink alcohol.       No acute complaints apart from sensation of ear clogging. no recent need for rescue inhaler.   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       GENERAL LIFESTYLE HABITS:   Diet:  More mindful of sugar intake and trying to substitute complex carbs, trying to drink 1/2 gallon of water daily and fewer sugary drinks like Cokes & gatorade. Needs to be more mindful of salt.   Exercise: remains very active with grass [employer]/ lawn care business-- about 8-10 lawns per week but no additional exercise  Sleep: 8+ hours, feels rested in the morning though does go to be frequently after midnight  Weight:  Lost about a total of 10 lb with starting metformin-current BMI stable at 26     Immunizations: PREVNAR 13 4/10/15, PNEUMOVAX 23 11/29/17, TDaP 4/5/2016, SHINGRIX 2/2 11/6/23, YEARLY FLU SHOT DECLINED, MODERNA INITIAL COVID VACCINE COMPLETED & UPDATED FALL 2024 COVID-19 VACCINE ADVISED BUt DECLINED. RSV VACCINE COMPLETED 6/3/24     Screening Tests: AAA SCREENING 5/17/21, Hep C Screen (-) 3/9/22, Colonoscopy 6/1/16- REPEAT 10 YEARS(6/2026), last PSA  0.43 10/4/19     Eye/Dental: Eye Exam Optometry Dr Napier 2/12/24-- no retinopathy, Dental-- has upper full dentures lower partial and has not been to the dentist in awhile.      Cataract Eval with Ophthalmology 11/11/24       PAST MEDICAL HISTORY, SURGICAL/SOCIAL/FAMILY HISTORY REVIEWED AS PER CHART, WITH PERTINENT FINDINGS INCLUDED IN HISTORY SECTION OF NOTE.     Current Medications    Medication List with Changes/Refills   New Medications    FEXOFENADINE-PSEUDOEPHEDRINE (ALLEGRA-D 24 HOUR) 180-240 MG PER 24 HR TABLET    Take 1 tablet by mouth every morning. for 10 days   Current Medications    ALBUTEROL (VENTOLIN HFA) 90 MCG/ACTUATION INHALER    Inhale 2 puffs into the lungs every 4 (four) hours as needed for Wheezing or Shortness of Breath. Rescue    AMLODIPINE (NORVASC) 10 MG TABLET    Take 1 tablet (10 mg total) by mouth once daily.    ASCORBIC ACID, VITAMIN C, (VITAMIN C)  500 MG TABLET    Take 1 tablet (500 mg total) by mouth 2 (two) times daily.    ATORVASTATIN (LIPITOR) 40 MG TABLET    TAKE 1 TABLET(40 MG) BY MOUTH EVERY EVENING    BUDESONIDE-FORMOTEROL 160-4.5 MCG (SYMBICORT) 160-4.5 MCG/ACTUATION HFAA    INHALE 2 PUFFS INTO THE LUNGS BY MOUTH TWICE DAILY    CALCIUM-VITAMIN D3 (OS-SALLY 500 + D3) 500 MG-5 MCG (200 UNIT) PER TABLET    Take 2 tablets by mouth once daily.    FLUTICASONE PROPIONATE (FLONASE) 50 MCG/ACTUATION NASAL SPRAY    2 sprays (100 mcg total) by Each Nostril route once daily.    LEVOCETIRIZINE (XYZAL) 5 MG TABLET    Take 1 tablet (5 mg total) by mouth every evening.    LOSARTAN (COZAAR) 100 MG TABLET    TAKE 1 TABLET(100 MG) BY MOUTH EVERY DAY    METFORMIN (GLUCOPHAGE-XR) 500 MG ER 24HR TABLET    TAKE 1 TABLET BY MOUTH EVERY DAY WITH DINNER OR EVENING MEAL    METOPROLOL SUCCINATE (TOPROL-XL) 100 MG 24 HR TABLET    Take 1 tablet (100 mg total) by mouth once daily.    MULTIVITAMIN WITH MINERALS TABLET    Take 1 tablet by mouth nightly.     PULSE OXIMETER (PULSE OXIMETER) DEVICE    by Apply Externally route 2 (two) times a day. Use twice daily at 8 AM and 3 PM and [facility] as [facility].    SODIUM CHLORIDE 0.9 % SPRA    1 Application by Nasal route 2 (two) times a day.    VITAMIN D (VITAMIN D3) 1000 UNITS TAB    Take 2,000 Units by mouth once daily.       Allergies   Review of patient's allergies indicates:  No Known Allergies      Review of Systems (Pertinent positives)  Review of Systems   Constitutional:  Negative for chills, fever and unexpected weight change.   HENT:  Negative for congestion and trouble swallowing. Ear pain: ears are clogged.   Eyes:  Negative for visual disturbance.   Respiratory:  Positive for cough (mild nocturnal, chronic & stable). Negative for shortness of breath.    Cardiovascular:  Negative for chest pain.   Gastrointestinal:  Negative for blood in stool, constipation and diarrhea.   Genitourinary:  Negative for dysuria  "and hematuria.   Musculoskeletal:  Negative for arthralgias and back pain.   Skin:  Negative for wound.   Allergic/Immunologic: Positive for environmental allergies (stable).   Neurological:  Negative for dizziness and syncope.   Psychiatric/Behavioral:  Negative for sleep disturbance.        /62 (BP Location: Left arm, Patient Position: Sitting)   Pulse 76   Temp 98.3 °F (36.8 °C)   Ht 5' 6" (1.676 m)   Wt 73.7 kg (162 lb 7.7 oz)   SpO2 95%   BMI 26.22 kg/m²     Physical Exam  Vitals reviewed.   Constitutional:       General: He is not in acute distress.     Appearance: He is well-developed.   HENT:      Head: Normocephalic and atraumatic.      Right Ear: No drainage. A middle ear effusion is present. Tympanic membrane is not erythematous or bulging.      Left Ear: No drainage. A middle ear effusion is present. Tympanic membrane is not erythematous or bulging.      Ears:      Comments: Both TMs are cloudy with loss of landmarks/light reflex but no redness or bulging  Eyes:      Conjunctiva/sclera: Conjunctivae normal.   Cardiovascular:      Rate and Rhythm: Normal rate and regular rhythm.   Pulmonary:      Effort: Pulmonary effort is normal.      Breath sounds: Examination of the right-lower field reveals rales. Examination of the left-lower field reveals rales. Decreased breath sounds, rhonchi (scattered) and rales present.   Musculoskeletal:      Right lower leg: No edema.      Left lower leg: No edema.   Skin:     General: Skin is warm and dry.   Neurological:      General: No focal deficit present.      Mental Status: He is alert and oriented to person, place, and time.   Psychiatric:         Mood and Affect: Mood normal.         Behavior: Behavior normal.           Assessment/Plan:  Scooter Morrissey is a 74 y.o. male who presents today for :        ICD-10-CM ICD-9-CM    1. Controlled type 2 diabetes mellitus without complication, without long-term current use of insulin  E11.9 250.00 Hemoglobin A1C     "  Comprehensive Metabolic Panel      Lipid Panel      CBC Auto Differential      TSH      Magnesium      Vitamin B12      Ferritin      Iron and TIBC      Vitamin D      2. Overweight (BMI 25.0-29.9)  E66.3 278.02       3. Essential hypertension  I10 401.9 Hemoglobin A1C      Comprehensive Metabolic Panel      Lipid Panel      CBC Auto Differential      TSH      Magnesium      Vitamin B12      Ferritin      Iron and TIBC      Vitamin D      4. Anemia, unspecified type  D64.9 285.9 Hemoglobin A1C      Comprehensive Metabolic Panel      Lipid Panel      CBC Auto Differential      TSH      Magnesium      Vitamin B12      Ferritin      Iron and TIBC      Vitamin D      5. Bronchiectasis without complication  J47.9 494.0       6. Clogged ear, bilateral  H93.8X3 388.8 Ambulatory referral/consult to ENT      Ambulatory referral/consult to Audiology      fexofenadine-pseudoephedrine (ALLEGRA-D 24 HOUR) 180-240 mg per 24 hr tablet      7. History of sinus surgery  Z98.890 V45.89 Ambulatory referral/consult to ENT      Ambulatory referral/consult to Audiology      8. Non-seasonal allergic rhinitis, unspecified trigger  J30.89 477.8 fexofenadine-pseudoephedrine (ALLEGRA-D 24 HOUR) 180-240 mg per 24 hr tablet      9. Asthma-COPD overlap syndrome  J44.89 493.20       10. Aortic atherosclerosis  I70.0 440.0       11. Mixed hyperlipidemia  E78.2 272.2 Hemoglobin A1C      Comprehensive Metabolic Panel      Lipid Panel      CBC Auto Differential      TSH      12. Sensorineural hearing loss (SNHL) of both ears  H90.3 389.18 Ambulatory referral/consult to ENT      Ambulatory referral/consult to Audiology      13. Screening for malignant neoplasm of prostate  Z12.5 V76.44 PSA, Screening      14. Medication management  Z79.899 V58.69 Hemoglobin A1C      Comprehensive Metabolic Panel      Lipid Panel      CBC Auto Differential      TSH      Magnesium      Vitamin B12      Ferritin      Iron and TIBC      Vitamin D          HEALTH  MAINTENANCE REVIEWED  IMMUNIZATIONS REVIEWED:  SEASONAL FLU SHOT & UPDATED COVID -19 SHOT DECLINED, SHINGRIX & PNEUMONIA VACCINES COMPLETED, RSV VACCINE COMPLETED 6/3/24.   TDaP UTD 4/5/16  NEXT COLONOSCOPY DUE 6/2026     OVERWEIGHT BMI:  Advised on continued attention to low carb diet, regular exercise, continue Metformin 500 XR daily-- current A1c 5.6 and down 5 -10 lbs. Recheck in 6 months.     CHRONIC CONTROLLED TYPE 2 DIABETES W/ MILD MICROALBUMINURIA:  A1C IMPROVED ON METFORMIN 500 XR DAILY & CURRENTLY 5.6 with resolution of previously noted microalbuminuria.     Blood pressure is also controlled and he is on an ARB.  Kidney function now normal--continued increased attention to hydration.  Recheck A1c/ CMP in 6 months.  Eye exam UTD   Foot Exam 12/9/24     HTN: controlled on Toprol , Amlodipine 10 and Losartan 100- ongoing monitoring     ALLERGIC RHINITIS, CHRONIC SINUSITIS, CLOGGED EARS:  Significantly improved following ethmoidectomy/sinus surgery with ENT Dr. Simmons 6/30/22, Continuing Xyzal/ nasal saline and flonse, PULMICORT RINSES WHEN ALLERGY SEASONS ARE HIGH.  RECENTLY COMPLAINS OF CLOGGED EARS AND TMS ARE CLOUDY WITH LOSS OF LANDMARKS BUT NO REDNESS OR BULGING- WILL PRESCRIBE 10 DAYS OF ALLEGRA D SINCE BLOOD PRESSURE IS CONTROLLED TO ADD TO HIS NIGHTLY XYZAL.  REFERRAL FOR ENT FOLLOW-UP ENTERED     ASTHMA/COPD/ BRONCHIECTASIS w/ HISTORY OF COVID PNEUMONIA AND PRIOR HISTORY OF PLEURAL EMPYEMA:  Has some chronic crackles/rhonchi/wheezes on his lung exam, but breathing and exercise tolerance are good and he has no concerns.  Continue Symbicort daily.  Continue regular exercise. Previously followed with Dr. Guerrier pulmonology.  Albuterol, nebulizer p.r.n., monitors his oxygen level and has had no concerns.  Has not needed albuterol recently      HYPERLIPIDEMIA WITH AORTIC ATHEROSCLEROSIS:  Remaining tobacco free, blood pressure controlled, LDL at goal on atorvastatin 40-- recheck in 6 months.  He is  now on a fish oil supplement.     ANEMIA: chronic mild and stable . B12/folate, iron studies unremarkable & renal function now WNL w/ EGFR >60 -- ongoing monitoring.        Visit today included increased complexity associated with the care of the episodic problem EAR CLOGGING addressed and managing the longitudinal care of the patient due to the serious and/or complex managed problem(s) CHRONIC TYPE 2 DIABETES, CHRONIC COPD/ASTHMA/ALLERGIC RHINITIS, CHRONIC HYPERTENSION.      There are no Patient Instructions on file for this visit.      Follow up in about 6 months (around 12/10/2025) for to follow up on lab results, return as needed for new concerns.

## 2025-08-13 DIAGNOSIS — I10 BENIGN ESSENTIAL HYPERTENSION: ICD-10-CM

## 2025-08-13 RX ORDER — METOPROLOL SUCCINATE 100 MG/1
TABLET, EXTENDED RELEASE ORAL
Qty: 90 TABLET | Refills: 3 | Status: SHIPPED | OUTPATIENT
Start: 2025-08-13

## 2025-08-19 DIAGNOSIS — E11.29 TYPE 2 DIABETES MELLITUS WITH MICROALBUMINURIA, UNSPECIFIED WHETHER LONG TERM INSULIN USE: ICD-10-CM

## 2025-08-19 DIAGNOSIS — R80.9 TYPE 2 DIABETES MELLITUS WITH MICROALBUMINURIA, UNSPECIFIED WHETHER LONG TERM INSULIN USE: ICD-10-CM

## 2025-08-20 RX ORDER — METFORMIN HYDROCHLORIDE 500 MG/1
TABLET, EXTENDED RELEASE ORAL
Qty: 90 TABLET | Refills: 1 | Status: SHIPPED | OUTPATIENT
Start: 2025-08-20

## (undated) DEVICE — SUT 4-0 CHROMIC GUT / P-3

## (undated) DEVICE — DRESSING AQUACEL SACRAL 9 X 9

## (undated) DEVICE — SOL NACL 0.9% INJ 500ML BG

## (undated) DEVICE — SYR 10CC LUER LOCK

## (undated) DEVICE — DRESSING TEGADERM 2 3/8 X 2.75

## (undated) DEVICE — KIT INFLATOR BLLN 18

## (undated) DEVICE — ADHESIVE MASTISOL VIAL 48/BX

## (undated) DEVICE — SKINMARKER W/RULER DEVON

## (undated) DEVICE — TOWEL OR DISP STRL BLUE 4/PK

## (undated) DEVICE — SEE MEDLINE ITEM 156934

## (undated) DEVICE — SEE MEDLINE ITEM 156955

## (undated) DEVICE — BLADE TRICUT

## (undated) DEVICE — ELECTRODE REM PLYHSV RETURN 9

## (undated) DEVICE — PACKING NASAL 4CM X 4CM ST

## (undated) DEVICE — SUT SILK 2-0 SH 18IN BLACK

## (undated) DEVICE — KIT CHITOGEL ENDOSCP SNUS SURG

## (undated) DEVICE — POSITIONER HEEL FOAM CONVOLTD

## (undated) DEVICE — SEE L#120831

## (undated) DEVICE — KIT SINUS ENDOSCOPY PACKNG

## (undated) DEVICE — BLLN SEEKER FRONTAL 6X17MM

## (undated) DEVICE — BLLN SEEKER SPHENOID 6X17MM

## (undated) DEVICE — GLOVE BIOGEL ULTRATOUCH 6.5

## (undated) DEVICE — TRACKER PATIENT NON INVASIVE

## (undated) DEVICE — COLLECTION SPECIMEN NEPTUNE

## (undated) DEVICE — SEE MEDLINE ITEM 157116

## (undated) DEVICE — COVER OVERHEAD SURG LT BLUE

## (undated) DEVICE — DRAPE INSTR MAGNETIC 10X16IN

## (undated) DEVICE — SEE MEDLINE ITEM 154981

## (undated) DEVICE — CONTAINER MULTIPURP W/LID 16OZ

## (undated) DEVICE — SUT 2/0 30IN SILK BLK BRAI

## (undated) DEVICE — SEE MEDLINE ITEM 157194

## (undated) DEVICE — PACK HEAD & NECK

## (undated) DEVICE — CLOSURE SKIN STERI STRIP 1/2X4

## (undated) DEVICE — SPONGE DERMACEA 4X4IN 12PLY

## (undated) DEVICE — SOL BSS BALANCED SALT

## (undated) DEVICE — TRACKER ENT INSTRUMENT

## (undated) DEVICE — TUBING SUC UNIV W/CONN 12FT

## (undated) DEVICE — SPONGE NEURO 1/2 X 2

## (undated) DEVICE — BLADE INFERIOR TURBINATE 2MM

## (undated) DEVICE — DRESSING TELFA N ADH 3X8

## (undated) DEVICE — SUT PLN GUT 4-0 SC-1SC-1 1

## (undated) DEVICE — TRAP MUCUS SPECIMEN 40CC

## (undated) DEVICE — SPLINT INTRANASAL STRL SOFT

## (undated) DEVICE — TUBING XPS IRRIG TO STRAIGHTSH

## (undated) DEVICE — NDL HYPO REG 25G X 1 1/2

## (undated) DEVICE — SEE MEDLINE ITEM 157117